# Patient Record
Sex: MALE | Race: WHITE | NOT HISPANIC OR LATINO | Employment: PART TIME | URBAN - METROPOLITAN AREA
[De-identification: names, ages, dates, MRNs, and addresses within clinical notes are randomized per-mention and may not be internally consistent; named-entity substitution may affect disease eponyms.]

---

## 2017-05-05 ENCOUNTER — TRANSCRIBE ORDERS (OUTPATIENT)
Dept: ADMINISTRATIVE | Age: 67
End: 2017-05-05

## 2017-05-05 ENCOUNTER — LAB (OUTPATIENT)
Dept: LAB | Age: 67
End: 2017-05-05
Payer: COMMERCIAL

## 2017-05-05 DIAGNOSIS — E11.319 TYPE 2 DIABETES MELLITUS WITH RETINOPATHY, MACULAR EDEMA PRESENCE UNSPECIFIED, UNSPECIFIED LATERALITY, UNSPECIFIED LONG TERM INSULIN USE STATUS, UNSPECIFIED RETINOPATHY SEVERITY: ICD-10-CM

## 2017-05-05 DIAGNOSIS — E78.5 HYPERLIPIDEMIA, UNSPECIFIED HYPERLIPIDEMIA TYPE: ICD-10-CM

## 2017-05-05 DIAGNOSIS — E11.319 TYPE 2 DIABETES MELLITUS WITH RETINOPATHY, MACULAR EDEMA PRESENCE UNSPECIFIED, UNSPECIFIED LATERALITY, UNSPECIFIED LONG TERM INSULIN USE STATUS, UNSPECIFIED RETINOPATHY SEVERITY: Primary | ICD-10-CM

## 2017-05-05 DIAGNOSIS — IMO0001 UNCONTROLLED DIABETES MELLITUS TYPE 2 WITHOUT COMPLICATIONS, UNSPECIFIED LONG TERM INSULIN USE STATUS: Primary | ICD-10-CM

## 2017-05-05 DIAGNOSIS — IMO0001 UNCONTROLLED DIABETES MELLITUS TYPE 2 WITHOUT COMPLICATIONS, UNSPECIFIED LONG TERM INSULIN USE STATUS: ICD-10-CM

## 2017-05-05 LAB
25(OH)D3 SERPL-MCNC: 33.8 NG/ML (ref 30–100)
ALBUMIN SERPL BCP-MCNC: 3.8 G/DL (ref 3.5–5)
ALP SERPL-CCNC: 83 U/L (ref 46–116)
ALT SERPL W P-5'-P-CCNC: 21 U/L (ref 12–78)
ANION GAP SERPL CALCULATED.3IONS-SCNC: 9 MMOL/L (ref 4–13)
AST SERPL W P-5'-P-CCNC: 11 U/L (ref 5–45)
BASOPHILS # BLD AUTO: 0.03 THOUSANDS/ΜL (ref 0–0.1)
BASOPHILS NFR BLD AUTO: 0 % (ref 0–1)
BILIRUB SERPL-MCNC: 0.44 MG/DL (ref 0.2–1)
BUN SERPL-MCNC: 19 MG/DL (ref 5–25)
CALCIUM SERPL-MCNC: 9.4 MG/DL (ref 8.3–10.1)
CHLORIDE SERPL-SCNC: 111 MMOL/L (ref 100–108)
CHOLEST SERPL-MCNC: 120 MG/DL (ref 50–200)
CO2 SERPL-SCNC: 24 MMOL/L (ref 21–32)
CREAT SERPL-MCNC: 1.13 MG/DL (ref 0.6–1.3)
EOSINOPHIL # BLD AUTO: 0.2 THOUSAND/ΜL (ref 0–0.61)
EOSINOPHIL NFR BLD AUTO: 3 % (ref 0–6)
ERYTHROCYTE [DISTWIDTH] IN BLOOD BY AUTOMATED COUNT: 13.2 % (ref 11.6–15.1)
EST. AVERAGE GLUCOSE BLD GHB EST-MCNC: 197 MG/DL
GFR SERPL CREATININE-BSD FRML MDRD: >60 ML/MIN/1.73SQ M
GLUCOSE P FAST SERPL-MCNC: 145 MG/DL (ref 65–99)
HBA1C MFR BLD: 8.5 % (ref 4.2–6.3)
HCT VFR BLD AUTO: 45.4 % (ref 36.5–49.3)
HDLC SERPL-MCNC: 38 MG/DL (ref 40–60)
HGB BLD-MCNC: 15.4 G/DL (ref 12–17)
LDLC SERPL CALC-MCNC: 54 MG/DL (ref 0–100)
LDLC SERPL DIRECT ASSAY-MCNC: 65 MG/DL (ref 0–100)
LYMPHOCYTES # BLD AUTO: 2.43 THOUSANDS/ΜL (ref 0.6–4.47)
LYMPHOCYTES NFR BLD AUTO: 32 % (ref 14–44)
MCH RBC QN AUTO: 30.6 PG (ref 26.8–34.3)
MCHC RBC AUTO-ENTMCNC: 33.9 G/DL (ref 31.4–37.4)
MCV RBC AUTO: 90 FL (ref 82–98)
MONOCYTES # BLD AUTO: 0.65 THOUSAND/ΜL (ref 0.17–1.22)
MONOCYTES NFR BLD AUTO: 9 % (ref 4–12)
NEUTROPHILS # BLD AUTO: 4.26 THOUSANDS/ΜL (ref 1.85–7.62)
NEUTS SEG NFR BLD AUTO: 56 % (ref 43–75)
NRBC BLD AUTO-RTO: 0 /100 WBCS
PLATELET # BLD AUTO: 214 THOUSANDS/UL (ref 149–390)
PMV BLD AUTO: 12.1 FL (ref 8.9–12.7)
POTASSIUM SERPL-SCNC: 3.8 MMOL/L (ref 3.5–5.3)
PROT SERPL-MCNC: 7.6 G/DL (ref 6.4–8.2)
PSA SERPL-MCNC: 0.9 NG/ML (ref 0–4)
RBC # BLD AUTO: 5.03 MILLION/UL (ref 3.88–5.62)
SODIUM SERPL-SCNC: 144 MMOL/L (ref 136–145)
TRIGL SERPL-MCNC: 141 MG/DL
TSH SERPL DL<=0.05 MIU/L-ACNC: 0.97 UIU/ML (ref 0.36–3.74)
WBC # BLD AUTO: 7.58 THOUSAND/UL (ref 4.31–10.16)

## 2017-05-05 PROCEDURE — 80053 COMPREHEN METABOLIC PANEL: CPT

## 2017-05-05 PROCEDURE — G0103 PSA SCREENING: HCPCS

## 2017-05-05 PROCEDURE — 36415 COLL VENOUS BLD VENIPUNCTURE: CPT

## 2017-05-05 PROCEDURE — 80061 LIPID PANEL: CPT

## 2017-05-05 PROCEDURE — 82306 VITAMIN D 25 HYDROXY: CPT

## 2017-05-05 PROCEDURE — 84443 ASSAY THYROID STIM HORMONE: CPT

## 2017-05-05 PROCEDURE — 83721 ASSAY OF BLOOD LIPOPROTEIN: CPT

## 2017-05-05 PROCEDURE — 85025 COMPLETE CBC W/AUTO DIFF WBC: CPT

## 2017-05-05 PROCEDURE — 83036 HEMOGLOBIN GLYCOSYLATED A1C: CPT

## 2018-04-13 ENCOUNTER — TRANSCRIBE ORDERS (OUTPATIENT)
Dept: ADMINISTRATIVE | Age: 68
End: 2018-04-13

## 2018-04-13 ENCOUNTER — APPOINTMENT (OUTPATIENT)
Dept: LAB | Age: 68
End: 2018-04-13
Payer: COMMERCIAL

## 2018-04-13 DIAGNOSIS — IMO0002 UNCONTROLLED TYPE 2 DIABETES MELLITUS WITH OTHER SPECIFIED COMPLICATION, WITHOUT LONG-TERM CURRENT USE OF INSULIN: ICD-10-CM

## 2018-04-13 DIAGNOSIS — I10 ESSENTIAL HYPERTENSION, MALIGNANT: ICD-10-CM

## 2018-04-13 DIAGNOSIS — IMO0002 UNCONTROLLED TYPE 2 DIABETES MELLITUS WITH OTHER SPECIFIED COMPLICATION, WITHOUT LONG-TERM CURRENT USE OF INSULIN: Primary | ICD-10-CM

## 2018-04-13 LAB
CREAT UR-MCNC: 95.7 MG/DL
EST. AVERAGE GLUCOSE BLD GHB EST-MCNC: 212 MG/DL
HBA1C MFR BLD: 9 % (ref 4.2–6.3)
MICROALBUMIN UR-MCNC: 59.9 MG/L (ref 0–20)
MICROALBUMIN/CREAT 24H UR: 63 MG/G CREATININE (ref 0–30)

## 2018-04-13 PROCEDURE — 82570 ASSAY OF URINE CREATININE: CPT | Performed by: INTERNAL MEDICINE

## 2018-04-13 PROCEDURE — 82043 UR ALBUMIN QUANTITATIVE: CPT | Performed by: INTERNAL MEDICINE

## 2018-04-13 PROCEDURE — 83036 HEMOGLOBIN GLYCOSYLATED A1C: CPT

## 2018-04-13 PROCEDURE — 36415 COLL VENOUS BLD VENIPUNCTURE: CPT

## 2018-12-30 ENCOUNTER — APPOINTMENT (OUTPATIENT)
Dept: LAB | Age: 68
End: 2018-12-30
Payer: COMMERCIAL

## 2018-12-30 ENCOUNTER — TRANSCRIBE ORDERS (OUTPATIENT)
Dept: ADMINISTRATIVE | Age: 68
End: 2018-12-30

## 2018-12-30 DIAGNOSIS — I51.9 MYXEDEMA HEART DISEASE: ICD-10-CM

## 2018-12-30 DIAGNOSIS — E03.9 MYXEDEMA HEART DISEASE: ICD-10-CM

## 2018-12-30 DIAGNOSIS — E55.9 VITAMIN D DEFICIENCY DISEASE: ICD-10-CM

## 2018-12-30 DIAGNOSIS — E11.649 UNCONTROLLED TYPE 2 DIABETES MELLITUS WITH HYPOGLYCEMIA, UNSPECIFIED HYPOGLYCEMIA COMA STATUS (HCC): Primary | ICD-10-CM

## 2018-12-30 DIAGNOSIS — E78.5 HYPERLIPIDEMIA, UNSPECIFIED HYPERLIPIDEMIA TYPE: ICD-10-CM

## 2018-12-30 DIAGNOSIS — E11.649 UNCONTROLLED TYPE 2 DIABETES MELLITUS WITH HYPOGLYCEMIA, UNSPECIFIED HYPOGLYCEMIA COMA STATUS (HCC): ICD-10-CM

## 2018-12-30 LAB
25(OH)D3 SERPL-MCNC: 40.8 NG/ML (ref 30–100)
ALBUMIN SERPL BCP-MCNC: 3.5 G/DL (ref 3.5–5)
ALP SERPL-CCNC: 83 U/L (ref 46–116)
ALT SERPL W P-5'-P-CCNC: 18 U/L (ref 12–78)
ANION GAP SERPL CALCULATED.3IONS-SCNC: 6 MMOL/L (ref 4–13)
AST SERPL W P-5'-P-CCNC: 10 U/L (ref 5–45)
BASOPHILS # BLD AUTO: 0.04 THOUSANDS/ΜL (ref 0–0.1)
BASOPHILS NFR BLD AUTO: 1 % (ref 0–1)
BILIRUB SERPL-MCNC: 0.56 MG/DL (ref 0.2–1)
BUN SERPL-MCNC: 17 MG/DL (ref 5–25)
CALCIUM SERPL-MCNC: 8.7 MG/DL (ref 8.3–10.1)
CHLORIDE SERPL-SCNC: 108 MMOL/L (ref 100–108)
CHOLEST SERPL-MCNC: 108 MG/DL (ref 50–200)
CO2 SERPL-SCNC: 26 MMOL/L (ref 21–32)
CREAT SERPL-MCNC: 1.12 MG/DL (ref 0.6–1.3)
EOSINOPHIL # BLD AUTO: 0.18 THOUSAND/ΜL (ref 0–0.61)
EOSINOPHIL NFR BLD AUTO: 3 % (ref 0–6)
ERYTHROCYTE [DISTWIDTH] IN BLOOD BY AUTOMATED COUNT: 12.2 % (ref 11.6–15.1)
EST. AVERAGE GLUCOSE BLD GHB EST-MCNC: 223 MG/DL
GFR SERPL CREATININE-BSD FRML MDRD: 68 ML/MIN/1.73SQ M
GLUCOSE P FAST SERPL-MCNC: 234 MG/DL (ref 65–99)
HBA1C MFR BLD: 9.4 % (ref 4.2–6.3)
HCT VFR BLD AUTO: 44.8 % (ref 36.5–49.3)
HDLC SERPL-MCNC: 37 MG/DL (ref 40–60)
HGB BLD-MCNC: 14.8 G/DL (ref 12–17)
IMM GRANULOCYTES # BLD AUTO: 0.01 THOUSAND/UL (ref 0–0.2)
IMM GRANULOCYTES NFR BLD AUTO: 0 % (ref 0–2)
LDLC SERPL CALC-MCNC: 51 MG/DL (ref 0–100)
LDLC SERPL DIRECT ASSAY-MCNC: 68 MG/DL (ref 0–100)
LYMPHOCYTES # BLD AUTO: 1.91 THOUSANDS/ΜL (ref 0.6–4.47)
LYMPHOCYTES NFR BLD AUTO: 28 % (ref 14–44)
MCH RBC QN AUTO: 30.5 PG (ref 26.8–34.3)
MCHC RBC AUTO-ENTMCNC: 33 G/DL (ref 31.4–37.4)
MCV RBC AUTO: 92 FL (ref 82–98)
MONOCYTES # BLD AUTO: 0.5 THOUSAND/ΜL (ref 0.17–1.22)
MONOCYTES NFR BLD AUTO: 7 % (ref 4–12)
NEUTROPHILS # BLD AUTO: 4.2 THOUSANDS/ΜL (ref 1.85–7.62)
NEUTS SEG NFR BLD AUTO: 61 % (ref 43–75)
NONHDLC SERPL-MCNC: 71 MG/DL
NRBC BLD AUTO-RTO: 0 /100 WBCS
PLATELET # BLD AUTO: 192 THOUSANDS/UL (ref 149–390)
PMV BLD AUTO: 11.9 FL (ref 8.9–12.7)
POTASSIUM SERPL-SCNC: 4.3 MMOL/L (ref 3.5–5.3)
PROT SERPL-MCNC: 7.3 G/DL (ref 6.4–8.2)
PSA SERPL-MCNC: 0.8 NG/ML (ref 0–4)
RBC # BLD AUTO: 4.86 MILLION/UL (ref 3.88–5.62)
SODIUM SERPL-SCNC: 140 MMOL/L (ref 136–145)
TRIGL SERPL-MCNC: 101 MG/DL
TSH SERPL DL<=0.05 MIU/L-ACNC: 1.01 UIU/ML (ref 0.36–3.74)
WBC # BLD AUTO: 6.84 THOUSAND/UL (ref 4.31–10.16)

## 2018-12-30 PROCEDURE — 36415 COLL VENOUS BLD VENIPUNCTURE: CPT

## 2018-12-30 PROCEDURE — 80053 COMPREHEN METABOLIC PANEL: CPT

## 2018-12-30 PROCEDURE — 82306 VITAMIN D 25 HYDROXY: CPT

## 2018-12-30 PROCEDURE — 84443 ASSAY THYROID STIM HORMONE: CPT

## 2018-12-30 PROCEDURE — 83036 HEMOGLOBIN GLYCOSYLATED A1C: CPT

## 2018-12-30 PROCEDURE — 83721 ASSAY OF BLOOD LIPOPROTEIN: CPT

## 2018-12-30 PROCEDURE — 80061 LIPID PANEL: CPT

## 2018-12-30 PROCEDURE — 85025 COMPLETE CBC W/AUTO DIFF WBC: CPT

## 2018-12-30 PROCEDURE — G0103 PSA SCREENING: HCPCS

## 2019-01-08 ENCOUNTER — APPOINTMENT (OUTPATIENT)
Dept: LAB | Age: 69
End: 2019-01-08
Payer: COMMERCIAL

## 2019-01-08 ENCOUNTER — TRANSCRIBE ORDERS (OUTPATIENT)
Dept: ADMINISTRATIVE | Age: 69
End: 2019-01-08

## 2019-01-08 DIAGNOSIS — E78.5 HYPERLIPIDEMIA, UNSPECIFIED HYPERLIPIDEMIA TYPE: Primary | ICD-10-CM

## 2019-01-08 DIAGNOSIS — E78.5 HYPERLIPIDEMIA, UNSPECIFIED HYPERLIPIDEMIA TYPE: ICD-10-CM

## 2019-01-08 LAB
BACTERIA UR QL AUTO: NORMAL /HPF
BILIRUB UR QL STRIP: NEGATIVE
CLARITY UR: CLEAR
COLOR UR: YELLOW
GLUCOSE UR STRIP-MCNC: ABNORMAL MG/DL
HGB UR QL STRIP.AUTO: NEGATIVE
HYALINE CASTS #/AREA URNS LPF: NORMAL /LPF
KETONES UR STRIP-MCNC: NEGATIVE MG/DL
LEUKOCYTE ESTERASE UR QL STRIP: ABNORMAL
NITRITE UR QL STRIP: NEGATIVE
NON-SQ EPI CELLS URNS QL MICRO: NORMAL /HPF
PH UR STRIP.AUTO: 5.5 [PH] (ref 4.5–8)
PROT UR STRIP-MCNC: NEGATIVE MG/DL
RBC #/AREA URNS AUTO: NORMAL /HPF
SP GR UR STRIP.AUTO: 1.04 (ref 1–1.03)
UROBILINOGEN UR QL STRIP.AUTO: 0.2 E.U./DL
WBC #/AREA URNS AUTO: NORMAL /HPF

## 2019-01-08 PROCEDURE — 81001 URINALYSIS AUTO W/SCOPE: CPT | Performed by: INTERNAL MEDICINE

## 2019-07-21 ENCOUNTER — APPOINTMENT (OUTPATIENT)
Dept: LAB | Age: 69
End: 2019-07-21
Payer: COMMERCIAL

## 2019-07-21 ENCOUNTER — TRANSCRIBE ORDERS (OUTPATIENT)
Dept: ADMINISTRATIVE | Age: 69
End: 2019-07-21

## 2019-07-21 DIAGNOSIS — E11.65 UNCONTROLLED TYPE 2 DIABETES MELLITUS WITH HYPERGLYCEMIA (HCC): ICD-10-CM

## 2019-07-21 DIAGNOSIS — E78.5 HYPERLIPIDEMIA, UNSPECIFIED HYPERLIPIDEMIA TYPE: Primary | ICD-10-CM

## 2019-07-21 DIAGNOSIS — E78.5 HYPERLIPIDEMIA, UNSPECIFIED HYPERLIPIDEMIA TYPE: ICD-10-CM

## 2019-07-21 LAB
ALBUMIN SERPL BCP-MCNC: 3.7 G/DL (ref 3.5–5)
ALP SERPL-CCNC: 82 U/L (ref 46–116)
ALT SERPL W P-5'-P-CCNC: 22 U/L (ref 12–78)
ANION GAP SERPL CALCULATED.3IONS-SCNC: 4 MMOL/L (ref 4–13)
AST SERPL W P-5'-P-CCNC: 12 U/L (ref 5–45)
BASOPHILS # BLD AUTO: 0.06 THOUSANDS/ΜL (ref 0–0.1)
BASOPHILS NFR BLD AUTO: 1 % (ref 0–1)
BILIRUB SERPL-MCNC: 0.45 MG/DL (ref 0.2–1)
BUN SERPL-MCNC: 16 MG/DL (ref 5–25)
CALCIUM SERPL-MCNC: 9.5 MG/DL (ref 8.3–10.1)
CHLORIDE SERPL-SCNC: 106 MMOL/L (ref 100–108)
CHOLEST SERPL-MCNC: 132 MG/DL (ref 50–200)
CO2 SERPL-SCNC: 27 MMOL/L (ref 21–32)
CREAT SERPL-MCNC: 1.12 MG/DL (ref 0.6–1.3)
CREAT UR-MCNC: 64 MG/DL
EOSINOPHIL # BLD AUTO: 0.24 THOUSAND/ΜL (ref 0–0.61)
EOSINOPHIL NFR BLD AUTO: 3 % (ref 0–6)
ERYTHROCYTE [DISTWIDTH] IN BLOOD BY AUTOMATED COUNT: 12.3 % (ref 11.6–15.1)
GFR SERPL CREATININE-BSD FRML MDRD: 67 ML/MIN/1.73SQ M
GLUCOSE P FAST SERPL-MCNC: 239 MG/DL (ref 65–99)
HCT VFR BLD AUTO: 45.1 % (ref 36.5–49.3)
HDLC SERPL-MCNC: 42 MG/DL (ref 40–60)
HGB BLD-MCNC: 14.9 G/DL (ref 12–17)
IMM GRANULOCYTES # BLD AUTO: 0.02 THOUSAND/UL (ref 0–0.2)
IMM GRANULOCYTES NFR BLD AUTO: 0 % (ref 0–2)
LDLC SERPL CALC-MCNC: 68 MG/DL (ref 0–100)
LYMPHOCYTES # BLD AUTO: 2.13 THOUSANDS/ΜL (ref 0.6–4.47)
LYMPHOCYTES NFR BLD AUTO: 29 % (ref 14–44)
MCH RBC QN AUTO: 30.1 PG (ref 26.8–34.3)
MCHC RBC AUTO-ENTMCNC: 33 G/DL (ref 31.4–37.4)
MCV RBC AUTO: 91 FL (ref 82–98)
MICROALBUMIN UR-MCNC: 47.3 MG/L (ref 0–20)
MICROALBUMIN/CREAT 24H UR: 74 MG/G CREATININE (ref 0–30)
MONOCYTES # BLD AUTO: 0.7 THOUSAND/ΜL (ref 0.17–1.22)
MONOCYTES NFR BLD AUTO: 10 % (ref 4–12)
NEUTROPHILS # BLD AUTO: 4.24 THOUSANDS/ΜL (ref 1.85–7.62)
NEUTS SEG NFR BLD AUTO: 57 % (ref 43–75)
NONHDLC SERPL-MCNC: 90 MG/DL
NRBC BLD AUTO-RTO: 0 /100 WBCS
PLATELET # BLD AUTO: 209 THOUSANDS/UL (ref 149–390)
PMV BLD AUTO: 11.8 FL (ref 8.9–12.7)
POTASSIUM SERPL-SCNC: 5 MMOL/L (ref 3.5–5.3)
PROT SERPL-MCNC: 7.2 G/DL (ref 6.4–8.2)
RBC # BLD AUTO: 4.95 MILLION/UL (ref 3.88–5.62)
SODIUM SERPL-SCNC: 137 MMOL/L (ref 136–145)
TRIGL SERPL-MCNC: 112 MG/DL
WBC # BLD AUTO: 7.39 THOUSAND/UL (ref 4.31–10.16)

## 2019-07-21 PROCEDURE — 85025 COMPLETE CBC W/AUTO DIFF WBC: CPT

## 2019-07-21 PROCEDURE — 80061 LIPID PANEL: CPT

## 2019-07-21 PROCEDURE — 82570 ASSAY OF URINE CREATININE: CPT | Performed by: INTERNAL MEDICINE

## 2019-07-21 PROCEDURE — 36415 COLL VENOUS BLD VENIPUNCTURE: CPT

## 2019-07-21 PROCEDURE — 82043 UR ALBUMIN QUANTITATIVE: CPT | Performed by: INTERNAL MEDICINE

## 2019-07-21 PROCEDURE — 80053 COMPREHEN METABOLIC PANEL: CPT

## 2019-07-26 ENCOUNTER — TRANSCRIBE ORDERS (OUTPATIENT)
Dept: ADMINISTRATIVE | Age: 69
End: 2019-07-26

## 2019-07-26 ENCOUNTER — APPOINTMENT (OUTPATIENT)
Dept: LAB | Age: 69
End: 2019-07-26
Payer: COMMERCIAL

## 2019-07-26 DIAGNOSIS — E11.649 UNCONTROLLED TYPE 2 DIABETES MELLITUS WITH HYPOGLYCEMIA WITHOUT COMA (HCC): Primary | ICD-10-CM

## 2019-07-26 DIAGNOSIS — E11.649 UNCONTROLLED TYPE 2 DIABETES MELLITUS WITH HYPOGLYCEMIA WITHOUT COMA (HCC): ICD-10-CM

## 2019-07-26 LAB
EST. AVERAGE GLUCOSE BLD GHB EST-MCNC: 258 MG/DL
HBA1C MFR BLD: 10.6 % (ref 4.2–6.3)

## 2019-07-26 PROCEDURE — 36415 COLL VENOUS BLD VENIPUNCTURE: CPT

## 2019-07-26 PROCEDURE — 83036 HEMOGLOBIN GLYCOSYLATED A1C: CPT

## 2019-11-03 ENCOUNTER — APPOINTMENT (OUTPATIENT)
Dept: LAB | Age: 69
End: 2019-11-03
Payer: COMMERCIAL

## 2019-11-03 ENCOUNTER — TRANSCRIBE ORDERS (OUTPATIENT)
Dept: ADMINISTRATIVE | Age: 69
End: 2019-11-03

## 2019-11-03 DIAGNOSIS — E11.65 TYPE 2 DIABETES MELLITUS WITH HYPERGLYCEMIA, UNSPECIFIED WHETHER LONG TERM INSULIN USE (HCC): ICD-10-CM

## 2019-11-03 DIAGNOSIS — E78.5 HYPERLIPIDEMIA, UNSPECIFIED HYPERLIPIDEMIA TYPE: ICD-10-CM

## 2019-11-03 DIAGNOSIS — E78.5 HYPERLIPIDEMIA, UNSPECIFIED HYPERLIPIDEMIA TYPE: Primary | ICD-10-CM

## 2019-11-03 LAB
ALBUMIN SERPL BCP-MCNC: 3.7 G/DL (ref 3.5–5)
ALP SERPL-CCNC: 72 U/L (ref 46–116)
ALT SERPL W P-5'-P-CCNC: 20 U/L (ref 12–78)
ANION GAP SERPL CALCULATED.3IONS-SCNC: 6 MMOL/L (ref 4–13)
AST SERPL W P-5'-P-CCNC: 9 U/L (ref 5–45)
BILIRUB SERPL-MCNC: 0.56 MG/DL (ref 0.2–1)
BUN SERPL-MCNC: 21 MG/DL (ref 5–25)
CALCIUM SERPL-MCNC: 9.3 MG/DL (ref 8.3–10.1)
CHLORIDE SERPL-SCNC: 108 MMOL/L (ref 100–108)
CO2 SERPL-SCNC: 26 MMOL/L (ref 21–32)
CREAT SERPL-MCNC: 1.23 MG/DL (ref 0.6–1.3)
EST. AVERAGE GLUCOSE BLD GHB EST-MCNC: 177 MG/DL
GFR SERPL CREATININE-BSD FRML MDRD: 60 ML/MIN/1.73SQ M
GLUCOSE SERPL-MCNC: 263 MG/DL (ref 65–140)
HBA1C MFR BLD: 7.8 % (ref 4.2–6.3)
POTASSIUM SERPL-SCNC: 4.7 MMOL/L (ref 3.5–5.3)
PROT SERPL-MCNC: 7 G/DL (ref 6.4–8.2)
SODIUM SERPL-SCNC: 140 MMOL/L (ref 136–145)

## 2019-11-03 PROCEDURE — 36415 COLL VENOUS BLD VENIPUNCTURE: CPT

## 2019-11-03 PROCEDURE — 80053 COMPREHEN METABOLIC PANEL: CPT

## 2019-11-03 PROCEDURE — 83036 HEMOGLOBIN GLYCOSYLATED A1C: CPT

## 2020-09-08 ENCOUNTER — OFFICE VISIT (OUTPATIENT)
Dept: INTERNAL MEDICINE CLINIC | Facility: CLINIC | Age: 70
End: 2020-09-08
Payer: MEDICARE

## 2020-09-08 VITALS
WEIGHT: 142 LBS | BODY MASS INDEX: 22.82 KG/M2 | HEIGHT: 66 IN | SYSTOLIC BLOOD PRESSURE: 139 MMHG | HEART RATE: 79 BPM | DIASTOLIC BLOOD PRESSURE: 82 MMHG | TEMPERATURE: 97.6 F

## 2020-09-08 DIAGNOSIS — I10 ESSENTIAL HYPERTENSION: ICD-10-CM

## 2020-09-08 DIAGNOSIS — Z79.4 TYPE 2 DIABETES MELLITUS WITH HYPERGLYCEMIA, WITH LONG-TERM CURRENT USE OF INSULIN (HCC): Primary | ICD-10-CM

## 2020-09-08 DIAGNOSIS — N52.1 ERECTILE DISORDER DUE TO MEDICAL CONDITION IN MALE: ICD-10-CM

## 2020-09-08 DIAGNOSIS — Z23 ENCOUNTER FOR IMMUNIZATION: ICD-10-CM

## 2020-09-08 DIAGNOSIS — E11.65 TYPE 2 DIABETES MELLITUS WITH HYPERGLYCEMIA, WITH LONG-TERM CURRENT USE OF INSULIN (HCC): Primary | ICD-10-CM

## 2020-09-08 DIAGNOSIS — E78.2 MIXED HYPERLIPIDEMIA: ICD-10-CM

## 2020-09-08 PROCEDURE — 99214 OFFICE O/P EST MOD 30 MIN: CPT | Performed by: INTERNAL MEDICINE

## 2020-09-08 PROCEDURE — G0008 ADMIN INFLUENZA VIRUS VAC: HCPCS | Performed by: INTERNAL MEDICINE

## 2020-09-08 PROCEDURE — 90653 IIV ADJUVANT VACCINE IM: CPT | Performed by: INTERNAL MEDICINE

## 2020-09-08 RX ORDER — ATORVASTATIN CALCIUM 40 MG/1
40 TABLET, FILM COATED ORAL
COMMUNITY
Start: 2020-07-16 | End: 2020-11-05

## 2020-09-08 RX ORDER — DAPAGLIFLOZIN 10 MG/1
10 TABLET, FILM COATED ORAL DAILY
COMMUNITY
Start: 2020-07-16 | End: 2020-10-12

## 2020-09-08 RX ORDER — SILDENAFIL 100 MG/1
100 TABLET, FILM COATED ORAL DAILY PRN
COMMUNITY
End: 2021-06-19 | Stop reason: SDUPTHER

## 2020-09-08 RX ORDER — METFORMIN HYDROCHLORIDE 500 MG/1
1000 TABLET, EXTENDED RELEASE ORAL 2 TIMES DAILY
COMMUNITY
Start: 2020-06-22 | End: 2020-11-05 | Stop reason: SDUPTHER

## 2020-09-08 RX ORDER — AMLODIPINE BESYLATE 10 MG/1
10 TABLET ORAL DAILY
COMMUNITY
Start: 2020-07-04 | End: 2020-09-28

## 2020-09-08 RX ORDER — VALSARTAN 320 MG/1
320 TABLET ORAL DAILY
COMMUNITY
Start: 2020-07-26 | End: 2020-10-19

## 2020-09-08 RX ORDER — GLIPIZIDE 10 MG/1
10 TABLET, FILM COATED, EXTENDED RELEASE ORAL 2 TIMES DAILY
COMMUNITY
Start: 2020-07-26 | End: 2020-10-22

## 2020-09-08 NOTE — PROGRESS NOTES
Assessment/Plan:           1  Type 2 diabetes mellitus with hyperglycemia, with long-term current use of insulin ([address] )    2  Mixed hyperlipidemia    3  Erectile disorder due to medical condition in male    4  Essential hypertension           1  Type 2 diabetes mellitus with hyperglycemia, with long-term current use of insulin ([facility])    - Hemoglobin A1C; Future  - Microalbumin / creatinine urine ratio    2  Mixed hyperlipidemia      3  Erectile disorder due to medical condition in male      4  Essential hypertension    - CBC and differential; Future  - Comprehensive metabolic panel; Future           Subjective:      Patient ID: Chad Nieto is a 72 y o  male  HPI    The following portions of the patient's history were reviewed and updated as appropriate: He  has no past medical history on file  He There are no active problems to display for this patient  He  has no past surgical history on file  His family history is not on file  He  reports that he has quit smoking  He has never used smokeless tobacco  He reports previous alcohol use  No history on file for drug  Current Outpatient Medications   Medication Sig Dispense Refill    amLODIPine (NORVASC) 10 mg tablet Take 10 mg by mouth daily Use as directed      atorvastatin (LIPITOR) 40 mg tablet Take 40 mg by mouth daily at bedtime      Farxiga 10 MG TABS Take 10 mg by mouth daily      glipiZIDE (GLUCOTROL XL) 10 mg 24 hr tablet Take 10 mg by mouth 2 (two) times a day      linaGLIPtin 5 MG TABS Take 5 mg by mouth daily      metFORMIN (GLUCOPHAGE-XR) 500 mg 24 hr tablet Take 1,000 mg by mouth 2 (two) times a day      sildenafil (VIAGRA) 100 mg tablet Take 100 mg by mouth daily as needed for erectile dysfunction      valsartan (DIOVAN) 320 MG tablet Take 320 mg by mouth daily       No current facility-administered medications for this visit        Current Outpatient Medications on File Prior to Visit   Medication Sig    amLODIPine (NORVASC) 10 mg tablet Take 10 mg by mouth daily Use as directed    atorvastatin (LIPITOR) 40 mg tablet Take 40 mg by mouth daily at bedtime    Farxiga 10 MG TABS Take 10 mg by mouth daily    glipiZIDE (GLUCOTROL XL) 10 mg 24 hr tablet Take 10 mg by mouth 2 (two) times a day    linaGLIPtin 5 MG TABS Take 5 mg by mouth daily    metFORMIN (GLUCOPHAGE-XR) 500 mg 24 hr tablet Take 1,000 mg by mouth 2 (two) times a day    sildenafil (VIAGRA) 100 mg tablet Take 100 mg by mouth daily as needed for erectile dysfunction    valsartan (DIOVAN) 320 MG tablet Take 320 mg by mouth daily     No current facility-administered medications on file prior to visit  He has No Known Allergies       Review of Systems   Constitutional: Negative for activity change  HENT: Negative for congestion  Respiratory: Negative for apnea  Cardiovascular: Negative for chest pain  Gastrointestinal: Negative for abdominal distention  Genitourinary: Negative for dysuria  Musculoskeletal: Positive for arthralgias  Neurological: Negative for headaches  Psychiatric/Behavioral: Negative for behavioral problems  Objective:      /82 (BP Location: Left arm, Patient Position: Sitting, Cuff Size: Adult)   Pulse 79   Temp 97 6 °F (36 4 °C) (Temporal)   Ht 5' 6" (1 676 m)   Wt 64 4 kg (142 lb)   BMI 22 92 kg/m²     No results found for this or any previous visit (from the past 1344 hour(s))  Physical Exam  HENT:      Head: Normocephalic and atraumatic  Nose: Nose normal       Mouth/Throat:      Mouth: Mucous membranes are moist       Pharynx: Oropharynx is clear  Eyes:      Extraocular Movements: Extraocular movements intact  Pupils: Pupils are equal, round, and reactive to light  Cardiovascular:      Rate and Rhythm: Normal rate and regular rhythm  Pulses: Normal pulses  Heart sounds: Normal heart sounds  Pulmonary:      Effort: Pulmonary effort is normal    Musculoskeletal: Normal range of motion  Neurological:      Mental Status: He is alert

## 2020-09-26 DIAGNOSIS — I10 ESSENTIAL HYPERTENSION: Primary | ICD-10-CM

## 2020-09-28 RX ORDER — AMLODIPINE BESYLATE 10 MG/1
TABLET ORAL
Qty: 90 TABLET | Refills: 0 | Status: SHIPPED | OUTPATIENT
Start: 2020-09-28 | End: 2020-11-05

## 2020-10-11 DIAGNOSIS — E11.65 TYPE 2 DIABETES MELLITUS WITH HYPERGLYCEMIA, WITH LONG-TERM CURRENT USE OF INSULIN (HCC): Primary | ICD-10-CM

## 2020-10-11 DIAGNOSIS — Z79.4 TYPE 2 DIABETES MELLITUS WITH HYPERGLYCEMIA, WITH LONG-TERM CURRENT USE OF INSULIN (HCC): Primary | ICD-10-CM

## 2020-10-12 RX ORDER — DAPAGLIFLOZIN 10 MG/1
TABLET, FILM COATED ORAL
Qty: 90 TABLET | Refills: 0 | Status: SHIPPED | OUTPATIENT
Start: 2020-10-12 | End: 2020-11-05

## 2020-10-18 DIAGNOSIS — I10 ESSENTIAL HYPERTENSION: Primary | ICD-10-CM

## 2020-10-19 RX ORDER — VALSARTAN 320 MG/1
TABLET ORAL
Qty: 90 TABLET | Refills: 0 | Status: SHIPPED | OUTPATIENT
Start: 2020-10-19 | End: 2021-03-28 | Stop reason: SDUPTHER

## 2020-10-22 DIAGNOSIS — Z79.4 TYPE 2 DIABETES MELLITUS WITH HYPERGLYCEMIA, WITH LONG-TERM CURRENT USE OF INSULIN (HCC): Primary | ICD-10-CM

## 2020-10-22 DIAGNOSIS — E11.65 TYPE 2 DIABETES MELLITUS WITH HYPERGLYCEMIA, WITH LONG-TERM CURRENT USE OF INSULIN (HCC): Primary | ICD-10-CM

## 2020-10-22 RX ORDER — GLIPIZIDE 10 MG/1
TABLET, FILM COATED, EXTENDED RELEASE ORAL
Qty: 180 TABLET | Refills: 0 | Status: SHIPPED | OUTPATIENT
Start: 2020-10-22 | End: 2020-12-15 | Stop reason: SDUPTHER

## 2020-11-05 DIAGNOSIS — Z79.4 TYPE 2 DIABETES MELLITUS WITH HYPERGLYCEMIA, WITH LONG-TERM CURRENT USE OF INSULIN (HCC): ICD-10-CM

## 2020-11-05 DIAGNOSIS — E11.65 TYPE 2 DIABETES MELLITUS WITH HYPERGLYCEMIA, WITH LONG-TERM CURRENT USE OF INSULIN (HCC): ICD-10-CM

## 2020-11-05 DIAGNOSIS — I10 ESSENTIAL HYPERTENSION: ICD-10-CM

## 2020-11-05 RX ORDER — AMLODIPINE BESYLATE 10 MG/1
TABLET ORAL
Qty: 90 TABLET | Refills: 0 | Status: SHIPPED | OUTPATIENT
Start: 2020-11-05 | End: 2020-12-15 | Stop reason: SDUPTHER

## 2020-11-05 RX ORDER — DAPAGLIFLOZIN 10 MG/1
TABLET, FILM COATED ORAL
Qty: 90 TABLET | Refills: 0 | Status: SHIPPED | OUTPATIENT
Start: 2020-11-05 | End: 2020-12-08 | Stop reason: ALTCHOICE

## 2020-11-05 RX ORDER — ATORVASTATIN CALCIUM 40 MG/1
TABLET, FILM COATED ORAL
Qty: 90 TABLET | Refills: 1 | Status: SHIPPED | OUTPATIENT
Start: 2020-11-05 | End: 2021-06-19 | Stop reason: SDUPTHER

## 2020-11-05 RX ORDER — METFORMIN HYDROCHLORIDE 500 MG/1
1000 TABLET, EXTENDED RELEASE ORAL 2 TIMES DAILY
Qty: 360 TABLET | Refills: 0 | Status: SHIPPED | OUTPATIENT
Start: 2020-11-05 | End: 2020-12-15 | Stop reason: SDUPTHER

## 2020-11-05 RX ORDER — DAPAGLIFLOZIN 10 MG/1
TABLET, FILM COATED ORAL
Qty: 90 TABLET | Refills: 0 | Status: CANCELLED | OUTPATIENT
Start: 2020-11-05

## 2020-11-05 RX ORDER — ATORVASTATIN CALCIUM 40 MG/1
TABLET, FILM COATED ORAL
Qty: 90 TABLET | Status: CANCELLED | OUTPATIENT
Start: 2020-11-05

## 2020-12-05 ENCOUNTER — APPOINTMENT (OUTPATIENT)
Dept: LAB | Age: 70
End: 2020-12-05
Payer: MEDICARE

## 2020-12-05 DIAGNOSIS — Z79.4 TYPE 2 DIABETES MELLITUS WITH HYPERGLYCEMIA, WITH LONG-TERM CURRENT USE OF INSULIN (HCC): ICD-10-CM

## 2020-12-05 DIAGNOSIS — E11.65 TYPE 2 DIABETES MELLITUS WITH HYPERGLYCEMIA, WITH LONG-TERM CURRENT USE OF INSULIN (HCC): ICD-10-CM

## 2020-12-05 DIAGNOSIS — I10 ESSENTIAL HYPERTENSION: ICD-10-CM

## 2020-12-05 LAB
ALBUMIN SERPL BCP-MCNC: 3.7 G/DL (ref 3.5–5)
ALP SERPL-CCNC: 72 U/L (ref 46–116)
ALT SERPL W P-5'-P-CCNC: 22 U/L (ref 12–78)
ANION GAP SERPL CALCULATED.3IONS-SCNC: 4 MMOL/L (ref 4–13)
AST SERPL W P-5'-P-CCNC: 13 U/L (ref 5–45)
BASOPHILS # BLD AUTO: 0.05 THOUSANDS/ΜL (ref 0–0.1)
BASOPHILS NFR BLD AUTO: 1 % (ref 0–1)
BILIRUB SERPL-MCNC: 0.6 MG/DL (ref 0.2–1)
BUN SERPL-MCNC: 22 MG/DL (ref 5–25)
CALCIUM SERPL-MCNC: 10.1 MG/DL (ref 8.3–10.1)
CHLORIDE SERPL-SCNC: 111 MMOL/L (ref 100–108)
CO2 SERPL-SCNC: 28 MMOL/L (ref 21–32)
CREAT SERPL-MCNC: 1.12 MG/DL (ref 0.6–1.3)
CREAT UR-MCNC: 86.8 MG/DL
EOSINOPHIL # BLD AUTO: 0.3 THOUSAND/ΜL (ref 0–0.61)
EOSINOPHIL NFR BLD AUTO: 5 % (ref 0–6)
ERYTHROCYTE [DISTWIDTH] IN BLOOD BY AUTOMATED COUNT: 12.4 % (ref 11.6–15.1)
EST. AVERAGE GLUCOSE BLD GHB EST-MCNC: 260 MG/DL
GFR SERPL CREATININE-BSD FRML MDRD: 67 ML/MIN/1.73SQ M
GLUCOSE P FAST SERPL-MCNC: 159 MG/DL (ref 65–99)
HBA1C MFR BLD: 10.7 %
HCT VFR BLD AUTO: 44 % (ref 36.5–49.3)
HGB BLD-MCNC: 14.5 G/DL (ref 12–17)
IMM GRANULOCYTES # BLD AUTO: 0.01 THOUSAND/UL (ref 0–0.2)
IMM GRANULOCYTES NFR BLD AUTO: 0 % (ref 0–2)
LYMPHOCYTES # BLD AUTO: 2.32 THOUSANDS/ΜL (ref 0.6–4.47)
LYMPHOCYTES NFR BLD AUTO: 36 % (ref 14–44)
MCH RBC QN AUTO: 30.3 PG (ref 26.8–34.3)
MCHC RBC AUTO-ENTMCNC: 33 G/DL (ref 31.4–37.4)
MCV RBC AUTO: 92 FL (ref 82–98)
MICROALBUMIN UR-MCNC: 80 MG/L (ref 0–20)
MICROALBUMIN/CREAT 24H UR: 92 MG/G CREATININE (ref 0–30)
MONOCYTES # BLD AUTO: 0.51 THOUSAND/ΜL (ref 0.17–1.22)
MONOCYTES NFR BLD AUTO: 8 % (ref 4–12)
NEUTROPHILS # BLD AUTO: 3.22 THOUSANDS/ΜL (ref 1.85–7.62)
NEUTS SEG NFR BLD AUTO: 50 % (ref 43–75)
NRBC BLD AUTO-RTO: 0 /100 WBCS
PLATELET # BLD AUTO: 204 THOUSANDS/UL (ref 149–390)
PMV BLD AUTO: 11.6 FL (ref 8.9–12.7)
POTASSIUM SERPL-SCNC: 6 MMOL/L (ref 3.5–5.3)
PROT SERPL-MCNC: 6.9 G/DL (ref 6.4–8.2)
RBC # BLD AUTO: 4.79 MILLION/UL (ref 3.88–5.62)
SODIUM SERPL-SCNC: 143 MMOL/L (ref 136–145)
WBC # BLD AUTO: 6.41 THOUSAND/UL (ref 4.31–10.16)

## 2020-12-05 PROCEDURE — 83036 HEMOGLOBIN GLYCOSYLATED A1C: CPT

## 2020-12-05 PROCEDURE — 85025 COMPLETE CBC W/AUTO DIFF WBC: CPT

## 2020-12-05 PROCEDURE — 36415 COLL VENOUS BLD VENIPUNCTURE: CPT

## 2020-12-05 PROCEDURE — 82570 ASSAY OF URINE CREATININE: CPT | Performed by: INTERNAL MEDICINE

## 2020-12-05 PROCEDURE — 80053 COMPREHEN METABOLIC PANEL: CPT

## 2020-12-05 PROCEDURE — 82043 UR ALBUMIN QUANTITATIVE: CPT | Performed by: INTERNAL MEDICINE

## 2020-12-08 ENCOUNTER — OFFICE VISIT (OUTPATIENT)
Dept: INTERNAL MEDICINE CLINIC | Facility: CLINIC | Age: 70
End: 2020-12-08
Payer: MEDICARE

## 2020-12-08 VITALS
DIASTOLIC BLOOD PRESSURE: 75 MMHG | BODY MASS INDEX: 23.3 KG/M2 | TEMPERATURE: 97.8 F | HEIGHT: 66 IN | SYSTOLIC BLOOD PRESSURE: 122 MMHG | OXYGEN SATURATION: 97 % | WEIGHT: 145 LBS | HEART RATE: 87 BPM

## 2020-12-08 DIAGNOSIS — Z00.01 ENCOUNTER FOR GENERAL ADULT MEDICAL EXAMINATION WITH ABNORMAL FINDINGS: Primary | ICD-10-CM

## 2020-12-08 DIAGNOSIS — Z11.59 NEED FOR HEPATITIS C SCREENING TEST: ICD-10-CM

## 2020-12-08 DIAGNOSIS — E87.5 HYPERKALEMIA: ICD-10-CM

## 2020-12-08 DIAGNOSIS — E78.2 MIXED HYPERLIPIDEMIA: ICD-10-CM

## 2020-12-08 DIAGNOSIS — I10 ESSENTIAL HYPERTENSION: ICD-10-CM

## 2020-12-08 DIAGNOSIS — E11.65 TYPE 2 DIABETES MELLITUS WITH HYPERGLYCEMIA, WITH LONG-TERM CURRENT USE OF INSULIN (HCC): ICD-10-CM

## 2020-12-08 DIAGNOSIS — Z79.4 TYPE 2 DIABETES MELLITUS WITH HYPERGLYCEMIA, WITH LONG-TERM CURRENT USE OF INSULIN (HCC): ICD-10-CM

## 2020-12-08 PROCEDURE — 99214 OFFICE O/P EST MOD 30 MIN: CPT | Performed by: INTERNAL MEDICINE

## 2020-12-08 PROCEDURE — G0402 INITIAL PREVENTIVE EXAM: HCPCS | Performed by: INTERNAL MEDICINE

## 2020-12-08 RX ORDER — EMPAGLIFLOZIN 25 MG/1
25 TABLET, FILM COATED ORAL EVERY MORNING
Qty: 30 TABLET | Refills: 1 | Status: SHIPPED | OUTPATIENT
Start: 2020-12-08 | End: 2021-06-19 | Stop reason: SDUPTHER

## 2020-12-10 ENCOUNTER — ANESTHESIA EVENT (OUTPATIENT)
Dept: GASTROENTEROLOGY | Facility: HOSPITAL | Age: 70
End: 2020-12-10

## 2020-12-11 ENCOUNTER — HOSPITAL ENCOUNTER (OUTPATIENT)
Dept: GASTROENTEROLOGY | Facility: HOSPITAL | Age: 70
Setting detail: OUTPATIENT SURGERY
Discharge: HOME | End: 2020-12-11
Attending: COLON & RECTAL SURGERY | Admitting: COLON & RECTAL SURGERY
Payer: MEDICARE

## 2020-12-11 ENCOUNTER — ANESTHESIA (OUTPATIENT)
Dept: GASTROENTEROLOGY | Facility: HOSPITAL | Age: 70
End: 2020-12-11

## 2020-12-11 VITALS — HEART RATE: 69 BPM

## 2020-12-11 VITALS
OXYGEN SATURATION: 96 % | SYSTOLIC BLOOD PRESSURE: 114 MMHG | DIASTOLIC BLOOD PRESSURE: 67 MMHG | BODY MASS INDEX: 24.11 KG/M2 | HEART RATE: 78 BPM | TEMPERATURE: 97.3 F | RESPIRATION RATE: 18 BRPM | WEIGHT: 150 LBS | HEIGHT: 66 IN

## 2020-12-11 DIAGNOSIS — Z12.11 COLON CANCER SCREENING: ICD-10-CM

## 2020-12-11 LAB — GLUCOSE SERPL-MCNC: 185 MG/DL (ref 65–140)

## 2020-12-11 PROCEDURE — 45385 COLONOSCOPY W/LESION REMOVAL: CPT | Performed by: COLON & RECTAL SURGERY

## 2020-12-11 PROCEDURE — 88305 TISSUE EXAM BY PATHOLOGIST: CPT | Performed by: PATHOLOGY

## 2020-12-11 PROCEDURE — 99203 OFFICE O/P NEW LOW 30 MIN: CPT | Performed by: COLON & RECTAL SURGERY

## 2020-12-11 PROCEDURE — 82948 REAGENT STRIP/BLOOD GLUCOSE: CPT

## 2020-12-11 RX ORDER — PROPOFOL 10 MG/ML
INJECTION, EMULSION INTRAVENOUS AS NEEDED
Status: DISCONTINUED | OUTPATIENT
Start: 2020-12-11 | End: 2020-12-11

## 2020-12-11 RX ORDER — LIDOCAINE HYDROCHLORIDE 10 MG/ML
INJECTION, SOLUTION EPIDURAL; INFILTRATION; INTRACAUDAL; PERINEURAL AS NEEDED
Status: DISCONTINUED | OUTPATIENT
Start: 2020-12-11 | End: 2020-12-11

## 2020-12-11 RX ORDER — PROPOFOL 10 MG/ML
INJECTION, EMULSION INTRAVENOUS CONTINUOUS PRN
Status: DISCONTINUED | OUTPATIENT
Start: 2020-12-11 | End: 2020-12-11

## 2020-12-11 RX ORDER — SODIUM CHLORIDE 9 MG/ML
INJECTION, SOLUTION INTRAVENOUS CONTINUOUS PRN
Status: DISCONTINUED | OUTPATIENT
Start: 2020-12-11 | End: 2020-12-11

## 2020-12-11 RX ADMIN — SODIUM CHLORIDE: 0.9 INJECTION, SOLUTION INTRAVENOUS at 08:07

## 2020-12-11 RX ADMIN — PROPOFOL 100 MCG/KG/MIN: 10 INJECTION, EMULSION INTRAVENOUS at 09:08

## 2020-12-11 RX ADMIN — LIDOCAINE HYDROCHLORIDE 50 MG: 10 INJECTION, SOLUTION EPIDURAL; INFILTRATION; INTRACAUDAL; PERINEURAL at 09:08

## 2020-12-11 RX ADMIN — PROPOFOL 100 MG: 10 INJECTION, EMULSION INTRAVENOUS at 09:08

## 2020-12-15 DIAGNOSIS — I10 ESSENTIAL HYPERTENSION: ICD-10-CM

## 2020-12-15 DIAGNOSIS — E11.65 TYPE 2 DIABETES MELLITUS WITH HYPERGLYCEMIA, WITH LONG-TERM CURRENT USE OF INSULIN (HCC): ICD-10-CM

## 2020-12-15 DIAGNOSIS — Z79.4 TYPE 2 DIABETES MELLITUS WITH HYPERGLYCEMIA, WITH LONG-TERM CURRENT USE OF INSULIN (HCC): ICD-10-CM

## 2020-12-16 RX ORDER — GLIPIZIDE 10 MG/1
10 TABLET, FILM COATED, EXTENDED RELEASE ORAL 2 TIMES DAILY
Qty: 180 TABLET | Refills: 1 | Status: SHIPPED | OUTPATIENT
Start: 2020-12-16 | End: 2021-04-27 | Stop reason: HOSPADM

## 2020-12-16 RX ORDER — AMLODIPINE BESYLATE 10 MG/1
10 TABLET ORAL DAILY
Qty: 90 TABLET | Refills: 1 | Status: SHIPPED | OUTPATIENT
Start: 2020-12-16 | End: 2021-06-19 | Stop reason: SDUPTHER

## 2020-12-16 RX ORDER — DAPAGLIFLOZIN 10 MG/1
10 TABLET, FILM COATED ORAL DAILY
Qty: 90 TABLET | Refills: 0 | Status: SHIPPED | OUTPATIENT
Start: 2020-12-16 | End: 2021-04-06

## 2020-12-16 RX ORDER — METFORMIN HYDROCHLORIDE 500 MG/1
1000 TABLET, EXTENDED RELEASE ORAL 2 TIMES DAILY
Qty: 360 TABLET | Refills: 0 | Status: SHIPPED | OUTPATIENT
Start: 2020-12-16 | End: 2021-03-28 | Stop reason: SDUPTHER

## 2021-03-12 ENCOUNTER — IMMUNIZATIONS (OUTPATIENT)
Dept: FAMILY MEDICINE CLINIC | Facility: HOSPITAL | Age: 71
End: 2021-03-12

## 2021-03-12 DIAGNOSIS — Z23 ENCOUNTER FOR IMMUNIZATION: Primary | ICD-10-CM

## 2021-03-12 PROCEDURE — 0001A SARS-COV-2 / COVID-19 MRNA VACCINE (PFIZER-BIONTECH) 30 MCG: CPT

## 2021-03-12 PROCEDURE — 91300 SARS-COV-2 / COVID-19 MRNA VACCINE (PFIZER-BIONTECH) 30 MCG: CPT

## 2021-03-28 DIAGNOSIS — E11.65 TYPE 2 DIABETES MELLITUS WITH HYPERGLYCEMIA, WITH LONG-TERM CURRENT USE OF INSULIN (HCC): ICD-10-CM

## 2021-03-28 DIAGNOSIS — I10 ESSENTIAL HYPERTENSION: ICD-10-CM

## 2021-03-28 DIAGNOSIS — Z79.4 TYPE 2 DIABETES MELLITUS WITH HYPERGLYCEMIA, WITH LONG-TERM CURRENT USE OF INSULIN (HCC): ICD-10-CM

## 2021-03-29 RX ORDER — METFORMIN HYDROCHLORIDE 500 MG/1
1000 TABLET, EXTENDED RELEASE ORAL 2 TIMES DAILY
Qty: 360 TABLET | Refills: 0 | Status: SHIPPED | OUTPATIENT
Start: 2021-03-29 | End: 2021-06-19 | Stop reason: SDUPTHER

## 2021-03-29 RX ORDER — VALSARTAN 320 MG/1
320 TABLET ORAL DAILY
Qty: 90 TABLET | Refills: 0 | Status: SHIPPED | OUTPATIENT
Start: 2021-03-29 | End: 2021-06-19 | Stop reason: SDUPTHER

## 2021-04-02 ENCOUNTER — APPOINTMENT (OUTPATIENT)
Dept: LAB | Age: 71
End: 2021-04-02
Payer: COMMERCIAL

## 2021-04-02 DIAGNOSIS — E87.5 HYPERKALEMIA: ICD-10-CM

## 2021-04-02 DIAGNOSIS — Z11.59 NEED FOR HEPATITIS C SCREENING TEST: ICD-10-CM

## 2021-04-02 LAB
ANION GAP SERPL CALCULATED.3IONS-SCNC: 5 MMOL/L (ref 4–13)
BUN SERPL-MCNC: 21 MG/DL (ref 5–25)
CALCIUM SERPL-MCNC: 9.6 MG/DL (ref 8.3–10.1)
CHLORIDE SERPL-SCNC: 109 MMOL/L (ref 100–108)
CO2 SERPL-SCNC: 27 MMOL/L (ref 21–32)
CREAT SERPL-MCNC: 1.14 MG/DL (ref 0.6–1.3)
GFR SERPL CREATININE-BSD FRML MDRD: 65 ML/MIN/1.73SQ M
GLUCOSE P FAST SERPL-MCNC: 208 MG/DL (ref 65–99)
HCV AB SER QL: NORMAL
POTASSIUM SERPL-SCNC: 4.6 MMOL/L (ref 3.5–5.3)
SODIUM SERPL-SCNC: 141 MMOL/L (ref 136–145)

## 2021-04-02 PROCEDURE — 86803 HEPATITIS C AB TEST: CPT

## 2021-04-02 PROCEDURE — 80048 BASIC METABOLIC PNL TOTAL CA: CPT

## 2021-04-02 PROCEDURE — 36415 COLL VENOUS BLD VENIPUNCTURE: CPT

## 2021-04-05 ENCOUNTER — IMMUNIZATIONS (OUTPATIENT)
Dept: FAMILY MEDICINE CLINIC | Facility: HOSPITAL | Age: 71
End: 2021-04-05

## 2021-04-05 DIAGNOSIS — Z23 ENCOUNTER FOR IMMUNIZATION: Primary | ICD-10-CM

## 2021-04-05 PROCEDURE — 91300 SARS-COV-2 / COVID-19 MRNA VACCINE (PFIZER-BIONTECH) 30 MCG: CPT

## 2021-04-05 PROCEDURE — 0002A SARS-COV-2 / COVID-19 MRNA VACCINE (PFIZER-BIONTECH) 30 MCG: CPT

## 2021-04-06 ENCOUNTER — OFFICE VISIT (OUTPATIENT)
Dept: INTERNAL MEDICINE CLINIC | Facility: CLINIC | Age: 71
End: 2021-04-06
Payer: COMMERCIAL

## 2021-04-06 VITALS
BODY MASS INDEX: 22.66 KG/M2 | TEMPERATURE: 97.9 F | HEIGHT: 66 IN | DIASTOLIC BLOOD PRESSURE: 97 MMHG | OXYGEN SATURATION: 96 % | HEART RATE: 94 BPM | SYSTOLIC BLOOD PRESSURE: 157 MMHG | WEIGHT: 141 LBS

## 2021-04-06 DIAGNOSIS — Z99.89 OBSTRUCTIVE SLEEP APNEA ON CPAP: ICD-10-CM

## 2021-04-06 DIAGNOSIS — Z79.4 TYPE 2 DIABETES MELLITUS WITH HYPERGLYCEMIA, WITH LONG-TERM CURRENT USE OF INSULIN (HCC): Primary | ICD-10-CM

## 2021-04-06 DIAGNOSIS — E11.9 ENCOUNTER FOR DIABETIC FOOT EXAM (HCC): ICD-10-CM

## 2021-04-06 DIAGNOSIS — E11.65 TYPE 2 DIABETES MELLITUS WITH HYPERGLYCEMIA, WITH LONG-TERM CURRENT USE OF INSULIN (HCC): Primary | ICD-10-CM

## 2021-04-06 DIAGNOSIS — G47.33 OBSTRUCTIVE SLEEP APNEA ON CPAP: ICD-10-CM

## 2021-04-06 DIAGNOSIS — E78.2 MIXED HYPERLIPIDEMIA: ICD-10-CM

## 2021-04-06 DIAGNOSIS — Z12.5 SCREENING PSA (PROSTATE SPECIFIC ANTIGEN): ICD-10-CM

## 2021-04-06 DIAGNOSIS — E87.5 HYPERKALEMIA: ICD-10-CM

## 2021-04-06 DIAGNOSIS — I10 ESSENTIAL HYPERTENSION: ICD-10-CM

## 2021-04-06 PROCEDURE — 99214 OFFICE O/P EST MOD 30 MIN: CPT | Performed by: INTERNAL MEDICINE

## 2021-04-06 NOTE — PROGRESS NOTES
Assessment/Plan: This is a 52-year-old gentleman with history of uncontrolled diabetes mellitus hypertension obstructive sleep apnea hyperlipidemia hyperkalemia and noncompliance  He states that he does not take his insulin regularly and he will try to be more regular  He does follow with an endocrinologist but has not seen his endocrinologist in a while  Also his endocrinologist might be moving to start spurred and he might have to find an alternative  I stressed the need to take his insulin on a regular basis  He states that he will try to remember to take it every day  His hyperkalemia is better and it may be related to not taking his insulin regularly  1  Type 2 diabetes mellitus with hyperglycemia, with long-term current use of insulin ([facility] [address] )    2  Encounter for diabetic foot exam ([facility] [address] )    3  Essential hypertension    4  Mixed hyperlipidemia    5  Hyperkalemia    6  Obstructive sleep apnea on CPAP           1  Type 2 diabetes mellitus with hyperglycemia, with long-term current use of insulin ([facility] [address] )      2  Encounter for diabetic foot exam [facility])    - Ambulatory referral to Ophthalmology; Future    3  Essential hypertension      4  Mixed hyperlipidemia      5  Hyperkalemia      6  Obstructive sleep apnea on CPAP             Subjective:      Patient ID: Charles Sharp is a 72 y o  male  This is a 52-year-old gentleman with history of uncontrolled diabetes mellitus hypertension obstructive sleep apnea hyperlipidemia hyperkalemia and noncompliance  He states that he does not take his insulin regularly and he will try to be more regular  He does follow with an endocrinologist but has not seen his endocrinologist in a while  Also his endocrinologist might be moving to start spurred and he might have to find an alternative  This is a 52-year-old gentleman with history of uncontrolled diabetes mellitus hypertension obstructive sleep apnea hyperlipidemia       The following portions of the patient's history were reviewed and updated as appropriate: He  has a past medical history of Diabetes mellitus ([facility] [address] ), Dyslipidemia, Hypertension, and Obstructive sleep apnea on CPAP  He   Patient Active Problem List    Diagnosis Date Noted    Obstructive sleep apnea on CPAP     Type 2 diabetes mellitus with hyperglycemia, with long-term current use of insulin ([facility] ) 12/08/2020    Essential hypertension 12/08/2020    Hyperkalemia 12/08/2020    Mixed hyperlipidemia 12/08/2020     He  has a past surgical history that includes Colonoscopy (2008)  His family history includes Hypertension in his mother  He  reports that he has quit smoking  He has never used smokeless tobacco  He reports previous alcohol use  No history on file for drug  Current Outpatient Medications   Medication Sig Dispense Refill    amLODIPine (NORVASC) 10 mg tablet Take 1 tablet (10 mg total) by mouth daily Use as directed 90 tablet 1    atorvastatin (LIPITOR) 40 mg tablet TAKE 1 TABLET BY MOUTH EVERYDAY AT BEDTIME 90 tablet 1    Empagliflozin (Jardiance) 25 MG TABS Take 1 tablet (25 mg total) by mouth every morning 30 tablet 1    glipiZIDE (GLUCOTROL XL) 10 mg 24 hr tablet Take 1 tablet (10 mg total) by mouth 2 (two) times a day 180 tablet 1    linaGLIPtin 5 MG TABS Take 5 mg by mouth daily      metFORMIN (GLUCOPHAGE-XR) 500 mg 24 hr tablet Take 2 tablets (1,000 mg total) by mouth 2 (two) times a day 360 tablet 0    sildenafil (VIAGRA) 100 mg tablet Take 100 mg by mouth daily as needed for erectile dysfunction      valsartan (DIOVAN) 320 MG tablet Take 1 tablet (320 mg total) by mouth daily 90 tablet 0     No current facility-administered medications for this visit        Current Outpatient Medications on File Prior to Visit   Medication Sig    amLODIPine (NORVASC) 10 mg tablet Take 1 tablet (10 mg total) by mouth daily Use as directed    atorvastatin (LIPITOR) 40 mg tablet TAKE 1 TABLET BY MOUTH EVERYDAY AT BEDTIME    Empagliflozin (Jardiance) 25 MG TABS Take 1 tablet (25 mg total) by mouth every morning    glipiZIDE (GLUCOTROL XL) 10 mg 24 hr tablet Take 1 tablet (10 mg total) by mouth 2 (two) times a day    linaGLIPtin 5 MG TABS Take 5 mg by mouth daily    metFORMIN (GLUCOPHAGE-XR) 500 mg 24 hr tablet Take 2 tablets (1,000 mg total) by mouth 2 (two) times a day    sildenafil (VIAGRA) 100 mg tablet Take 100 mg by mouth daily as needed for erectile dysfunction    valsartan (DIOVAN) 320 MG tablet Take 1 tablet (320 mg total) by mouth daily    [DISCONTINUED] Dapagliflozin Propanediol (Farxiga) 10 MG TABS Take 1 tablet (10 mg total) by mouth daily     No current facility-administered medications on file prior to visit  He has No Known Allergies       Review of Systems   Constitutional: Negative for appetite change, chills, fatigue and fever  HENT: Negative for sore throat and trouble swallowing  Eyes: Negative for redness  Respiratory: Negative for shortness of breath  Cardiovascular: Negative for chest pain and palpitations  Gastrointestinal: Negative for abdominal pain, constipation and diarrhea  Genitourinary: Negative for dysuria and hematuria  Musculoskeletal: Negative for back pain and neck pain  Skin: Negative for rash  Neurological: Negative for seizures, weakness and headaches  Hematological: Negative for adenopathy  Psychiatric/Behavioral: Negative for confusion  The patient is not nervous/anxious            Objective:      /97 (BP Location: Left arm, Patient Position: Sitting, Cuff Size: Standard)   Pulse 94   Temp 97 9 °F (36 6 °C) (Temporal)   Ht 5' 6" (1 676 m)   Wt 64 kg (141 lb)   SpO2 96%   BMI 22 76 kg/m²     Recent Results (from the past 1344 hour(s))   Basic metabolic panel    Collection Time: 04/02/21  8:52 AM   Result Value Ref Range    Sodium 141 136 - 145 mmol/L    Potassium 4 6 3 5 - 5 3 mmol/L    Chloride 109 (H) 100 - 108 mmol/L    CO2 27 21 - 32 mmol/L    ANION GAP 5 4 - 13 mmol/L    BUN 21 5 - 25 mg/dL    Creatinine 1 14 0 60 - 1 30 mg/dL    Glucose, Fasting 208 (H) 65 - 99 mg/dL    Calcium 9 6 8 3 - 10 1 mg/dL    eGFR 65 ml/min/1 73sq m   Hepatitis C antibody    Collection Time: 04/02/21  8:52 AM   Result Value Ref Range    Hepatitis C Ab Non-reactive Non-reactive        Physical Exam  Constitutional:       General: He is not in acute distress  Appearance: Normal appearance  HENT:      Head: Normocephalic and atraumatic  Nose: Nose normal       Mouth/Throat:      Mouth: Mucous membranes are moist    Eyes:      Extraocular Movements: Extraocular movements intact  Pupils: Pupils are equal, round, and reactive to light  Neck:      Musculoskeletal: Normal range of motion  Cardiovascular:      Rate and Rhythm: Normal rate and regular rhythm  Pulses: Normal pulses  Heart sounds: Normal heart sounds  No murmur  No friction rub  Pulmonary:      Effort: Pulmonary effort is normal  No respiratory distress  Breath sounds: Normal breath sounds  No wheezing  Abdominal:      General: Abdomen is flat  Bowel sounds are normal  There is no distension  Palpations: Abdomen is soft  There is no mass  Tenderness: There is no abdominal tenderness  There is no guarding  Musculoskeletal: Normal range of motion  Neurological:      General: No focal deficit present  Mental Status: He is alert and oriented to person, place, and time  Mental status is at baseline  Cranial Nerves: No cranial nerve deficit  Psychiatric:         Mood and Affect: Mood normal          Behavior: Behavior normal        Falls Plan of Care: Balance, strength, and gait training instructions were provided

## 2021-04-26 ENCOUNTER — HOSPITAL ENCOUNTER (OUTPATIENT)
Facility: HOSPITAL | Age: 71
Setting detail: OBSERVATION
Discharge: HOME | End: 2021-04-27
Attending: EMERGENCY MEDICINE | Admitting: INTERNAL MEDICINE
Payer: COMMERCIAL

## 2021-04-26 ENCOUNTER — APPOINTMENT (EMERGENCY)
Dept: RADIOLOGY | Facility: HOSPITAL | Age: 71
End: 2021-04-26
Payer: COMMERCIAL

## 2021-04-26 ENCOUNTER — APPOINTMENT (OUTPATIENT)
Dept: NON INVASIVE DIAGNOSTICS | Facility: HOSPITAL | Age: 71
End: 2021-04-26
Payer: COMMERCIAL

## 2021-04-26 ENCOUNTER — APPOINTMENT (OUTPATIENT)
Dept: RADIOLOGY | Facility: HOSPITAL | Age: 71
End: 2021-04-26
Payer: COMMERCIAL

## 2021-04-26 DIAGNOSIS — E78.2 MIXED HYPERLIPIDEMIA: ICD-10-CM

## 2021-04-26 DIAGNOSIS — I65.03 VERTEBRAL ARTERY STENOSIS, BILATERAL: ICD-10-CM

## 2021-04-26 DIAGNOSIS — R42 DIZZINESS: Primary | ICD-10-CM

## 2021-04-26 DIAGNOSIS — Z79.4 TYPE 2 DIABETES MELLITUS WITH HYPERGLYCEMIA, WITH LONG-TERM CURRENT USE OF INSULIN (HCC): ICD-10-CM

## 2021-04-26 DIAGNOSIS — E11.65 TYPE 2 DIABETES MELLITUS WITH HYPERGLYCEMIA, WITH LONG-TERM CURRENT USE OF INSULIN (HCC): ICD-10-CM

## 2021-04-26 DIAGNOSIS — R27.0 ATAXIA: ICD-10-CM

## 2021-04-26 PROBLEM — R79.89 ELEVATED SERUM CREATININE: Status: ACTIVE | Noted: 2021-04-26

## 2021-04-26 PROBLEM — N17.9 AKI (ACUTE KIDNEY INJURY) (HCC): Status: ACTIVE | Noted: 2021-04-26

## 2021-04-26 LAB
ALBUMIN SERPL BCP-MCNC: 3.8 G/DL (ref 3.5–5)
ALP SERPL-CCNC: 82 U/L (ref 46–116)
ALT SERPL W P-5'-P-CCNC: 23 U/L (ref 12–78)
ANION GAP SERPL CALCULATED.3IONS-SCNC: 4 MMOL/L (ref 4–13)
AST SERPL W P-5'-P-CCNC: 31 U/L (ref 5–45)
ATRIAL RATE: 67 BPM
BASOPHILS # BLD AUTO: 0.05 THOUSANDS/ΜL (ref 0–0.1)
BASOPHILS NFR BLD AUTO: 1 % (ref 0–1)
BILIRUB SERPL-MCNC: 0.81 MG/DL (ref 0.2–1)
BUN SERPL-MCNC: 24 MG/DL (ref 5–25)
CALCIUM SERPL-MCNC: 9.5 MG/DL (ref 8.3–10.1)
CHLORIDE SERPL-SCNC: 107 MMOL/L (ref 100–108)
CO2 SERPL-SCNC: 28 MMOL/L (ref 21–32)
CREAT SERPL-MCNC: 1.32 MG/DL (ref 0.6–1.3)
EOSINOPHIL # BLD AUTO: 0.24 THOUSAND/ΜL (ref 0–0.61)
EOSINOPHIL NFR BLD AUTO: 3 % (ref 0–6)
ERYTHROCYTE [DISTWIDTH] IN BLOOD BY AUTOMATED COUNT: 12.1 % (ref 11.6–15.1)
GFR SERPL CREATININE-BSD FRML MDRD: 55 ML/MIN/1.73SQ M
GLUCOSE SERPL-MCNC: 169 MG/DL (ref 65–140)
GLUCOSE SERPL-MCNC: 243 MG/DL (ref 65–140)
GLUCOSE SERPL-MCNC: 257 MG/DL (ref 65–140)
HCT VFR BLD AUTO: 49.1 % (ref 36.5–49.3)
HGB BLD-MCNC: 16.8 G/DL (ref 12–17)
IMM GRANULOCYTES # BLD AUTO: 0.03 THOUSAND/UL (ref 0–0.2)
IMM GRANULOCYTES NFR BLD AUTO: 0 % (ref 0–2)
LYMPHOCYTES # BLD AUTO: 2.75 THOUSANDS/ΜL (ref 0.6–4.47)
LYMPHOCYTES NFR BLD AUTO: 31 % (ref 14–44)
MCH RBC QN AUTO: 30.4 PG (ref 26.8–34.3)
MCHC RBC AUTO-ENTMCNC: 34.2 G/DL (ref 31.4–37.4)
MCV RBC AUTO: 89 FL (ref 82–98)
MONOCYTES # BLD AUTO: 0.76 THOUSAND/ΜL (ref 0.17–1.22)
MONOCYTES NFR BLD AUTO: 9 % (ref 4–12)
NEUTROPHILS # BLD AUTO: 5.12 THOUSANDS/ΜL (ref 1.85–7.62)
NEUTS SEG NFR BLD AUTO: 56 % (ref 43–75)
NRBC BLD AUTO-RTO: 0 /100 WBCS
P AXIS: 38 DEGREES
PLATELET # BLD AUTO: 230 THOUSANDS/UL (ref 149–390)
PMV BLD AUTO: 11.5 FL (ref 8.9–12.7)
POTASSIUM SERPL-SCNC: 4.8 MMOL/L (ref 3.5–5.3)
PR INTERVAL: 178 MS
PROT SERPL-MCNC: 7.8 G/DL (ref 6.4–8.2)
QRS AXIS: -29 DEGREES
QRSD INTERVAL: 88 MS
QT INTERVAL: 390 MS
QTC INTERVAL: 412 MS
RBC # BLD AUTO: 5.52 MILLION/UL (ref 3.88–5.62)
SODIUM SERPL-SCNC: 139 MMOL/L (ref 136–145)
T WAVE AXIS: 33 DEGREES
TROPONIN I SERPL-MCNC: <0.02 NG/ML
VENTRICULAR RATE: 67 BPM
WBC # BLD AUTO: 8.95 THOUSAND/UL (ref 4.31–10.16)

## 2021-04-26 PROCEDURE — NC001 PR NO CHARGE: Performed by: PSYCHIATRY & NEUROLOGY

## 2021-04-26 PROCEDURE — 93005 ELECTROCARDIOGRAM TRACING: CPT

## 2021-04-26 PROCEDURE — 93306 TTE W/DOPPLER COMPLETE: CPT | Performed by: INTERNAL MEDICINE

## 2021-04-26 PROCEDURE — G1004 CDSM NDSC: HCPCS

## 2021-04-26 PROCEDURE — 93306 TTE W/DOPPLER COMPLETE: CPT

## 2021-04-26 PROCEDURE — 99205 OFFICE O/P NEW HI 60 MIN: CPT | Performed by: PSYCHIATRY & NEUROLOGY

## 2021-04-26 PROCEDURE — 70498 CT ANGIOGRAPHY NECK: CPT

## 2021-04-26 PROCEDURE — 96360 HYDRATION IV INFUSION INIT: CPT

## 2021-04-26 PROCEDURE — 82948 REAGENT STRIP/BLOOD GLUCOSE: CPT

## 2021-04-26 PROCEDURE — 85025 COMPLETE CBC W/AUTO DIFF WBC: CPT | Performed by: EMERGENCY MEDICINE

## 2021-04-26 PROCEDURE — 36415 COLL VENOUS BLD VENIPUNCTURE: CPT

## 2021-04-26 PROCEDURE — 99285 EMERGENCY DEPT VISIT HI MDM: CPT

## 2021-04-26 PROCEDURE — 70496 CT ANGIOGRAPHY HEAD: CPT

## 2021-04-26 PROCEDURE — 92610 EVALUATE SWALLOWING FUNCTION: CPT

## 2021-04-26 PROCEDURE — 99285 EMERGENCY DEPT VISIT HI MDM: CPT | Performed by: EMERGENCY MEDICINE

## 2021-04-26 PROCEDURE — 84484 ASSAY OF TROPONIN QUANT: CPT | Performed by: EMERGENCY MEDICINE

## 2021-04-26 PROCEDURE — 71045 X-RAY EXAM CHEST 1 VIEW: CPT

## 2021-04-26 PROCEDURE — 70551 MRI BRAIN STEM W/O DYE: CPT

## 2021-04-26 PROCEDURE — 80053 COMPREHEN METABOLIC PANEL: CPT | Performed by: EMERGENCY MEDICINE

## 2021-04-26 PROCEDURE — 93010 ELECTROCARDIOGRAM REPORT: CPT | Performed by: INTERNAL MEDICINE

## 2021-04-26 RX ORDER — ASPIRIN 81 MG/1
81 TABLET, CHEWABLE ORAL DAILY
Status: DISCONTINUED | OUTPATIENT
Start: 2021-04-26 | End: 2021-04-27 | Stop reason: HOSPADM

## 2021-04-26 RX ORDER — DAPAGLIFLOZIN 5 MG/1
5 TABLET, FILM COATED ORAL DAILY
COMMUNITY
End: 2021-04-27 | Stop reason: HOSPADM

## 2021-04-26 RX ORDER — AMLODIPINE BESYLATE 10 MG/1
10 TABLET ORAL DAILY
Status: DISCONTINUED | OUTPATIENT
Start: 2021-04-26 | End: 2021-04-27 | Stop reason: HOSPADM

## 2021-04-26 RX ORDER — INSULIN GLARGINE 100 [IU]/ML
52 INJECTION, SOLUTION SUBCUTANEOUS
COMMUNITY
End: 2021-04-27 | Stop reason: HOSPADM

## 2021-04-26 RX ORDER — ONDANSETRON 2 MG/ML
4 INJECTION INTRAMUSCULAR; INTRAVENOUS EVERY 6 HOURS PRN
Status: DISCONTINUED | OUTPATIENT
Start: 2021-04-26 | End: 2021-04-27 | Stop reason: HOSPADM

## 2021-04-26 RX ORDER — ATORVASTATIN CALCIUM 40 MG/1
40 TABLET, FILM COATED ORAL
Status: DISCONTINUED | OUTPATIENT
Start: 2021-04-26 | End: 2021-04-27 | Stop reason: HOSPADM

## 2021-04-26 RX ORDER — HEPARIN SODIUM 5000 [USP'U]/ML
5000 INJECTION, SOLUTION INTRAVENOUS; SUBCUTANEOUS EVERY 8 HOURS SCHEDULED
Status: DISCONTINUED | OUTPATIENT
Start: 2021-04-26 | End: 2021-04-27 | Stop reason: HOSPADM

## 2021-04-26 RX ORDER — LABETALOL 20 MG/4 ML (5 MG/ML) INTRAVENOUS SYRINGE
10 EVERY 6 HOURS PRN
Status: DISCONTINUED | OUTPATIENT
Start: 2021-04-26 | End: 2021-04-27 | Stop reason: HOSPADM

## 2021-04-26 RX ORDER — ACETAMINOPHEN 325 MG/1
650 TABLET ORAL EVERY 4 HOURS PRN
Status: DISCONTINUED | OUTPATIENT
Start: 2021-04-26 | End: 2021-04-27 | Stop reason: HOSPADM

## 2021-04-26 RX ORDER — INSULIN GLARGINE 100 [IU]/ML
20 INJECTION, SOLUTION SUBCUTANEOUS
Status: DISCONTINUED | OUTPATIENT
Start: 2021-04-26 | End: 2021-04-27

## 2021-04-26 RX ADMIN — SODIUM CHLORIDE 1000 ML: 0.9 INJECTION, SOLUTION INTRAVENOUS at 11:02

## 2021-04-26 RX ADMIN — INSULIN GLARGINE 20 UNITS: 100 INJECTION, SOLUTION SUBCUTANEOUS at 21:43

## 2021-04-26 RX ADMIN — INSULIN LISPRO 2 UNITS: 100 INJECTION, SOLUTION INTRAVENOUS; SUBCUTANEOUS at 18:16

## 2021-04-26 RX ADMIN — ATORVASTATIN CALCIUM 40 MG: 40 TABLET, FILM COATED ORAL at 21:42

## 2021-04-26 RX ADMIN — HEPARIN SODIUM 5000 UNITS: 5000 INJECTION INTRAVENOUS; SUBCUTANEOUS at 21:42

## 2021-04-26 RX ADMIN — IOHEXOL 85 ML: 350 INJECTION, SOLUTION INTRAVENOUS at 10:16

## 2021-04-26 RX ADMIN — ASPIRIN 81 MG 81 MG: 81 TABLET ORAL at 16:54

## 2021-04-26 RX ADMIN — AMLODIPINE BESYLATE 10 MG: 10 TABLET ORAL at 16:54

## 2021-04-26 NOTE — CONSULTS
Consultation - Neurology   Roxy Rangel 72 y o  male MRN: [id number]  Unit/Bed#: ED 24 Encounter: [id number]     Assessment/Plan   * Dizziness  Assessment & Plan  60-year-old male with past medical history of hypertension, hyperlipidemia, diabetes, and sleep apnea  The patient presents with dizziness and vomiting for the past few days and intermittent dizziness over the past few weeks  It is also noted to be having more difficulty walking  CT angiogram did show stenosis of both V4 segments of the vertebral arteries just above the foramen magnum, moderate on the right and mild on the left  Neurological examination as below  NIH of zero  DDX - posterior circulation stroke vs inner ear pathology, does not appear to be vestibular basilar insufficiency, rule out orthostatisis  - Stroke pathway   MRI brain with out contrast   Echo   Lipid Panel   Hemoglobin A1c   Aspirin 81 mg, will further adjust depending on MRI findings   Atorvastatin 40 mg   Permissive HTN   Continue telemetry   PT/OT/ST   Frequent neuro checks  Continue to monitor and notify neurology with any changes  - Medical management and supportive care per primary team  Correction of any metabolic or infectious disturbances  Vertebral artery stenosis, bilateral  Assessment & Plan  CT angiogram did show stenosis of both V4 segments of the vertebral arteries just above the foramen magnum, moderate on the right and mild on the left  -  Plan as above    Roxy Rangel will need follow up in in 4 weeks with neurovascular attending or advance practitioner  He will not require outpatient neurological testing  History of Present Illness     Reason for Consult / Principal Problem: Dizziness and ataxia  HPI: Roxy Rangel is a 72 y o   male with past medical history of hypertension, hyperlipidemia, diabetes mellitus, and sleep apnea (complaint with cpap)    Per record review, the patient presented ED on 04/26/2021 with concerns of dizziness and vomiting for the past 3 days, he also has been intermittently dizzy over the past few weeks  It is reported that is worse in the morning when he tried to get out of bed, cannot keep food down for last few days  There is also reports that he has to catch himself when he is walking which is abnormal for him  During patient interview,  the patient reports that for the last 1-2 weeks he has been having intermittent dizziness - described more as vertigo, but could not elaborate to well  He reports these episodes will last 15 minutes than resolve, but he would not feel well, weak for a few hours afterwards  No focal stroke like symptoms associated with these  He reports he may have had 5-6 of these episodes  He reports on Friday, these symptoms got worse, his dizziness was worse with vomiting an nausea over the past few days, this was constant and was unable to eat  He also reported generalized weakness and a feeling of being off balance with this, no ataxia reported, just generally weak - falling side to side, no focal weakness with walking reported  He reports since coming to the hospital this improved, but he has not walked  His blood pressures were elevated on arrival, HR's were in the 70s to 80s, she had no fever  He was noted to have a slightly elevated creatinine of 1 32 with baseline a 1 1, chest x ray was unremarkable for cardiopulmonary disease  CT of the head and neck - all CT brain showed periventricular white matter changes suggestive of chronic microangiopathic, no mass, hemorrhage or acute ischemia  CT Angiography did show stenosis of both V4 segments of the vertebral artery just above the foramen magnum, moderate on the right and mild on the left  The patient was admitted on the stroke pathway       Inpatient consult to Neurology  Consult performed by: Kellie Grant PA-C  Consult ordered by: Mis Floyd DO        Review of Systems   Constitutional: Positive for activity change, appetite change and fatigue  HENT: Positive for trouble swallowing  Respiratory: Negative  Cardiovascular: Negative  Gastrointestinal: Negative  Musculoskeletal: Positive for gait problem  Neurological: Positive for dizziness and weakness  Negative for seizures, syncope, facial asymmetry, speech difficulty, light-headedness, numbness and headaches  General weakness   Hematological: Negative  Psychiatric/Behavioral: Negative        Historical Information   Past Medical History:   Diagnosis Date    Diabetes mellitus ([facility] [address] )     Type 2    Dyslipidemia     Hypertension     Obstructive sleep apnea on CPAP      Past Surgical History:   Procedure Laterality Date    COLONOSCOPY  2008     Social History   Social History     Substance and Sexual Activity   Alcohol Use Not Currently     Social History     Substance and Sexual Activity   Drug Use Not on file     E-Cigarette/Vaping     E-Cigarette/Vaping Substances     Social History     Tobacco Use   Smoking Status Former Smoker   Smokeless Tobacco Never Used     Family History:   Family History   Problem Relation Age of Onset    Hypertension Mother      Meds/Allergies   all current active meds have been reviewed, current meds:   Current Facility-Administered Medications   Medication Dose Route Frequency    acetaminophen (TYLENOL) tablet 650 mg  650 mg Oral Q4H PRN    amLODIPine (NORVASC) tablet 10 mg  10 mg Oral Daily    aspirin chewable tablet 81 mg  81 mg Oral Daily    atorvastatin (LIPITOR) tablet 40 mg  40 mg Oral HS    heparin (porcine) subcutaneous injection 5,000 Units  5,000 Units Subcutaneous Q8H [address]    insulin glargine (LANTUS) subcutaneous injection 20 Units 0 2 mL  20 Units Subcutaneous HS    insulin lispro (HumaLOG) 100 units/mL subcutaneous injection 1-5 Units  1-5 Units Subcutaneous 4x Daily (AC & HS)    Labetalol HCl (NORMODYNE) injection 10 mg  10 mg Intravenous Q6H PRN    ondansetron (ZOFRAN) injection 4 mg  4 mg Intravenous Q6H PRN    and PTA meds:   Prior to Admission Medications   Prescriptions Last Dose Informant Patient Reported? Taking? Dapagliflozin Propanediol (Farxiga) 5 MG TABS   Yes Yes   Sig: Take 5 mg by mouth daily   Empagliflozin (Jardiance) 25 MG TABS Not Taking at Unknown time  No No   Sig: Take 1 tablet (25 mg total) by mouth every morning   Patient not taking: Reported on 4/26/2021   amLODIPine (NORVASC) 10 mg tablet Past Week at Unknown time  No Yes   Sig: Take 1 tablet (10 mg total) by mouth daily Use as directed   atorvastatin (LIPITOR) 40 mg tablet Past Week at Unknown time Self No Yes   Sig: TAKE 1 TABLET BY MOUTH EVERYDAY AT BEDTIME   glipiZIDE (GLUCOTROL XL) 10 mg 24 hr tablet Past Week at Unknown time  No Yes   Sig: Take 1 tablet (10 mg total) by mouth 2 (two) times a day   insulin glargine (Basaglar KwikPen) 100 units/mL injection pen  Self Yes Yes   Sig: Inject 52 Units under the skin daily at bedtime   linaGLIPtin 5 MG TABS Not Taking at Unknown time Self Yes No   Sig: Take 5 mg by mouth daily   metFORMIN (GLUCOPHAGE-XR) 500 mg 24 hr tablet Past Week at Unknown time  No Yes   Sig: Take 2 tablets (1,000 mg total) by mouth 2 (two) times a day   sildenafil (VIAGRA) 100 mg tablet Not Taking at Unknown time Self Yes No   Sig: Take 100 mg by mouth daily as needed for erectile dysfunction   valsartan (DIOVAN) 320 MG tablet Past Week at Unknown time  No Yes   Sig: Take 1 tablet (320 mg total) by mouth daily      Facility-Administered Medications: None     No Known Allergies    Objective   Vitals:Blood pressure (!) 176/86, pulse 74, temperature 98 °F (36 7 °C), temperature source Oral, resp  rate 18, SpO2 97 %  ,There is no height or weight on file to calculate BMI  No intake or output data in the 24 hours ending 04/26/21 1430    Invasive Devices:    Invasive Devices     Peripheral Intravenous Line            Peripheral IV 04/26/21 Left Antecubital less than 1 day                Physical Exam  Vitals signs reviewed  Constitutional:       General: He is not in acute distress  Appearance: Normal appearance  He is not ill-appearing, toxic-appearing or diaphoretic  HENT:      Head: Normocephalic and atraumatic  Mouth/Throat:      Mouth: Mucous membranes are moist    Eyes:      Extraocular Movements: Extraocular movements intact and EOM normal       Pupils: Pupils are equal, round, and reactive to light  Neck:      Musculoskeletal: Normal range of motion  Cardiovascular:      Rate and Rhythm: Normal rate  Pulmonary:      Effort: No respiratory distress  Abdominal:      General: There is no distension  Musculoskeletal:         General: No swelling or tenderness  Right lower leg: No edema  Left lower leg: No edema  Skin:     General: Skin is warm and dry  Neurological:      Mental Status: He is alert and oriented to person, place, and time  Coordination: Finger-Nose-Finger Test and Heel to [facility] Test normal       Deep Tendon Reflexes: Strength normal       Reflex Scores:       Tricep reflexes are 1+ on the right side and 1+ on the left side  Bicep reflexes are 1+ on the right side and 1+ on the left side  Brachioradialis reflexes are 1+ on the right side and 1+ on the left side  Patellar reflexes are 1+ on the right side and 1+ on the left side  Achilles reflexes are 0 on the right side and 0 on the left side  Psychiatric:         Speech: Speech normal        Neurologic Exam     Mental Status   Oriented to person, place, and time  Follows 2 step commands  Attention: normal  Concentration: normal    Speech: speech is normal   Level of consciousness: alert  Able to name object  Able to read  Able to repeat  Able to write  Cranial Nerves     CN II   Visual fields full to confrontation  CN III, IV, VI   Pupils are equal, round, and reactive to light    Extraocular motions are normal    Nystagmus: none   Diplopia: none  Ophthalmoparesis: none    CN V   Facial sensation intact  CN VII   Facial expression full, symmetric  CN VIII   CN VIII normal      CN IX, X   CN IX normal    CN X normal      CN XI   CN XI normal      CN XII   CN XII normal      Motor Exam   Muscle bulk: normal  Right arm tone: normal  Left arm tone: normal  Right arm pronator drift: absent  Left arm pronator drift: absent    Strength   Strength 5/5 throughout  Sensory Exam   Light touch normal    Pinprick normal    No focal sensory findings noted  Gait, Coordination, and Reflexes     Coordination   Finger to nose coordination: normal  Heel to shin coordination: normal    Tremor   Resting tremor: absent  Intention tremor: absent  Action tremor: absent    Reflexes   Right brachioradialis: 1+  Left brachioradialis: 1+  Right biceps: 1+  Left biceps: 1+  Right triceps: 1+  Left triceps: 1+  Right patellar: 1+  Left patellar: 1+  Right achilles: 0  Left achilles: 0  Right plantar: normal  Left plantar: normalNo seizure activity seen        Lab Results:     Recent Results (from the past 24 hour(s))   CBC and differential    Collection Time: 04/26/21  9:22 AM   Result Value Ref Range    WBC 8 95 4 31 - 10 16 Thousand/uL    RBC 5 52 3 88 - 5 62 Million/uL    Hemoglobin 16 8 12 0 - 17 0 g/dL    Hematocrit 49 1 36 5 - 49 3 %    MCV 89 82 - 98 fL    MCH 30 4 26 8 - 34 3 pg    MCHC 34 2 31 4 - 37 4 g/dL    RDW 12 1 11 6 - 15 1 %    MPV 11 5 8 9 - 12 7 fL    Platelets 007 154 - 494 Thousands/uL    nRBC 0 /100 WBCs    Neutrophils Relative 56 43 - 75 %    Immat GRANS % 0 0 - 2 %    Lymphocytes Relative 31 14 - 44 %    Monocytes Relative 9 4 - 12 %    Eosinophils Relative 3 0 - 6 %    Basophils Relative 1 0 - 1 %    Neutrophils Absolute 5 12 1 85 - 7 62 Thousands/µL    Immature Grans Absolute 0 03 0 00 - 0 20 Thousand/uL    Lymphocytes Absolute 2 75 0 60 - 4 47 Thousands/µL    Monocytes Absolute 0 76 0 17 - 1 22 Thousand/µL    Eosinophils Absolute 0 24 0 00 - 0 61 Thousand/µL    Basophils Absolute 0 05 0 00 - 0 10 Thousands/µL   Comprehensive metabolic panel    Collection Time: 04/26/21  9:22 AM   Result Value Ref Range    Sodium 139 136 - 145 mmol/L    Potassium 4 8 3 5 - 5 3 mmol/L    Chloride 107 100 - 108 mmol/L    CO2 28 21 - 32 mmol/L    ANION GAP 4 4 - 13 mmol/L    BUN 24 5 - 25 mg/dL    Creatinine 1 32 (H) 0 60 - 1 30 mg/dL    Glucose 169 (H) 65 - 140 mg/dL    Calcium 9 5 8 3 - 10 1 mg/dL    AST 31 5 - 45 U/L    ALT 23 12 - 78 U/L    Alkaline Phosphatase 82 46 - 116 U/L    Total Protein 7 8 6 4 - 8 2 g/dL    Albumin 3 8 3 5 - 5 0 g/dL    Total Bilirubin 0 81 0 20 - 1 00 mg/dL    eGFR 55 ml/min/1 73sq m   Troponin I    Collection Time: 04/26/21  9:22 AM   Result Value Ref Range    Troponin I <0 02 ng/mL     Per radiology interpertation -     "  Procedure: Cta Head And Neck With And Without Contrast    Result Date: 4/26/2021  Narrative: CTA NECK AND BRAIN WITH AND WITHOUT CONTRAST INDICATION: Ataxia, stroke suspected COMPARISON:   None  TECHNIQUE:  Routine CT imaging of the Brain without contrast   Post contrast imaging was performed after administration of iodinated contrast through the neck and brain  Post contrast axial 0 625 mm images timed to opacify the arterial system  3D rendering was performed on an independent workstation  MIP reconstructions performed  Coronal reconstructions were performed of the noncontrast portion of the brain  Radiation dose length product (DLP) for this visit:  1423 43 mGy-cm   This examination, like all CT scans performed in the [facility], was performed utilizing techniques to minimize radiation dose exposure, including the use of iterative reconstruction and automated exposure control     IV Contrast:  85 mL of iohexol (OMNIPAQUE)  IMAGE QUALITY:   Diagnostic FINDINGS: NONCONTRAST BRAIN PARENCHYMA: Decreased attenuation is noted in periventricular and subcortical white matter demonstrating an appearance that is statistically most likely to represent moderate microangiopathic change  No CT signs of acute infarction  No intracranial mass, mass effect or midline shift  No acute parenchymal hemorrhage  Vascular calcification of the distal vertebral arteries  VENTRICLES AND EXTRA-AXIAL SPACES:  Ventricles and extra-axial CSF spaces are prominent commensurate with the degree of volume loss  No hydrocephalus  No acute extra-axial hemorrhage  VISUALIZED ORBITS AND PARANASAL SINUSES:  Unremarkable  CERVICAL VASCULATURE AORTIC ARCH AND GREAT VESSELS:  Normal aortic arch and great vessel origins  Normal visualized subclavian vessels  RIGHT VERTEBRAL ARTERY CERVICAL SEGMENT:  Normal origin  The vessel is normal in caliber throughout the neck  LEFT VERTEBRAL ARTERY CERVICAL SEGMENT:  Normal origin  The vessel is normal in caliber throughout the neck  RIGHT EXTRACRANIAL CAROTID SEGMENT:  Normal caliber common carotid artery  Normal bifurcation and cervical internal carotid artery  No stenosis or dissection  LEFT EXTRACRANIAL CAROTID SEGMENT:  Normal caliber common carotid artery  Normal bifurcation and cervical internal carotid artery  No stenosis or dissection  NASCET criteria was used to determine the degree of internal carotid artery diameter stenosis  INTRACRANIAL VASCULATURE INTERNAL CAROTID ARTERIES:  Normal enhancement of the intracranial portions of the internal carotid arteries  Normal ophthalmic artery origins  Normal ICA terminus  ANTERIOR CIRCULATION:  Symmetric A1 segments and anterior cerebral arteries with normal enhancement  Normal anterior communicating artery  MIDDLE CEREBRAL ARTERY CIRCULATION:  M1 segment and middle cerebral artery branches demonstrate normal enhancement bilaterally  DISTAL VERTEBRAL ARTERIES:  Mild calcification of both V4 segments  There is approximately 50% stenosis of the right vertebral artery and mild, less than 50% narrowing on the left  BASILAR ARTERY:  Basilar artery is normal in caliber    Normal superior cerebellar arteries  POSTERIOR CEREBRAL ARTERIES: Both posterior cerebral arteries arises from the basilar tip  Both arteries demonstrate normal enhancement  Normal posterior communicating arteries  DURAL VENOUS SINUSES:  Normal  NON VASCULAR ANATOMY BONY STRUCTURES:  Mild cervical spondylitic degenerative change  No acute osseous abnormality  SOFT TISSUES OF THE NECK:  Normal  THORACIC INLET:  Unremarkable  Impression: CT brain: Periventricular white matter changes suggestive of chronic microangiopathy  No mass, hemorrhage or acute ischemia  CT angiography: Stenosis of both V4 segments of the vertebral arteries just above the foramen magnum, moderate on the right and mild on the left  Workstation performed: [id number]     Procedure: Xr Chest 1 View Portable    Result Date: 4/26/2021  Narrative: CHEST INDICATION:   dizzy  COMPARISON:  None EXAM PERFORMED/VIEWS:  XR CHEST PORTABLE FINDINGS: Cardiomediastinal silhouette appears unremarkable  The lungs are clear  No pneumothorax or pleural effusion  Osseous structures appear within normal limits for patient age  Impression: No acute cardiopulmonary disease  Workstation performed: [id number]   "  Imaging Studies: I have personally reviewed pertinent reports  EKG, Pathology, and Other Studies: I have personally reviewed pertinent reports        Code Status: Level 1 - Full Code

## 2021-04-26 NOTE — ASSESSMENT & PLAN NOTE
Patient with dizziness, nausea, and vomiting over the past 3 days  He has experiences intermittently before, but acutely worse over the past couple of days  Patient has not been able to keep any food or liquids down  Patient with a remote history of vertigo versus motion sickness  CT angiogram revealed bilateral vertebral artery stenosis, right greater than left  Right vertebral artery with about 50% stenosis  Patient improved with IV fluid resuscitation in the ED  MRI:  Chronic lacunar strokes without any acute findings  Microangiopathic changes  Echo- EF 03%, grade 1 diastolic dysfunction  Plan:  -Neurology consult, appreciate recommendations  - source of his symptoms likely peripheral vertigo  - Will communicate with PT OT regarding vestibular rehab  - Atorvastatin 40 mg daily continue  - Aspirin 81 mg daily started  - plan for discharge after following up on PT OT recommendations

## 2021-04-26 NOTE — ASSESSMENT & PLAN NOTE
Patient with bilateral vertebral artery stenosis on CT angiogram   Right is worse than left  Approximately 50% stenosed      Plan:  -Atorvastatin 40 mg daily  -Labetalol 10 mg every 6 hours as needed for systolic blood pressures greater than 200  -Neurology consult, appreciate recommendations

## 2021-04-26 NOTE — SPEECH THERAPY NOTE
Bedside Swallow Evaluation:    Summary:  Pt presents w/ adequate oral and pharyngeal stages of swallowing with regular solids and thin liquids  Patient is admitted with dizziness and vomiting  Does not report any esophageal or swallowing concerns  Informal assessment of speech, language and cognitive skills appear adequate  Recommendations:  Diet: Regular  Liquid: Thin   Meds: Whole with liquids   Supervision: Independent     Eval only, No f/u tx indicated  From H&P:   The patient is a 72 y o  M with a PMHx of HTN, T2DM (Last A1c 10 7% in Dec 2020), HLD that presented to the ED on 4/26/21 with concerns of worsening dizziness with vomiting for the past 3 days  Patient states that he has been dizzy intermittently over the past few weeks  It is worse in the morning when he tries to get up out of bed  It is worsened to the point where the patient can't keep anything down for the past few days  He states it is worse with food  Denies any feeling of food getting stuck in his throat  Patient states that he is compliant with his medications, and no longer takes sildenafil  Patient does not take his blood pressure at home  Patient states that sometimes he will have to [address] when walking, but normally does not have any problems  He states that years ago, he was told he might have vertigo  He states some remote history of motion sickness  Patient denied any headaches, double vision, blurry vision, slurred speech, or numbness    In the ED, patient was hypertensive with systolic blood pressures in the 160s to 180s, heart rates in the 70s to 80s, afebrile, and saturating well on room air  Patient did not have any electrolyte abnormalities on preliminary blood work, and troponin was negative  Hemoglobin was robust at 16 8  Creatinine elevated slightly at 1 32, from his baseline of 1 1  Chest x-ray was unremarkable for any cardiopulmonary disease    CT angiogram of the head and neck revealed stenosis of both V4 segments of the vertebral arteries just above the foramen magnum, moderate on the right and mild on the left  Neurological exam was unremarkable  Houston-Hallpike maneuver was negative  Patient seem to ambulate appropriately without assistance  Patient states that he feels better since coming into the emergency department and being resuscitated with fluids  Patient was admitted for observation and neurological consult for vertebral artery stenosis  Consider consult w/:  None     Reason for consult:  Stroke protocol    Precautions:  None     Current diet:  Regular with thin liquids   Premorbid diet[de-identified]  Regular with thin liquids   Previous VBS:  None   O2 requirement:  None   Voice/Speech:  WFL  Social:  Lives with family   Follows commands:  WFL                        Cognitive Status:  WFL  Oral [facility] exam:  Dentition: wfl-natural   Labial strength and ROM: wfl  Lingual strength and ROM: wfl  Mandibular strength and ROM: wfl  Velum: not visualized     Items administered:  soft solid, hard solid, thin liquids  Liquids were taken by straw       Oral stage:  Lip closure: wfl  Mastication: wfl  Bolus formation: wfl  Bolus control: wfl  Transfer: wfl  Oral residue: no  Pocketing: no    Pharyngeal stage:  Swallow promptness: appears adequate  Laryngeal rise: not palpated  Wet voice: no  Throat clear: no  Cough: no  Secondary swallows: no  Audible swallows: no  No overt s/s aspiration    Esophageal stage:  No s/s reported

## 2021-04-26 NOTE — H&P
INTERNAL MEDICINE RESIDENCY ADMISSION H&P     Name: Charles Sharp   Age & Sex: 72 y o  male   MRN: [id number]  Unit/Bed#: ED 24   Encounter: [id number]  Primary Care Provider: Fermin Donaldson MD    Code Status: Level 1 - Full Code  Admission Status: OBSERVATION  Disposition: Patient requires Med/Surg with Telemetry    Admit to team: SOD Team B     ASSESSMENT/PLAN     Principal Problem:    Dizziness  Active Problems:    Type 2 diabetes mellitus with hyperglycemia, with long-term current use of insulin (HCC)    Essential hypertension    Mixed hyperlipidemia    Vertebral artery stenosis, bilateral    Elevated serum creatinine      * Dizziness  Assessment & Plan  Patient with dizziness, nausea, and vomiting over the past 3 days  He has experiences intermittently before, but acutely worse over the past couple of days  Patient has not been able to keep any food or liquids down  Patient with a remote history of vertigo versus motion sickness  CT angiogram revealed bilateral vertebral artery stenosis, right greater than left  Right vertebral artery with about 50% stenosis  Patient improved with IV fluid resuscitation in the ED  Plan:  -MRI of the brain ordered  -Neurology consult, appreciate recommendations  -Atorvastatin 40 mg daily continue  -Aspirin 81 mg daily started  -A1c level, lipid panel ordered  -Orthostatics ordered  -Echocardiogram ordered  -PT/OT  -Admit for observation    Elevated serum creatinine  Assessment & Plan  Patient recently with a baseline creatinine of 1 1, EGFR 65  Patient's creatinine on arrival was 1 32  Likely secondary to decreased p o  Intake for the past few days  Received 1 L bolus of normal saline in the emergency department      Plan:  -Bedside nursing dysphagia assessment  -After cleared, encourage PO Intake  -Follow with daily BMP    Vertebral artery stenosis, bilateral  Assessment & Plan  Patient with bilateral vertebral artery stenosis on CT angiogram   Right is worse than left  Approximately 50% stenosed  Plan:  -Atorvastatin 40 mg daily  -Lipid panel ordered  -Labetalol 10 mg every 6 hours as needed for systolic blood pressures greater than 200  -Neurology consult, appreciate recommendations    Mixed hyperlipidemia  Assessment & Plan  Patient's most recent lipid panel in 2019 revealed total cholesterol 132, triglycerides 112, HDL 42, LDL 68  Patient is compliant with atorvastatin 40 mg daily at home  Plan:  -New lipid panel ordered  -Continue atorvastatin 40 mg daily    Essential hypertension  Assessment & Plan  Patient's home regimen includes amlodipine 10 mg daily, valsartan 320 mg daily  Patient does not take his blood pressure at home  States that he is compliant with all his medications  He states that he follows up regularly with his PCP  Plan:  -Continue home amlodipine 10 mg daily  -Hold valsartan in setting of elevated creatinine  -Permissive hypertension per Neurology  -Labetalol 10 mg every 6 hours for systolic blood pressure greater than 200     Type 2 diabetes mellitus with hyperglycemia, with long-term current use of insulin ([facility])  Assessment & Plan  Lab Results   Component Value Date    HGBA1C 10 7 (H) 12/05/2020       No results for input(s): POCGLU in the last 72 hours  Blood Sugar Average: Last 72 hrs:    Patient's last hemoglobin A1c was 10 7% in December of 2020  Patient's home regimen includes dapagliflozin 5 mg daily, glipizide 10 mg twice daily, linagliptin 5 mg daily, metformin 1000 mg twice daily, and insulin glargine 52 units at bedtime  Patient states that his wife administers most of his medications      Plan:  -Patient has not had adequate PO intake recently, so will initiate insulin glargine 20 units at bedtime, titrate up as appropriate    -Sliding scale insulin algorithm 2  -Hemoglobin A1c ordered      VTE Pharmacologic Prophylaxis: Heparin  VTE Mechanical Prophylaxis: sequential compression device    CHIEF COMPLAINT     Chief Complaint   Patient presents with    Dizziness     Dizzy for two weeks with mulktiple episodes of vomiting over thispast weekend  HISTORY OF PRESENT ILLNESS   The patient is a 72 y o  M with a PMHx of HTN, T2DM (Last A1c 10 7% in Dec 2020), HLD that presented to the ED on 4/26/21 with concerns of worsening dizziness with vomiting for the past 3 days  Patient states that he has been dizzy intermittently over the past few weeks  It is worse in the morning when he tries to get up out of bed  It is worsened to the point where the patient can't keep anything down for the past few days  He states it is worse with food  Denies any feeling of food getting stuck in his throat  Patient states that he is compliant with his medications, and no longer takes sildenafil  Patient does not take his blood pressure at home  Patient states that sometimes he will have to [address] when walking, but normally does not have any problems  He states that years ago, he was told he might have vertigo  He states some remote history of motion sickness  Patient denied any headaches, double vision, blurry vision, slurred speech, or numbness  In the ED, patient was hypertensive with systolic blood pressures in the 160s to 180s, heart rates in the 70s to 80s, afebrile, and saturating well on room air  Patient did not have any electrolyte abnormalities on preliminary blood work, and troponin was negative  Hemoglobin was robust at 16 8  Creatinine elevated slightly at 1 32, from his baseline of 1 1  Chest x-ray was unremarkable for any cardiopulmonary disease  CT angiogram of the head and neck revealed stenosis of both V4 segments of the vertebral arteries just above the foramen magnum, moderate on the right and mild on the left  Neurological exam was unremarkable  [place]-Hallpike maneuver was negative  Patient seem to ambulate appropriately without assistance    Patient states that he feels better since coming into the emergency department and being resuscitated with fluids  Patient was admitted for observation and neurological consult for vertebral artery stenosis  REVIEW OF SYSTEMS     Review of Systems   Constitutional: Negative for chills and fatigue  HENT: Negative for congestion, postnasal drip, rhinorrhea, sinus pain, sore throat and tinnitus  Eyes: Negative for discharge and itching  Respiratory: Negative for cough, chest tightness and shortness of breath  Cardiovascular: Negative for chest pain  Gastrointestinal: Positive for nausea and vomiting  Negative for abdominal distention and abdominal pain  Genitourinary: Negative for difficulty urinating and dysuria  Musculoskeletal: Positive for gait problem  Negative for arthralgias and myalgias  Skin: Negative for wound  Neurological: Positive for dizziness  Negative for seizures, speech difficulty, weakness and numbness  Psychiatric/Behavioral: Negative for agitation and confusion  OBJECTIVE     Vitals:    21 1100 21 1130 21 1230 21 1330   BP: (!) 175/92 (!) 177/92 (!) 185/95 (!) 176/86   Pulse: 74 72 77 74   Resp: 13 12 16 18   Temp:       TempSrc:       SpO2: 96% 96% 96% 97%      Temperature:   Temp (24hrs), Av °F (36 7 °C), Min:98 °F (36 7 °C), Max:98 °F (36 7 °C)    Temperature: 98 °F (36 7 °C)  Intake & Output:  I/O     None        Weights: There is no height or weight on file to calculate BMI  Weight (last 2 days)     None        Physical Exam  Vitals signs reviewed  Constitutional:       Appearance: Normal appearance  HENT:      Head: Normocephalic and atraumatic  Right Ear: External ear normal       Left Ear: External ear normal       Nose: Nose normal       Mouth/Throat:      Mouth: Mucous membranes are moist       Pharynx: Oropharynx is clear  Eyes:      Extraocular Movements: Extraocular movements intact  Pupils: Pupils are equal, round, and reactive to light     Neck: Musculoskeletal: Normal range of motion  Cardiovascular:      Rate and Rhythm: Normal rate and regular rhythm  Heart sounds: No murmur  Pulmonary:      Effort: Pulmonary effort is normal       Breath sounds: Normal breath sounds  No wheezing  Abdominal:      General: Abdomen is flat  Bowel sounds are normal  There is no distension  Palpations: Abdomen is soft  Tenderness: There is no abdominal tenderness  Musculoskeletal:      Right lower leg: No edema  Left lower leg: No edema  Skin:     General: Skin is warm and dry  Capillary Refill: Capillary refill takes less than 2 seconds  Neurological:      General: No focal deficit present  Mental Status: He is alert and oriented to person, place, and time  Cranial Nerves: No cranial nerve deficit  Comments: [place]-Hallpike negative B/L  Strength 5/5 in all extremities  Normal finger-to-nose and heel-to-shin   Psychiatric:         Mood and Affect: Mood normal          Behavior: Behavior normal        PAST MEDICAL HISTORY     Past Medical History:   Diagnosis Date    Diabetes mellitus ([facility] [address] )     Type 2    Dyslipidemia     Hypertension     Obstructive sleep apnea on CPAP      PAST SURGICAL HISTORY     Past Surgical History:   Procedure Laterality Date    COLONOSCOPY  2008     SOCIAL & FAMILY HISTORY     Social History     Substance and Sexual Activity   Alcohol Use Not Currently     Substance and Sexual Activity   Alcohol Use Not Currently        Substance and Sexual Activity   Drug Use Not on file     Social History     Tobacco Use   Smoking Status Former Smoker   Smokeless Tobacco Never Used     Family History   Problem Relation Age of Onset    Hypertension Mother      LABORATORY DATA     Labs: I have personally reviewed pertinent reports      Results from last 7 days   Lab Units 04/26/21  0922   WBC Thousand/uL 8 95   HEMOGLOBIN g/dL 16 8   HEMATOCRIT % 49 1   PLATELETS Thousands/uL 230   NEUTROS PCT % 56   MONOS PCT % 9 Results from last 7 days   Lab Units 04/26/21  0922   POTASSIUM mmol/L 4 8   CHLORIDE mmol/L 107   CO2 mmol/L 28   BUN mg/dL 24   CREATININE mg/dL 1 32*   CALCIUM mg/dL 9 5   ALK PHOS U/L 82   ALT U/L 23   AST U/L 31                      Results from last 7 days   Lab Units 04/26/21  0922   TROPONIN I ng/mL <0 02     Micro:  No results found for: [place], WOUNDCULT, SPUTUMCULTUR  IMAGING & DIAGNOSTIC TESTS     Imaging: I have personally reviewed pertinent reports  Cta Head And Neck With And Without Contrast    Result Date: 4/26/2021  Impression: CT brain: Periventricular white matter changes suggestive of chronic microangiopathy  No mass, hemorrhage or acute ischemia  CT angiography: Stenosis of both V4 segments of the vertebral arteries just above the foramen magnum, moderate on the right and mild on the left  Workstation performed: [id number]     Xr Chest 1 View Portable    Result Date: 4/26/2021  Impression: No acute cardiopulmonary disease  Workstation performed: [id number]     EKG, Pathology, and Other Studies: I have personally reviewed pertinent reports  ALLERGIES   No Known Allergies  MEDICATIONS PRIOR TO ARRIVAL     Prior to Admission medications    Medication Sig Start Date End Date Taking?  Authorizing Provider   amLODIPine (NORVASC) 10 mg tablet Take 1 tablet (10 mg total) by mouth daily Use as directed 12/16/20   Chante Botello MD   atorvastatin (LIPITOR) 40 mg tablet TAKE 1 TABLET BY MOUTH EVERYDAY AT BEDTIME 11/5/20   Chante Botello MD   Empagliflozin (Jardiance) 25 MG TABS Take 1 tablet (25 mg total) by mouth every morning 12/8/20   Chante Botello MD   glipiZIDE (GLUCOTROL XL) 10 mg 24 hr tablet Take 1 tablet (10 mg total) by mouth 2 (two) times a day 12/16/20   Chante Botello MD   linaGLIPtin 5 MG TABS Take 5 mg by mouth daily    Historical Provider, MD   metFORMIN (GLUCOPHAGE-XR) 500 mg 24 hr tablet Take 2 tablets (1,000 mg total) by mouth 2 (two) times a day 3/29/21 6/27/21  Merline Roller, MD   sildenafil (VIAGRA) 100 mg tablet Take 100 mg by mouth daily as needed for erectile dysfunction    Historical Provider, MD   valsartan (DIOVAN) 320 MG tablet Take 1 tablet (320 mg total) by mouth daily 3/29/21   Merline Roller, MD     MEDICATIONS ADMINISTERED IN LAST 24 HOURS     Medication Administration - last 24 hours from 04/25/2021 1358 to 04/26/2021 1358       Date/Time Order Dose Route Action Action by     04/26/2021 1016 iohexol (OMNIPAQUE) 350 MG/ML injection (SINGLE-DOSE) 85 mL 85 mL Intravenous Given Giuseppe Seay     04/26/2021 1330 sodium chloride 0 9 % bolus 1,000 mL 0 mL Intravenous Stopped Sary Boucher RN     04/26/2021 1102 sodium chloride 0 9 % bolus 1,000 mL 1,000 mL Intravenous New Bag Lynann Apley, RN        CURRENT MEDICATIONS              Admission Time  I spent 45 minutes admitting the patient  This involved direct patient contact where I performed a full history and physical, reviewing previous records, and reviewing laboratory and other diagnostic studies  Portions of the record may have been created with voice recognition software  Occasional wrong word or "sound a like" substitutions may have occurred due to the inherent limitations of voice recognition software    Read the chart carefully and recognize, using context, where substitutions have occurred     ==  Cameron Martinez, [facility]  Internal Medicine Residency PGY-1

## 2021-04-26 NOTE — ASSESSMENT & PLAN NOTE
Patient's most recent lipid panel in 2019 revealed total cholesterol 132, triglycerides 112, HDL 42, LDL 68  Patient is compliant with atorvastatin 40 mg daily at home      Plan:  -Continue atorvastatin 40 mg daily

## 2021-04-26 NOTE — PROGRESS NOTES
INTERNAL MEDICINE RESIDENCY SENIOR ADMISSION NOTE     Name: Margarito Nova   Age & Sex: 72 y o  male   MRN: [id number]  Unit/Bed#: ED 24   Encounter: [id number]  Primary Care Provider: Tiffanie Johnston MD    Admit to team: SOD Team B     Patient seen and examined  Reviewed H&P per Dr Valorie Rangel   Agree with the assessment and plan with any exception/addition as noted below:    Principal Problem:    Dizziness  Active Problems:    Type 2 diabetes mellitus with hyperglycemia, with long-term current use of insulin (HCC)    Essential hypertension    Mixed hyperlipidemia    Vertebral artery stenosis, bilateral    Elevated serum creatinine    79-year-old male with past medical history of hypertension, hyperlipidemia, type 2 diabetes who presented to the ED today with concerns for dizziness and vomiting for the past 3 days, as well as generalized weakness and feeling off balance  CT a showed stenosis of both V4 segments of vertebral artery just above the foramen magnum, moderate on the right and mild on the left  Patient was admitted on stroke pathway  Stroke pathway/vertebral artery stenosis, moderate on the right and mild on the left  · Neurology following, appreciate recommendations  Follow-up on MRI, echocardiogram, lipid panel, hemoglobin A1c  · Frequent neuro checks  · Initiated on aspirin 81 mg and atorvastatin 40 mg q h s  · Allow for permissive hypertension  · P r n   Labetalol for SBP greater than 200  · Monitor on telemetry    Further management of chronic conditions as highlighted in H&P      Code Status: Level 1 - Full Code  Admission Status: INPATIENT   Disposition: Patient requires Med/Surg with Telemetry  Expected Length of Stay: >2 days     Edvin Colin DO  Internal Medicine- PGY2

## 2021-04-26 NOTE — ED ATTENDING ATTESTATION
Final Diagnosis:  1  Dizziness    2  Ataxia    3  Type 2 diabetes mellitus with hyperglycemia, with long-term current use of insulin ([facility] [address] )    4  Mixed hyperlipidemia    5  Vertebral artery stenosis, bilateral           IRm MD, saw and evaluated the patient  All available labs and X-rays were ordered by me or the resident and have been reviewed by myself  I discussed the patient with the resident / non-physician and agree with the resident's / non-physician practitioner's findings and plan as documented in the resident's / non-physician practicitioner's note, except where noted  At this point, I agree with the current assessment done in the ED  I was present during key portions of all procedures performed unless otherwise stated  Chief Complaint   Patient presents with    Dizziness     Dizzy for two weeks with mulktiple episodes of vomiting over thispast weekend  This is a 72 y o  male presenting for evaluation of dizziness  For 3 weeks, worsening in 3 days, has had dizziness w/ nausea/vomiting  It was happening maybe 1x/week initially  Over the weekend, couldn't keep anything down  He is vomiting even toast, can't keep anything donw  No changes in medications  Otherwise, healthy  Saturday: walking around was fine, but when he got home is when he got sick  Hx of motion sickness  PMH:  DM  HTN   has a past medical history of Diabetes mellitus ([facility] [address] ), Dyslipidemia, Hypertension, and Obstructive sleep apnea on CPAP  PSH:   has a past surgical history that includes Colonoscopy (2008)      Social:  Social History     Substance and Sexual Activity   Alcohol Use Not Currently     Social History     Tobacco Use   Smoking Status Former Smoker   Smokeless Tobacco Never Used     Social History     Substance and Sexual Activity   Drug Use Not on file     PE:  Vitals:    04/27/21 1115 04/27/21 1147 04/27/21 1148 04/27/21 1503   BP: (!) 171/103 163/100 162/100 163/99   Pulse: 74 Resp: 18      Temp: 97 9 °F (36 6 °C)   97 8 °F (36 6 °C)   TempSrc:       SpO2: 94%      General: VSS, NAD, awake, alert  Well-nourished, well-developed  Appears stated age  Head: Normocephalic, atraumatic, nontender  Eyes: PERRL, EOM-I  No diplopia  No hyphema  No subconjunctival hemorrhages  Symmetrical lids  ENTAtraumatic external nose and ears  MMM  No stridor  Normal phonation  No drooling  Base of mouth is soft  No mastoid tenderness  Neck: Symmetric, trachea midline  No JVD  CV: Peripheral pulses +2 throughout  No chest wall tenderness  Lungs:   Unlabored   No retractions  No crepitus  No tachypnea  No paradoxical motion  Abd: +BS, soft, NT/ND    MSK:   FROM   No lower extremity edema  Back:   No CVAT  Skin: Dry, intact  Neuro: AAOx3, GCS 15, CN II-XII grossly intact  Motor grossly intact  Sensory grossly intact  EHL/FHL/PF/DF/KF/KE/HF/HE 5/5  No saddle anesthesia  M/U/R/A nerve sensation bilateral intact and equal    strength 5/5  Good capillary refill  2+ radial pulses, bilaterally equal    BICEPS/TRICEPS 5/5  Shoulder abduction/adduction 5/5  No pronator drift  No aphasia/dysarthria  CN 2-12 intact  Normal finger to nose  No nystagmus  No facial droop  Ambulating with very small steps  If with bigger steps, he becomes ataxic and almost falls  Psychiatric/Behavioral: Appropriate mood and affect   Exam: deferred  A:  - Dizziness  - Ataxia  - Vomiting  P:  - Concern for central cause  - CTA H/N  - Labs  - Admission for further workup    - 13 point ROS was performed and all are normal unless stated in the history above  - Nursing note reviewed  Vitals reviewed  - Orders placed by myself and/or advanced practitioner / resident     - Previous chart was reviewed  - No language barrier    - History obtained from patient  - There are no limitations to the history obtained  - Critical care time: Not applicable for this patient     - Patient does not need initiation of IV thrombolytics: Last Known well was unknown    Code Status: Prior  Advance Directive and Living Will:      Power of :    POLST:      Medications   iohexol (OMNIPAQUE) 350 MG/ML injection (SINGLE-DOSE) 85 mL (85 mL Intravenous Given 4/26/21 1016)   sodium chloride 0 9 % bolus 1,000 mL (0 mL Intravenous Stopped 4/26/21 1330)   potassium chloride (K-DUR,KLOR-CON) CR tablet 40 mEq (40 mEq Oral Given 4/27/21 0918)     MRI brain wo contrast   Final Result      White matter changes suggestive of chronic microangiopathy  No acute intracranial pathology  Chronic lacunar infarcts are seen in the left centrum semiovale  Workstation performed: [id number]         CTA head and neck with and without contrast   ED Interpretation   CT ordered by my resident and the report from radiologist has been reviewed  Final Result      CT brain: Periventricular white matter changes suggestive of chronic microangiopathy  No mass, hemorrhage or acute ischemia  CT angiography: Stenosis of both V4 segments of the vertebral arteries just above the foramen magnum, moderate on the right and mild on the left  Workstation performed: [id number]         XR chest 1 view portable   ED Interpretation   No acute cardiopulmonary disease        Final Result      No acute cardiopulmonary disease                    Workstation performed: [id number]           Orders Placed This Encounter   Procedures    XR chest 1 view portable    CTA head and neck with and without contrast    MRI brain wo contrast    CBC and differential    Comprehensive metabolic panel    Troponin I    Lipid Panel with Direct LDL reflex    Hemoglobin A1c w/EAG Estimation    Comprehensive metabolic panel    CBC (With Platelets)    Ambulatory referral to Physical Therapy    Ambulatory referral to Occupational Therapy    Ambulatory referral to Neurosurgery    Continuous cardiac monitoring    Continuous pulse oximetry    Orthostatic blood pressure    Activity as tolerated    Inpatient consult to Neurology    Inpatient consult to Neurology    OT eval and treat    PT eval and treat    SPEECH Language eval and treatment    EKG RESULTS    ECG 12 lead    ECG 12 lead    Echo complete with contrast if indicated    Place in Observation    Discharge patient     Labs Reviewed   COMPREHENSIVE METABOLIC PANEL - Abnormal       Result Value Ref Range Status    Sodium 139  136 - 145 mmol/L Final    Potassium 4 8  3 5 - 5 3 mmol/L Final    Comment: Slightly Hemolyzed; Results May be Affected    Chloride 107  100 - 108 mmol/L Final    CO2 28  21 - 32 mmol/L Final    ANION GAP 4  4 - 13 mmol/L Final    BUN 24  5 - 25 mg/dL Final    Creatinine 1 32 (*) 0 60 - 1 30 mg/dL Final    Comment: Standardized to IDMS reference method    Glucose 169 (*) 65 - 140 mg/dL Final    Comment: If the patient is fasting, the ADA then defines impaired fasting glucose as > 100 mg/dL and diabetes as > or equal to 123 mg/dL  Specimen collection should occur prior to Sulfasalazine administration due to the potential for falsely depressed results  Specimen collection should occur prior to Sulfapyridine administration due to the potential for falsely elevated results  Calcium 9 5  8 3 - 10 1 mg/dL Final    AST 31  5 - 45 U/L Final    Comment: Slightly Hemolyzed; Results May be Affected  Specimen collection should occur prior to Sulfasalazine administration due to the potential for falsely depressed results  ALT 23  12 - 78 U/L Final    Comment: Specimen collection should occur prior to Sulfasalazine administration due to the potential for falsely depressed results       Alkaline Phosphatase 82  46 - 116 U/L Final    Total Protein 7 8  6 4 - 8 2 g/dL Final    Albumin 3 8  3 5 - 5 0 g/dL Final    Total Bilirubin 0 81  0 20 - 1 00 mg/dL Final    Comment: Use of this assay is not recommended for patients undergoing treatment with eltrombopag due to the potential for falsely elevated results      eGFR 55  ml/min/1 73sq m Final    Narrative:     National Kidney Disease Foundation guidelines for Chronic Kidney Disease (CKD):     Stage 1 with normal or high GFR (GFR > 90 mL/min/1 73 square meters)    Stage 2 Mild CKD (GFR = 60-89 mL/min/1 73 square meters)    Stage 3A Moderate CKD (GFR = 45-59 mL/min/1 73 square meters)    Stage 3B Moderate CKD (GFR = 30-44 mL/min/1 73 square meters)    Stage 4 Severe CKD (GFR = 15-29 mL/min/1 73 square meters)    Stage 5 End Stage CKD (GFR <15 mL/min/1 73 square meters)  Note: GFR calculation is accurate only with a steady state creatinine   POCT GLUCOSE - Abnormal    POC Glucose 243 (*) 65 - 140 mg/dl Final   CBC AND DIFFERENTIAL    WBC 8 95  4 31 - 10 16 Thousand/uL Final    RBC 5 52  3 88 - 5 62 Million/uL Final    Hemoglobin 16 8  12 0 - 17 0 g/dL Final    Hematocrit 49 1  36 5 - 49 3 % Final    MCV 89  82 - 98 fL Final    MCH 30 4  26 8 - 34 3 pg Final    MCHC 34 2  31 4 - 37 4 g/dL Final    RDW 12 1  11 6 - 15 1 % Final    MPV 11 5  8 9 - 12 7 fL Final    Platelets 939  591 - 390 Thousands/uL Final    nRBC 0  /100 WBCs Final    Neutrophils Relative 56  43 - 75 % Final    Immat GRANS % 0  0 - 2 % Final    Lymphocytes Relative 31  14 - 44 % Final    Monocytes Relative 9  4 - 12 % Final    Eosinophils Relative 3  0 - 6 % Final    Basophils Relative 1  0 - 1 % Final    Neutrophils Absolute 5 12  1 85 - 7 62 Thousands/µL Final    Immature Grans Absolute 0 03  0 00 - 0 20 Thousand/uL Final    Lymphocytes Absolute 2 75  0 60 - 4 47 Thousands/µL Final    Monocytes Absolute 0 76  0 17 - 1 22 Thousand/µL Final    Eosinophils Absolute 0 24  0 00 - 0 61 Thousand/µL Final    Basophils Absolute 0 05  0 00 - 0 10 Thousands/µL Final   TROPONIN I    Troponin I <0 02  ng/mL Final    Comment: Siemens Chemistry analyzer 99% cutoff is > 0 04 ng/mL in network labs     o cTnI 99% cutoff is useful only when applied to patients in the clinical setting of myocardial ischemia   o cTnI 99% cutoff should be interpreted in the context of clinical history, ECG findings and possibly cardiac imaging to establish correct diagnosis  o cTnI 99% cutoff may be suggestive but clearly not indicative of a coronary event without the clinical setting of myocardial ischemia  LIGHT BLUE TOP     Time reflects when diagnosis was documented in both MDM as applicable and the Disposition within this note     Time User Action Codes Description Comment    4/26/2021 12:06 PM Jules Ryan Add [R42] Dizziness     4/26/2021 12:06 PM Jules Ryan Add [R27 0] Ataxia     4/26/2021  1:14 PM Yetta Halley Add [E11 65,  Z79 4] Type 2 diabetes mellitus with hyperglycemia, with long-term current use of insulin ([facility] [address] )     4/26/2021  1:14 PM Yetta Halley Add [E78 2] Mixed hyperlipidemia     4/26/2021  1:32 PM Yetta Halley Add [I65 03] Vertebral artery stenosis, bilateral       ED Disposition     ED Disposition Condition Date/Time Comment    Admit Stable Mon Apr 26, 2021 12:06 PM Case was discussed with Dr Greg Tidwell and the patient's admission status was agreed to be Admission Status: observation status to the service of Dr Sara Arnett           Follow-up Information     Follow up With Specialties Details Why Contact Info Additional Information    Rosa Mederos MD Internal Medicine Follow up in 1 week(s)  [address] [id number] Neurosurgery Follow up in 1 week(s)  [address]  [facility] [address]  [facility] [address], [place], [id number] [phone]        Discharge Medication List as of 4/27/2021  3:01 PM      START taking these medications    Details   aspirin 81 mg chewable tablet Chew 1 tablet (81 mg total) daily, Starting Wed 4/28/2021, Normal      insulin glargine (LANTUS) 100 units/mL subcutaneous injection Inject 25 Units under the skin daily at bedtime, Starting Tue 4/27/2021, Normal         CONTINUE these medications which have NOT CHANGED    Details   amLODIPine (NORVASC) 10 mg tablet Take 1 tablet (10 mg total) by mouth daily Use as directed, Starting Wed 12/16/2020, Normal      atorvastatin (LIPITOR) 40 mg tablet TAKE 1 TABLET BY MOUTH EVERYDAY AT BEDTIME, Normal      metFORMIN (GLUCOPHAGE-XR) 500 mg 24 hr tablet Take 2 tablets (1,000 mg total) by mouth 2 (two) times a day, Starting Mon 3/29/2021, Until Sun 6/27/2021, Normal      valsartan (DIOVAN) 320 MG tablet Take 1 tablet (320 mg total) by mouth daily, Starting Mon 3/29/2021, Normal      Empagliflozin (Jardiance) 25 MG TABS Take 1 tablet (25 mg total) by mouth every morning, Starting Tue 12/8/2020, Print      linaGLIPtin 5 MG TABS Take 5 mg by mouth daily, Historical Med      sildenafil (VIAGRA) 100 mg tablet Take 100 mg by mouth daily as needed for erectile dysfunction, Historical Med         STOP taking these medications       Dapagliflozin Propanediol (Farxiga) 5 MG TABS Comments:   Reason for Stopping:         glipiZIDE (GLUCOTROL XL) 10 mg 24 hr tablet Comments:   Reason for Stopping:         insulin glargine (Basaglar KwikPen) 100 units/mL injection pen Comments:   Reason for Stopping:             Outpatient Discharge Orders   Ambulatory referral to Physical Therapy   Standing Status: Future Standing Exp  Date: 04/27/22      Ambulatory referral to Occupational Therapy   Standing Status: Future Standing Exp  Date: 04/27/22      Ambulatory referral to Neurosurgery   Standing Status: Future Standing Exp  Date: 04/27/22      Activity as tolerated     Prior to Admission Medications   Prescriptions Last Dose Informant Patient Reported? Taking?    Dapagliflozin Propanediol (Farxiga) 5 MG TABS   Yes Yes   Sig: Take 5 mg by mouth daily   Empagliflozin (Jardiance) 25 MG TABS Not Taking at Unknown time  No No   Sig: Take 1 tablet (25 mg total) by mouth every morning   Patient not taking: Reported on 4/26/2021   amLODIPine (NORVASC) 10 mg tablet Past Week at Unknown time  No Yes   Sig: Take 1 tablet (10 mg total) by mouth daily Use as directed   atorvastatin (LIPITOR) 40 mg tablet Past Week at Unknown time Self No Yes   Sig: TAKE 1 TABLET BY MOUTH EVERYDAY AT BEDTIME   glipiZIDE (GLUCOTROL XL) 10 mg 24 hr tablet Past Week at Unknown time  No Yes   Sig: Take 1 tablet (10 mg total) by mouth 2 (two) times a day   insulin glargine (Basaglar KwikPen) 100 units/mL injection pen  Self Yes Yes   Sig: Inject 52 Units under the skin daily at bedtime   linaGLIPtin 5 MG TABS Not Taking at Unknown time Self Yes No   Sig: Take 5 mg by mouth daily   metFORMIN (GLUCOPHAGE-XR) 500 mg 24 hr tablet Past Week at Unknown time  No Yes   Sig: Take 2 tablets (1,000 mg total) by mouth 2 (two) times a day   sildenafil (VIAGRA) 100 mg tablet Not Taking at Unknown time Self Yes No   Sig: Take 100 mg by mouth daily as needed for erectile dysfunction   valsartan (DIOVAN) 320 MG tablet Past Week at Unknown time  No Yes   Sig: Take 1 tablet (320 mg total) by mouth daily      Facility-Administered Medications: None       Portions of the record may have been created with voice recognition software  Occasional wrong word or "sound a like" substitutions may have occurred due to the inherent limitations of voice recognition software  Read the chart carefully and recognize, using context, where substitutions have occurred      Electronically signed by:  Magi Sands

## 2021-04-26 NOTE — ASSESSMENT & PLAN NOTE
Patient's home regimen includes amlodipine 10 mg daily, valsartan 320 mg daily  Patient does not take his blood pressure at home  States that he is compliant with all his medications  He states that he follows up regularly with his PCP  Plan:  -Continue home amlodipine 10 mg daily  - no signs of stroke on MRI  - will start on losartan 100 mg daily as a substitute for home valsartan  - resume valsartan on discharge and stop losartan

## 2021-04-26 NOTE — ED PROVIDER NOTES
History  Chief Complaint   Patient presents with    Dizziness     Dizzy for two weeks with mulktiple episodes of vomiting over thispast weekend  29-year-old male past medical history significant for hypertension, hyperlipidemia, diabetes that presents ED today for dizziness and weakness  Patient has had intermittent dizziness where he feels unsteady on his feet for last 2 weeks  However over the weekend he has had worsening of this dizziness as well as nausea and vomiting and not able to tolerate p o  Intake  They have tried even eating bland foods and he still does have vomiting  No blood in the vomit  Patient also feels unsteady on his feet  Denies any chest pain, shortness breath, abdominal pain  He says he has a remote history of motion sickness/vertigo  Prior to Admission Medications   Prescriptions Last Dose Informant Patient Reported? Taking?    Dapagliflozin Propanediol (Farxiga) 5 MG TABS   Yes Yes   Sig: Take 5 mg by mouth daily   Empagliflozin (Jardiance) 25 MG TABS Not Taking at Unknown time  No No   Sig: Take 1 tablet (25 mg total) by mouth every morning   Patient not taking: Reported on 4/26/2021   amLODIPine (NORVASC) 10 mg tablet Past Week at Unknown time  No Yes   Sig: Take 1 tablet (10 mg total) by mouth daily Use as directed   atorvastatin (LIPITOR) 40 mg tablet Past Week at Unknown time Self No Yes   Sig: TAKE 1 TABLET BY MOUTH EVERYDAY AT BEDTIME   glipiZIDE (GLUCOTROL XL) 10 mg 24 hr tablet Past Week at Unknown time  No Yes   Sig: Take 1 tablet (10 mg total) by mouth 2 (two) times a day   insulin glargine (Basaglar KwikPen) 100 units/mL injection pen  Self Yes Yes   Sig: Inject 52 Units under the skin daily at bedtime   linaGLIPtin 5 MG TABS Not Taking at Unknown time Self Yes No   Sig: Take 5 mg by mouth daily   metFORMIN (GLUCOPHAGE-XR) 500 mg 24 hr tablet Past Week at Unknown time  No Yes   Sig: Take 2 tablets (1,000 mg total) by mouth 2 (two) times a day   sildenafil (VIAGRA) 100 mg tablet Not Taking at Unknown time Self Yes No   Sig: Take 100 mg by mouth daily as needed for erectile dysfunction   valsartan (DIOVAN) 320 MG tablet Past Week at Unknown time  No Yes   Sig: Take 1 tablet (320 mg total) by mouth daily      Facility-Administered Medications: None       Past Medical History:   Diagnosis Date    Diabetes mellitus ([facility] [address] )     Type 2    Dyslipidemia     Hypertension     Obstructive sleep apnea on CPAP        Past Surgical History:   Procedure Laterality Date    COLONOSCOPY  2008       Family History   Problem Relation Age of Onset    Hypertension Mother      I have reviewed and agree with the history as documented  E-Cigarette/Vaping     E-Cigarette/Vaping Substances     Social History     Tobacco Use    Smoking status: Former Smoker    Smokeless tobacco: Never Used   Substance Use Topics    Alcohol use: Not Currently    Drug use: Not on file        Review of Systems   Constitutional: Negative for chills and fever  HENT: Negative for hearing loss  Eyes: Negative for visual disturbance  Respiratory: Negative for shortness of breath  Cardiovascular: Negative for chest pain  Gastrointestinal: Negative for abdominal pain, constipation, diarrhea, nausea and vomiting  Genitourinary: Negative for difficulty urinating  Musculoskeletal: Negative for myalgias  Skin: Negative for rash  Neurological: Positive for dizziness  Negative for headaches  Psychiatric/Behavioral: Negative for agitation  All other systems reviewed and are negative        Physical Exam  ED Triage Vitals   Temperature Pulse Respirations Blood Pressure SpO2   04/26/21 0918 04/26/21 0918 04/26/21 0918 04/26/21 0918 04/26/21 0918   98 °F (36 7 °C) 69 16 166/93 98 %      Temp Source Heart Rate Source Patient Position - Orthostatic VS BP Location FiO2 (%)   04/26/21 0918 04/26/21 1230 -- -- --   Oral Monitor         Pain Score       04/26/21 0918       No Pain             Orthostatic Vital Signs  Vitals:    04/26/21 1230 04/26/21 1330 04/26/21 1600 04/26/21 1700   BP: (!) 185/95 (!) 176/86 153/88 155/93   Pulse: 77 74 78 76       Physical Exam  Vitals signs and nursing note reviewed  Constitutional:       General: He is not in acute distress  Appearance: Normal appearance  He is not ill-appearing  HENT:      Head: Normocephalic and atraumatic  Right Ear: External ear normal       Left Ear: External ear normal       Nose: Nose normal       Mouth/Throat:      Mouth: Mucous membranes are moist       Pharynx: Oropharynx is clear  No oropharyngeal exudate  Eyes:      Extraocular Movements: Extraocular movements intact  Conjunctiva/sclera: Conjunctivae normal       Pupils: Pupils are equal, round, and reactive to light  Neck:      Musculoskeletal: Normal range of motion  No neck rigidity  Cardiovascular:      Rate and Rhythm: Normal rate and regular rhythm  Pulses: Normal pulses  Heart sounds: Normal heart sounds  Pulmonary:      Effort: Pulmonary effort is normal       Breath sounds: Normal breath sounds  Abdominal:      General: Abdomen is flat  Bowel sounds are normal  There is no distension  Palpations: Abdomen is soft  Tenderness: There is no abdominal tenderness  Musculoskeletal: Normal range of motion  General: No swelling  Skin:     General: Skin is warm and dry  Capillary Refill: Capillary refill takes less than 2 seconds  Neurological:      General: No focal deficit present  Mental Status: He is alert and oriented to person, place, and time  Mental status is at baseline  Cranial Nerves: No cranial nerve deficit  Motor: No weakness  Gait: Gait abnormal       Comments: Patient ataxic and tips over to the left when he walks     Psychiatric:         Mood and Affect: Mood normal          Behavior: Behavior normal          ED Medications  Medications   amLODIPine (NORVASC) tablet 10 mg (10 mg Oral Given 4/26/21 1654)   atorvastatin (LIPITOR) tablet 40 mg (has no administration in time range)   heparin (porcine) subcutaneous injection 5,000 Units (5,000 Units Subcutaneous Not Given 4/26/21 1654)   aspirin chewable tablet 81 mg (81 mg Oral Given 4/26/21 1654)   ondansetron (ZOFRAN) injection 4 mg (has no administration in time range)   acetaminophen (TYLENOL) tablet 650 mg (has no administration in time range)   insulin glargine (LANTUS) subcutaneous injection 20 Units 0 2 mL (has no administration in time range)   insulin lispro (HumaLOG) 100 units/mL subcutaneous injection 1-5 Units (2 Units Subcutaneous Given 4/26/21 1816)   Labetalol HCl (NORMODYNE) injection 10 mg (has no administration in time range)   iohexol (OMNIPAQUE) 350 MG/ML injection (SINGLE-DOSE) 85 mL (85 mL Intravenous Given 4/26/21 1016)   sodium chloride 0 9 % bolus 1,000 mL (0 mL Intravenous Stopped 4/26/21 1330)       Diagnostic Studies  Results Reviewed     Procedure Component Value Units Date/Time    Fingerstick Glucose (POCT) [[id number]]  (Abnormal) Collected: 04/26/21 1802    Lab Status: Final result Updated: 04/26/21 1810     POC Glucose 243 mg/dl     Platelet count [[id number]]     Lab Status: No result Specimen: Blood     Troponin I [[id number]] Collected: 04/26/21 0922    Lab Status: Final result Specimen: Blood from Arm, Left Updated: 04/26/21 0957     Troponin I <0 02 ng/mL     Comprehensive metabolic panel [[id number]]  (Abnormal) Collected: 04/26/21 0922    Lab Status: Final result Specimen: Blood from Arm, Left Updated: 04/26/21 0953     Sodium 139 mmol/L      Potassium 4 8 mmol/L      Chloride 107 mmol/L      CO2 28 mmol/L      ANION GAP 4 mmol/L      BUN 24 mg/dL      Creatinine 1 32 mg/dL      Glucose 169 mg/dL      Calcium 9 5 mg/dL      AST 31 U/L      ALT 23 U/L      Alkaline Phosphatase 82 U/L      Total Protein 7 8 g/dL      Albumin 3 8 g/dL      Total Bilirubin 0 81 mg/dL      eGFR 55 ml/min/1 73sq m     Narrative:      National Kidney Disease Foundation guidelines for Chronic Kidney Disease (CKD):     Stage 1 with normal or high GFR (GFR > 90 mL/min/1 73 square meters)    Stage 2 Mild CKD (GFR = 60-89 mL/min/1 73 square meters)    Stage 3A Moderate CKD (GFR = 45-59 mL/min/1 73 square meters)    Stage 3B Moderate CKD (GFR = 30-44 mL/min/1 73 square meters)    Stage 4 Severe CKD (GFR = 15-29 mL/min/1 73 square meters)    Stage 5 End Stage CKD (GFR <15 mL/min/1 73 square meters)  Note: GFR calculation is accurate only with a steady state creatinine    CBC and differential [[id number]] Collected: 04/26/21 0922    Lab Status: Final result Specimen: Blood from Arm, Left Updated: 04/26/21 0936     WBC 8 95 Thousand/uL      RBC 5 52 Million/uL      Hemoglobin 16 8 g/dL      Hematocrit 49 1 %      MCV 89 fL      MCH 30 4 pg      MCHC 34 2 g/dL      RDW 12 1 %      MPV 11 5 fL      Platelets 299 Thousands/uL      nRBC 0 /100 WBCs      Neutrophils Relative 56 %      Immat GRANS % 0 %      Lymphocytes Relative 31 %      Monocytes Relative 9 %      Eosinophils Relative 3 %      Basophils Relative 1 %      Neutrophils Absolute 5 12 Thousands/µL      Immature Grans Absolute 0 03 Thousand/uL      Lymphocytes Absolute 2 75 Thousands/µL      Monocytes Absolute 0 76 Thousand/µL      Eosinophils Absolute 0 24 Thousand/µL      Basophils Absolute 0 05 Thousands/µL                  CTA head and neck with and without contrast   ED Interpretation by Natacha Donaldson MD (04/26 1059)   CT ordered by my resident and the report from radiologist has been reviewed  Final Result by Smita Meléndez DO (04/26 1041)      CT brain: Periventricular white matter changes suggestive of chronic microangiopathy  No mass, hemorrhage or acute ischemia  CT angiography: Stenosis of both V4 segments of the vertebral arteries just above the foramen magnum, moderate on the right and mild on the left                    Workstation performed: [id number]         XR chest 1 view portable   ED Interpretation by Radu Jo MD (04/26 1006)   No acute cardiopulmonary disease        Final Result by Shanice Berrios MD (04/26 1055)      No acute cardiopulmonary disease  Workstation performed: [id number]         MRI brain wo contrast    (Results Pending)         Procedures  Procedures      ED Course  ED Course as of Apr 26 1954 Mon Apr 26, 2021   1126 SOD was paged for admission  Want us to reach out to Neuro Admitting for possible admission      1140 Neuro Admitting Paged via Tiger Text for admission      1201 OBS, med surg with tele  Oris Omid                                          [facility]  Number of Diagnoses or Management Options  Ataxia:   Dizziness:   Diagnosis management comments: 32-year-old male presenting to ED today for dizziness with nausea vomiting  At this time will evaluate with a CBC, CMP, troponin, 12 lead EKG, CT head and neck with and without contrast   If initial workup negative patient should be admitted to the medicine service for stroke workup with MRI  Patient was ultimately admitted to the SOD service for further management and workup        Disposition  Final diagnoses:   Dizziness   Ataxia     Time reflects when diagnosis was documented in both MDM as applicable and the Disposition within this note     Time User Action Codes Description Comment    4/26/2021 12:06 PM Beverlie Eng Add [R42] Dizziness     4/26/2021 12:06 PM Beverlie Eng Add [R27 0] Ataxia     4/26/2021  1:14 PM Michaeline Cons Add [E11 65,  Z79 4] Type 2 diabetes mellitus with hyperglycemia, with long-term current use of insulin ([facility] [address] )     4/26/2021  1:14 PM Michaeline Cons Add [E78 2] Mixed hyperlipidemia     4/26/2021  1:32 PM Michaeline Cons Add [I65 03] Vertebral artery stenosis, bilateral       ED Disposition     ED Disposition Condition Date/Time Comment    Admit Stable Mon Apr 26, 2021 12:06 PM Case was discussed with Dr Juan Tyler and the patient's admission status was agreed to be Admission Status: observation status to the service of Dr Florencio Deleon   Follow-up Information    None         Current Discharge Medication List      CONTINUE these medications which have NOT CHANGED    Details   amLODIPine (NORVASC) 10 mg tablet Take 1 tablet (10 mg total) by mouth daily Use as directed  Qty: 90 tablet, Refills: 1    Associated Diagnoses: Essential hypertension      atorvastatin (LIPITOR) 40 mg tablet TAKE 1 TABLET BY MOUTH EVERYDAY AT BEDTIME  Qty: 90 tablet, Refills: 1    Associated Diagnoses: Type 2 diabetes mellitus with hyperglycemia, with long-term current use of insulin ([facility])      Dapagliflozin Propanediol (Farxiga) 5 MG TABS Take 5 mg by mouth daily      glipiZIDE (GLUCOTROL XL) 10 mg 24 hr tablet Take 1 tablet (10 mg total) by mouth 2 (two) times a day  Qty: 180 tablet, Refills: 1    Associated Diagnoses: Type 2 diabetes mellitus with hyperglycemia, with long-term current use of insulin ([facility])      insulin glargine (Basaglar KwikPen) 100 units/mL injection pen Inject 52 Units under the skin daily at bedtime      metFORMIN (GLUCOPHAGE-XR) 500 mg 24 hr tablet Take 2 tablets (1,000 mg total) by mouth 2 (two) times a day  Qty: 360 tablet, Refills: 0    Associated Diagnoses: Type 2 diabetes mellitus with hyperglycemia, with long-term current use of insulin ([facility])      valsartan (DIOVAN) 320 MG tablet Take 1 tablet (320 mg total) by mouth daily  Qty: 90 tablet, Refills: 0    Associated Diagnoses: Essential hypertension      Empagliflozin (Jardiance) 25 MG TABS Take 1 tablet (25 mg total) by mouth every morning  Qty: 30 tablet, Refills: 1    Associated Diagnoses: Type 2 diabetes mellitus with hyperglycemia, with long-term current use of insulin ([facility])      linaGLIPtin 5 MG TABS Take 5 mg by mouth daily      sildenafil (VIAGRA) 100 mg tablet Take 100 mg by mouth daily as needed for erectile dysfunction           No discharge procedures on file      PDMP Review     None           ED Provider  Attending physically available and evaluated Anisa Tidwell  BALDEV managed the patient along with the ED Attending      Electronically Signed by         Zully Curry MD  04/26/21 9255

## 2021-04-26 NOTE — ASSESSMENT & PLAN NOTE
Lab Results   Component Value Date    HGBA1C 10 7 (H) 12/05/2020       No results for input(s): POCGLU in the last 72 hours  Blood Sugar Average: Last 72 hrs:    Patient's last hemoglobin A1c was 10 7% in December of 2020  Patient's home regimen includes dapagliflozin 5 mg daily, glipizide 10 mg twice daily, linagliptin 5 mg daily, metformin 1000 mg twice daily, and insulin glargine 52 units at bedtime  Patient states that his wife administers most of his medications  Plan:  - currently on Lantus 25 units daily with sliding scale  Will up titrate based on response   - plan is to resume metformin, linagliptin and Jardiance on discharge  - Will hold on to home glipizide and dapagliflozin on discharge

## 2021-04-26 NOTE — ASSESSMENT & PLAN NOTE
CT angiogram did show stenosis of both V4 segments of the vertebral arteries just above the foramen magnum, moderate on the right and mild on the left      -  Plan as above

## 2021-04-26 NOTE — ASSESSMENT & PLAN NOTE
Patient recently with a baseline creatinine of 1 1, EGFR 65  Patient's creatinine on arrival was 1 32  Likely secondary to decreased p o  Intake for the past few days  Received 1 L bolus of normal saline in the emergency department  Plan:  - resolved

## 2021-04-26 NOTE — ASSESSMENT & PLAN NOTE
15-year-old male with past medical history of hypertension, hyperlipidemia, diabetes, and sleep apnea  The patient presents with dizziness and vomiting for the past few days and intermittent dizziness over the past few weeks  It is also noted to be having more difficulty walking  CT angiogram did show stenosis of both V4 segments of the vertebral arteries just above the foramen magnum, moderate on the right and mild on the left  Neurological examination as below  NIH of zero  Symptoms have now resolved  DDX - Suspect inner ear pathology, cannot exclude orthostasis, does not appear to be vestibular basilar insufficiency with VA stenosis noted on CTA imaging, MRI with out evidence for stroke, symptoms lasted for 3 days with out stroke seen on MRI less likely TIA  With his severity of symptoms and lasting for few days, this appears less likely to be an MRI negative stroke  - Stroke pathway   MRI brain with out contrast completed as below - no evidence of stroke   Echo - EF was 70% there was no regional wall abnormalities,  Bilateral atrium were normal in size   Lipid Panel - LDL 75   Hemoglobin A1c - 10 2 H   Continue ASA, with stenosis seen on CTA imaging   Atorvastatin 40 mg   Continue telemetry   PT/OT/ST - consider vestibular therapy as out patient   Frequent neuro checks  Continue to monitor and notify neurology with any changes  - Medical management and supportive care per primary team  Correction of any metabolic or infectious disturbances

## 2021-04-27 VITALS
RESPIRATION RATE: 18 BRPM | DIASTOLIC BLOOD PRESSURE: 99 MMHG | SYSTOLIC BLOOD PRESSURE: 163 MMHG | OXYGEN SATURATION: 94 % | TEMPERATURE: 97.8 F | HEART RATE: 74 BPM

## 2021-04-27 PROBLEM — R42 DIZZINESS: Status: RESOLVED | Noted: 2021-04-26 | Resolved: 2021-04-27

## 2021-04-27 PROBLEM — R79.89 ELEVATED SERUM CREATININE: Status: RESOLVED | Noted: 2021-04-26 | Resolved: 2021-04-27

## 2021-04-27 LAB
ALBUMIN SERPL BCP-MCNC: 3.3 G/DL (ref 3.5–5)
ALP SERPL-CCNC: 72 U/L (ref 46–116)
ALT SERPL W P-5'-P-CCNC: 22 U/L (ref 12–78)
ANION GAP SERPL CALCULATED.3IONS-SCNC: 8 MMOL/L (ref 4–13)
AST SERPL W P-5'-P-CCNC: 18 U/L (ref 5–45)
BILIRUB SERPL-MCNC: 0.69 MG/DL (ref 0.2–1)
BUN SERPL-MCNC: 19 MG/DL (ref 5–25)
CALCIUM ALBUM COR SERPL-MCNC: 10 MG/DL (ref 8.3–10.1)
CALCIUM SERPL-MCNC: 9.4 MG/DL (ref 8.3–10.1)
CHLORIDE SERPL-SCNC: 109 MMOL/L (ref 100–108)
CHOLEST SERPL-MCNC: 142 MG/DL (ref 50–200)
CO2 SERPL-SCNC: 23 MMOL/L (ref 21–32)
CREAT SERPL-MCNC: 0.98 MG/DL (ref 0.6–1.3)
ERYTHROCYTE [DISTWIDTH] IN BLOOD BY AUTOMATED COUNT: 12.2 % (ref 11.6–15.1)
EST. AVERAGE GLUCOSE BLD GHB EST-MCNC: 246 MG/DL
GFR SERPL CREATININE-BSD FRML MDRD: 78 ML/MIN/1.73SQ M
GLUCOSE SERPL-MCNC: 100 MG/DL (ref 65–140)
GLUCOSE SERPL-MCNC: 114 MG/DL (ref 65–140)
GLUCOSE SERPL-MCNC: 173 MG/DL (ref 65–140)
HBA1C MFR BLD: 10.2 %
HCT VFR BLD AUTO: 42.7 % (ref 36.5–49.3)
HDLC SERPL-MCNC: 47 MG/DL
HGB BLD-MCNC: 14.7 G/DL (ref 12–17)
LDLC SERPL CALC-MCNC: 75 MG/DL (ref 0–100)
MCH RBC QN AUTO: 30.4 PG (ref 26.8–34.3)
MCHC RBC AUTO-ENTMCNC: 34.4 G/DL (ref 31.4–37.4)
MCV RBC AUTO: 88 FL (ref 82–98)
PLATELET # BLD AUTO: 217 THOUSANDS/UL (ref 149–390)
PMV BLD AUTO: 11.2 FL (ref 8.9–12.7)
POTASSIUM SERPL-SCNC: 3.3 MMOL/L (ref 3.5–5.3)
PROT SERPL-MCNC: 6.5 G/DL (ref 6.4–8.2)
RBC # BLD AUTO: 4.83 MILLION/UL (ref 3.88–5.62)
SODIUM SERPL-SCNC: 140 MMOL/L (ref 136–145)
TRIGL SERPL-MCNC: 99 MG/DL
WBC # BLD AUTO: 6.99 THOUSAND/UL (ref 4.31–10.16)

## 2021-04-27 PROCEDURE — 83036 HEMOGLOBIN GLYCOSYLATED A1C: CPT | Performed by: STUDENT IN AN ORGANIZED HEALTH CARE EDUCATION/TRAINING PROGRAM

## 2021-04-27 PROCEDURE — 85027 COMPLETE CBC AUTOMATED: CPT | Performed by: STUDENT IN AN ORGANIZED HEALTH CARE EDUCATION/TRAINING PROGRAM

## 2021-04-27 PROCEDURE — 82948 REAGENT STRIP/BLOOD GLUCOSE: CPT

## 2021-04-27 PROCEDURE — 99214 OFFICE O/P EST MOD 30 MIN: CPT | Performed by: PSYCHIATRY & NEUROLOGY

## 2021-04-27 PROCEDURE — 80053 COMPREHEN METABOLIC PANEL: CPT | Performed by: STUDENT IN AN ORGANIZED HEALTH CARE EDUCATION/TRAINING PROGRAM

## 2021-04-27 PROCEDURE — 97166 OT EVAL MOD COMPLEX 45 MIN: CPT

## 2021-04-27 PROCEDURE — 80061 LIPID PANEL: CPT | Performed by: STUDENT IN AN ORGANIZED HEALTH CARE EDUCATION/TRAINING PROGRAM

## 2021-04-27 PROCEDURE — 97162 PT EVAL MOD COMPLEX 30 MIN: CPT

## 2021-04-27 RX ORDER — POTASSIUM CHLORIDE 20 MEQ/1
40 TABLET, EXTENDED RELEASE ORAL ONCE
Status: COMPLETED | OUTPATIENT
Start: 2021-04-27 | End: 2021-04-27

## 2021-04-27 RX ORDER — INSULIN GLARGINE 100 [IU]/ML
25 INJECTION, SOLUTION SUBCUTANEOUS
Qty: 10 ML | Refills: 0 | Status: SHIPPED | OUTPATIENT
Start: 2021-04-27 | End: 2021-05-21

## 2021-04-27 RX ORDER — INSULIN GLARGINE 100 [IU]/ML
25 INJECTION, SOLUTION SUBCUTANEOUS
Status: DISCONTINUED | OUTPATIENT
Start: 2021-04-27 | End: 2021-04-27 | Stop reason: HOSPADM

## 2021-04-27 RX ORDER — LOSARTAN POTASSIUM 50 MG/1
100 TABLET ORAL DAILY
Status: DISCONTINUED | OUTPATIENT
Start: 2021-04-27 | End: 2021-04-27 | Stop reason: HOSPADM

## 2021-04-27 RX ORDER — ASPIRIN 81 MG/1
81 TABLET, CHEWABLE ORAL DAILY
Qty: 90 TABLET | Refills: 0 | Status: SHIPPED | OUTPATIENT
Start: 2021-04-28 | End: 2021-06-19 | Stop reason: SDUPTHER

## 2021-04-27 RX ORDER — INSULIN GLARGINE 100 [IU]/ML
30 INJECTION, SOLUTION SUBCUTANEOUS
Status: DISCONTINUED | OUTPATIENT
Start: 2021-04-27 | End: 2021-04-27

## 2021-04-27 RX ADMIN — POTASSIUM CHLORIDE 40 MEQ: 1500 TABLET, EXTENDED RELEASE ORAL at 09:18

## 2021-04-27 RX ADMIN — LOSARTAN POTASSIUM 100 MG: 50 TABLET, FILM COATED ORAL at 12:59

## 2021-04-27 RX ADMIN — HEPARIN SODIUM 5000 UNITS: 5000 INJECTION INTRAVENOUS; SUBCUTANEOUS at 13:00

## 2021-04-27 RX ADMIN — ASPIRIN 81 MG 81 MG: 81 TABLET ORAL at 09:18

## 2021-04-27 RX ADMIN — AMLODIPINE BESYLATE 10 MG: 10 TABLET ORAL at 09:19

## 2021-04-27 RX ADMIN — INSULIN LISPRO 1 UNITS: 100 INJECTION, SOLUTION INTRAVENOUS; SUBCUTANEOUS at 13:00

## 2021-04-27 NOTE — CASE MANAGEMENT
Pt is medically stable for discharge today  CM s/w therapy, no recommendations indicated  Pt has no further needs  Wife Tolu to transport home

## 2021-04-27 NOTE — PHYSICAL THERAPY NOTE
Physical Therapy Evaluation note     Patient Name: Luis Alberto Camilo    NPPTT'H Date: 4/27/2021     Problem List  Active Problems:    Type 2 diabetes mellitus with hyperglycemia, with long-term current use of insulin ([facility] [address] )    Essential hypertension    Mixed hyperlipidemia    Vertebral artery stenosis, bilateral       Past Medical History  Past Medical History:   Diagnosis Date    Diabetes mellitus ([address] )     Type 2    Dyslipidemia     Hypertension     Obstructive sleep apnea on CPAP         Past Surgical History  Past Surgical History:   Procedure Laterality Date    COLONOSCOPY  2008 04/27/21 1130   PT Last Visit   PT Visit Date 04/27/21   Note Type   Note type Evaluation   Pain Assessment   Pain Assessment Tool Pain Assessment not indicated - pt denies pain   Pain Score No Pain   Home Living   Type of Home House   Home Layout Two level   Additional Comments Pt lives in a [facility] with 12 steps to the 2nd floor  Pt had 2 THEODORE  PT was I for ADL's and mobility prior  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  Pt's wife works from home  PT does not use any DME      Prior Function   Level of [place] Independent with ADLs and functional mobility   Lives With Spouse   Receives Help From Family   ADL Assistance Independent   IADLs Independent   Falls in the last 6 months 0   Vocational   (semi retired)   Restrictions/Precautions   [facility] Bearing Precautions Per Order No   Other Precautions Fall Risk   General   Family/Caregiver Present No   Cognition   Overall Cognitive Status WFL   Arousal/Participation Alert   Attention Within functional limits   Orientation Level Oriented X4   Memory Within functional limits   Following Commands Follows all commands and directions without difficulty   RLE Assessment   RLE Assessment WFL   LLE Assessment   LLE Assessment WFL   Bed Mobility   Supine to Sit 6  Modified independent   Additional Comments sitting EOB at a I level Transfers   Sit to Stand 5  Supervision   Stand to Sit 5  Supervision   Ambulation/Elevation   Gait pattern Excessively slow; Shuffling   Gait Assistance 5  Supervision   Assistive Device None   Distance 60ftx2   Stair Management Assistance 5  Supervision   Stair Management Technique One rail R   Number of Stairs 7   Balance   Static Sitting Good   Dynamic Sitting Fair +   Static Standing Fair +   Dynamic Standing Fair +   Ambulatory Fair +   Endurance Deficit   Endurance Deficit No   Activity Tolerance   Activity Tolerance Patient tolerated treatment well   Medical Staff Made Aware OT   Nurse Made Aware nurse approved therapy session   Assessment   Prognosis Good   Problem List Decreased mobility   Assessment Pt is a 71 yo male admitted to [place] [address] on 4/26/2021 s/p dizziness  DX: vertebral artery stenosis B, hyperlipidemia, hypertension, DM  Two patient identifiers were used to confirm  Pt lives in a [facility] with 2 [address] with his wife  Pt was I for ADL's and mobility prior  Pt did not use any DME  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  Pt's wife can assist and works from home  Pt's impairments include reduced mobility, gait abnormalitites  Recommend discharge to home  At the end of the session the patient was left in seated position with call bell and phone within reach  The patient's AM-PAC Basic Mobility Inpatient Short Form Raw Score is 22, Standardized Score is 47 4  A standardized score greater than 42 9 suggests the patient may benefit from discharge to home  Please also refer to the recommendation of the Physical Therapist for safe discharge planning  Pt will be D/C from PT at this time due to being near his baseline for mobility      Recommendation   PT Discharge Recommendation No rehabilitation needs   PT - OK to Discharge Yes   Additional Comments when medically stable   AM-PAC Basic Mobility Inpatient   Turning in Bed Without Bedrails 4   Lying on Back to Sitting on Edge of Flat Bed 4 Moving Bed to Chair 4   Standing Up From Chair 4   Walk in Room 3   Climb 3-5 Stairs 3   Basic Mobility Inpatient Raw Score 22   Basic Mobility Standardized Score 47 4   Alison Cochran, Pt, DPT

## 2021-04-27 NOTE — PROGRESS NOTES
Progress Note - Neurology   Margarito Nova 72 y o  male MRN: [id number]  Unit/Bed#: CW2 213-02 Encounter: [id number]    Assessment/Plan   * Dizziness  Assessment & Plan  67-year-old male with past medical history of hypertension, hyperlipidemia, diabetes, and sleep apnea  The patient presents with dizziness and vomiting for the past few days and intermittent dizziness over the past few weeks  It is also noted to be having more difficulty walking  CT angiogram did show stenosis of both V4 segments of the vertebral arteries just above the foramen magnum, moderate on the right and mild on the left  Neurological examination as below  NIH of zero  Symptoms have now resolved  DDX - Suspect inner ear pathology, cannot exclude orthostasis, does not appear to be vestibular basilar insufficiency with VA stenosis noted on CTA imaging, MRI with out evidence for stroke, symptoms lasted for 3 days with out stroke seen on MRI less likely TIA  With his severity of symptoms and lasting for few days, this appears less likely to be an MRI negative stroke  - Stroke pathway   MRI brain with out contrast completed as below - no evidence of stroke   Echo - EF was 70% there was no regional wall abnormalities,  Bilateral atrium were normal in size   Lipid Panel - LDL 75   Hemoglobin A1c - 10 2 H   Continue ASA, with stenosis seen on CTA imaging   Atorvastatin 40 mg   Continue telemetry   PT/OT/ST - consider vestibular therapy as out patient   Frequent neuro checks  Continue to monitor and notify neurology with any changes  - Medical management and supportive care per primary team  Correction of any metabolic or infectious disturbances  Vertebral artery stenosis, bilateral  Assessment & Plan  CT angiogram did show stenosis of both V4 segments of the vertebral arteries just above the foramen magnum, moderate on the right and mild on the left      -  Plan as above    Margarito Nova will need follow up in in 4 weeks with neurovascular attending or advance practitioner  He will not require outpatient neurological testing  Subjective:   No events overnight  He denies any further dizziness or nausea or vomiting  He denies any headache problems with swallowing, problems with speech, problems with vision, one-sided weakness, one-sided numbness, ataxia, or any other neurological complaints  He reports he was able to ambulate to the bathroom without any difficulty and he appears back to his baseline  I did notice a little facial asymmetry this morning on the left side,  When investigating this with the patient the patient reports that he did have teeth removed on that side and that is a chronic finding  ROS:  12 point ROS are negative, as per HPI  Vitals: Blood pressure 167/99, pulse 69, temperature 97 9 °F (36 6 °C), temperature source Oral, resp  rate 18, SpO2 95 %  ,There is no height or weight on file to calculate BMI  Current Facility-Administered Medications   Medication Dose Route Frequency Provider Last Rate    acetaminophen  650 mg Oral Q4H PRN Jimmey Pun, DO      amLODIPine  10 mg Oral Daily Jimmey Pun, DO      aspirin  81 mg Oral Daily Jimmey Pun, DO      atorvastatin  40 mg Oral HS Jimmey Pun, DO      heparin (porcine)  5,000 Units Subcutaneous Q8H [facility], DO      insulin glargine  30 Units Subcutaneous HS Clotilda Pollack, DO      insulin lispro  1-5 Units Subcutaneous 4x Daily (AC & HS) Jimmey Pun, DO      Labetalol HCl  10 mg Intravenous Q6H PRN Jimmey Pun, DO      ondansetron  4 mg Intravenous Q6H PRN Jimmey Pun, DO      potassium chloride  40 mEq Oral Once Clotilda Pollack, DO       Physical Exam:   Vital signs reviewed     Constitutional - in NAD  HEENT - NC/AT  Lungs -  No respiratory distress noted,  No dyspnea to conversation  Abdomen -  Nondistended  Extremities - No edema noted  Skin - No rashes noted,  no ecchymosis,  or matthew    Neurological    Mental status - the patient is awake alert oriented x 3, with no evidence of aphasia or dysarthria, patient is able to follow simple commands, is able to follow complex commands  No paraphasic errors noted  Cranial nerves 2 through 12 are intact - there was a slight decrease in left nasolabial fold reported to be chronic  Motor - 5/5 upper extremities and lower extremities, without drift, normal tone and bulk    No tremor or fixed deficit noted    Rapid movements are equal bilaterally    Sensation - nonfocal to touch, no neglect noted  Coordination -  no ataxia noted on finger-to-nose or heel-to-shin    Toes are downgoing bilaterally    No evidence of  myoclonus or tremor  No evidence of seizure activity, observed  Gait - nl    Lab, Imaging and other studies:   I have personally reviewed pertinent reports    , CBC:   Results from last 7 days   Lab Units 04/27/21  0512 04/26/21  0922   WBC Thousand/uL 6 99 8 95   RBC Million/uL 4 83 5 52   HEMOGLOBIN g/dL 14 7 16 8   HEMATOCRIT % 42 7 49 1   MCV fL 88 89   PLATELETS Thousands/uL 217 230   , BMP/CMP:   Results from last 7 days   Lab Units 04/27/21  0512 04/26/21  0922   SODIUM mmol/L 140 139   POTASSIUM mmol/L 3 3* 4 8   CHLORIDE mmol/L 109* 107   CO2 mmol/L 23 28   BUN mg/dL 19 24   CREATININE mg/dL 0 98 1 32*   CALCIUM mg/dL 9 4 9 5   AST U/L 18 31   ALT U/L 22 23   ALK PHOS U/L 72 82   EGFR ml/min/1 73sq m 78 55   , Vitamin B12:   , HgBA1C:   Results from last 7 days   Lab Units 04/27/21  0512   HEMOGLOBIN A1C % 10 2*   , TSH:   , Coagulation:   , Lipid Profile:   Results from last 7 days   Lab Units 04/27/21  0512   HDL mg/dL 47   LDL CALC mg/dL 75   TRIGLYCERIDES mg/dL 99   , Ammonia:   , Urinalysis:       Invalid input(s): URIBILINOGEN, Drug Screen:   , Medication Drug Levels:       Invalid input(s): CARBAMAZEPINE,  PHENOBARB, LACOSAMIDE, OXCARBAZEPINE     Per radiology interpretation -    "  Procedure: Cta Head And Neck With And Without Contrast    Result Date: 4/26/2021  Narrative: CTA NECK AND BRAIN WITH AND WITHOUT CONTRAST INDICATION: Ataxia, stroke suspected COMPARISON:   None  TECHNIQUE:  Routine CT imaging of the Brain without contrast   Post contrast imaging was performed after administration of iodinated contrast through the neck and brain  Post contrast axial 0 625 mm images timed to opacify the arterial system  3D rendering was performed on an independent workstation  MIP reconstructions performed  Coronal reconstructions were performed of the noncontrast portion of the brain  Radiation dose length product (DLP) for this visit:  1423 43 mGy-cm   This examination, like all CT scans performed in the [facility], was performed utilizing techniques to minimize radiation dose exposure, including the use of iterative reconstruction and automated exposure control  IV Contrast:  85 mL of iohexol (OMNIPAQUE)  IMAGE QUALITY:   Diagnostic FINDINGS: NONCONTRAST BRAIN PARENCHYMA: Decreased attenuation is noted in periventricular and subcortical white matter demonstrating an appearance that is statistically most likely to represent moderate microangiopathic change  No CT signs of acute infarction  No intracranial mass, mass effect or midline shift  No acute parenchymal hemorrhage  Vascular calcification of the distal vertebral arteries  VENTRICLES AND EXTRA-AXIAL SPACES:  Ventricles and extra-axial CSF spaces are prominent commensurate with the degree of volume loss  No hydrocephalus  No acute extra-axial hemorrhage  VISUALIZED ORBITS AND PARANASAL SINUSES:  Unremarkable  CERVICAL VASCULATURE AORTIC ARCH AND GREAT VESSELS:  Normal aortic arch and great vessel origins  Normal visualized subclavian vessels  RIGHT VERTEBRAL ARTERY CERVICAL SEGMENT:  Normal origin  The vessel is normal in caliber throughout the neck  LEFT VERTEBRAL ARTERY CERVICAL SEGMENT:  Normal origin   The vessel is normal in caliber throughout the neck  RIGHT EXTRACRANIAL CAROTID SEGMENT:  Normal caliber common carotid artery  Normal bifurcation and cervical internal carotid artery  No stenosis or dissection  LEFT EXTRACRANIAL CAROTID SEGMENT:  Normal caliber common carotid artery  Normal bifurcation and cervical internal carotid artery  No stenosis or dissection  NASCET criteria was used to determine the degree of internal carotid artery diameter stenosis  INTRACRANIAL VASCULATURE INTERNAL CAROTID ARTERIES:  Normal enhancement of the intracranial portions of the internal carotid arteries  Normal ophthalmic artery origins  Normal ICA terminus  ANTERIOR CIRCULATION:  Symmetric A1 segments and anterior cerebral arteries with normal enhancement  Normal anterior communicating artery  MIDDLE CEREBRAL ARTERY CIRCULATION:  M1 segment and middle cerebral artery branches demonstrate normal enhancement bilaterally  DISTAL VERTEBRAL ARTERIES:  Mild calcification of both V4 segments  There is approximately 50% stenosis of the right vertebral artery and mild, less than 50% narrowing on the left  BASILAR ARTERY:  Basilar artery is normal in caliber  Normal superior cerebellar arteries  POSTERIOR CEREBRAL ARTERIES: Both posterior cerebral arteries arises from the basilar tip  Both arteries demonstrate normal enhancement  Normal posterior communicating arteries  DURAL VENOUS SINUSES:  Normal  NON VASCULAR ANATOMY BONY STRUCTURES:  Mild cervical spondylitic degenerative change  No acute osseous abnormality  SOFT TISSUES OF THE NECK:  Normal  THORACIC INLET:  Unremarkable  Impression: CT brain: Periventricular white matter changes suggestive of chronic microangiopathy  No mass, hemorrhage or acute ischemia  CT angiography: Stenosis of both V4 segments of the vertebral arteries just above the foramen magnum, moderate on the right and mild on the left   Workstation performed: [id number]     Procedure: Xr Chest 1 View Portable    Result Date: 4/26/2021  Narrative: CHEST INDICATION:   dizzy  COMPARISON:  None EXAM PERFORMED/VIEWS:  XR CHEST PORTABLE FINDINGS: Cardiomediastinal silhouette appears unremarkable  The lungs are clear  No pneumothorax or pleural effusion  Osseous structures appear within normal limits for patient age  Impression: No acute cardiopulmonary disease   Workstation performed: [id number]   "

## 2021-04-27 NOTE — DISCHARGE SUMMARY
INTERNAL MEDICINE RESIDENCY DISCHARGE SUMMARY     Faith Matos   72 y o  male  MRN: [id number]  Room/Bed: [facility] 213/[facility] 213-02     [facility] U[facility]9   Encounter: [id number]    Active Problems:    Type 2 diabetes mellitus with hyperglycemia, with long-term current use of insulin (HCC)    Essential hypertension    Mixed hyperlipidemia    Vertebral artery stenosis, bilateral      * Dizziness-resolved as of 4/27/2021  Assessment & Plan  Patient with dizziness, nausea, and vomiting over the past 3 days  He has experiences intermittently before, but acutely worse over the past couple of days  Patient has not been able to keep any food or liquids down  Patient with a remote history of vertigo versus motion sickness  CT angiogram revealed bilateral vertebral artery stenosis, right greater than left  Right vertebral artery with about 50% stenosis  Patient improved with IV fluid resuscitation in the ED  MRI:  Chronic lacunar strokes without any acute findings  Microangiopathic changes  Echo- EF 04%, grade 1 diastolic dysfunction  Plan:  -Neurology consult, appreciate recommendations  - source of his symptoms likely peripheral vertigo  - Will communicate with PT OT regarding vestibular rehab  - Atorvastatin 40 mg daily continue  - Aspirin 81 mg daily started  - patient does not require any PTOT outpatient  Plan to discharge on 04/27 with planned follow-up with Neurosurgery outpatient  Vertebral artery stenosis, bilateral  Assessment & Plan  Patient with bilateral vertebral artery stenosis on CT angiogram   Right is worse than left  Approximately 50% stenosed      Plan:  -Atorvastatin 40 mg daily  -Labetalol 10 mg every 6 hours as needed for systolic blood pressures greater than 200  -Neurology consult, appreciate recommendations    Mixed hyperlipidemia  Assessment & Plan  Patient's most recent lipid panel in 2019 revealed total cholesterol 132, triglycerides 112, HDL 42, LDL 68  Patient is compliant with atorvastatin 40 mg daily at home  Plan:  -Continue atorvastatin 40 mg daily    Essential hypertension  Assessment & Plan  Patient's home regimen includes amlodipine 10 mg daily, valsartan 320 mg daily  Patient does not take his blood pressure at home  States that he is compliant with all his medications  He states that he follows up regularly with his PCP  Plan:  -Continue home amlodipine 10 mg daily  - no signs of stroke on MRI  - will start on losartan 100 mg daily as a substitute for home valsartan  - resume valsartan on discharge and stop losartan  Type 2 diabetes mellitus with hyperglycemia, with long-term current use of insulin ([facility])  Assessment & Plan  Lab Results   Component Value Date    HGBA1C 10 7 (H) 12/05/2020       No results for input(s): POCGLU in the last 72 hours  Blood Sugar Average: Last 72 hrs:    Patient's last hemoglobin A1c was 10 7% in December of 2020  Patient's home regimen includes dapagliflozin 5 mg daily, glipizide 10 mg twice daily, linagliptin 5 mg daily, metformin 1000 mg twice daily, and insulin glargine 52 units at bedtime  Patient states that his wife administers most of his medications  Plan:  - currently on Lantus 25 units daily with sliding scale  Will up titrate based on response   - plan is to resume metformin, linagliptin and Jardiance on discharge  - Will hold on to home glipizide and dapagliflozin on discharge  Elevated serum creatinine-resolved as of 4/27/2021  Assessment & Plan  Patient recently with a baseline creatinine of 1 1, EGFR 65  Patient's creatinine on arrival was 1 32  Likely secondary to decreased p o  Intake for the past few days  Received 1 L bolus of normal saline in the emergency department  Plan:  - resolved  [address]     As per Dr Danielito Ospina note:  The patient is a 72 y o  M with a PMHx of HTN, T2DM (Last A1c 10 7% in Dec 2020), HLD that presented to the ED on 4/26/21 with concerns of worsening dizziness with vomiting for the past 3 days  Patient states that he has been dizzy intermittently over the past few weeks  It is worse in the morning when he tries to get up out of bed  It is worsened to the point where the patient can't keep anything down for the past few days  He states it is worse with food  Denies any feeling of food getting stuck in his throat  Patient states that he is compliant with his medications, and no longer takes sildenafil  Patient does not take his blood pressure at home  Patient states that sometimes he will have to [address] when walking, but normally does not have any problems  He states that years ago, he was told he might have vertigo  He states some remote history of motion sickness  Patient denied any headaches, double vision, blurry vision, slurred speech, or numbness      In the ED, patient was hypertensive with systolic blood pressures in the 160s to 180s, heart rates in the 70s to 80s, afebrile, and saturating well on room air  Patient did not have any electrolyte abnormalities on preliminary blood work, and troponin was negative  Hemoglobin was robust at 16 8  Creatinine elevated slightly at 1 32, from his baseline of 1 1  Chest x-ray was unremarkable for any cardiopulmonary disease  CT angiogram of the head and neck revealed stenosis of both V4 segments of the vertebral arteries just above the foramen magnum, moderate on the right and mild on the left  Neurological exam was unremarkable  [place]-Hallpike maneuver was negative  Patient seem to ambulate appropriately without assistance  Patient states that he feels better since coming into the emergency department and being resuscitated with fluids  Patient was admitted for observation and neurological consult for vertebral artery stenosis "    Patient had uncomplicated hospital course  Neurology saw the patient    Patient also had MRI which showed chronic lacunar stroke without any acute findings  His cardiac echocardiogram was unremarkable  Patient also had [place]-Hallpike maneuver which was negative  CTA head and neck showed moderate right and mild left vertebral stenosis  Aspirin was added to his regimen  The etiology of his symptoms was thought to be likely peripheral vertigo and less likely to be vertebral stenosis causing his symptoms  For patient's diabetes, his insulin was decreased to Lantus 25 units daily along with continuation of metformin, Jardiance and linagliptin  His glipizide and dapagliflozin where hold on discharge  Patient was evaluated by PT OT and did not require any rehab  Patient was discharged on 04/27 with plan to follow-up with neurosurgery given his bilateral vertebral stenosis on CTA head and neck  Patient was symptoms free on the day of discharge  Patient was also asked to follow up with PCP within 1-2 weeks  DISCHARGE INFORMATION     PCP at Discharge: Dr Jasmin Gomez    Admitting Provider: Salina Pritchard MD  Admission Date: 4/26/2021    Discharge Provider: Salina Pritchard MD  Discharge Date: 4/27/21    Discharge Disposition: Final discharge disposition not confirmed  Discharge Condition: good  Discharge with Lines: no    Discharge Diet: regular diet  Activity Restrictions: none  Test Results Pending at Discharge: none    Discharge Diagnoses:  Principal Problem (Resolved):    Dizziness  Active Problems:    Type 2 diabetes mellitus with hyperglycemia, with long-term current use of insulin (HCC)    Essential hypertension    Mixed hyperlipidemia    Vertebral artery stenosis, bilateral  Resolved Problems:    Elevated serum creatinine      Consulting Providers: neurology       Diagnostic & Therapeutic Procedures Performed:  Cta Head And Neck With And Without Contrast    Result Date: 4/26/2021  Impression: CT brain: Periventricular white matter changes suggestive of chronic microangiopathy  No mass, hemorrhage or acute ischemia   CT angiography: Stenosis of both V4 segments of the vertebral arteries just above the foramen magnum, moderate on the right and mild on the left  Workstation performed: [id number]     Xr Chest 1 View Portable    Result Date: 4/26/2021  Impression: No acute cardiopulmonary disease  Workstation performed: [id number]     Mri Brain Wo Contrast    Result Date: 4/27/2021  Impression: White matter changes suggestive of chronic microangiopathy  No acute intracranial pathology  Chronic lacunar infarcts are seen in the left centrum semiovale   Workstation performed: [id number]       Code Status: Level 1 - Full Code  Advance Directive & Living Will: <no information>  Power of :    POLST:      Medications:  Current Discharge Medication List      STOP taking these medications       Dapagliflozin Propanediol (Farxiga) 5 MG TABS Comments:   Reason for Stopping:         glipiZIDE (GLUCOTROL XL) 10 mg 24 hr tablet Comments:   Reason for Stopping:         insulin glargine (Basaglar KwikPen) 100 units/mL injection pen Comments:   Reason for Stopping:             Current Discharge Medication List      START taking these medications    Details   aspirin 81 mg chewable tablet Chew 1 tablet (81 mg total) daily  Qty: 90 tablet, Refills: 0    Associated Diagnoses: Vertebral artery stenosis, bilateral      insulin glargine (LANTUS) 100 units/mL subcutaneous injection Inject 25 Units under the skin daily at bedtime  Qty: 10 mL, Refills: 0    Associated Diagnoses: Type 2 diabetes mellitus with hyperglycemia, with long-term current use of insulin ([facility]           Current Discharge Medication List      CONTINUE these medications which have NOT CHANGED    Details   amLODIPine (NORVASC) 10 mg tablet Take 1 tablet (10 mg total) by mouth daily Use as directed  Qty: 90 tablet, Refills: 1    Associated Diagnoses: Essential hypertension      atorvastatin (LIPITOR) 40 mg tablet TAKE 1 TABLET BY MOUTH EVERYDAY AT BEDTIME  Qty: 90 tablet, Refills: 1    Associated Diagnoses: Type 2 diabetes mellitus with hyperglycemia, with long-term current use of insulin ([facility])      metFORMIN (GLUCOPHAGE-XR) 500 mg 24 hr tablet Take 2 tablets (1,000 mg total) by mouth 2 (two) times a day  Qty: 360 tablet, Refills: 0    Associated Diagnoses: Type 2 diabetes mellitus with hyperglycemia, with long-term current use of insulin ([facility])      valsartan (DIOVAN) 320 MG tablet Take 1 tablet (320 mg total) by mouth daily  Qty: 90 tablet, Refills: 0    Associated Diagnoses: Essential hypertension      Empagliflozin (Jardiance) 25 MG TABS Take 1 tablet (25 mg total) by mouth every morning  Qty: 30 tablet, Refills: 1    Associated Diagnoses: Type 2 diabetes mellitus with hyperglycemia, with long-term current use of insulin ([facility])      linaGLIPtin 5 MG TABS Take 5 mg by mouth daily      sildenafil (VIAGRA) 100 mg tablet Take 100 mg by mouth daily as needed for erectile dysfunction             Allergies:  No Known Allergies    FOLLOW-UP     Name Relationship Specialty Phone Fax Address Order              [address] [phone] [address] [place] [phone]     Next Steps: Follow up in 1 week(s)      Christiana Cuevas MD   Internal Medicine [facility] [address] [place] [address]     Next Steps: Follow up in 1 week(s)               Discharge Statement:   I spent 1 hour minutes discharging the patient  This time was spent on the day of discharge  I had direct contact with the patient on the day of discharge  Additional documentation is required if more than 30 minutes were spent on discharge  Portions of the record may have been created with voice recognition software  Occasional wrong word or "sound a like" substitutions may have occurred due to the inherent limitations of voice recognition software    Read the chart carefully and recognize, using context, where substitutions have occurred     ==  Zoraida Delgado, [address]  Internal Medicine Resident PGY-2

## 2021-04-27 NOTE — OCCUPATIONAL THERAPY NOTE
Occupational Therapy Evaluation     Patient Name: Reyes Barney  EQFBB'[id number] Date: 4/27/2021  Problem List  Active Problems:    Type 2 diabetes mellitus with hyperglycemia, with long-term current use of insulin ([facility] [address] )    Essential hypertension    Mixed hyperlipidemia    Vertebral artery stenosis, bilateral    Past Medical History  Past Medical History:   Diagnosis Date    Diabetes mellitus ([facility] [address] )     Type 2    Dyslipidemia     Hypertension     Obstructive sleep apnea on CPAP      Past Surgical History  Past Surgical History:   Procedure Laterality Date    COLONOSCOPY  2008 04/27/21 1135   OT Last Visit   OT Visit Date 04/27/21   Note Type   Note type Evaluation   Restrictions/Precautions   Weight Bearing Precautions Per Order No   Other Precautions Fall Risk;Pain   Pain Assessment   Pain Assessment Tool Pain Assessment not indicated - pt denies pain   Pain Score No Pain   Home Living   Type of [address] [place] [address] Two level;1/2 bath on main level;Bed/bath upstairs; Performs ADLs on one level  (2 THEODORE)   Bathroom Shower/Tub Walk-in shower   Bathroom Toilet Standard   Bathroom Equipment Other (Comment)  (denies any)   Home Equipment Other (Comment)  (denies any)   Additional Comments Pt reports living in 2 SH, 2 THEODORE, bed/bath 2nd floor w/ 12 steps up   Prior Function   Level of [place] Independent with ADLs and functional mobility   Lives With Spouse   Receives Help From Family   ADL Assistance Independent   IADLs Independent   Falls in the last 6 months 0   Vocational Other (Comment)  (semi retired)   Comments Pt reports being I w/ ADLS/IADLS, transfers and functional mobility PTA   Lifestyle   Autonomy I ADLS/IADLS, transfers and functional mobility PTA   Reciprocal Relationships Pt lives w/ his spouse who works from home   Service to Others Pt is semi retired; 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   Intrinsic Gratification Enjoys relaxing   Psychosocial   Psychosocial (WDL) WDL   ADL Eating Assistance 7  Independent   Grooming Assistance 7  Independent   UB Bathing Assistance 5  Supervision/Setup   LB Bathing Assistance 5  Supervision/Setup   UB Dressing Assistance 5  Supervision/Setup   LB Dressing Assistance 5  Supervision/Setup   Toileting Assistance  5  Supervision/Setup   Functional Assistance 5  Supervision/Setup   Bed Mobility   Supine to Sit 6  Modified independent   Sit to Supine Unable to assess   Additional Comments Sat EOB w/ G balance/trunk control   Transfers   Sit to Stand 5  Supervision   Additional items Verbal cues   Stand to Sit 5  Supervision   Additional items Verbal cues   Additional Comments Pt performed STS from EOB w/ S, no DME Used   Functional Mobility   Functional Mobility 5  Supervision   Additional Comments pt ambulated short houshold distance w/ S, no DME Used   Balance   Static Sitting Good   Dynamic Sitting Fair +   Static Standing Fair   Dynamic Standing Fair   Ambulatory Fair   Activity Tolerance   Activity Tolerance Patient tolerated treatment well   Medical Staff Made Aware PT   Nurse Made Aware yes   RUE Assessment   RUE Assessment WFL   LUE Assessment   LUE Assessment WFL   Hand Function   Gross Motor Coordination Functional   Fine Motor Coordination Functional   Sensation   Light Touch No apparent deficits   Vision-Basic Assessment   Current Vision No visual deficits   Vision - Complex Assessment   Ocular Range of Motion WFL   Perception   Inattention/Neglect Appears intact   Cognition   Overall Cognitive Status WFL   Arousal/Participation Responsive; Cooperative   Attention Within functional limits   Orientation Level Oriented X4   Memory Within functional limits   Following Commands Follows all commands and directions without difficulty   Comments Pt is pleasant and cooperative   Assessment   Limitation Decreased high-level ADLs; Decreased endurance   Prognosis Fair   Assessment Pt is a 71 y/o male seen for OT eval s/p adm to [facility] w/ worsening dizziness and vomiting  Pt is dx'd w/ B/L vertebral artery stenosis  Pt  has a past medical history of Diabetes mellitus ([address] ), Dyslipidemia, Hypertension, and Obstructive sleep apnea on CPAP  Pt with active OT orders and up with assistance  orders  Pt lives with his spouse in 2 SH, 2 THEODORE, 12 steps to 2nd floor  Pt was I w/  ADLS and IADLS, drove, & required no use of DME PTA  Pt is not currently demonstrating any significant deficits on occupational performance @ this time  Overall, is functioning at a S level for all functional tasks  Will have spouse at home to assist as needed; requires no use of DME  Reports no concerns about D/C home w/ family support once medically stable  Pt scored overall 90/100 on the Barthel Index  Based on the aforementioned OT evaluation, functional performance deficits, and assessments, pt has been identified as a moderate complexity evaluation  Recommend home with family support upon D/C  The patient's raw score on the AM-PAC Daily Activity inpatient short form is 24, standardized score is 57 54, greater than 39 4  Patients at this level are likely to benefit from discharge to home  Please refer to the recommendation of the Occupational Therapist for safe discharge planning  Pt will no longer benefit from immediate acute skilled OT services  Pt w/ no further questions or concerns regarding OT services  Will D/C OT at this time  Please re-consult if pt's medical status changes     Goals   Patient Goals to go home   Recommendation   OT Discharge Recommendation No rehabilitation needs   OT - OK to Discharge Yes  (when medically stable)   AM-PAC Daily Activity Inpatient   Lower Body Dressing 4   Bathing 4   Toileting 4   Upper Body Dressing 4   Grooming 4   Eating 4   Daily Activity Raw Score 24   Daily Activity Standardized Score (Calc for Raw Score >=11) 57 54   AM-PAC Applied Cognition Inpatient   Following a Speech/Presentation 4   Understanding Ordinary Conversation 4   Taking Medications 4 Remembering Where Things Are Placed or Put Away 4   Remembering List of 4-5 Errands 4   Taking Care of Complicated Tasks 4   Applied Cognition Raw Score 24   Applied Cognition Standardized Score 62 21   Barthel Index   Feeding 10   Bathing 5   Grooming Score 5   Dressing Score 10   Bladder Score 10   Bowels Score 10   Toilet Use Score 10   Transfers (Bed/Chair) Score 15   Mobility (Level Surface) Score 10   Stairs Score 5   Barthel Index Score 90     Emili Foreman MS, OTR/L

## 2021-04-27 NOTE — PLAN OF CARE
Problem: Potential for Falls  Goal: Patient will remain free of falls  Description: INTERVENTIONS:  - Assess patient frequently for physical needs  -  Identify cognitive and physical deficits and behaviors that affect risk of falls  -  [place] fall precautions as indicated by assessment   - Educate patient/family on patient safety including physical limitations  - Instruct patient to call for assistance with activity based on assessment  - Modify environment to reduce risk of injury  - Consider OT/PT consult to assist with strengthening/mobility  4/26/2021 2020 by Dipti Jenkins RN  Outcome: Progressing  4/26/2021 2020 by Dipti Jenkins RN  Outcome: Progressing     Problem: Neurological Deficit  Goal: Neurological status is stable or improving  Description: Interventions:  - Monitor and assess patient's level of consciousness, motor function, sensory function, and level of assistance needed for ADLs  - Monitor and report changes from baseline  Collaborate with interdisciplinary team to initiate plan and implement interventions as ordered  - Provide and maintain a safe environment  - Consider seizure precautions  - Consider fall precautions  - Consider aspiration precautions  - Consider bleeding precautions  4/26/2021 2020 by Dipti Jenkins RN  Outcome: Progressing  4/26/2021 2020 by Dipti Jenkins RN  Outcome: Progressing     Problem: Activity Intolerance/Impaired Mobility  Goal: Mobility/activity is maintained at optimum level for patient  Description: Interventions:  - Assess and monitor patient  barriers to mobility and need for assistive/adaptive devices  - Assess patient's emotional response to limitations  - Collaborate with interdisciplinary team and initiate plans and interventions as ordered  - Encourage independent activity per ability   - Maintain proper body alignment  - Perform active/passive rom as tolerated/ordered    - Plan activities to conserve energy   - Turn patient as appropriate  4/26/2021 2020 by Steve Evans RN  Outcome: Progressing  4/26/2021 2020 by Steve Evans RN  Outcome: Progressing     Problem: Communication Impairment  Goal: Ability to express needs and understand communication  Description: Assess patient's communication skills and ability to understand information  Patient will demonstrate use of effective communication techniques, alternative methods of communication and understanding even if not able to speak  - Encourage communication and provide alternate methods of communication as needed  - Collaborate with case management/ for discharge needs  - Include patient/family/caregiver in decisions related to communication  4/26/2021 2020 by Steve Evans RN  Outcome: Progressing  4/26/2021 2020 by Steve Evans RN  Outcome: Progressing     Problem: Potential for Aspiration  Goal: Non-ventilated patient's risk of aspiration is minimized  Description: Assess and monitor vital signs, respiratory status, and labs (WBC)  Monitor for signs of aspiration (tachypnea, cough, rales, wheezing, cyanosis, fever)  - Assess and monitor patient's ability to swallow  - Place patient up in chair to eat if possible  - HOB up at 90 degrees to eat if unable to get patient up into chair   - Supervise patient during oral intake  - Instruct patient/ family to take small bites  - Instruct patient/ family to take small single sips when taking liquids  - Follow patient-specific strategies generated by speech pathologist   4/26/2021 2020 by Steve Evans RN  Outcome: Progressing  4/26/2021 2020 by Steve Evans RN  Outcome: Progressing  Goal: Ventilated patient's risk of aspiration is minimized  Description: Assess and monitor vital signs, respiratory status, airway cuff pressure, and labs (WBC)  Monitor for signs of aspiration (tachypnea, cough, rales, wheezing, cyanosis, fever)      - Elevate head of bed 30 degrees if patient has tube feeding   - Monitor tube feeding  4/26/2021 2020 by Monalisa Tijerina RN  Outcome: Progressing  4/26/2021 2020 by Monalisa Tijerina RN  Outcome: Progressing     Problem: Nutrition  Goal: Nutrition/Hydration status is improving  Description: Monitor and assess patient's nutrition/hydration status for malnutrition (ex- brittle hair, bruises, dry skin, pale skin and conjunctiva, muscle wasting, smooth red tongue, and disorientation)  Collaborate with interdisciplinary team and initiate plan and interventions as ordered  Monitor patient's weight and dietary intake as ordered or per policy  Utilize nutrition screening tool and intervene per policy  Determine patient's food preferences and provide high-protein, high-caloric foods as appropriate  - Assist patient with eating   - Allow adequate time for meals   - Encourage patient to take dietary supplement as ordered  - Collaborate with clinical nutritionist   - Include patient/family/caregiver in decisions related to nutrition    4/26/2021 2020 by Monalisa Tijerina RN  Outcome: Progressing  4/26/2021 2020 by Monalisa Tijerina RN  Outcome: Progressing     Problem: NEUROSENSORY - ADULT  Goal: Achieves stable or improved neurological status  Description: INTERVENTIONS  - Monitor and report changes in neurological status  - Monitor vital signs such as temperature, blood pressure, glucose, and any other labs ordered   - Initiate measures to prevent increased intracranial pressure  - Monitor for seizure activity and implement precautions if appropriate      4/26/2021 2020 by Monalisa Tijerina RN  Outcome: Progressing  4/26/2021 2020 by Monalisa Tijerina RN  Outcome: Progressing  Goal: Remains free of injury related to seizures activity  Description: INTERVENTIONS  - Maintain airway, patient safety  and administer oxygen as ordered  - Monitor patient for seizure activity, document and report duration and description of seizure to physician/advanced practitioner  - If seizure occurs,  ensure patient safety during seizure  - Reorient patient post seizure  - Seizure pads on all 4 side rails  - Instruct patient/family to notify RN of any seizure activity including if an aura is experienced  - Instruct patient/family to call for assistance with activity based on nursing assessment  - Administer anti-seizure medications if ordered    4/26/2021 2020 by Corinne Clark RN  Outcome: Progressing  4/26/2021 2020 by Corinne Clark RN  Outcome: Progressing  Goal: Achieves maximal functionality and self care  Description: INTERVENTIONS  - Monitor swallowing and airway patency with patient fatigue and changes in neurological status  - Encourage and assist patient to increase activity and self care     - Encourage visually impaired, hearing impaired and aphasic patients to use assistive/communication devices  4/26/2021 2020 by Corinne Clark RN  Outcome: Progressing  4/26/2021 2020 by Corinne Clark RN  Outcome: Progressing     Problem: PAIN - ADULT  Goal: Verbalizes/displays adequate comfort level or baseline comfort level  Description: Interventions:  - Encourage patient to monitor pain and request assistance  - Assess pain using appropriate pain scale  - Administer analgesics based on type and severity of pain and evaluate response  - Implement non-pharmacological measures as appropriate and evaluate response  - Consider cultural and social influences on pain and pain management  - Notify physician/advanced practitioner if interventions unsuccessful or patient reports new pain  4/26/2021 2020 by Corinne Clark RN  Outcome: Progressing  4/26/2021 2020 by Corinne Clark RN  Outcome: Progressing     Problem: INFECTION - ADULT  Goal: Absence or prevention of progression during hospitalization  Description: INTERVENTIONS:  - Assess and monitor for signs and symptoms of infection  - Monitor lab/diagnostic results  - Monitor all insertion sites, i e  indwelling lines, tubes, and drains  - Monitor endotracheal if appropriate and nasal secretions for changes in amount and color  - [place] appropriate cooling/warming therapies per order  - Administer medications as ordered  - Instruct and encourage patient and family to use good hand hygiene technique  - Identify and instruct in appropriate isolation precautions for identified infection/condition  4/26/2021 2020 by Tamar Ruggiero RN  Outcome: Progressing  4/26/2021 2020 by Tamar Ruggiero RN  Outcome: Progressing     Problem: SAFETY ADULT  Goal: Maintain or return to baseline ADL function  Description: INTERVENTIONS:  -  Assess patient's ability to carry out ADLs; assess patient's baseline for ADL function and identify physical deficits which impact ability to perform ADLs (bathing, care of mouth/teeth, toileting, grooming, dressing, etc )  - Assess/evaluate cause of self-care deficits   - Assess range of motion  - Assess patient's mobility; develop plan if impaired  - Assess patient's need for assistive devices and provide as appropriate  - Encourage maximum independence but intervene and supervise when necessary  - Involve family in performance of ADLs  - Assess for home care needs following discharge   - Consider OT consult to assist with ADL evaluation and planning for discharge  - Provide patient education as appropriate  4/26/2021 2020 by Tamar Ruggiero RN  Outcome: Progressing  4/26/2021 2020 by Tamar Ruggiero RN  Outcome: Progressing  Goal: Maintain or return mobility status to optimal level  Description: INTERVENTIONS:  - Assess patient's baseline mobility status (ambulation, transfers, stairs, etc )    - Identify cognitive and physical deficits and behaviors that affect mobility  - Identify mobility aids required to assist with transfers and/or ambulation (gait belt, sit-to-stand, lift, walker, cane, etc )  - [place] fall precautions as indicated by assessment  - Record patient progress and toleration of activity level on Mobility SBAR; progress patient to next Phase/Stage  - Instruct patient to call for assistance with activity based on assessment  - Consider rehabilitation consult to assist with strengthening/weightbearing, etc   4/26/2021 2020 by Tracie Johnson RN  Outcome: Progressing  4/26/2021 2020 by Tracie Johnson RN  Outcome: Progressing     Problem: DISCHARGE PLANNING  Goal: Discharge to home or other facility with appropriate resources  Description: INTERVENTIONS:  - Identify barriers to discharge w/patient and caregiver  - Arrange for needed discharge resources and transportation as appropriate  - Identify discharge learning needs (meds, wound care, etc )  - Arrange for interpretive services to assist at discharge as needed  - Refer to Case Management Department for coordinating discharge planning if the patient needs post-hospital services based on physician/advanced practitioner order or complex needs related to functional status, cognitive ability, or social support system  4/26/2021 2020 by Tracie Johnson RN  Outcome: Progressing  4/26/2021 2020 by Tracie Johnson RN  Outcome: Progressing     Problem: Knowledge Deficit  Goal: Patient/family/caregiver demonstrates understanding of disease process, treatment plan, medications, and discharge instructions  Description: Complete learning assessment and assess knowledge base    Interventions:  - Provide teaching at level of understanding  - Provide teaching via preferred learning methods  4/26/2021 2020 by Tracie Johnson RN  Outcome: Progressing  4/26/2021 2020 by Tracie Johnson RN  Outcome: Progressing

## 2021-04-27 NOTE — PROGRESS NOTES
INTERNAL MEDICINE RESIDENCY PROGRESS NOTE     Name: Anisa Tidwell   Age & Sex: 72 y o  male   MRN: [id number]  Unit/Bed#: CW2 213-02   Encounter: [id number]  Team: SOD Team B     PATIENT INFORMATION     Name: Anisa Tidwell   Age & Sex: 72 y o  male   MRN: [id number]  Hospital Stay Days: 0    ASSESSMENT/PLAN     Principal Problem:    Dizziness  Active Problems:    Type 2 diabetes mellitus with hyperglycemia, with long-term current use of insulin (HCC)    Essential hypertension    Mixed hyperlipidemia    Vertebral artery stenosis, bilateral      * Dizziness  Assessment & Plan  Patient with dizziness, nausea, and vomiting over the past 3 days  He has experiences intermittently before, but acutely worse over the past couple of days  Patient has not been able to keep any food or liquids down  Patient with a remote history of vertigo versus motion sickness  CT angiogram revealed bilateral vertebral artery stenosis, right greater than left  Right vertebral artery with about 50% stenosis  Patient improved with IV fluid resuscitation in the ED  MRI:  Chronic lacunar strokes without any acute findings  Microangiopathic changes  Echo- EF 10%, grade 1 diastolic dysfunction  Plan:  -Neurology consult, appreciate recommendations  - source of his symptoms likely peripheral vertigo  - Will communicate with PT OT regarding vestibular rehab  - Atorvastatin 40 mg daily continue  - Aspirin 81 mg daily started  - plan for discharge after following up on PT OT recommendations  Vertebral artery stenosis, bilateral  Assessment & Plan  Patient with bilateral vertebral artery stenosis on CT angiogram   Right is worse than left  Approximately 50% stenosed      Plan:  -Atorvastatin 40 mg daily  -Labetalol 10 mg every 6 hours as needed for systolic blood pressures greater than 200  -Neurology consult, appreciate recommendations    Mixed hyperlipidemia  Assessment & Plan  Patient's most recent lipid panel in 2019 revealed total cholesterol 132, triglycerides 112, HDL 42, LDL 68  Patient is compliant with atorvastatin 40 mg daily at home  Plan:  -Continue atorvastatin 40 mg daily    Essential hypertension  Assessment & Plan  Patient's home regimen includes amlodipine 10 mg daily, valsartan 320 mg daily  Patient does not take his blood pressure at home  States that he is compliant with all his medications  He states that he follows up regularly with his PCP  Plan:  -Continue home amlodipine 10 mg daily  - no signs of stroke on MRI  - will start on losartan 100 mg daily as a substitute for home valsartan  - resume valsartan on discharge and stop losartan  Type 2 diabetes mellitus with hyperglycemia, with long-term current use of insulin (HCC)  Assessment & Plan  Lab Results   Component Value Date    HGBA1C 10 7 (H) 12/05/2020       No results for input(s): POCGLU in the last 72 hours  Blood Sugar Average: Last 72 hrs:    Patient's last hemoglobin A1c was 10 7% in December of 2020  Patient's home regimen includes dapagliflozin 5 mg daily, glipizide 10 mg twice daily, linagliptin 5 mg daily, metformin 1000 mg twice daily, and insulin glargine 52 units at bedtime  Patient states that his wife administers most of his medications  Plan:  - currently on Lantus 25 units daily with sliding scale  Will up titrate based on response   - plan is to resume metformin, linagliptin and Jardiance on discharge  - Will hold on to home glipizide and dapagliflozin on discharge  Elevated serum creatinine-resolved as of 4/27/2021  Assessment & Plan  Patient recently with a baseline creatinine of 1 1, EGFR 65  Patient's creatinine on arrival was 1 32  Likely secondary to decreased p o  Intake for the past few days  Received 1 L bolus of normal saline in the emergency department  Plan:  - resolved  Disposition:  Likely discharge today or tomorrow     SUBJECTIVE     Patient seen and examined  No acute events overnight  Alert and oriented x3    OBJECTIVE     Vitals:    21 0600 21 0655 21 0700 21 1115   BP: 145/91 167/99 167/99 (!) 171/103   Pulse:    74   Resp:  18  18   Temp:    97 9 °F (36 6 °C)   TempSrc:       SpO2:    94%      Temperature:   Temp (24hrs), Av 9 °F (36 6 °C), Min:97 9 °F (36 6 °C), Max:97 9 °F (36 6 °C)    Temperature: 97 9 °F (36 6 °C)  Intake & Output:  I/O        07 -  07 07 -  0700    P  O    240    Total Intake   240    Net   +240               Weights: There is no height or weight on file to calculate BMI  Weight (last 2 days)     None        Physical Exam  Constitutional:       General: He is not in acute distress  Appearance: He is well-developed  He is not diaphoretic  HENT:      Head: Normocephalic and atraumatic  Nose: Nose normal       Mouth/Throat:      Pharynx: No oropharyngeal exudate  Eyes:      General: No scleral icterus  Right eye: No discharge  Left eye: No discharge  Neck:      Musculoskeletal: Normal range of motion and neck supple  Cardiovascular:      Rate and Rhythm: Normal rate and regular rhythm  Heart sounds: Normal heart sounds  No murmur  No friction rub  No gallop  Pulmonary:      Effort: Pulmonary effort is normal  No respiratory distress  Breath sounds: No stridor  No wheezing or rales  Abdominal:      General: Bowel sounds are normal  There is no distension  Palpations: Abdomen is soft  Tenderness: There is no abdominal tenderness  There is no guarding  Musculoskeletal: Normal range of motion  Skin:     General: Skin is warm  Findings: No erythema  Neurological:      Mental Status: He is alert and oriented to person, place, and time  LABORATORY DATA     Labs: I have personally reviewed pertinent reports    Results from last 7 days   Lab Units 21  0512 21  0922   WBC Thousand/uL 6 99 8 95   HEMOGLOBIN g/dL 14 7 16  8   HEMATOCRIT % 42 7 49 1   PLATELETS Thousands/uL 217 230   NEUTROS PCT %  --  56   MONOS PCT %  --  9      Results from last 7 days   Lab Units 04/27/21  0512 04/26/21  0922   POTASSIUM mmol/L 3 3* 4 8   CHLORIDE mmol/L 109* 107   CO2 mmol/L 23 28   BUN mg/dL 19 24   CREATININE mg/dL 0 98 1 32*   CALCIUM mg/dL 9 4 9 5   ALK PHOS U/L 72 82   ALT U/L 22 23   AST U/L 18 31                      Results from last 7 days   Lab Units 04/26/21  0922   TROPONIN I ng/mL <0 02       IMAGING & DIAGNOSTIC TESTING     Radiology Results: I have personally reviewed pertinent reports  Cta Head And Neck With And Without Contrast    Result Date: 4/26/2021  Impression: CT brain: Periventricular white matter changes suggestive of chronic microangiopathy  No mass, hemorrhage or acute ischemia  CT angiography: Stenosis of both V4 segments of the vertebral arteries just above the foramen magnum, moderate on the right and mild on the left  Workstation performed: [id number]     Xr Chest 1 View Portable    Result Date: 4/26/2021  Impression: No acute cardiopulmonary disease  Workstation performed: [id number]     Mri Brain Wo Contrast    Result Date: 4/27/2021  Impression: White matter changes suggestive of chronic microangiopathy  No acute intracranial pathology  Chronic lacunar infarcts are seen in the left centrum semiovale  Workstation performed: [id number]     Other Diagnostic Testing: I have personally reviewed pertinent reports      ACTIVE MEDICATIONS     Current Facility-Administered Medications   Medication Dose Route Frequency    acetaminophen (TYLENOL) tablet 650 mg  650 mg Oral Q4H PRN    amLODIPine (NORVASC) tablet 10 mg  10 mg Oral Daily    aspirin chewable tablet 81 mg  81 mg Oral Daily    atorvastatin (LIPITOR) tablet 40 mg  40 mg Oral HS    heparin (porcine) subcutaneous injection 5,000 Units  5,000 Units Subcutaneous Q8H [facility]    insulin glargine (LANTUS) subcutaneous injection 25 Units 0 25 mL  25 Units Subcutaneous HS    insulin lispro (HumaLOG) 100 units/mL subcutaneous injection 1-5 Units  1-5 Units Subcutaneous 4x Daily (AC & HS)    Labetalol HCl (NORMODYNE) injection 10 mg  10 mg Intravenous Q6H PRN    losartan (COZAAR) tablet 100 mg  100 mg Oral Daily    ondansetron (ZOFRAN) injection 4 mg  4 mg Intravenous Q6H PRN       VTE Pharmacologic Prophylaxis: Heparin  VTE Mechanical Prophylaxis: sequential compression device    Portions of the record may have been created with voice recognition software  Occasional wrong word or "sound a like" substitutions may have occurred due to the inherent limitations of voice recognition software    Read the chart carefully and recognize, using context, where substitutions have occurred   ==  Merleen Jeans, [facility]  Internal Medicine Residency PGY-2

## 2021-04-27 NOTE — CASE MANAGEMENT
LOS: Day 1  Bundle: pt is not a bundle  Readmission risk: pt is not a 30 day readmit    CM reviewed role with pt at bedside  Pt reported his wife Kendal Rubi as his emergency contact at [phone]  Pt reported that he lives with his wife in a 2 level town house with [address] between floors  Pt reported that he was IPTA with ADLs  Pt reported that he drives  No reported hx of VNA, STR or OPPT  PCP is Dr Ave Schilder; pharmacy of choice is [facility] on [facility]  No reported hx of MH or D&A  Pt reported his wife Kendal Rubi will p/u once medically stable  CM reviewed d/c planning process including the following: identifying help at home, patient preference for d/c planning needs, Discharge Lounge, [facility] Meds to Bed program, availability of treatment team to discuss questions or concerns patient and/or family may have regarding understanding medications and recognizing signs and symptoms once discharged  CM also encouraged patient to follow up with all recommended appointments after discharge  Patient advised of importance for patient and family to participate in managing patients medical well being  Patient/caregiver received discharge checklist  Content reviewed  Patient/caregiver encouraged to participate in discharge plan of care prior to discharge home

## 2021-04-28 ENCOUNTER — TRANSITIONAL CARE MANAGEMENT (OUTPATIENT)
Dept: INTERNAL MEDICINE CLINIC | Facility: CLINIC | Age: 71
End: 2021-04-28

## 2021-05-03 ENCOUNTER — OFFICE VISIT (OUTPATIENT)
Dept: INTERNAL MEDICINE CLINIC | Facility: CLINIC | Age: 71
End: 2021-05-03
Payer: COMMERCIAL

## 2021-05-03 VITALS
BODY MASS INDEX: 22.18 KG/M2 | HEART RATE: 73 BPM | DIASTOLIC BLOOD PRESSURE: 85 MMHG | TEMPERATURE: 98.6 F | HEIGHT: 66 IN | SYSTOLIC BLOOD PRESSURE: 138 MMHG | WEIGHT: 138 LBS

## 2021-05-03 DIAGNOSIS — I10 ESSENTIAL HYPERTENSION: ICD-10-CM

## 2021-05-03 DIAGNOSIS — E11.65 TYPE 2 DIABETES MELLITUS WITH HYPERGLYCEMIA, WITH LONG-TERM CURRENT USE OF INSULIN (HCC): ICD-10-CM

## 2021-05-03 DIAGNOSIS — E78.2 MIXED HYPERLIPIDEMIA: ICD-10-CM

## 2021-05-03 DIAGNOSIS — R11.2 INTRACTABLE VOMITING WITH NAUSEA, UNSPECIFIED VOMITING TYPE: Primary | ICD-10-CM

## 2021-05-03 DIAGNOSIS — Z79.4 TYPE 2 DIABETES MELLITUS WITH HYPERGLYCEMIA, WITH LONG-TERM CURRENT USE OF INSULIN (HCC): ICD-10-CM

## 2021-05-03 PROCEDURE — 1111F DSCHRG MED/CURRENT MED MERGE: CPT | Performed by: INTERNAL MEDICINE

## 2021-05-03 PROCEDURE — 99496 TRANSJ CARE MGMT HIGH F2F 7D: CPT | Performed by: INTERNAL MEDICINE

## 2021-05-06 PROBLEM — R11.10 INTRACTABLE VOMITING: Status: ACTIVE | Noted: 2021-05-06

## 2021-05-06 NOTE — ASSESSMENT & PLAN NOTE
The cause of sudden intractable vomiting is not clear  Possibly it is related to the vertebral artery stenosis  This may be affecting the posterior circulation of the brain and causing vomiting

## 2021-05-06 NOTE — ASSESSMENT & PLAN NOTE
Lab Results   Component Value Date    HGBA1C 10 2 (H) 04/27/2021   Does have uncontrolled diabetes mellitus and has not been very compliant with his insulin  Diabetic gastroparesis will have to be considered

## 2021-05-06 NOTE — PROGRESS NOTES
Assessment/Plan:     Intractable vomiting  The cause of sudden intractable vomiting is not clear  Possibly it is related to the vertebral artery stenosis  This may be affecting the posterior circulation of the brain and causing vomiting  Type 2 diabetes mellitus with hyperglycemia, with long-term current use of insulin ([facility])    Lab Results   Component Value Date    HGBA1C 10 2 (H) 04/27/2021   Does have uncontrolled diabetes mellitus and has not been very compliant with his insulin  Diabetic gastroparesis will have to be considered  Diagnoses and all orders for this visit:    Intractable vomiting with nausea, unspecified vomiting type  -     Ambulatory referral to Gastroenterology; Future    Type 2 diabetes mellitus with hyperglycemia, with long-term current use of insulin ([facility] )  -     Ambulatory referral to Ophthalmology; Future    Essential hypertension    Mixed hyperlipidemia         Subjective:     Patient ID: Pao Chand is a 72 y o  male  This is a 81-year-old gentleman who was admitted to the hospital with intractable nausea and vomiting  He describes these episodes as happening infrequently but he gets the urge to vomit and throws up during these episodes  He has no abdominal pain or any diarrhea or constipation  MRI of the brain did show microangiopathic changes and old lacunar infarct  CT angiogram showed vertebral artery stenosis  Review of Systems   Constitutional: Negative for appetite change, chills, fatigue and fever  HENT: Negative for sore throat and trouble swallowing  Eyes: Negative for redness  Respiratory: Negative for shortness of breath  Cardiovascular: Negative for chest pain and palpitations  Gastrointestinal: Negative for abdominal pain, constipation and diarrhea  Patient is not nauseous at the time of the visit  Genitourinary: Negative for dysuria and hematuria  Musculoskeletal: Negative for back pain and neck pain  Skin: Negative for rash  Neurological: Negative for seizures, weakness and headaches  Hematological: Negative for adenopathy  Psychiatric/Behavioral: Negative for confusion  The patient is not nervous/anxious  Objective:     Physical Exam  Constitutional:       General: He is not in acute distress  Appearance: Normal appearance  HENT:      Head: Normocephalic and atraumatic  Right Ear: Tympanic membrane normal       Left Ear: Tympanic membrane normal       Nose: Nose normal       Mouth/Throat:      Mouth: Mucous membranes are moist    Eyes:      Extraocular Movements: Extraocular movements intact  Pupils: Pupils are equal, round, and reactive to light  Neck:      Musculoskeletal: Normal range of motion and neck supple  Cardiovascular:      Rate and Rhythm: Normal rate and regular rhythm  Pulses: Normal pulses  Heart sounds: Normal heart sounds  No murmur  No friction rub  Pulmonary:      Effort: Pulmonary effort is normal  No respiratory distress  Breath sounds: Normal breath sounds  No wheezing  Abdominal:      General: Abdomen is flat  Bowel sounds are normal  There is no distension  Palpations: Abdomen is soft  There is no mass  Tenderness: There is no abdominal tenderness  There is no guarding  Musculoskeletal: Normal range of motion  Skin:     General: Skin is warm and dry  Neurological:      General: No focal deficit present  Mental Status: He is alert and oriented to person, place, and time  Mental status is at baseline  Cranial Nerves: No cranial nerve deficit  Comments: Patient is not nauseous with head movement     Psychiatric:         Mood and Affect: Mood normal          Behavior: Behavior normal            Vitals:    05/03/21 1716 05/03/21 1730 05/03/21 1748   BP: 169/97 150/90 138/85   BP Location: Left arm  Right arm   Patient Position: Sitting     Cuff Size: Standard     Pulse: 73     Temp: 98 6 °F (37 °C)     Weight: 62 6 kg (138 lb) Height: 5' 6" (1 676 m)         Transitional Care Management Review:  Bhargavi Hernandez is a 72 y o  male here for TCM follow up  During the TCM phone call patient stated:    TCM Call (since 4/5/2021)     Date and time call was made  4/28/2021  8:29 AM    Hospital care reviewed  Records reviewed    Patient [facility] hospitial[facility] at  [facility]        Date of Admission  04/26/21    Date of discharge  04/27/21    Diagnosis  dizziness    Disposition  Home    Current Symptoms  None      TCM Call (since 4/5/2021)     Post hospital issues  None    Scheduled for follow up?   Yes    Did you obtain your prescribed medications  No    Why were you unable to obtain your medications  sent to [facility] and picking up today 4/28    Do you need help managing your prescriptions or medications  Yes    Why type of assitance do you need  wife manages    Is transportation to your appointment needed  Yes    Specify why  wife drives    I have advised the patient to call PCP with any new or worsening symptoms  Stephanie Pradhan MD

## 2021-05-11 ENCOUNTER — TELEPHONE (OUTPATIENT)
Dept: NEUROLOGY | Facility: CLINIC | Age: 71
End: 2021-05-11

## 2021-05-11 NOTE — TELEPHONE ENCOUNTER
Spoke with patient and he is scheduled with Dr Leslie Buckley on 07/02 in the [place] office  Mailed patient paperwork  SLB/Stroke/[employer]    Notes from Chart:  Radha Shaffer will need follow up in in 4 weeks with neurovascular attending or advance practitioner  He will not require outpatient neurological testing

## 2021-05-13 NOTE — ASSESSMENT & PLAN NOTE
Presents as referral from pcp for bilateral V4 segment vertebral artery stenosis  · Presented to hospital 4/26/21 with 3 days of dizziness, nausea and vomiting and noted with the above findings  · Evaluated by neurology in hospital and started on ASA 81 mg, already on atorvastatin  · Since discharge has had 2 additional episodes including this morning  · Exam is non-focal  Negative Rhomberg  Imaging:  · CTA head and neck w/wo 4/26/21: CT brain: Periventricular white matter changes suggestive of chronic microangiopathy  No mass, hemorrhage or acute ischemia  CT angiography: Stenosis of both V4 segments of the vertebral arteries just above the foramen magnum, moderate on the right and mild on the left  · MRI brain wo 4/26/21: White matter changes suggestive of chronic microangiopathy  No acute intracranial pathology  Chronic lacunar infarcts are seen in the left centrum semiovale  Plan:  · Reviewed imaging extensively with patient and wife Marylee Redden  · Continue ASA 81 mg daily and statin  · Follow up with Dr Valorie Rangel as scheduled in July  · Discussed that symptoms are more consistent with vertigo than with VBI since they are associated with sudden posture changes and episodic  No syncopal events  · No further neurosurgical follow-up or intervention needed  · Follow up as needed

## 2021-05-14 ENCOUNTER — OFFICE VISIT (OUTPATIENT)
Dept: NEUROSURGERY | Facility: CLINIC | Age: 71
End: 2021-05-14
Payer: COMMERCIAL

## 2021-05-14 VITALS
DIASTOLIC BLOOD PRESSURE: 84 MMHG | SYSTOLIC BLOOD PRESSURE: 142 MMHG | WEIGHT: 138 LBS | HEART RATE: 74 BPM | RESPIRATION RATE: 16 BRPM | BODY MASS INDEX: 22.18 KG/M2 | HEIGHT: 66 IN | TEMPERATURE: 96.9 F

## 2021-05-14 DIAGNOSIS — I65.03 VERTEBRAL ARTERY STENOSIS, BILATERAL: Primary | ICD-10-CM

## 2021-05-14 PROCEDURE — 3077F SYST BP >= 140 MM HG: CPT | Performed by: NURSE PRACTITIONER

## 2021-05-14 PROCEDURE — 3008F BODY MASS INDEX DOCD: CPT | Performed by: NURSE PRACTITIONER

## 2021-05-14 PROCEDURE — 1036F TOBACCO NON-USER: CPT | Performed by: NURSE PRACTITIONER

## 2021-05-14 PROCEDURE — 1160F RVW MEDS BY RX/DR IN RCRD: CPT | Performed by: NURSE PRACTITIONER

## 2021-05-14 PROCEDURE — 99204 OFFICE O/P NEW MOD 45 MIN: CPT | Performed by: NURSE PRACTITIONER

## 2021-05-14 PROCEDURE — 3079F DIAST BP 80-89 MM HG: CPT | Performed by: NURSE PRACTITIONER

## 2021-05-14 NOTE — PROGRESS NOTES
Neurosurgery Office Note  Basilia Fernando 72 y o  male MRN: [id number]      Assessment/Plan     Vertebral artery stenosis, bilateral  Presents as referral from pcp for bilateral V4 segment vertebral artery stenosis  · Presented to hospital 4/26/21 with 3 days of dizziness, nausea and vomiting and noted with the above findings  · Evaluated by neurology in hospital and started on ASA 81 mg, already on atorvastatin  · Since discharge has had 2 additional episodes including this morning  · Exam is non-focal  Negative Rhomberg  Imaging:  · CTA head and neck w/wo 4/26/21: CT brain: Periventricular white matter changes suggestive of chronic microangiopathy  No mass, hemorrhage or acute ischemia  CT angiography: Stenosis of both V4 segments of the vertebral arteries just above the foramen magnum, moderate on the right and mild on the left  · MRI brain wo 4/26/21: White matter changes suggestive of chronic microangiopathy  No acute intracranial pathology  Chronic lacunar infarcts are seen in the left centrum semiovale  Plan:  · Reviewed imaging extensively with patient and wife Tao Ortiz  · Continue ASA 81 mg daily and statin  · Follow up with Dr Danielito Ospina as scheduled in July  · Discussed that symptoms are more consistent with vertigo than with VBI since they are associated with sudden posture changes and episodic  No syncopal events  · No further neurosurgical follow-up or intervention needed  · Follow up as needed  Diagnoses and all orders for this visit:    Vertebral artery stenosis, bilateral            CHIEF COMPLAINT    Chief Complaint   Patient presents with    Follow-up       HISTORY    History of Present Illness     72y o  year old male with past medical history of DM, HTN, HLD, DEWEY, who presents as referral from neurology for bilateral V4 segment vertebral artery stenosis  He states that 2 weeks ago he was experiencing almost constant dizziness and episodes of nausea and vomiting   Since he was discharged he had one additional episode one week ago and one this morning  He states that these most often occur in the morning when he is getting up and ready and with sudden movements  He denies sensation of room spinning but feels as though he is sea sick  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  HPI    See Discussion    REVIEW OF SYSTEMS    Review of Systems   Constitutional: Negative  HENT: Negative  Eyes: Negative  Respiratory: Positive for apnea (CPAP)  Gastrointestinal: Positive for vomiting (this am due to dizziness)  Endocrine: Negative  Genitourinary: Negative  Musculoskeletal: Positive for gait problem  Skin: Negative  Allergic/Immunologic: Negative  Neurological: Positive for dizziness (when waking up in the am, recently past month with sudden movement)  Hematological: Bruises/bleeds easily (asa 81)  ROS reviewed and edited as needed  Meds/Allergies     Current Outpatient Medications   Medication Sig Dispense Refill    amLODIPine (NORVASC) 10 mg tablet Take 1 tablet (10 mg total) by mouth daily Use as directed 90 tablet 1    aspirin 81 mg chewable tablet Chew 1 tablet (81 mg total) daily 90 tablet 0    atorvastatin (LIPITOR) 40 mg tablet TAKE 1 TABLET BY MOUTH EVERYDAY AT BEDTIME 90 tablet 1    Empagliflozin (Jardiance) 25 MG TABS Take 1 tablet (25 mg total) by mouth every morning 30 tablet 1    insulin glargine (LANTUS) 100 units/mL subcutaneous injection Inject 25 Units under the skin daily at bedtime 10 mL 0    metFORMIN (GLUCOPHAGE-XR) 500 mg 24 hr tablet Take 2 tablets (1,000 mg total) by mouth 2 (two) times a day 360 tablet 0    sildenafil (VIAGRA) 100 mg tablet Take 100 mg by mouth daily as needed for erectile dysfunction      valsartan (DIOVAN) 320 MG tablet Take 1 tablet (320 mg total) by mouth daily 90 tablet 0     No current facility-administered medications for this visit          No Known Allergies    PAST HISTORY    Past Medical History:   Diagnosis Date    Diabetes mellitus ([facility] [address] )     Type 2    Dyslipidemia     Hypertension     Obstructive sleep apnea on CPAP        Past Surgical History:   Procedure Laterality Date    COLONOSCOPY  2008       Social History     Tobacco Use    Smoking status: Former Smoker    Smokeless tobacco: Never Used   Substance Use Topics    Alcohol use: Not Currently    Drug use: Not on file       Family History   Problem Relation Age of Onset    Hypertension Mother          Above history personally reviewed  EXAM    Vitals:Blood pressure 142/84, pulse 74, temperature (!) 96 9 °F (36 1 °C), temperature source Tympanic, resp  rate 16, height 5' 6" (1 676 m), weight 62 6 kg (138 lb)  ,Body mass index is 22 27 kg/m²  Physical Exam  Constitutional:       General: He is not in acute distress  Appearance: He is well-developed  He is not diaphoretic  Eyes:      General:         Right eye: No discharge  Left eye: No discharge  Extraocular Movements: EOM normal       Conjunctiva/sclera: Conjunctivae normal       Pupils: Pupils are equal, round, and reactive to light  Neck:      Musculoskeletal: Normal range of motion and neck supple  Pulmonary:      Effort: Pulmonary effort is normal  No respiratory distress  Abdominal:      General: Bowel sounds are normal  There is no distension  Palpations: Abdomen is soft  Tenderness: There is no abdominal tenderness  Musculoskeletal: Normal range of motion  Skin:     General: Skin is warm and dry  Neurological:      Mental Status: He is alert and oriented to person, place, and time  Cranial Nerves: No cranial nerve deficit  Sensory: No sensory deficit  Motor: No weakness        Coordination: Coordination normal  Finger-Nose-Finger Test normal       Gait: Gait normal       Deep Tendon Reflexes: Reflexes normal    Psychiatric:         Speech: Speech normal          Behavior: Behavior normal          Thought Content: Thought content normal          Judgment: Judgment normal          Neurologic Exam     Mental Status   Oriented to person, place, and time  Oriented to person  Oriented to place  Oriented to time  Oriented to year, month and date  Registration: recalls 3 of 3 objects  Follows 2 step commands  Attention: normal  Concentration: normal    Speech: speech is normal   Level of consciousness: alert  Knowledge: good and consistent with education  Able to perform simple calculations  Able to name object  Cranial Nerves   Cranial nerves II through XII intact  CN III, IV, VI   Pupils are equal, round, and reactive to light  Extraocular motions are normal    Right pupil: Size: 3 mm  Shape: regular  Reactivity: brisk  Consensual response: intact  Accommodation: intact  Left pupil: Size: 3 mm  Shape: regular  Reactivity: brisk  Consensual response: intact  Accommodation: intact  Nystagmus: none   Diplopia: none  Conjugate gaze: present    CN V   Right facial sensation deficit: none  Left facial sensation deficit: none    CN VII   Facial expression full, symmetric       CN VIII   Hearing: intact    CN IX, X   Palate: symmetric    CN XI   Right sternocleidomastoid strength: normal  Left sternocleidomastoid strength: normal  Right trapezius strength: normal  Left trapezius strength: normal    CN XII   Tongue: not atrophic  Fasciculations: absent  Tongue deviation: none    Motor Exam   Muscle bulk: normal  Overall muscle tone: normal  Right arm pronator drift: absent  Left arm pronator drift: absent    Strength   Right deltoid: 5/5  Left deltoid: 5/5  Right biceps: 5/5  Left biceps: 5/5  Right triceps: 5/5  Left triceps: 5/5  Right quadriceps: 5/5  Left quadriceps: 5/5  Right hamstrin/5  Left hamstrin/5  Right anterior tibial: 5/5  Left anterior tibial: 5/5  Right posterior tibial: 5/5  Left posterior tibial: 5/5  Right peroneal: 5/5  Left peroneal: 5/5  Right gastroc: 5/5  Left gastroc: 5/5    Sensory Exam   Light touch normal    Proprioception normal      Gait, Coordination, and Reflexes     Coordination   Finger to nose coordination: normal    Tremor   Resting tremor: absent  Intention tremor: absent  Action tremor: absent        MEDICAL DECISION MAKING    Imaging Studies:     Cta Head And Neck With And Without Contrast    Result Date: 4/26/2021  Narrative: CTA NECK AND BRAIN WITH AND WITHOUT CONTRAST INDICATION: Ataxia, stroke suspected COMPARISON:   None  TECHNIQUE:  Routine CT imaging of the Brain without contrast   Post contrast imaging was performed after administration of iodinated contrast through the neck and brain  Post contrast axial 0 625 mm images timed to opacify the arterial system  3D rendering was performed on an independent workstation  MIP reconstructions performed  Coronal reconstructions were performed of the noncontrast portion of the brain  Radiation dose length product (DLP) for this visit:  1423 43 mGy-cm   This examination, like all CT scans performed in the [facility], was performed utilizing techniques to minimize radiation dose exposure, including the use of iterative reconstruction and automated exposure control  IV Contrast:  85 mL of iohexol (OMNIPAQUE)  IMAGE QUALITY:   Diagnostic FINDINGS: NONCONTRAST BRAIN PARENCHYMA: Decreased attenuation is noted in periventricular and subcortical white matter demonstrating an appearance that is statistically most likely to represent moderate microangiopathic change  No CT signs of acute infarction  No intracranial mass, mass effect or midline shift  No acute parenchymal hemorrhage  Vascular calcification of the distal vertebral arteries  VENTRICLES AND EXTRA-AXIAL SPACES:  Ventricles and extra-axial CSF spaces are prominent commensurate with the degree of volume loss  No hydrocephalus  No acute extra-axial hemorrhage   VISUALIZED ORBITS AND PARANASAL SINUSES: Unremarkable  CERVICAL VASCULATURE AORTIC ARCH AND GREAT VESSELS:  Normal aortic arch and great vessel origins  Normal visualized subclavian vessels  RIGHT VERTEBRAL ARTERY CERVICAL SEGMENT:  Normal origin  The vessel is normal in caliber throughout the neck  LEFT VERTEBRAL ARTERY CERVICAL SEGMENT:  Normal origin  The vessel is normal in caliber throughout the neck  RIGHT EXTRACRANIAL CAROTID SEGMENT:  Normal caliber common carotid artery  Normal bifurcation and cervical internal carotid artery  No stenosis or dissection  LEFT EXTRACRANIAL CAROTID SEGMENT:  Normal caliber common carotid artery  Normal bifurcation and cervical internal carotid artery  No stenosis or dissection  NASCET criteria was used to determine the degree of internal carotid artery diameter stenosis  INTRACRANIAL VASCULATURE INTERNAL CAROTID ARTERIES:  Normal enhancement of the intracranial portions of the internal carotid arteries  Normal ophthalmic artery origins  Normal ICA terminus  ANTERIOR CIRCULATION:  Symmetric A1 segments and anterior cerebral arteries with normal enhancement  Normal anterior communicating artery  MIDDLE CEREBRAL ARTERY CIRCULATION:  M1 segment and middle cerebral artery branches demonstrate normal enhancement bilaterally  DISTAL VERTEBRAL ARTERIES:  Mild calcification of both V4 segments  There is approximately 50% stenosis of the right vertebral artery and mild, less than 50% narrowing on the left  BASILAR ARTERY:  Basilar artery is normal in caliber  Normal superior cerebellar arteries  POSTERIOR CEREBRAL ARTERIES: Both posterior cerebral arteries arises from the basilar tip  Both arteries demonstrate normal enhancement  Normal posterior communicating arteries  DURAL VENOUS SINUSES:  Normal  NON VASCULAR ANATOMY BONY STRUCTURES:  Mild cervical spondylitic degenerative change  No acute osseous abnormality  SOFT TISSUES OF THE NECK:  Normal  THORACIC INLET:  Unremarkable       Impression: CT brain: Periventricular white matter changes suggestive of chronic microangiopathy  No mass, hemorrhage or acute ischemia  CT angiography: Stenosis of both V4 segments of the vertebral arteries just above the foramen magnum, moderate on the right and mild on the left  Workstation performed: [id number]     Xr Chest 1 View Portable    Result Date: 4/26/2021  Narrative: CHEST INDICATION:   dizzy  COMPARISON:  None EXAM PERFORMED/VIEWS:  XR CHEST PORTABLE FINDINGS: Cardiomediastinal silhouette appears unremarkable  The lungs are clear  No pneumothorax or pleural effusion  Osseous structures appear within normal limits for patient age  Impression: No acute cardiopulmonary disease  Workstation performed: [id number]     Mri Brain Wo Contrast    Result Date: 4/27/2021  Narrative: MRI BRAIN WITHOUT CONTRAST INDICATION: Dizziness, ataxia  COMPARISON:   None  TECHNIQUE:  Sagittal T1, axial T2, axial FLAIR, axial T1, axial [place] and axial diffusion imaging  IMAGE QUALITY:  Diagnostic  FINDINGS: BRAIN PARENCHYMA:  There is no discrete mass, mass effect or midline shift  There is no intracranial hemorrhage  There is no evidence of acute infarction and diffusion imaging is unremarkable  Small scattered hyperintensities on T2/FLAIR imaging are noted in the periventricular and subcortical white matter demonstrating an appearance that is statistically most likely to represent mild microangiopathic change  Chronic lacunar infarcts are seen in the left centrum semiovale  VENTRICLES:  Normal for the patient's age  SELLA AND PITUITARY GLAND:  Normal  ORBITS:  Normal  PARANASAL SINUSES:  Normal  VASCULATURE:  Evaluation of the major intracranial vasculature demonstrates appropriate flow voids  CALVARIUM AND SKULL BASE:  Normal  EXTRACRANIAL SOFT TISSUES:  Normal      Impression: White matter changes suggestive of chronic microangiopathy  No acute intracranial pathology    Chronic lacunar infarcts are seen in the left centrum semiovale  Workstation performed: [id number]       I have personally reviewed pertinent reports     and I have personally reviewed pertinent films in PACS

## 2021-05-21 DIAGNOSIS — Z79.4 TYPE 2 DIABETES MELLITUS WITH HYPERGLYCEMIA, WITH LONG-TERM CURRENT USE OF INSULIN (HCC): ICD-10-CM

## 2021-05-21 DIAGNOSIS — E11.65 TYPE 2 DIABETES MELLITUS WITH HYPERGLYCEMIA, WITH LONG-TERM CURRENT USE OF INSULIN (HCC): ICD-10-CM

## 2021-05-21 RX ORDER — INSULIN GLARGINE 100 [IU]/ML
INJECTION, SOLUTION SUBCUTANEOUS
Qty: 10 ML | Refills: 0 | Status: SHIPPED | OUTPATIENT
Start: 2021-05-21 | End: 2021-06-19 | Stop reason: SDUPTHER

## 2021-06-09 ENCOUNTER — TELEPHONE (OUTPATIENT)
Dept: GASTROENTEROLOGY | Facility: CLINIC | Age: 71
End: 2021-06-09

## 2021-06-19 DIAGNOSIS — E11.65 TYPE 2 DIABETES MELLITUS WITH HYPERGLYCEMIA, WITH LONG-TERM CURRENT USE OF INSULIN (HCC): ICD-10-CM

## 2021-06-19 DIAGNOSIS — I10 ESSENTIAL HYPERTENSION: ICD-10-CM

## 2021-06-19 DIAGNOSIS — Z79.4 TYPE 2 DIABETES MELLITUS WITH HYPERGLYCEMIA, WITH LONG-TERM CURRENT USE OF INSULIN (HCC): ICD-10-CM

## 2021-06-19 DIAGNOSIS — I65.03 VERTEBRAL ARTERY STENOSIS, BILATERAL: ICD-10-CM

## 2021-06-19 DIAGNOSIS — N52.1 ERECTILE DYSFUNCTION DUE TO DISEASES CLASSIFIED ELSEWHERE: Primary | ICD-10-CM

## 2021-06-19 NOTE — TELEPHONE ENCOUNTER
Patient's wife called to request for Rx to be sent she stated that she called in 6/16 and nothing has been sent to the pharmacy

## 2021-06-20 PROCEDURE — 4010F ACE/ARB THERAPY RXD/TAKEN: CPT | Performed by: NURSE PRACTITIONER

## 2021-06-20 RX ORDER — ASPIRIN 81 MG/1
81 TABLET, CHEWABLE ORAL DAILY
Qty: 90 TABLET | Refills: 0 | Status: SHIPPED | OUTPATIENT
Start: 2021-06-20

## 2021-06-20 RX ORDER — VALSARTAN 320 MG/1
320 TABLET ORAL DAILY
Qty: 90 TABLET | Refills: 0 | Status: SHIPPED | OUTPATIENT
Start: 2021-06-20 | End: 2021-07-22 | Stop reason: SDUPTHER

## 2021-06-20 RX ORDER — ATORVASTATIN CALCIUM 40 MG/1
40 TABLET, FILM COATED ORAL
Qty: 90 TABLET | Refills: 2 | Status: SHIPPED | OUTPATIENT
Start: 2021-06-20 | End: 2021-08-18 | Stop reason: SDUPTHER

## 2021-06-20 RX ORDER — METFORMIN HYDROCHLORIDE 500 MG/1
1000 TABLET, EXTENDED RELEASE ORAL 2 TIMES DAILY
Qty: 360 TABLET | Refills: 0 | Status: SHIPPED | OUTPATIENT
Start: 2021-06-20 | End: 2021-08-18 | Stop reason: SDUPTHER

## 2021-06-20 RX ORDER — EMPAGLIFLOZIN 25 MG/1
25 TABLET, FILM COATED ORAL EVERY MORNING
Qty: 30 TABLET | Refills: 0 | Status: SHIPPED | OUTPATIENT
Start: 2021-06-20 | End: 2021-06-23

## 2021-06-20 RX ORDER — INSULIN GLARGINE 100 [IU]/ML
25 INJECTION, SOLUTION SUBCUTANEOUS
Qty: 10 ML | Refills: 0 | Status: SHIPPED | OUTPATIENT
Start: 2021-06-20 | End: 2021-07-16 | Stop reason: SDUPTHER

## 2021-06-20 RX ORDER — AMLODIPINE BESYLATE 10 MG/1
10 TABLET ORAL DAILY
Qty: 90 TABLET | Refills: 0 | Status: SHIPPED | OUTPATIENT
Start: 2021-06-20 | End: 2021-08-18 | Stop reason: SDUPTHER

## 2021-06-20 RX ORDER — SILDENAFIL 100 MG/1
100 TABLET, FILM COATED ORAL DAILY PRN
Qty: 10 TABLET | Refills: 0 | Status: SHIPPED | OUTPATIENT
Start: 2021-06-20

## 2021-06-22 DIAGNOSIS — Z79.4 TYPE 2 DIABETES MELLITUS WITH HYPERGLYCEMIA, WITH LONG-TERM CURRENT USE OF INSULIN (HCC): ICD-10-CM

## 2021-06-22 DIAGNOSIS — E11.65 TYPE 2 DIABETES MELLITUS WITH HYPERGLYCEMIA, WITH LONG-TERM CURRENT USE OF INSULIN (HCC): ICD-10-CM

## 2021-06-23 RX ORDER — EMPAGLIFLOZIN 25 MG/1
TABLET, FILM COATED ORAL
Qty: 30 TABLET | Refills: 1 | Status: SHIPPED | OUTPATIENT
Start: 2021-06-23 | End: 2021-07-16 | Stop reason: SDUPTHER

## 2021-07-02 ENCOUNTER — OFFICE VISIT (OUTPATIENT)
Dept: NEUROLOGY | Facility: CLINIC | Age: 71
End: 2021-07-02
Payer: COMMERCIAL

## 2021-07-02 VITALS
HEIGHT: 66 IN | HEART RATE: 70 BPM | BODY MASS INDEX: 21.53 KG/M2 | SYSTOLIC BLOOD PRESSURE: 104 MMHG | DIASTOLIC BLOOD PRESSURE: 70 MMHG | WEIGHT: 134 LBS

## 2021-07-02 DIAGNOSIS — I65.03 VERTEBRAL ARTERY STENOSIS, BILATERAL: ICD-10-CM

## 2021-07-02 DIAGNOSIS — Z87.898 HISTORY OF VERTIGO: Primary | ICD-10-CM

## 2021-07-02 PROCEDURE — 3078F DIAST BP <80 MM HG: CPT | Performed by: PSYCHIATRY & NEUROLOGY

## 2021-07-02 PROCEDURE — 3008F BODY MASS INDEX DOCD: CPT | Performed by: PSYCHIATRY & NEUROLOGY

## 2021-07-02 PROCEDURE — 1160F RVW MEDS BY RX/DR IN RCRD: CPT | Performed by: PSYCHIATRY & NEUROLOGY

## 2021-07-02 PROCEDURE — 3074F SYST BP LT 130 MM HG: CPT | Performed by: PSYCHIATRY & NEUROLOGY

## 2021-07-02 PROCEDURE — 1036F TOBACCO NON-USER: CPT | Performed by: PSYCHIATRY & NEUROLOGY

## 2021-07-02 PROCEDURE — 99213 OFFICE O/P EST LOW 20 MIN: CPT | Performed by: PSYCHIATRY & NEUROLOGY

## 2021-07-02 NOTE — PROGRESS NOTES
Patient ID: Ruddy Proctor is a 80 y o  male  Assessment/Plan:    Vertebral artery stenosis, bilateral  Episode of vertigo lasting 4 days in April; no repeat dizziness since discharge  Hospital workup showing no acute infarct, instead chronic lacunar infarct  EKG/Echo unremarkable at that time  Vertigo thought to be peripheral in nature  Bilateral v4 stenosis; more on right  Found on imaging during hospitalization in April  No symptoms of concern on this visit  Recommend controlling vascular risk factors especially Diabetes as his A1c was last 10  Recommend continuing daily 81mg aspirin  Follow up on a PRN basis if dizziness continues/worsens or any new neurological symptoms of concern  Diagnoses and all orders for this visit:    History of vertigo    Vertebral artery stenosis, bilateral         Subjective:    HPI    Patient presents to the office today following recent hospitalization  At that time he presented with 4 days of vertigo symptoms- describes severe dizziness with nausea and vomiting but without hearing loss or ear pain  He denies any known trigger- no fever or sickness to his knowledge   He states he always has been someone who easily becomes dizzy during certain activities such as being in plane/boat etc  He completed testing in the hospital which was negative for acute stroke  His diabetes was uncontrolled when checked at that time; he states this is cyclical for him (diabetes will be well controlled in summer but poorly controlled in winter time)  He understands the need for regular use of medication and healthy lifestyle to keep A1c numbers consistently below 7  Since discharge, he denies experiencing any new or worsening stroke-like symptoms  He denies repeat dizziness episodes  Patient denies the presence of any residual symptoms following hospitalization and claims he has returned to baseline functioning   He did see neurosurgery since hospitalization, who suggested dizziness likely related to vertigo and less likely to be vertebrobasilar insufficiency  It was recommended he continue daily aspirin and statin  Appointments / Medication Review:  Patient successfully followed up with PCP and neurosurgery  I reviewed medications with him  Patient reports having no difficulties obtaining medications  Reports he is taking all as prescribed with no missed doses, medication side effects, or signs of bleeding  Risk Factors / Education:  We reviewed stroke type, symptoms, personal risk factors and management, medications, and resources  Patient verbalizes understanding of teaching  Offered stroke education binder to patient  he has been managing personal stroke risk factors and reports the following: he is non smoker, he reports following a not healthy diet  I reviewed info with him regarding low salt / low cholesterol / diabetic friendly diet  Reports he is more determined to get his diet under control  I addressed all his questions  At the conclusion of the conversation, patient denies having any further questions or concerns  Objective:    Blood pressure 104/70, pulse 70, height 5' 6" (1 676 m), weight 60 8 kg (134 lb)  Physical Exam  Vitals reviewed  Constitutional:       General: He is not in acute distress  Appearance: Normal appearance  HENT:      Head: Normocephalic and atraumatic  Mouth/Throat:      Mouth: Mucous membranes are moist       Pharynx: Oropharynx is clear  Eyes:      Extraocular Movements: No nystagmus  Cardiovascular:      Rate and Rhythm: Normal rate  Pulses: Normal pulses  Pulmonary:      Effort: Pulmonary effort is normal    Abdominal:      General: There is no distension  Musculoskeletal:         General: Normal range of motion  Cervical back: Normal range of motion  Skin:     General: Skin is warm and dry  Capillary Refill: Capillary refill takes less than 2 seconds     Neurological:      General: No focal deficit present  Mental Status: He is alert  Coordination: Romberg sign negative  Deep Tendon Reflexes: Strength normal       Reflex Scores:       Bicep reflexes are 2+ on the right side and 2+ on the left side  Brachioradialis reflexes are 2+ on the right side and 2+ on the left side  Patellar reflexes are 2+ on the right side and 2+ on the left side  Achilles reflexes are 2+ on the right side and 2+ on the left side  Psychiatric:         Mood and Affect: Mood normal          Speech: Speech normal          Behavior: Behavior normal          Neurological Exam  Mental Status  Alert  Oriented to person, place, time and situation  Speech is normal  Fund of knowledge is appropriate for level of education  Cranial Nerves  CN II: Right visual acuity: counts fingers  Left visual acuity: counts fingers  CN III, IV, VI: No nystagmus  Right pupil: 2 mm  Round  Reactive to light  Reactive to accommodation  Left pupil: 2 mm  Round  Reactive to light  Reactive to accommodation  CN V: Facial sensation is normal   CN VII: Full and symmetric facial movement  CN VIII: Hearing is normal   CN IX, X: Palate elevates symmetrically  CN XI: Shoulder shrug strength is normal   CN XII: Tongue midline without atrophy or fasciculations  Motor  Normal muscle bulk throughout  Normal muscle tone  No abnormal involuntary movements  Strength is 5/5 throughout all four extremities  Sensory  Light touch is normal in upper and lower extremities  Reflexes                                           Right                      Left  Brachioradialis                    2+                         2+  Biceps                                 2+                         2+  Patellar                                2+                         2+  Achilles                                2+                         2+    Gait  Casual gait is normal including stance, stride, and arm swing  Normal toe walking  Normal tandem gait  Romberg is absent  Able to rise from chair without using arms  ROS:    Review of Systems   Constitutional: Negative  Negative for appetite change and fever  HENT: Negative  Negative for hearing loss, tinnitus, trouble swallowing and voice change  Eyes: Negative  Negative for photophobia and pain  Respiratory: Negative  Negative for shortness of breath  Cardiovascular: Negative  Negative for palpitations  Gastrointestinal: Negative  Negative for nausea and vomiting  Endocrine: Negative  Negative for cold intolerance  Genitourinary: Negative  Negative for dysuria, frequency and urgency  Musculoskeletal: Negative  Negative for myalgias and neck pain  Skin: Negative  Negative for rash  Neurological: Negative  Negative for dizziness, tremors, seizures, syncope, facial asymmetry, speech difficulty, weakness, light-headedness, numbness and headaches  Hematological: Negative  Does not bruise/bleed easily  Psychiatric/Behavioral: Negative  Negative for confusion, hallucinations and sleep disturbance

## 2021-07-02 NOTE — ASSESSMENT & PLAN NOTE
Episode of vertigo lasting 4 days in April; no repeat dizziness since discharge  Hospital workup showing no acute infarct, instead chronic lacunar infarct  EKG/Echo unremarkable at that time  Vertigo thought to be peripheral in nature  Bilateral v4 stenosis; more on right  Found on imaging during hospitalization in April  No symptoms of concern on this visit  Recommend controlling vascular risk factors especially Diabetes as his A1c was last 10  Recommend continuing daily 81mg aspirin  Follow up on a PRN basis if dizziness continues/worsens or any new neurological symptoms of concern

## 2021-07-07 ENCOUNTER — RA CDI HCC (OUTPATIENT)
Dept: OTHER | Facility: HOSPITAL | Age: 71
End: 2021-07-07

## 2021-07-07 NOTE — PROGRESS NOTES
[facility]  coding opportunities          Chart reviewed, no opportunity found: CHART REVIEWED, NO OPPORTUNITY FOUND                     Patients insurance company: [employer] (Medicare Advantage and Commercial)

## 2021-07-16 DIAGNOSIS — E11.65 TYPE 2 DIABETES MELLITUS WITH HYPERGLYCEMIA, WITH LONG-TERM CURRENT USE OF INSULIN (HCC): ICD-10-CM

## 2021-07-16 DIAGNOSIS — Z79.4 TYPE 2 DIABETES MELLITUS WITH HYPERGLYCEMIA, WITH LONG-TERM CURRENT USE OF INSULIN (HCC): ICD-10-CM

## 2021-07-16 RX ORDER — INSULIN GLARGINE 100 [IU]/ML
25 INJECTION, SOLUTION SUBCUTANEOUS
Qty: 22.5 ML | Refills: 1 | Status: SHIPPED | OUTPATIENT
Start: 2021-07-16 | End: 2021-08-18 | Stop reason: SDUPTHER

## 2021-07-16 RX ORDER — EMPAGLIFLOZIN 25 MG/1
25 TABLET, FILM COATED ORAL EVERY MORNING
Qty: 90 TABLET | Refills: 1 | Status: SHIPPED | OUTPATIENT
Start: 2021-07-16 | End: 2021-08-18 | Stop reason: SDUPTHER

## 2021-07-22 DIAGNOSIS — I10 ESSENTIAL HYPERTENSION: ICD-10-CM

## 2021-07-23 PROCEDURE — 4010F ACE/ARB THERAPY RXD/TAKEN: CPT | Performed by: PSYCHIATRY & NEUROLOGY

## 2021-07-23 RX ORDER — VALSARTAN 320 MG/1
320 TABLET ORAL DAILY
Qty: 90 TABLET | Refills: 1 | Status: SHIPPED | OUTPATIENT
Start: 2021-07-23 | End: 2021-08-18 | Stop reason: SDUPTHER

## 2021-08-06 DIAGNOSIS — Z79.4 TYPE 2 DIABETES MELLITUS WITH HYPERGLYCEMIA, WITH LONG-TERM CURRENT USE OF INSULIN (HCC): Primary | ICD-10-CM

## 2021-08-06 DIAGNOSIS — E11.65 TYPE 2 DIABETES MELLITUS WITH HYPERGLYCEMIA, WITH LONG-TERM CURRENT USE OF INSULIN (HCC): Primary | ICD-10-CM

## 2021-08-06 NOTE — TELEPHONE ENCOUNTER
Last seen 5/3/21, next appt is 8/18/21   Refill needed for his insulin syringes, he only has enough for 1 more day, needs sent to pharmacy today

## 2021-08-11 ENCOUNTER — RA CDI HCC (OUTPATIENT)
Dept: OTHER | Facility: HOSPITAL | Age: 71
End: 2021-08-11

## 2021-08-15 ENCOUNTER — APPOINTMENT (OUTPATIENT)
Dept: LAB | Age: 71
End: 2021-08-15
Payer: COMMERCIAL

## 2021-08-15 DIAGNOSIS — Z12.5 SCREENING PSA (PROSTATE SPECIFIC ANTIGEN): ICD-10-CM

## 2021-08-15 DIAGNOSIS — Z79.4 TYPE 2 DIABETES MELLITUS WITH HYPERGLYCEMIA, WITH LONG-TERM CURRENT USE OF INSULIN (HCC): ICD-10-CM

## 2021-08-15 DIAGNOSIS — I10 ESSENTIAL HYPERTENSION: ICD-10-CM

## 2021-08-15 DIAGNOSIS — E11.65 TYPE 2 DIABETES MELLITUS WITH HYPERGLYCEMIA, WITH LONG-TERM CURRENT USE OF INSULIN (HCC): ICD-10-CM

## 2021-08-15 LAB
BACTERIA UR QL AUTO: ABNORMAL /HPF
BASOPHILS # BLD AUTO: 0.05 THOUSANDS/ΜL (ref 0–0.1)
BASOPHILS NFR BLD AUTO: 1 % (ref 0–1)
BILIRUB UR QL STRIP: NEGATIVE
CLARITY UR: CLEAR
COLOR UR: YELLOW
CREAT UR-MCNC: 44.3 MG/DL
EOSINOPHIL # BLD AUTO: 0.31 THOUSAND/ΜL (ref 0–0.61)
EOSINOPHIL NFR BLD AUTO: 4 % (ref 0–6)
ERYTHROCYTE [DISTWIDTH] IN BLOOD BY AUTOMATED COUNT: 12.2 % (ref 11.6–15.1)
EST. AVERAGE GLUCOSE BLD GHB EST-MCNC: 298 MG/DL
GLUCOSE UR STRIP-MCNC: ABNORMAL MG/DL
HBA1C MFR BLD: 12 %
HCT VFR BLD AUTO: 42.8 % (ref 36.5–49.3)
HGB BLD-MCNC: 14.5 G/DL (ref 12–17)
HGB UR QL STRIP.AUTO: NEGATIVE
HYALINE CASTS #/AREA URNS LPF: ABNORMAL /LPF
IMM GRANULOCYTES # BLD AUTO: 0.02 THOUSAND/UL (ref 0–0.2)
IMM GRANULOCYTES NFR BLD AUTO: 0 % (ref 0–2)
KETONES UR STRIP-MCNC: NEGATIVE MG/DL
LEUKOCYTE ESTERASE UR QL STRIP: ABNORMAL
LYMPHOCYTES # BLD AUTO: 2.04 THOUSANDS/ΜL (ref 0.6–4.47)
LYMPHOCYTES NFR BLD AUTO: 26 % (ref 14–44)
MCH RBC QN AUTO: 30.9 PG (ref 26.8–34.3)
MCHC RBC AUTO-ENTMCNC: 33.9 G/DL (ref 31.4–37.4)
MCV RBC AUTO: 91 FL (ref 82–98)
MICROALBUMIN UR-MCNC: 94.2 MG/L (ref 0–20)
MICROALBUMIN/CREAT 24H UR: 213 MG/G CREATININE (ref 0–30)
MONOCYTES # BLD AUTO: 0.6 THOUSAND/ΜL (ref 0.17–1.22)
MONOCYTES NFR BLD AUTO: 8 % (ref 4–12)
NEUTROPHILS # BLD AUTO: 4.72 THOUSANDS/ΜL (ref 1.85–7.62)
NEUTS SEG NFR BLD AUTO: 61 % (ref 43–75)
NITRITE UR QL STRIP: NEGATIVE
NON-SQ EPI CELLS URNS QL MICRO: ABNORMAL /HPF
NRBC BLD AUTO-RTO: 0 /100 WBCS
PH UR STRIP.AUTO: 6 [PH]
PLATELET # BLD AUTO: 229 THOUSANDS/UL (ref 149–390)
PMV BLD AUTO: 11.9 FL (ref 8.9–12.7)
PROT UR STRIP-MCNC: ABNORMAL MG/DL
PSA SERPL-MCNC: 0.7 NG/ML (ref 0–4)
RBC # BLD AUTO: 4.69 MILLION/UL (ref 3.88–5.62)
RBC #/AREA URNS AUTO: ABNORMAL /HPF
SP GR UR STRIP.AUTO: 1.04 (ref 1–1.03)
UROBILINOGEN UR QL STRIP.AUTO: 0.2 E.U./DL
WBC # BLD AUTO: 7.74 THOUSAND/UL (ref 4.31–10.16)
WBC #/AREA URNS AUTO: ABNORMAL /HPF

## 2021-08-15 PROCEDURE — 83036 HEMOGLOBIN GLYCOSYLATED A1C: CPT

## 2021-08-15 PROCEDURE — 3060F POS MICROALBUMINURIA REV: CPT | Performed by: INTERNAL MEDICINE

## 2021-08-15 PROCEDURE — 85025 COMPLETE CBC W/AUTO DIFF WBC: CPT

## 2021-08-15 PROCEDURE — 81001 URINALYSIS AUTO W/SCOPE: CPT | Performed by: INTERNAL MEDICINE

## 2021-08-15 PROCEDURE — 82043 UR ALBUMIN QUANTITATIVE: CPT | Performed by: INTERNAL MEDICINE

## 2021-08-15 PROCEDURE — G0103 PSA SCREENING: HCPCS

## 2021-08-15 PROCEDURE — 82570 ASSAY OF URINE CREATININE: CPT | Performed by: INTERNAL MEDICINE

## 2021-08-15 PROCEDURE — 36415 COLL VENOUS BLD VENIPUNCTURE: CPT

## 2021-08-15 PROCEDURE — 3046F HEMOGLOBIN A1C LEVEL >9.0%: CPT | Performed by: INTERNAL MEDICINE

## 2021-08-18 ENCOUNTER — OFFICE VISIT (OUTPATIENT)
Dept: INTERNAL MEDICINE CLINIC | Facility: CLINIC | Age: 71
End: 2021-08-18
Payer: COMMERCIAL

## 2021-08-18 VITALS
OXYGEN SATURATION: 98 % | HEART RATE: 78 BPM | SYSTOLIC BLOOD PRESSURE: 143 MMHG | BODY MASS INDEX: 21.69 KG/M2 | HEIGHT: 66 IN | WEIGHT: 135 LBS | TEMPERATURE: 98 F | DIASTOLIC BLOOD PRESSURE: 85 MMHG

## 2021-08-18 DIAGNOSIS — E11.65 TYPE 2 DIABETES MELLITUS WITH HYPERGLYCEMIA, WITH LONG-TERM CURRENT USE OF INSULIN (HCC): Primary | ICD-10-CM

## 2021-08-18 DIAGNOSIS — I10 ESSENTIAL HYPERTENSION: ICD-10-CM

## 2021-08-18 DIAGNOSIS — Z79.4 TYPE 2 DIABETES MELLITUS WITH HYPERGLYCEMIA, WITH LONG-TERM CURRENT USE OF INSULIN (HCC): Primary | ICD-10-CM

## 2021-08-18 DIAGNOSIS — E78.2 MIXED HYPERLIPIDEMIA: ICD-10-CM

## 2021-08-18 PROCEDURE — 3077F SYST BP >= 140 MM HG: CPT | Performed by: INTERNAL MEDICINE

## 2021-08-18 PROCEDURE — 3079F DIAST BP 80-89 MM HG: CPT | Performed by: INTERNAL MEDICINE

## 2021-08-18 PROCEDURE — 99214 OFFICE O/P EST MOD 30 MIN: CPT | Performed by: INTERNAL MEDICINE

## 2021-08-18 PROCEDURE — 4010F ACE/ARB THERAPY RXD/TAKEN: CPT | Performed by: INTERNAL MEDICINE

## 2021-08-18 PROCEDURE — 3008F BODY MASS INDEX DOCD: CPT | Performed by: INTERNAL MEDICINE

## 2021-08-18 PROCEDURE — 3725F SCREEN DEPRESSION PERFORMED: CPT | Performed by: INTERNAL MEDICINE

## 2021-08-18 PROCEDURE — 1160F RVW MEDS BY RX/DR IN RCRD: CPT | Performed by: INTERNAL MEDICINE

## 2021-08-18 RX ORDER — AMLODIPINE BESYLATE 10 MG/1
10 TABLET ORAL DAILY
Qty: 90 TABLET | Refills: 3 | Status: SHIPPED | OUTPATIENT
Start: 2021-08-18

## 2021-08-18 RX ORDER — ATORVASTATIN CALCIUM 40 MG/1
40 TABLET, FILM COATED ORAL
Qty: 90 TABLET | Refills: 2 | Status: SHIPPED | OUTPATIENT
Start: 2021-08-18

## 2021-08-18 RX ORDER — VALSARTAN 320 MG/1
320 TABLET ORAL DAILY
Qty: 90 TABLET | Refills: 1 | Status: SHIPPED | OUTPATIENT
Start: 2021-08-18 | End: 2021-11-01 | Stop reason: SDUPTHER

## 2021-08-18 RX ORDER — EMPAGLIFLOZIN 25 MG/1
25 TABLET, FILM COATED ORAL EVERY MORNING
Qty: 90 TABLET | Refills: 1 | Status: SHIPPED | OUTPATIENT
Start: 2021-08-18 | End: 2022-07-05 | Stop reason: SDUPTHER

## 2021-08-18 RX ORDER — INSULIN GLARGINE 100 [IU]/ML
25 INJECTION, SOLUTION SUBCUTANEOUS
Qty: 30 ML | Refills: 1 | Status: SHIPPED | OUTPATIENT
Start: 2021-08-18 | End: 2021-12-28

## 2021-08-18 RX ORDER — METFORMIN HYDROCHLORIDE 500 MG/1
1000 TABLET, EXTENDED RELEASE ORAL 2 TIMES DAILY
Qty: 360 TABLET | Refills: 3 | Status: SHIPPED | OUTPATIENT
Start: 2021-08-18 | End: 2022-07-20

## 2021-08-18 NOTE — PROGRESS NOTES
Assessment/Plan: This is a 67-year-old gentleman with a history of diabetes mellitus  He states that he has not been very compliant with his insulin earlier in the year but for the last 3 months has been taking his insulin more regularly  Because of his high A1c Dexcom 24 hour blood sugar monitoring was advised as well as follow-up with endocrinology  1  Type 2 diabetes mellitus with hyperglycemia, with long-term current use of insulin (HCC)  -     Continous glucose monitoring dexcom placement; Future  -     Continous glucose monitoring dexcom intrepretation; Future  -     Ambulatory referral to Endocrinology; Future  -     metFORMIN (GLUCOPHAGE-XR) 500 mg 24 hr tablet; Take 2 tablets (1,000 mg total) by mouth 2 (two) times a day  -     insulin glargine (Lantus) 100 units/mL subcutaneous injection; Inject 25 Units under the skin daily at bedtime  -     Empagliflozin (Jardiance) 25 MG TABS; Take 1 tablet (25 mg total) by mouth every morning  -     Insulin Syringe-Needle U-100 30G X 1/2" 0 3 ML MISC; Use daily Use daily box of 100 BD ultrafine needles 30g  -     atorvastatin (LIPITOR) 40 mg tablet; Take 1 tablet (40 mg total) by mouth daily at bedtime    2  Essential hypertension  -     amLODIPine (NORVASC) 10 mg tablet; Take 1 tablet (10 mg total) by mouth daily Use as directed  -     valsartan (DIOVAN) 320 MG tablet; Take 1 tablet (320 mg total) by mouth daily    3  Mixed hyperlipidemia           1  Type 2 diabetes mellitus with hyperglycemia, with long-term current use of insulin ([address] )      2  Essential hypertension             Subjective:      Patient ID: Andreea Becker is a 80 y o  male  This is a 67-year-old gentleman with a history of diabetes mellitus  He states that he has not been very compliant with his insulin earlier in the year but for the last 3 months has been taking his insulin more regularly    Because of his high A1c Dexcom 24 hour blood sugar monitoring was advised as well as follow-up with endocrinology  The following portions of the patient's history were reviewed and updated as appropriate: He  has a past medical history of Diabetes mellitus ([facility] ), Dyslipidemia, Hypertension, and Obstructive sleep apnea on CPAP  He   Patient Active Problem List    Diagnosis Date Noted    Intractable vomiting 05/06/2021    Vertebral artery stenosis, bilateral 04/26/2021    Obstructive sleep apnea on CPAP     Type 2 diabetes mellitus with hyperglycemia, with long-term current use of insulin ([facility] ) 12/08/2020    Essential hypertension 12/08/2020    Hyperkalemia 12/08/2020    Mixed hyperlipidemia 12/08/2020     He  has a past surgical history that includes Colonoscopy (2008)  His family history includes Hypertension in his mother  He  reports that he has quit smoking  He has never used smokeless tobacco  He reports previous alcohol use  He reports that he does not use drugs    Current Outpatient Medications   Medication Sig Dispense Refill    amLODIPine (NORVASC) 10 mg tablet Take 1 tablet (10 mg total) by mouth daily Use as directed 90 tablet 3    aspirin 81 mg chewable tablet Chew 1 tablet (81 mg total) daily 90 tablet 0    atorvastatin (LIPITOR) 40 mg tablet Take 1 tablet (40 mg total) by mouth daily at bedtime 90 tablet 2    Empagliflozin (Jardiance) 25 MG TABS Take 1 tablet (25 mg total) by mouth every morning 90 tablet 1    insulin glargine (Lantus) 100 units/mL subcutaneous injection Inject 25 Units under the skin daily at bedtime 30 mL 1    Insulin Syringe-Needle U-100 30G X 1/2" 0 3 ML MISC Use daily Use daily box of 100 BD ultrafine needles 30g 100 each 2    metFORMIN (GLUCOPHAGE-XR) 500 mg 24 hr tablet Take 2 tablets (1,000 mg total) by mouth 2 (two) times a day 360 tablet 3    sildenafil (VIAGRA) 100 mg tablet Take 1 tablet (100 mg total) by mouth daily as needed for erectile dysfunction 10 tablet 0    valsartan (DIOVAN) 320 MG tablet Take 1 tablet (320 mg total) by mouth daily 90 tablet 1     No current facility-administered medications for this visit  Current Outpatient Medications on File Prior to Visit   Medication Sig    aspirin 81 mg chewable tablet Chew 1 tablet (81 mg total) daily    sildenafil (VIAGRA) 100 mg tablet Take 1 tablet (100 mg total) by mouth daily as needed for erectile dysfunction    [DISCONTINUED] amLODIPine (NORVASC) 10 mg tablet Take 1 tablet (10 mg total) by mouth daily Use as directed    [DISCONTINUED] atorvastatin (LIPITOR) 40 mg tablet Take 1 tablet (40 mg total) by mouth daily at bedtime    [DISCONTINUED] Empagliflozin (Jardiance) 25 MG TABS Take 1 tablet (25 mg total) by mouth every morning    [DISCONTINUED] insulin glargine (Lantus) 100 units/mL subcutaneous injection Inject 25 Units under the skin daily at bedtime    [DISCONTINUED] Insulin Syringe-Needle U-100 30G X 1/2" 0 3 ML MISC Use daily Use daily box of 100 BD ultrafine needles 30g    [DISCONTINUED] metFORMIN (GLUCOPHAGE-XR) 500 mg 24 hr tablet Take 2 tablets (1,000 mg total) by mouth 2 (two) times a day    [DISCONTINUED] valsartan (DIOVAN) 320 MG tablet Take 1 tablet (320 mg total) by mouth daily     No current facility-administered medications on file prior to visit  He has No Known Allergies       Review of Systems   Constitutional: Negative for appetite change, chills, fatigue and fever  HENT: Negative for sore throat and trouble swallowing  Eyes: Negative for redness  Respiratory: Negative for shortness of breath  Cardiovascular: Negative for chest pain and palpitations  Gastrointestinal: Negative for abdominal pain, constipation and diarrhea  Genitourinary: Negative for dysuria and hematuria  Musculoskeletal: Negative for back pain and neck pain  Skin: Negative for rash  Neurological: Negative for seizures, weakness and headaches  Hematological: Negative for adenopathy  Psychiatric/Behavioral: Negative for confusion  The patient is not nervous/anxious  Objective:      /85 (BP Location: Left arm, Patient Position: Sitting, Cuff Size: Standard)   Pulse 78   Temp 98 °F (36 7 °C) (Temporal)   Ht 5' 6" (1 676 m)   Wt 61 2 kg (135 lb)   SpO2 98%   BMI 21 79 kg/m²     Recent Results (from the past 1344 hour(s))   CBC and differential    Collection Time: 08/15/21 11:43 AM   Result Value Ref Range    WBC 7 74 4 31 - 10 16 Thousand/uL    RBC 4 69 3 88 - 5 62 Million/uL    Hemoglobin 14 5 12 0 - 17 0 g/dL    Hematocrit 42 8 36 5 - 49 3 %    MCV 91 82 - 98 fL    MCH 30 9 26 8 - 34 3 pg    MCHC 33 9 31 4 - 37 4 g/dL    RDW 12 2 11 6 - 15 1 %    MPV 11 9 8 9 - 12 7 fL    Platelets 364 051 - 781 Thousands/uL    nRBC 0 /100 WBCs    Neutrophils Relative 61 43 - 75 %    Immat GRANS % 0 0 - 2 %    Lymphocytes Relative 26 14 - 44 %    Monocytes Relative 8 4 - 12 %    Eosinophils Relative 4 0 - 6 %    Basophils Relative 1 0 - 1 %    Neutrophils Absolute 4 72 1 85 - 7 62 Thousands/µL    Immature Grans Absolute 0 02 0 00 - 0 20 Thousand/uL    Lymphocytes Absolute 2 04 0 60 - 4 47 Thousands/µL    Monocytes Absolute 0 60 0 17 - 1 22 Thousand/µL    Eosinophils Absolute 0 31 0 00 - 0 61 Thousand/µL    Basophils Absolute 0 05 0 00 - 0 10 Thousands/µL   Hemoglobin A1C    Collection Time: 08/15/21 11:43 AM   Result Value Ref Range    Hemoglobin A1C 12 0 (H) Normal 3 8-5 6%; PreDiabetic 5 7-6 4%; Diabetic >=6 5%; Glycemic control for adults with diabetes <7 0% %     mg/dl   PSA, Total Screen    Collection Time: 08/15/21 11:43 AM   Result Value Ref Range    PSA 0 7 0 0 - 4 0 ng/mL   Microalbumin / creatinine urine ratio    Collection Time: 08/15/21 11:52 AM   Result Value Ref Range    Creatinine, Ur 44 3 mg/dL    Microalbum  ,U,Random 94 2 (H) 0 0 - 20 0 mg/L    Microalb Creat Ratio 213 (H) 0 - 30 mg/g creatinine   Urinalysis with microscopic    Collection Time: 08/15/21 11:52 AM   Result Value Ref Range    Clarity, UA Clear     Color, UA Yellow     Specific Gravity, UA 1 036 (H) 1 003 - 1 030    pH, UA 6 0 4 5, 5 0, 5 5, 6 0, 6 5, 7 0, 7 5, 8 0    Glucose, UA >=1000 (1%) (A) Negative mg/dl    Ketones, UA Negative Negative mg/dl    Blood, UA Negative Negative    Protein, UA Trace (A) Negative mg/dl    Nitrite, UA Negative Negative    Bilirubin, UA Negative Negative    Urobilinogen, UA 0 2 0 2, 1 0 E U /dl E U /dl    Leukocytes, UA (A) Negative     Elevated glucose may cause decreased leukocyte values  See urine microscopic for [facility] result/    WBC, UA None Seen None Seen, 2-4, 5-60 /hpf    RBC, UA None Seen None Seen, 2-4 /hpf    Hyaline Casts, UA None Seen None Seen /lpf    Bacteria, UA None Seen None Seen, Occasional /hpf    Epithelial Cells None Seen None Seen, Occasional /hpf        Physical Exam  Constitutional:       General: He is not in acute distress  Appearance: Normal appearance  HENT:      Head: Normocephalic and atraumatic  Nose: Nose normal       Mouth/Throat:      Mouth: Mucous membranes are moist    Eyes:      Extraocular Movements: Extraocular movements intact  Pupils: Pupils are equal, round, and reactive to light  Cardiovascular:      Rate and Rhythm: Normal rate and regular rhythm  Pulses: Normal pulses  Heart sounds: Normal heart sounds  No murmur heard  No friction rub  Pulmonary:      Effort: Pulmonary effort is normal  No respiratory distress  Breath sounds: Normal breath sounds  No wheezing  Abdominal:      General: Abdomen is flat  Bowel sounds are normal  There is no distension  Palpations: Abdomen is soft  There is no mass  Tenderness: There is no abdominal tenderness  There is no guarding  Musculoskeletal:         General: Normal range of motion  Cervical back: Normal range of motion and neck supple  Neurological:      General: No focal deficit present  Mental Status: He is alert and oriented to person, place, and time  Mental status is at baseline        Cranial Nerves: No cranial nerve deficit     Psychiatric:         Mood and Affect: Mood normal          Behavior: Behavior normal

## 2021-09-03 ENCOUNTER — VBI (OUTPATIENT)
Dept: ADMINISTRATIVE | Facility: OTHER | Age: 71
End: 2021-09-03

## 2021-10-15 ENCOUNTER — VBI (OUTPATIENT)
Dept: ADMINISTRATIVE | Facility: OTHER | Age: 71
End: 2021-10-15

## 2021-10-22 ENCOUNTER — CLINICAL SUPPORT (OUTPATIENT)
Dept: INTERNAL MEDICINE CLINIC | Facility: CLINIC | Age: 71
End: 2021-10-22
Payer: COMMERCIAL

## 2021-10-22 ENCOUNTER — TELEPHONE (OUTPATIENT)
Dept: INTERNAL MEDICINE CLINIC | Facility: CLINIC | Age: 71
End: 2021-10-22

## 2021-10-22 DIAGNOSIS — E11.65 TYPE 2 DIABETES MELLITUS WITH HYPERGLYCEMIA, WITH LONG-TERM CURRENT USE OF INSULIN (HCC): Primary | ICD-10-CM

## 2021-10-22 DIAGNOSIS — Z79.4 TYPE 2 DIABETES MELLITUS WITH HYPERGLYCEMIA, WITH LONG-TERM CURRENT USE OF INSULIN (HCC): Primary | ICD-10-CM

## 2021-10-22 DIAGNOSIS — Z23 NEEDS FLU SHOT: Primary | ICD-10-CM

## 2021-10-22 PROCEDURE — 90662 IIV NO PRSV INCREASED AG IM: CPT

## 2021-10-22 PROCEDURE — G0008 ADMIN INFLUENZA VIRUS VAC: HCPCS

## 2021-11-01 DIAGNOSIS — I10 ESSENTIAL HYPERTENSION: ICD-10-CM

## 2021-11-01 PROCEDURE — 4010F ACE/ARB THERAPY RXD/TAKEN: CPT | Performed by: INTERNAL MEDICINE

## 2021-11-01 RX ORDER — VALSARTAN 320 MG/1
320 TABLET ORAL DAILY
Qty: 90 TABLET | Refills: 1 | Status: SHIPPED | OUTPATIENT
Start: 2021-11-01

## 2021-11-05 ENCOUNTER — CONSULT (OUTPATIENT)
Dept: ENDOCRINOLOGY | Facility: CLINIC | Age: 71
End: 2021-11-05
Payer: COMMERCIAL

## 2021-11-05 VITALS — WEIGHT: 147 LBS | BODY MASS INDEX: 23.63 KG/M2 | HEIGHT: 66 IN | HEART RATE: 73 BPM

## 2021-11-05 DIAGNOSIS — E11.65 TYPE 2 DIABETES MELLITUS WITH HYPERGLYCEMIA, WITH LONG-TERM CURRENT USE OF INSULIN (HCC): Primary | ICD-10-CM

## 2021-11-05 DIAGNOSIS — E55.9 VITAMIN D DEFICIENCY: ICD-10-CM

## 2021-11-05 DIAGNOSIS — R53.1 WEAKNESS: ICD-10-CM

## 2021-11-05 DIAGNOSIS — Z79.4 TYPE 2 DIABETES MELLITUS WITH HYPERGLYCEMIA, WITH LONG-TERM CURRENT USE OF INSULIN (HCC): Primary | ICD-10-CM

## 2021-11-05 DIAGNOSIS — E78.2 MIXED HYPERLIPIDEMIA: ICD-10-CM

## 2021-11-05 PROCEDURE — 99204 OFFICE O/P NEW MOD 45 MIN: CPT | Performed by: INTERNAL MEDICINE

## 2021-11-05 PROCEDURE — 3008F BODY MASS INDEX DOCD: CPT | Performed by: INTERNAL MEDICINE

## 2021-11-05 PROCEDURE — 1036F TOBACCO NON-USER: CPT | Performed by: INTERNAL MEDICINE

## 2021-11-08 ENCOUNTER — TELEPHONE (OUTPATIENT)
Dept: ENDOCRINOLOGY | Facility: CLINIC | Age: 71
End: 2021-11-08

## 2021-11-08 ENCOUNTER — DOCUMENTATION (OUTPATIENT)
Dept: ENDOCRINOLOGY | Facility: CLINIC | Age: 71
End: 2021-11-08

## 2021-11-08 DIAGNOSIS — E11.65 TYPE 2 DIABETES MELLITUS WITH HYPERGLYCEMIA, WITH LONG-TERM CURRENT USE OF INSULIN (HCC): Primary | ICD-10-CM

## 2021-11-08 DIAGNOSIS — Z79.4 TYPE 2 DIABETES MELLITUS WITH HYPERGLYCEMIA, WITH LONG-TERM CURRENT USE OF INSULIN (HCC): Primary | ICD-10-CM

## 2021-11-09 DIAGNOSIS — E11.65 TYPE 2 DIABETES MELLITUS WITH HYPERGLYCEMIA, WITH LONG-TERM CURRENT USE OF INSULIN (HCC): Primary | ICD-10-CM

## 2021-11-09 DIAGNOSIS — Z79.4 TYPE 2 DIABETES MELLITUS WITH HYPERGLYCEMIA, WITH LONG-TERM CURRENT USE OF INSULIN (HCC): Primary | ICD-10-CM

## 2021-11-09 RX ORDER — BLOOD SUGAR DIAGNOSTIC
1 STRIP MISCELLANEOUS 2 TIMES DAILY
Qty: 200 EACH | Refills: 1 | Status: SHIPPED | OUTPATIENT
Start: 2021-11-09 | End: 2022-05-02 | Stop reason: SDUPTHER

## 2021-11-12 DIAGNOSIS — E11.65 TYPE 2 DIABETES MELLITUS WITH HYPERGLYCEMIA, WITH LONG-TERM CURRENT USE OF INSULIN (HCC): ICD-10-CM

## 2021-11-12 DIAGNOSIS — Z79.4 TYPE 2 DIABETES MELLITUS WITH HYPERGLYCEMIA, WITH LONG-TERM CURRENT USE OF INSULIN (HCC): ICD-10-CM

## 2021-11-23 ENCOUNTER — VBI (OUTPATIENT)
Dept: ADMINISTRATIVE | Facility: OTHER | Age: 71
End: 2021-11-23

## 2021-12-06 ENCOUNTER — NEW PATIENT (OUTPATIENT)
Dept: URBAN - METROPOLITAN AREA CLINIC 6 | Facility: CLINIC | Age: 71
End: 2021-12-06

## 2021-12-06 DIAGNOSIS — Z79.4: ICD-10-CM

## 2021-12-06 DIAGNOSIS — E11.3293: ICD-10-CM

## 2021-12-06 LAB
LEFT EYE DIABETIC RETINOPATHY: NORMAL
RIGHT EYE DIABETIC RETINOPATHY: NORMAL
SEVERITY (EYE EXAM): NORMAL

## 2021-12-06 PROCEDURE — G8427 DOCREV CUR MEDS BY ELIG CLIN: HCPCS

## 2021-12-06 PROCEDURE — 2022F DILAT RTA XM EVC RTNOPTHY: CPT

## 2021-12-06 PROCEDURE — 92004 COMPRE OPH EXAM NEW PT 1/>: CPT

## 2021-12-06 PROCEDURE — 1036F TOBACCO NON-USER: CPT

## 2021-12-06 PROCEDURE — 92250 FUNDUS PHOTOGRAPHY W/I&R: CPT

## 2021-12-06 PROCEDURE — 5010F MACUL RESULT PHY/QHP MNG DM: CPT

## 2021-12-06 ASSESSMENT — TONOMETRY
OS_IOP_MMHG: 14
OD_IOP_MMHG: 13

## 2021-12-06 ASSESSMENT — VISUAL ACUITY
OS_SC: 20/30-1
OD_SC: 20/40-1
OU_CC: J1

## 2021-12-14 ENCOUNTER — LAB (OUTPATIENT)
Dept: LAB | Age: 71
End: 2021-12-14
Payer: COMMERCIAL

## 2021-12-14 DIAGNOSIS — E11.65 TYPE 2 DIABETES MELLITUS WITH HYPERGLYCEMIA, WITH LONG-TERM CURRENT USE OF INSULIN (HCC): ICD-10-CM

## 2021-12-14 DIAGNOSIS — R53.1 WEAKNESS: ICD-10-CM

## 2021-12-14 DIAGNOSIS — E55.9 VITAMIN D DEFICIENCY: ICD-10-CM

## 2021-12-14 DIAGNOSIS — Z79.4 TYPE 2 DIABETES MELLITUS WITH HYPERGLYCEMIA, WITH LONG-TERM CURRENT USE OF INSULIN (HCC): ICD-10-CM

## 2021-12-14 LAB
25(OH)D3 SERPL-MCNC: 49.6 NG/ML (ref 30–100)
ALBUMIN SERPL BCP-MCNC: 4.1 G/DL (ref 3.5–5)
ALP SERPL-CCNC: 100 U/L (ref 46–116)
ALT SERPL W P-5'-P-CCNC: 20 U/L (ref 12–78)
ANION GAP SERPL CALCULATED.3IONS-SCNC: 6 MMOL/L (ref 4–13)
AST SERPL W P-5'-P-CCNC: 18 U/L (ref 5–45)
BILIRUB SERPL-MCNC: 0.73 MG/DL (ref 0.2–1)
BUN SERPL-MCNC: 19 MG/DL (ref 5–25)
CALCIUM SERPL-MCNC: 10 MG/DL (ref 8.3–10.1)
CHLORIDE SERPL-SCNC: 108 MMOL/L (ref 100–108)
CO2 SERPL-SCNC: 26 MMOL/L (ref 21–32)
CREAT SERPL-MCNC: 1.28 MG/DL (ref 0.6–1.3)
EST. AVERAGE GLUCOSE BLD GHB EST-MCNC: 189 MG/DL
GFR SERPL CREATININE-BSD FRML MDRD: 56 ML/MIN/1.73SQ M
GLUCOSE P FAST SERPL-MCNC: 188 MG/DL (ref 65–99)
HBA1C MFR BLD: 8.2 %
POTASSIUM SERPL-SCNC: 4.2 MMOL/L (ref 3.5–5.3)
PROT SERPL-MCNC: 7.9 G/DL (ref 6.4–8.2)
SODIUM SERPL-SCNC: 140 MMOL/L (ref 136–145)
T4 FREE SERPL-MCNC: 1.1 NG/DL (ref 0.76–1.46)
TSH SERPL DL<=0.05 MIU/L-ACNC: 1.29 UIU/ML (ref 0.36–3.74)

## 2021-12-14 PROCEDURE — 83036 HEMOGLOBIN GLYCOSYLATED A1C: CPT

## 2021-12-14 PROCEDURE — 84439 ASSAY OF FREE THYROXINE: CPT

## 2021-12-14 PROCEDURE — 84681 ASSAY OF C-PEPTIDE: CPT

## 2021-12-14 PROCEDURE — 84443 ASSAY THYROID STIM HORMONE: CPT

## 2021-12-14 PROCEDURE — 83519 RIA NONANTIBODY: CPT

## 2021-12-14 PROCEDURE — 86341 ISLET CELL ANTIBODY: CPT

## 2021-12-14 PROCEDURE — 3052F HG A1C>EQUAL 8.0%<EQUAL 9.0%: CPT | Performed by: INTERNAL MEDICINE

## 2021-12-14 PROCEDURE — 82306 VITAMIN D 25 HYDROXY: CPT

## 2021-12-14 PROCEDURE — 36415 COLL VENOUS BLD VENIPUNCTURE: CPT

## 2021-12-14 PROCEDURE — 80053 COMPREHEN METABOLIC PANEL: CPT

## 2021-12-15 ENCOUNTER — TELEPHONE (OUTPATIENT)
Dept: ENDOCRINOLOGY | Facility: CLINIC | Age: 71
End: 2021-12-15

## 2021-12-16 LAB — C PEPTIDE SERPL-MCNC: 2.2 NG/ML (ref 1.1–4.4)

## 2021-12-20 ENCOUNTER — OFFICE VISIT (OUTPATIENT)
Dept: ENDOCRINOLOGY | Facility: CLINIC | Age: 71
End: 2021-12-20
Payer: COMMERCIAL

## 2021-12-20 VITALS
WEIGHT: 148.2 LBS | HEART RATE: 77 BPM | DIASTOLIC BLOOD PRESSURE: 90 MMHG | SYSTOLIC BLOOD PRESSURE: 170 MMHG | BODY MASS INDEX: 23.82 KG/M2 | HEIGHT: 66 IN

## 2021-12-20 DIAGNOSIS — E11.65 TYPE 2 DIABETES MELLITUS WITH HYPERGLYCEMIA, WITH LONG-TERM CURRENT USE OF INSULIN (HCC): Primary | ICD-10-CM

## 2021-12-20 DIAGNOSIS — Z79.4 TYPE 2 DIABETES MELLITUS WITH HYPERGLYCEMIA, WITH LONG-TERM CURRENT USE OF INSULIN (HCC): Primary | ICD-10-CM

## 2021-12-20 DIAGNOSIS — E78.2 MIXED HYPERLIPIDEMIA: ICD-10-CM

## 2021-12-20 DIAGNOSIS — E55.9 VITAMIN D DEFICIENCY: ICD-10-CM

## 2021-12-20 PROCEDURE — 1160F RVW MEDS BY RX/DR IN RCRD: CPT | Performed by: INTERNAL MEDICINE

## 2021-12-20 PROCEDURE — 1036F TOBACCO NON-USER: CPT | Performed by: INTERNAL MEDICINE

## 2021-12-20 PROCEDURE — 99214 OFFICE O/P EST MOD 30 MIN: CPT | Performed by: INTERNAL MEDICINE

## 2021-12-21 LAB — GAD65 AB SER-ACNC: <5 U/ML (ref 0–5)

## 2021-12-22 ENCOUNTER — OFFICE VISIT (OUTPATIENT)
Dept: INTERNAL MEDICINE CLINIC | Facility: CLINIC | Age: 71
End: 2021-12-22
Payer: COMMERCIAL

## 2021-12-22 ENCOUNTER — VBI (OUTPATIENT)
Dept: ADMINISTRATIVE | Facility: OTHER | Age: 71
End: 2021-12-22

## 2021-12-22 VITALS
WEIGHT: 143 LBS | DIASTOLIC BLOOD PRESSURE: 70 MMHG | HEIGHT: 66 IN | HEART RATE: 89 BPM | SYSTOLIC BLOOD PRESSURE: 120 MMHG | BODY MASS INDEX: 22.98 KG/M2 | OXYGEN SATURATION: 96 % | TEMPERATURE: 98.6 F

## 2021-12-22 DIAGNOSIS — I10 ESSENTIAL HYPERTENSION: ICD-10-CM

## 2021-12-22 DIAGNOSIS — Z00.00 MEDICARE ANNUAL WELLNESS VISIT, SUBSEQUENT: ICD-10-CM

## 2021-12-22 DIAGNOSIS — Z79.4 TYPE 2 DIABETES MELLITUS WITH HYPERGLYCEMIA, WITH LONG-TERM CURRENT USE OF INSULIN (HCC): Primary | ICD-10-CM

## 2021-12-22 DIAGNOSIS — E11.65 TYPE 2 DIABETES MELLITUS WITH HYPERGLYCEMIA, WITH LONG-TERM CURRENT USE OF INSULIN (HCC): Primary | ICD-10-CM

## 2021-12-22 DIAGNOSIS — E78.2 MIXED HYPERLIPIDEMIA: ICD-10-CM

## 2021-12-22 LAB — ISLET CELL512 AB SER-ACNC: <7.5 U/ML

## 2021-12-22 PROCEDURE — 3008F BODY MASS INDEX DOCD: CPT | Performed by: INTERNAL MEDICINE

## 2021-12-22 PROCEDURE — 99214 OFFICE O/P EST MOD 30 MIN: CPT | Performed by: INTERNAL MEDICINE

## 2021-12-22 PROCEDURE — G0439 PPPS, SUBSEQ VISIT: HCPCS | Performed by: INTERNAL MEDICINE

## 2021-12-22 NOTE — PROGRESS NOTES
Assessment and Plan:     Problem List Items Addressed This Visit        Endocrine    Type 2 diabetes mellitus with hyperglycemia, with long-term current use of insulin ([address] ) - Primary       Cardiovascular and Mediastinum    Essential hypertension      Other Visit Diagnoses     Medicare annual wellness visit, subsequent               Preventive health issues were discussed with patient, and age appropriate screening tests were ordered as noted in patient's After Visit Summary  Personalized health advice and appropriate referrals for health education or preventive services given if needed, as noted in patient's After Visit Summary       History of Present Illness:     Patient presents for Medicare Annual Wellness visit    Patient Care Team:  Bryan Quezada MD as PCP - General (Internal Medicine)  Andreea Méndez MD (Endocrinology)     Problem List:     Patient Active Problem List   Diagnosis    Type 2 diabetes mellitus with hyperglycemia, with long-term current use of insulin ([address] )    Essential hypertension    Hyperkalemia    Mixed hyperlipidemia    Obstructive sleep apnea on CPAP    Vertebral artery stenosis, bilateral    Intractable vomiting      Past Medical and Surgical History:     Past Medical History:   Diagnosis Date    Diabetes mellitus ([address] )     Type 2    Dyslipidemia     Hypertension     Obstructive sleep apnea on CPAP      Past Surgical History:   Procedure Laterality Date    COLONOSCOPY  2008      Family History:     Family History   Problem Relation Age of Onset    Hypertension Mother       Social History:     Social History     Socioeconomic History    Marital status: /Civil Union     Spouse name: None    Number of children: None    Years of education: None    Highest education level: None   Occupational History    None   Tobacco Use    Smoking status: Former Smoker    Smokeless tobacco: Never Used   Vaping Use    Vaping Use: Never used   Substance and Sexual Activity  Alcohol use: Not Currently    Drug use: Never    Sexual activity: None   Other Topics Concern    None   Social History Narrative    None     Social Determinants of Health     Financial Resource Strain: Not on file   Food Insecurity: Not on file   Transportation Needs: Not on file   Physical Activity: Not on file   Stress: Not on file   Social Connections: Not on file   Intimate Partner Violence: Not on file   Housing Stability: Not on file      Medications and Allergies:     Current Outpatient Medications   Medication Sig Dispense Refill    amLODIPine (NORVASC) 10 mg tablet Take 1 tablet (10 mg total) by mouth daily Use as directed 90 tablet 3    aspirin 81 mg chewable tablet Chew 1 tablet (81 mg total) daily 90 tablet 0    atorvastatin (LIPITOR) 40 mg tablet Take 1 tablet (40 mg total) by mouth daily at bedtime 90 tablet 2    Empagliflozin (Jardiance) 25 MG TABS Take 1 tablet (25 mg total) by mouth every morning 90 tablet 1    glucose blood (FreeStyle In[employer] Test) test strip Use 1 each 2 (two) times a day 200 each 1    Insulin Syringe-Needle U-100 30G X 1/2" 0 3 ML MISC Use daily Use daily box of 100 BD ultrafine needles 30g 100 each 2    sildenafil (VIAGRA) 100 mg tablet Take 1 tablet (100 mg total) by mouth daily as needed for erectile dysfunction 10 tablet 0    valsartan (DIOVAN) 320 MG tablet Take 1 tablet (320 mg total) by mouth daily 90 tablet 1    insulin glargine (Lantus) 100 units/mL subcutaneous injection Inject 25 Units under the skin daily at bedtime 30 mL 1    metFORMIN (GLUCOPHAGE-XR) 500 mg 24 hr tablet Take 2 tablets (1,000 mg total) by mouth 2 (two) times a day 360 tablet 3     No current facility-administered medications for this visit       No Known Allergies   Immunizations:     Immunization History   Administered Date(s) Administered    COVID-19 PFIZER VACCINE 0 3 ML IM 03/12/2021, 04/05/2021    INFLUENZA 09/08/2020    Influenza, high dose seasonal 0 7 mL 10/22/2021    Tdap 04/17/2017    influenza, trivalent, adjuvanted 09/08/2020      Health Maintenance:         Topic Date Due    Colorectal Cancer Screening  12/11/2025    Hepatitis C Screening  Completed         Topic Date Due    Pneumococcal Vaccine: 65+ Years (1 of 2 - PPSV23) Never done    COVID-19 Vaccine (3 - Booster for [place] Davi series) 10/05/2021      Medicare Health Risk Assessment:     /70   Pulse 89   Temp 98 6 °F (37 °C) (Temporal)   Ht 5' 6" (1 676 m)   Wt 64 9 kg (143 lb)   SpO2 96%   BMI 23 08 kg/m²      Willis Escalante is here for his Subsequent Wellness visit  Last Medicare Wellness visit information reviewed, patient interviewed and updates made to the record today  Health Risk Assessment:   Patient rates overall health as very good  Patient feels that their physical health rating is much better  Patient is very satisfied with their life  Eyesight was rated as same  Hearing was rated as same  Patient feels that their emotional and mental health rating is same  Patients states they are never, rarely angry  Patient states they are sometimes unusually tired/fatigued  Pain experienced in the last 7 days has been none  Patient states that he has experienced no weight loss or gain in last 6 months  Depression Screening:   PHQ-2 Score: 0      Fall Risk Screening: In the past year, patient has experienced: no history of falling in past year      Home Safety:  Patient does not have trouble with stairs inside or outside of their home  Patient has working smoke alarms and has working carbon monoxide detector  Home safety hazards include: none  Nutrition:   Current diet is Diabetic  Medications:   Patient is currently taking over-the-counter supplements  OTC medications include: see medication list  Patient is able to manage medications       Activities of Daily Living (ADLs)/Instrumental Activities of Daily Living (IADLs):   Walk and transfer into and out of bed and chair?: Yes  Dress and groom yourself?: Yes    Bathe or shower yourself?: Yes    Feed yourself? Yes  Do your laundry/housekeeping?: Yes  Manage your money, pay your bills and track your expenses?: Yes  Make your own meals?: Yes    Do your own shopping?: Yes    Previous Hospitalizations:   Any hospitalizations or ED visits within the last 12 months?: Yes    How many hospitalizations have you had in the last year?: 1-2    Advance Care Planning:   Living will: No    Durable POA for healthcare: No    Advanced directive: No      PREVENTIVE SCREENINGS      Cardiovascular Screening:    General: Screening Not Indicated and History Lipid Disorder      Diabetes Screening:     General: Screening Not Indicated and History Diabetes      Colorectal Cancer Screening:     General: Screening Current      Prostate Cancer Screening:    General: Screening Current      Abdominal Aortic Aneurysm (AAA) Screening:    Risk factors include: age between 73-67 yo and tobacco use        Lung Cancer Screening:     General: Screening Not Indicated      Hepatitis C Screening:    General: Screening Current    Screening, Brief Intervention, and Referral to Treatment (SBIRT)    Screening  Typical number of drinks in a day: 0  Typical number of drinks in a week: 0  Interpretation: Low risk drinking behavior  Single Item Drug Screening:  How often have you used an illegal drug (including marijuana) or a prescription medication for non-medical reasons in the past year? never    Single Item Drug Screen Score: 0  Interpretation: Negative screen for possible drug use disorder    Other Counseling Topics:   Car/seat belt/driving safety, skin self-exam, sunscreen and regular weightbearing exercise and calcium and vitamin D intake         Lord Nirmal MD

## 2021-12-22 NOTE — PROGRESS NOTES
Assessment/Plan:           1  Type 2 diabetes mellitus with hyperglycemia, with long-term current use of insulin ([facility])  Comments:  Sugar is elevated  Blood work was reviewed  Consistency with medications as advised    2  Essential hypertension  Comments:  Continue amlodipine valsartan continue home monitoring  3  Medicare annual wellness visit, subsequent    4  Mixed hyperlipidemia  Comments:  Continue atorvastatin 40 mg daily  1  Type 2 diabetes mellitus with hyperglycemia, with long-term current use of insulin ([address] )      2  Essential hypertension      3  Medicare annual wellness visit, subsequent         No problem-specific Assessment & Plan notes found for this encounter  Subjective:      Patient ID: Bettina Dubois is a 71 y o  male  HPI    The following portions of the patient's history were reviewed and updated as appropriate: He  has a past medical history of Diabetes mellitus ([address] ), Dyslipidemia, Hypertension, and Obstructive sleep apnea on CPAP  He   Patient Active Problem List    Diagnosis Date Noted    Intractable vomiting 05/06/2021    Vertebral artery stenosis, bilateral 04/26/2021    Obstructive sleep apnea on CPAP     Type 2 diabetes mellitus with hyperglycemia, with long-term current use of insulin ([facility] ) 12/08/2020    Essential hypertension 12/08/2020    Hyperkalemia 12/08/2020    Mixed hyperlipidemia 12/08/2020     He  has a past surgical history that includes Colonoscopy (2008)  His family history includes Hypertension in his mother  He  reports that he has quit smoking  He has never used smokeless tobacco  He reports previous alcohol use  He reports that he does not use drugs    Current Outpatient Medications   Medication Sig Dispense Refill    amLODIPine (NORVASC) 10 mg tablet Take 1 tablet (10 mg total) by mouth daily Use as directed 90 tablet 3    aspirin 81 mg chewable tablet Chew 1 tablet (81 mg total) daily 90 tablet 0    atorvastatin (LIPITOR) 40 mg tablet Take 1 tablet (40 mg total) by mouth daily at bedtime 90 tablet 2    Empagliflozin (Jardiance) 25 MG TABS Take 1 tablet (25 mg total) by mouth every morning 90 tablet 1    Insulin Syringe-Needle U-100 30G X 1/2" 0 3 ML MISC Use daily Use daily box of 100 BD ultrafine needles 30g 100 each 2    sildenafil (VIAGRA) 100 mg tablet Take 1 tablet (100 mg total) by mouth daily as needed for erectile dysfunction 10 tablet 0    valsartan (DIOVAN) 320 MG tablet Take 1 tablet (320 mg total) by mouth daily 90 tablet 1    glucose blood (FreeStyle Insu[employer]x Test) test strip Use 1 each 2 (two) times a day 200 each 1    Lantus 100 UNIT/ML subcutaneous injection INJECT 25 UNITS UNDER THE SKIN DAILY AT BEDTIME 20 mL 1    metFORMIN (GLUCOPHAGE-XR) 500 mg 24 hr tablet Take 2 tablets (1,000 mg total) by mouth 2 (two) times a day 360 tablet 3     No current facility-administered medications for this visit  Current Outpatient Medications on File Prior to Visit   Medication Sig    amLODIPine (NORVASC) 10 mg tablet Take 1 tablet (10 mg total) by mouth daily Use as directed    aspirin 81 mg chewable tablet Chew 1 tablet (81 mg total) daily    atorvastatin (LIPITOR) 40 mg tablet Take 1 tablet (40 mg total) by mouth daily at bedtime    Empagliflozin (Jardiance) 25 MG TABS Take 1 tablet (25 mg total) by mouth every morning    Insulin Syringe-Needle U-100 30G X 1/2" 0 3 ML MISC Use daily Use daily box of 100 BD ultrafine needles 30g    sildenafil (VIAGRA) 100 mg tablet Take 1 tablet (100 mg total) by mouth daily as needed for erectile dysfunction    valsartan (DIOVAN) 320 MG tablet Take 1 tablet (320 mg total) by mouth daily    metFORMIN (GLUCOPHAGE-XR) 500 mg 24 hr tablet Take 2 tablets (1,000 mg total) by mouth 2 (two) times a day     No current facility-administered medications on file prior to visit  He has No Known Allergies       Review of Systems   Constitutional: Negative for appetite change, chills, fatigue and fever    HENT: Negative for sore throat and trouble swallowing  Eyes: Negative for redness  Respiratory: Negative for shortness of breath  Cardiovascular: Negative for chest pain and palpitations  Gastrointestinal: Negative for abdominal pain, constipation and diarrhea  Genitourinary: Negative for dysuria and hematuria  Musculoskeletal: Negative for back pain and neck pain  Skin: Negative for rash  Neurological: Negative for seizures, weakness and headaches  Hematological: Negative for adenopathy  Psychiatric/Behavioral: Negative for confusion  The patient is not nervous/anxious  Objective:      /70   Pulse 89   Temp 98 6 °F (37 °C) (Temporal)   Ht 5' 6" (1 676 m)   Wt 64 9 kg (143 lb)   SpO2 96%   BMI 23 08 kg/m²     Results Reviewed     None          No results found for this or any previous visit (from the past 1344 hour(s))  Physical Exam  Constitutional:       General: He is not in acute distress  Appearance: Normal appearance  HENT:      Head: Normocephalic and atraumatic  Nose: Nose normal       Mouth/Throat:      Mouth: Mucous membranes are moist    Eyes:      Extraocular Movements: Extraocular movements intact  Pupils: Pupils are equal, round, and reactive to light  Cardiovascular:      Rate and Rhythm: Normal rate and regular rhythm  Pulses: Normal pulses  Heart sounds: Normal heart sounds  No murmur heard  No friction rub  Pulmonary:      Effort: Pulmonary effort is normal  No respiratory distress  Breath sounds: Normal breath sounds  No wheezing  Abdominal:      General: Abdomen is flat  Bowel sounds are normal  There is no distension  Palpations: Abdomen is soft  There is no mass  Tenderness: There is no abdominal tenderness  There is no guarding  Musculoskeletal:         General: Normal range of motion  Neurological:      General: No focal deficit present        Mental Status: He is alert and oriented to person, place, and time  Mental status is at baseline  Cranial Nerves: No cranial nerve deficit     Psychiatric:         Mood and Affect: Mood normal          Behavior: Behavior normal

## 2021-12-28 DIAGNOSIS — Z79.4 TYPE 2 DIABETES MELLITUS WITH HYPERGLYCEMIA, WITH LONG-TERM CURRENT USE OF INSULIN (HCC): ICD-10-CM

## 2021-12-28 DIAGNOSIS — E11.65 TYPE 2 DIABETES MELLITUS WITH HYPERGLYCEMIA, WITH LONG-TERM CURRENT USE OF INSULIN (HCC): ICD-10-CM

## 2021-12-28 RX ORDER — INSULIN GLARGINE 100 [IU]/ML
25 INJECTION, SOLUTION SUBCUTANEOUS
Qty: 20 ML | Refills: 1 | Status: SHIPPED | OUTPATIENT
Start: 2021-12-28 | End: 2022-04-18

## 2022-01-10 ENCOUNTER — VBI (OUTPATIENT)
Dept: ADMINISTRATIVE | Facility: OTHER | Age: 72
End: 2022-01-10

## 2022-02-05 ENCOUNTER — IMMUNIZATIONS (OUTPATIENT)
Dept: FAMILY MEDICINE CLINIC | Facility: HOSPITAL | Age: 72
End: 2022-02-05

## 2022-02-05 DIAGNOSIS — Z23 ENCOUNTER FOR IMMUNIZATION: Primary | ICD-10-CM

## 2022-02-05 PROCEDURE — 0001A COVID-19 PFIZER VACC 0.3 ML: CPT

## 2022-02-05 PROCEDURE — 91300 COVID-19 PFIZER VACC 0.3 ML: CPT

## 2022-04-17 DIAGNOSIS — Z79.4 TYPE 2 DIABETES MELLITUS WITH HYPERGLYCEMIA, WITH LONG-TERM CURRENT USE OF INSULIN (HCC): ICD-10-CM

## 2022-04-17 DIAGNOSIS — E11.65 TYPE 2 DIABETES MELLITUS WITH HYPERGLYCEMIA, WITH LONG-TERM CURRENT USE OF INSULIN (HCC): ICD-10-CM

## 2022-04-18 ENCOUNTER — TELEPHONE (OUTPATIENT)
Dept: ENDOCRINOLOGY | Facility: CLINIC | Age: 72
End: 2022-04-18

## 2022-04-18 ENCOUNTER — TELEPHONE (OUTPATIENT)
Dept: INTERNAL MEDICINE CLINIC | Facility: CLINIC | Age: 72
End: 2022-04-18

## 2022-04-18 DIAGNOSIS — E11.65 TYPE 2 DIABETES MELLITUS WITH HYPERGLYCEMIA, WITH LONG-TERM CURRENT USE OF INSULIN (HCC): Primary | ICD-10-CM

## 2022-04-18 DIAGNOSIS — Z79.4 TYPE 2 DIABETES MELLITUS WITH HYPERGLYCEMIA, WITH LONG-TERM CURRENT USE OF INSULIN (HCC): Primary | ICD-10-CM

## 2022-04-18 RX ORDER — INSULIN GLARGINE 100 [IU]/ML
25 INJECTION, SOLUTION SUBCUTANEOUS
Qty: 20 ML | Refills: 1 | Status: SHIPPED | OUTPATIENT
Start: 2022-04-18 | End: 2022-07-20

## 2022-04-19 ENCOUNTER — TELEPHONE (OUTPATIENT)
Dept: ENDOCRINOLOGY | Facility: CLINIC | Age: 72
End: 2022-04-19

## 2022-04-19 ENCOUNTER — APPOINTMENT (OUTPATIENT)
Dept: LAB | Age: 72
End: 2022-04-19
Payer: COMMERCIAL

## 2022-04-19 DIAGNOSIS — Z79.4 TYPE 2 DIABETES MELLITUS WITH HYPERGLYCEMIA, WITH LONG-TERM CURRENT USE OF INSULIN (HCC): ICD-10-CM

## 2022-04-19 DIAGNOSIS — E11.65 TYPE 2 DIABETES MELLITUS WITH HYPERGLYCEMIA, WITH LONG-TERM CURRENT USE OF INSULIN (HCC): ICD-10-CM

## 2022-04-19 LAB
ALBUMIN SERPL BCP-MCNC: 4 G/DL (ref 3.5–5)
ALP SERPL-CCNC: 80 U/L (ref 46–116)
ALT SERPL W P-5'-P-CCNC: 20 U/L (ref 12–78)
ANION GAP SERPL CALCULATED.3IONS-SCNC: 2 MMOL/L (ref 4–13)
AST SERPL W P-5'-P-CCNC: 15 U/L (ref 5–45)
BASOPHILS # BLD AUTO: 0.06 THOUSANDS/ΜL (ref 0–0.1)
BASOPHILS NFR BLD AUTO: 1 % (ref 0–1)
BILIRUB SERPL-MCNC: 0.68 MG/DL (ref 0.2–1)
BUN SERPL-MCNC: 20 MG/DL (ref 5–25)
CALCIUM SERPL-MCNC: 10.1 MG/DL (ref 8.3–10.1)
CHLORIDE SERPL-SCNC: 109 MMOL/L (ref 100–108)
CO2 SERPL-SCNC: 29 MMOL/L (ref 21–32)
CREAT SERPL-MCNC: 1.16 MG/DL (ref 0.6–1.3)
EOSINOPHIL # BLD AUTO: 0.4 THOUSAND/ΜL (ref 0–0.61)
EOSINOPHIL NFR BLD AUTO: 7 % (ref 0–6)
ERYTHROCYTE [DISTWIDTH] IN BLOOD BY AUTOMATED COUNT: 12.9 % (ref 11.6–15.1)
EST. AVERAGE GLUCOSE BLD GHB EST-MCNC: 260 MG/DL
GFR SERPL CREATININE-BSD FRML MDRD: 63 ML/MIN/1.73SQ M
GLUCOSE P FAST SERPL-MCNC: 168 MG/DL (ref 65–99)
HBA1C MFR BLD: 10.7 %
HCT VFR BLD AUTO: 46.8 % (ref 36.5–49.3)
HGB BLD-MCNC: 15.3 G/DL (ref 12–17)
IMM GRANULOCYTES # BLD AUTO: 0.02 THOUSAND/UL (ref 0–0.2)
IMM GRANULOCYTES NFR BLD AUTO: 0 % (ref 0–2)
LYMPHOCYTES # BLD AUTO: 1.99 THOUSANDS/ΜL (ref 0.6–4.47)
LYMPHOCYTES NFR BLD AUTO: 32 % (ref 14–44)
MCH RBC QN AUTO: 30.1 PG (ref 26.8–34.3)
MCHC RBC AUTO-ENTMCNC: 32.7 G/DL (ref 31.4–37.4)
MCV RBC AUTO: 92 FL (ref 82–98)
MONOCYTES # BLD AUTO: 0.47 THOUSAND/ΜL (ref 0.17–1.22)
MONOCYTES NFR BLD AUTO: 8 % (ref 4–12)
NEUTROPHILS # BLD AUTO: 3.26 THOUSANDS/ΜL (ref 1.85–7.62)
NEUTS SEG NFR BLD AUTO: 52 % (ref 43–75)
NRBC BLD AUTO-RTO: 0 /100 WBCS
PLATELET # BLD AUTO: 207 THOUSANDS/UL (ref 149–390)
PMV BLD AUTO: 11.6 FL (ref 8.9–12.7)
POTASSIUM SERPL-SCNC: 5.1 MMOL/L (ref 3.5–5.3)
PROT SERPL-MCNC: 7 G/DL (ref 6.4–8.2)
RBC # BLD AUTO: 5.09 MILLION/UL (ref 3.88–5.62)
SODIUM SERPL-SCNC: 140 MMOL/L (ref 136–145)
WBC # BLD AUTO: 6.2 THOUSAND/UL (ref 4.31–10.16)

## 2022-04-19 PROCEDURE — 36415 COLL VENOUS BLD VENIPUNCTURE: CPT

## 2022-04-19 PROCEDURE — 80053 COMPREHEN METABOLIC PANEL: CPT

## 2022-04-19 PROCEDURE — 3046F HEMOGLOBIN A1C LEVEL >9.0%: CPT | Performed by: INTERNAL MEDICINE

## 2022-04-19 PROCEDURE — 83036 HEMOGLOBIN GLYCOSYLATED A1C: CPT

## 2022-04-19 PROCEDURE — 85025 COMPLETE CBC W/AUTO DIFF WBC: CPT

## 2022-04-19 NOTE — TELEPHONE ENCOUNTER
----- Message from Madie Price MD sent at 4/19/2022  2:28 PM EDT -----  A1c high   Will discuss upcoming visit

## 2022-04-25 ENCOUNTER — OFFICE VISIT (OUTPATIENT)
Dept: ENDOCRINOLOGY | Facility: CLINIC | Age: 72
End: 2022-04-25
Payer: COMMERCIAL

## 2022-04-25 ENCOUNTER — RA CDI HCC (OUTPATIENT)
Dept: OTHER | Facility: HOSPITAL | Age: 72
End: 2022-04-25

## 2022-04-25 VITALS
SYSTOLIC BLOOD PRESSURE: 126 MMHG | DIASTOLIC BLOOD PRESSURE: 70 MMHG | WEIGHT: 145.4 LBS | HEART RATE: 71 BPM | BODY MASS INDEX: 23.37 KG/M2 | HEIGHT: 66 IN

## 2022-04-25 DIAGNOSIS — Z99.89 OBSTRUCTIVE SLEEP APNEA ON CPAP: ICD-10-CM

## 2022-04-25 DIAGNOSIS — Z79.4 TYPE 2 DIABETES MELLITUS WITH HYPERGLYCEMIA, WITH LONG-TERM CURRENT USE OF INSULIN (HCC): Primary | ICD-10-CM

## 2022-04-25 DIAGNOSIS — E11.65 TYPE 2 DIABETES MELLITUS WITH HYPERGLYCEMIA, WITH LONG-TERM CURRENT USE OF INSULIN (HCC): Primary | ICD-10-CM

## 2022-04-25 DIAGNOSIS — G47.33 OBSTRUCTIVE SLEEP APNEA ON CPAP: ICD-10-CM

## 2022-04-25 DIAGNOSIS — E78.2 MIXED HYPERLIPIDEMIA: ICD-10-CM

## 2022-04-25 PROCEDURE — 1160F RVW MEDS BY RX/DR IN RCRD: CPT | Performed by: INTERNAL MEDICINE

## 2022-04-25 PROCEDURE — 99214 OFFICE O/P EST MOD 30 MIN: CPT | Performed by: INTERNAL MEDICINE

## 2022-04-25 PROCEDURE — 1036F TOBACCO NON-USER: CPT | Performed by: INTERNAL MEDICINE

## 2022-04-25 NOTE — PROGRESS NOTES
[facility]  coding opportunities          Chart Reviewed number of suggestions sent to Provider 2  [phone], [phone]  [id number]    Patients Insurance     Medicare Insurance: [facility] [employer]
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
## 2022-04-27 ENCOUNTER — OFFICE VISIT (OUTPATIENT)
Dept: INTERNAL MEDICINE CLINIC | Facility: CLINIC | Age: 72
End: 2022-04-27
Payer: COMMERCIAL

## 2022-04-27 VITALS
OXYGEN SATURATION: 98 % | TEMPERATURE: 98 F | HEIGHT: 66 IN | WEIGHT: 137 LBS | BODY MASS INDEX: 22.02 KG/M2 | SYSTOLIC BLOOD PRESSURE: 160 MMHG | DIASTOLIC BLOOD PRESSURE: 92 MMHG | HEART RATE: 82 BPM

## 2022-04-27 DIAGNOSIS — E11.65 TYPE 2 DIABETES MELLITUS WITH HYPERGLYCEMIA, WITH LONG-TERM CURRENT USE OF INSULIN (HCC): Primary | ICD-10-CM

## 2022-04-27 DIAGNOSIS — Z99.89 OBSTRUCTIVE SLEEP APNEA ON CPAP: ICD-10-CM

## 2022-04-27 DIAGNOSIS — E78.2 MIXED HYPERLIPIDEMIA: ICD-10-CM

## 2022-04-27 DIAGNOSIS — I10 ESSENTIAL HYPERTENSION: ICD-10-CM

## 2022-04-27 DIAGNOSIS — G47.33 OBSTRUCTIVE SLEEP APNEA ON CPAP: ICD-10-CM

## 2022-04-27 DIAGNOSIS — Z79.4 TYPE 2 DIABETES MELLITUS WITH HYPERGLYCEMIA, WITH LONG-TERM CURRENT USE OF INSULIN (HCC): Primary | ICD-10-CM

## 2022-04-27 PROCEDURE — 99214 OFFICE O/P EST MOD 30 MIN: CPT | Performed by: INTERNAL MEDICINE

## 2022-04-27 PROCEDURE — 3008F BODY MASS INDEX DOCD: CPT | Performed by: INTERNAL MEDICINE

## 2022-04-27 NOTE — PROGRESS NOTES
Assessment/Plan:      Depression Screening and Follow-up Plan: Patient was screened for depression during today's encounter  They screened negative with a PHQ-2 score of 0           1  Type 2 diabetes mellitus with hyperglycemia, with long-term current use of insulin ([facility])  Comments:  He admits that he has not been taking his insulin because he was very busy  Or not of appliances in his house broke down  Orders:  -     Ambulatory referral to Diabetic Education; Future; Expected date: 04/27/2022    2  Essential hypertension  Comments:  [facility] continue amlodipine and valsartan  3  Mixed hyperlipidemia    4  Obstructive sleep apnea on CPAP  Comments:  Continue to use the CPAP machine  1  Type 2 diabetes mellitus with hyperglycemia, with long-term current use of insulin ([facility] )      2  Essential hypertension         No problem-specific Assessment & Plan notes found for this encounter  Subjective:      Patient ID: Marzena Gutierrez is a 80 y o  male  HPI    The following portions of the patient's history were reviewed and updated as appropriate: He  has a past medical history of Diabetes mellitus ([facility] ), Dyslipidemia, Hypertension, and Obstructive sleep apnea on CPAP  He   Patient Active Problem List    Diagnosis Date Noted    Intractable vomiting 05/06/2021    Vertebral artery stenosis, bilateral 04/26/2021    Obstructive sleep apnea on CPAP     Type 2 diabetes mellitus with hyperglycemia, with long-term current use of insulin ([facility] ) 12/08/2020    Essential hypertension 12/08/2020    Hyperkalemia 12/08/2020    Mixed hyperlipidemia 12/08/2020     He  has a past surgical history that includes Colonoscopy (2008)  His family history includes Hypertension in his mother  He  reports that he has quit smoking  He has never used smokeless tobacco  He reports previous alcohol use  He reports that he does not use drugs    Current Outpatient Medications   Medication Sig Dispense Refill    amLODIPine (NORVASC) 10 mg tablet Take 1 tablet (10 mg total) by mouth daily Use as directed 90 tablet 3    aspirin 81 mg chewable tablet Chew 1 tablet (81 mg total) daily 90 tablet 0    atorvastatin (LIPITOR) 40 mg tablet Take 1 tablet (40 mg total) by mouth daily at bedtime 90 tablet 2    Empagliflozin (Jardiance) 25 MG TABS Take 1 tablet (25 mg total) by mouth every morning 90 tablet 1    glucose blood (FreeStyle InsuLinx Test) test strip Use 1 each 2 (two) times a day 200 each 1    Insulin Syringe-Needle U-100 30G X 1/2" 0 3 ML MISC Use daily Use daily box of 100 BD ultrafine needles 30g 100 each 2    Lantus 100 UNIT/ML subcutaneous injection INJECT 25 UNITS UNDER THE SKIN DAILY AT BEDTIME 20 mL 1    metFORMIN (GLUCOPHAGE-XR) 500 mg 24 hr tablet Take 2 tablets (1,000 mg total) by mouth 2 (two) times a day 360 tablet 3    sildenafil (VIAGRA) 100 mg tablet Take 1 tablet (100 mg total) by mouth daily as needed for erectile dysfunction 10 tablet 0    valsartan (DIOVAN) 320 MG tablet Take 1 tablet (320 mg total) by mouth daily 90 tablet 1     No current facility-administered medications for this visit       Current Outpatient Medications on File Prior to Visit   Medication Sig    amLODIPine (NORVASC) 10 mg tablet Take 1 tablet (10 mg total) by mouth daily Use as directed    aspirin 81 mg chewable tablet Chew 1 tablet (81 mg total) daily    atorvastatin (LIPITOR) 40 mg tablet Take 1 tablet (40 mg total) by mouth daily at bedtime    Empagliflozin (Jardiance) 25 MG TABS Take 1 tablet (25 mg total) by mouth every morning    glucose blood (FreeStyle InsuLinx Test) test strip Use 1 each 2 (two) times a day    Insulin Syringe-Needle U-100 30G X 1/2" 0 3 ML MISC Use daily Use daily box of 100 BD ultrafine needles 30g    Lantus 100 UNIT/ML subcutaneous injection INJECT 25 UNITS UNDER THE SKIN DAILY AT BEDTIME    metFORMIN (GLUCOPHAGE-XR) 500 mg 24 hr tablet Take 2 tablets (1,000 mg total) by mouth 2 (two) times a day    sildenafil (VIAGRA) 100 mg tablet Take 1 tablet (100 mg total) by mouth daily as needed for erectile dysfunction    valsartan (DIOVAN) 320 MG tablet Take 1 tablet (320 mg total) by mouth daily     No current facility-administered medications on file prior to visit  He has No Known Allergies       Review of Systems   Constitutional: Negative for appetite change, chills, fatigue and fever  HENT: Negative for sore throat and trouble swallowing  Eyes: Negative for redness  Respiratory: Negative for shortness of breath  Cardiovascular: Negative for chest pain and palpitations  Gastrointestinal: Negative for abdominal pain, constipation and diarrhea  Genitourinary: Negative for dysuria and hematuria  Musculoskeletal: Negative for back pain and neck pain  Skin: Negative for rash  Neurological: Negative for seizures, weakness and headaches  Hematological: Negative for adenopathy  Psychiatric/Behavioral: Negative for confusion  The patient is not nervous/anxious  Objective:      /92 (BP Location: Left arm, Patient Position: Sitting, Cuff Size: Adult)   Pulse 82   Temp 98 °F (36 7 °C)   Ht 5' 6" (1 676 m)   Wt 62 1 kg (137 lb)   SpO2 98%   BMI 22 11 kg/m²     Results Reviewed     None          Recent Results (from the past 1344 hour(s))   Hemoglobin A1C    Collection Time: 04/19/22  9:49 AM   Result Value Ref Range    Hemoglobin A1C 10 7 (H) Normal 3 8-5 6%; PreDiabetic 5 7-6 4%; Diabetic >=6 5%; Glycemic control for adults with diabetes <7 0% %     mg/dl   CBC and differential    Collection Time: 04/19/22  9:49 AM   Result Value Ref Range    WBC 6 20 4  31 - 10 16 Thousand/uL    RBC 5 09 3 88 - 5 62 Million/uL    Hemoglobin 15 3 12 0 - 17 0 g/dL    Hematocrit 46 8 36 5 - 49 3 %    MCV 92 82 - 98 fL    MCH 30 1 26 8 - 34 3 pg    MCHC 32 7 31 4 - 37 4 g/dL    RDW 12 9 11 6 - 15 1 %    MPV 11 6 8 9 - 12 7 fL    Platelets 199 359 - 173 Thousands/uL    nRBC 0 /100 WBCs Neutrophils Relative 52 43 - 75 %    Immat GRANS % 0 0 - 2 %    Lymphocytes Relative 32 14 - 44 %    Monocytes Relative 8 4 - 12 %    Eosinophils Relative 7 (H) 0 - 6 %    Basophils Relative 1 0 - 1 %    Neutrophils Absolute 3 26 1 85 - 7 62 Thousands/µL    Immature Grans Absolute 0 02 0 00 - 0 20 Thousand/uL    Lymphocytes Absolute 1 99 0 60 - 4 47 Thousands/µL    Monocytes Absolute 0 47 0 17 - 1 22 Thousand/µL    Eosinophils Absolute 0 40 0 00 - 0 61 Thousand/µL    Basophils Absolute 0 06 0 00 - 0 10 Thousands/µL   Comprehensive metabolic panel    Collection Time: 04/19/22  9:49 AM   Result Value Ref Range    Sodium 140 136 - 145 mmol/L    Potassium 5 1 3 5 - 5 3 mmol/L    Chloride 109 (H) 100 - 108 mmol/L    CO2 29 21 - 32 mmol/L    ANION GAP 2 (L) 4 - 13 mmol/L    BUN 20 5 - 25 mg/dL    Creatinine 1 16 0 60 - 1 30 mg/dL    Glucose, Fasting 168 (H) 65 - 99 mg/dL    Calcium 10 1 8 3 - 10 1 mg/dL    AST 15 5 - 45 U/L    ALT 20 12 - 78 U/L    Alkaline Phosphatase 80 46 - 116 U/L    Total Protein 7 0 6 4 - 8 2 g/dL    Albumin 4 0 3 5 - 5 0 g/dL    Total Bilirubin 0 68 0 20 - 1 00 mg/dL    eGFR 63 ml/min/1 73sq m        Physical Exam  Constitutional:       General: He is not in acute distress  Appearance: Normal appearance  HENT:      Head: Normocephalic and atraumatic  Right Ear: Tympanic membrane normal       Left Ear: Tympanic membrane normal       Nose: Nose normal       Mouth/Throat:      Mouth: Mucous membranes are moist    Eyes:      Extraocular Movements: Extraocular movements intact  Pupils: Pupils are equal, round, and reactive to light  Cardiovascular:      Rate and Rhythm: Normal rate and regular rhythm  Pulses: Normal pulses  Heart sounds: Normal heart sounds  No murmur heard  No friction rub  Pulmonary:      Effort: Pulmonary effort is normal  No respiratory distress  Breath sounds: Normal breath sounds  No wheezing  Abdominal:      General: Abdomen is flat   Bowel sounds are normal  There is no distension  Palpations: Abdomen is soft  There is no mass  Tenderness: There is no abdominal tenderness  There is no guarding  Musculoskeletal:         General: Normal range of motion  Cervical back: Normal range of motion  Skin:     General: Skin is warm  Neurological:      General: No focal deficit present  Mental Status: He is alert and oriented to person, place, and time  Mental status is at baseline  Cranial Nerves: No cranial nerve deficit     Psychiatric:         Mood and Affect: Mood normal          Behavior: Behavior normal

## 2022-04-27 NOTE — PROGRESS NOTES
Diabetic Foot Exam    Patient's shoes and socks removed  Right Foot/Ankle   Right Foot Inspection  Skin Exam: skin normal, skin intact and dry skin  No warmth, no callus, no erythema, no maceration, no abnormal color, no pre-ulcer, no ulcer and no callus  Toe Exam: ROM and strength within normal limits  No swelling, no tenderness, erythema and  no right toe deformity    Sensory   Monofilament testing: intact    Vascular  Capillary refills: < 3 seconds  The right DP pulse is 1+  The right PT pulse is 1+  Left Foot/Ankle  Left Foot Inspection  Skin Exam: skin normal, skin intact and dry skin  No warmth, no erythema, no maceration, normal color, no pre-ulcer, no ulcer and no callus  Toe Exam: ROM and strength within normal limits  No swelling, no tenderness, no erythema and no left toe deformity  Sensory   Monofilament testing: intact    Vascular  Capillary refills: < 3 seconds  The left DP pulse is 1+  The left PT pulse is 1+       Assign Risk Category  No deformity present  No loss of protective sensation  No weak pulses  Risk: 0

## 2022-05-02 DIAGNOSIS — E11.65 TYPE 2 DIABETES MELLITUS WITH HYPERGLYCEMIA, WITH LONG-TERM CURRENT USE OF INSULIN (HCC): ICD-10-CM

## 2022-05-02 DIAGNOSIS — Z79.4 TYPE 2 DIABETES MELLITUS WITH HYPERGLYCEMIA, WITH LONG-TERM CURRENT USE OF INSULIN (HCC): ICD-10-CM

## 2022-05-02 RX ORDER — BLOOD SUGAR DIAGNOSTIC
1 STRIP MISCELLANEOUS 2 TIMES DAILY
Qty: 200 EACH | Refills: 1 | Status: SHIPPED | OUTPATIENT
Start: 2022-05-02

## 2022-05-03 ENCOUNTER — VBI (OUTPATIENT)
Dept: ADMINISTRATIVE | Facility: OTHER | Age: 72
End: 2022-05-03

## 2022-05-05 ENCOUNTER — TELEPHONE (OUTPATIENT)
Dept: ADMINISTRATIVE | Facility: OTHER | Age: 72
End: 2022-05-05

## 2022-05-05 NOTE — TELEPHONE ENCOUNTER
Upon review of the In Basket request we were able to locate, review, and update the patient chart as requested for Diabetic Eye Exam     Any additional questions or concerns should be emailed to the Practice Liaisons via [email or web address] email, please do not reply via In Basket      Thank you  Tawana Helms MA

## 2022-05-05 NOTE — TELEPHONE ENCOUNTER
----- Message from [facility] sent at 5/5/2022 11:04 AM EDT -----  05/05/22 11:05 AM    Hello, our patient Cliff Masterson has had Diabetic Eye Exam completed/performed  Please assist in updating the patient chart by pulling the document from the Media Tab  The date of service is 2021       Thank you,  [address] [place] [address]

## 2022-06-22 ENCOUNTER — VBI (OUTPATIENT)
Dept: ADMINISTRATIVE | Facility: OTHER | Age: 72
End: 2022-06-22

## 2022-07-05 DIAGNOSIS — E11.65 TYPE 2 DIABETES MELLITUS WITH HYPERGLYCEMIA, WITH LONG-TERM CURRENT USE OF INSULIN (HCC): ICD-10-CM

## 2022-07-05 DIAGNOSIS — Z79.4 TYPE 2 DIABETES MELLITUS WITH HYPERGLYCEMIA, WITH LONG-TERM CURRENT USE OF INSULIN (HCC): ICD-10-CM

## 2022-07-05 RX ORDER — EMPAGLIFLOZIN 25 MG/1
TABLET, FILM COATED ORAL
Qty: 90 TABLET | Refills: 1 | OUTPATIENT
Start: 2022-07-05

## 2022-07-12 ENCOUNTER — TELEMEDICINE (OUTPATIENT)
Dept: INTERNAL MEDICINE CLINIC | Facility: CLINIC | Age: 72
End: 2022-07-12
Payer: COMMERCIAL

## 2022-07-12 VITALS
HEIGHT: 66 IN | DIASTOLIC BLOOD PRESSURE: 84 MMHG | OXYGEN SATURATION: 90 % | SYSTOLIC BLOOD PRESSURE: 157 MMHG | WEIGHT: 137 LBS | HEART RATE: 72 BPM | BODY MASS INDEX: 22.02 KG/M2 | TEMPERATURE: 99.6 F

## 2022-07-12 DIAGNOSIS — Z79.4 TYPE 2 DIABETES MELLITUS WITH HYPERGLYCEMIA, WITH LONG-TERM CURRENT USE OF INSULIN (HCC): ICD-10-CM

## 2022-07-12 DIAGNOSIS — U07.1 COVID-19: Primary | ICD-10-CM

## 2022-07-12 DIAGNOSIS — E11.65 TYPE 2 DIABETES MELLITUS WITH HYPERGLYCEMIA, WITH LONG-TERM CURRENT USE OF INSULIN (HCC): ICD-10-CM

## 2022-07-12 PROCEDURE — 99214 OFFICE O/P EST MOD 30 MIN: CPT | Performed by: INTERNAL MEDICINE

## 2022-07-12 PROCEDURE — 1160F RVW MEDS BY RX/DR IN RCRD: CPT | Performed by: INTERNAL MEDICINE

## 2022-07-12 NOTE — PROGRESS NOTES
COVID-19 Outpatient Progress Note    Assessment/Plan:    Problem List Items Addressed This Visit        Endocrine    Type 2 diabetes mellitus with hyperglycemia, with long-term current use of insulin ([address] )      Other Visit Diagnoses     COVID-19    -  Primary    Relevant Medications    nirmatrelvir & ritonavir (Paxlovid) tablet therapy pack         Disposition:     Vitamin-C vitamin-D zinc plenty of fluids and Paxilovid  Patient meets criteria for PAXLOVID and they have been counseled appropriately according to EUA documentation released by the FDA  After discussion, patient agrees to treatment  Katelyn Ansari is an investigational medicine used to treat mild-to-moderate COVID-19 in adults and children (15years of age and older weighing at least 80 pounds (40 kg)) with positive results of direct SARS-CoV-2 viral testing, and who are at high risk for progression to severe COVID-19, including hospitalization or death  PAXLOVID is investigational because it is still being studied  There is limited information about the safety and effectiveness of using PAXLOVID to treat people with mild-to-moderate COVID-19  The FDA has authorized the emergency use of PAXLOVID for the treatment of mild-tomoderate COVID-19 in adults and children (15years of age and older weighing at least 80 pounds (40 kg)) with a positive test for the virus that causes COVID-19, and who are at high risk for progression to severe COVID-19, including hospitalization or death, under an EUA  What should I tell my healthcare provider before I take PAXLOVID? Tell your healthcare provider if you:  - Have any allergies  - Have liver or kidney disease  - Are pregnant or plan to become pregnant  - Are breastfeeding a child  - Have any serious illnesses    Tell your healthcare provider about all the medicines you take, including prescription and over-the-counter medicines, vitamins, and herbal supplements   Some medicines may interact with PAXLOVID and may cause serious side effects  Keep a list of your medicines to show your healthcare provider and pharmacist when you get a new medicine  You can ask your healthcare provider or pharmacist for a list of medicines that interact with PAXLOVID  Do not start taking a new medicine without telling your healthcare provider  Your healthcare provider can tell you if it is safe to take PAXLOVID with other medicines  Tell your healthcare provider if you are taking combined hormonal contraceptive  PAXLOVID may affect how your birth control pills work  Females who are able to become pregnant should use another effective alternative form of contraception or an additional barrier method of contraception  Talk to your healthcare provider if you have any questions about contraceptive methods that might be right for you  How do I take PAXLOVID? PAXLOVID consists of 2 medicines: nirmatrelvir and ritonavir  - Take 2 pink tablets of nirmatrelvir with 1 white tablet of ritonavir by mouth 2 times each day (in the morning and in the evening) for 5 days  For each dose, take all 3 tablets at the same time  - If you have kidney disease, talk to your healthcare provider  You may need a different dose  - Swallow the tablets whole  Do not chew, break, or crush the tablets  - Take PAXLOVID with or without food  - Do not stop taking PAXLOVID without talking to your healthcare provider, even if you feel better  - If you miss a dose of PAXLOVID within 8 hours of the time it is usually taken, take it as soon as you remember  If you miss a dose by more than 8 hours, skip the missed dose and take the next dose at your regular time  Do not take 2 doses of PAXLOVID at the same time  - If you take too much PAXLOVID, call your healthcare provider or go to the nearest hospital emergency room right away    - If you are taking a ritonavir- or cobicistat-containing medicine to treat hepatitis C or Human Immunodeficiency Virus (HIV), you should continue to take your medicine as prescribed by your healthcare provider   - Talk to your healthcare provider if you do not feel better or if you feel worse after 5 days  Who should generally not take PAXLOVID? Do not take PAXLOVID if:  You are allergic to nirmatrelvir, ritonavir, or any of the ingredients in PAXLOVID  You are taking any of the following medicines:  - Alfuzosin  - Pethidine, piroxicam, propoxyphene  - Ranolazine  - Amiodarone, dronedarone, flecainide, propafenone, quinidine  - Colchicine  - Lurasidone, pimozide, clozapine  - Dihydroergotamine, ergotamine, methylergonovine  - Lovastatin, simvastatin  - Sildenafil (Revatio®) for pulmonary arterial hypertension (PAH)  - Triazolam, oral midazolam  - Apalutamide  - Carbamazepine, phenobarbital, phenytoin  - Rifampin  - St  Jose Marias Wort (hypericum perforatum)    What are the important possible side effects of PAXLOVID? Possible side effects of PAXLOVID are:  - Liver Problems  Tell your healthcare provider right away if you have any of these signs and symptoms of liver problems: loss of appetite, yellowing of your skin and the whites of eyes (jaundice), dark-colored urine, pale colored stools and itchy skin, stomach area (abdominal) pain  - Resistance to HIV Medicines  If you have untreated HIV infection, PAXLOVID may lead to some HIV medicines not working as well in the future  - Other possible side effects include: altered sense of taste, diarrhea, high blood pressure, or muscle aches    These are not all the possible side effects of PAXLOVID  Not many people have taken PAXLOVID  Serious and unexpected side effects may happen  Thena Bras is still being studied, so it is possible that all of the risks are not known at this time  What other treatment choices are there? Like Gilbert Commander may allow for the emergency use of other medicines to treat people with COVID-19   Go to https://[employer]/ for information on the emergency use of other medicines that are authorized by FDA to treat people with COVID-19  Your healthcare provider may talk with you about clinical trials for which you may be eligible  It is your choice to be treated or not to be treated with PAXLOVID  Should you decide not to receive it or for your child not to receive it, it will not change your standard medical care  What if I am pregnant or breastfeeding? There is no experience treating pregnant women or breastfeeding mothers with PAXLOVID  For a mother and unborn baby, the benefit of taking PAXLOVID may be greater than the risk from the treatment  If you are pregnant, discuss your options and specific situation with your healthcare provider  It is recommended that you use effective barrier contraception or do not have sexual activity while taking PAXLOVID  If you are breastfeeding, discuss your options and specific situation with your healthcare provider  How do I report side effects with PAXLOVID? Contact your healthcare provider if you have any side effects that bother you or do not go away  Report side effects to FDA MedWatch at www fda gov/medwatch or call 5-448-QAO1169 or you can report side effects to [employer]  at the contact information provided below  Website Fax number Telephone number   [employer] [phone]     How should I store [address]? Store PAXLOVID tablets at room temperature between 68°F to 77°F (20°C to 25°C)  Full fact sheet for patients, parents, and caregivers can be found at: [employer]    I have spent 25 minutes directly with the patient   Greater than 50% of this time was spent in counseling/coordination of care regarding: prognosis, risks and benefits of treatment options and instructions for management  If short of breath or low oxygen level he will go to the emergency room  Encounter provider Moi Law MD    Provider located at [address] [address] [phone]    Recent Visits  No visits were found meeting these conditions  Showing recent visits within past 7 days and meeting all other requirements  Today's Visits  Date Type Provider Dept   07/12/22 Telemedicine Moi Law MD [facility]  [address] today's visits and meeting all other requirements  Future Appointments  No visits were found meeting these conditions  Showing future appointments within next 150 days and meeting all other requirements       Patient agrees to participate in a virtual check in via telephone or video visit instead of presenting to the office to address urgent/immediate medical needs  Patient is aware this is a billable service  After connecting through [facility], the patient was identified by name and date of birth  Reyes Barney was informed that this was a telemedicine visit and that the exam was being conducted confidentially over secure lines  My office door was closed  No one else was in the room  Reyes Barney acknowledged consent and understanding of privacy and security of the telemedicine visit  I informed the patient that I have reviewed his record in Epic and presented the opportunity for him to ask any questions regarding the visit today  The patient agreed to participate  Verification of patient location:  Patient is located in the following state in which I hold an active license: [place]    Subjective:   Reyes Barney is a 71 y o  male who is concerned about COVID-19  Patient's symptoms include nasal congestion, anosmia, loss of taste, cough, diarrhea and myalgias   Patient denies fever, chills, fatigue, sore throat, shortness of breath, abdominal pain, nausea, vomiting and headaches         - Date of symptom onset: 7/9/2022      COVID-19 vaccination status: Fully vaccinated with booster    Exposure:   Contact with a person who is under investigation (PUI) for or who is positive for COVID-19 within the last 14 days?: No    Hospitalized recently for fever and/or lower respiratory symptoms?: No      Currently a healthcare worker that is involved in direct patient care?: No      Works in a special setting where the risk of COVID-19 transmission may be high? (this may include long-term care, correctional and [facility]CI facilities; homeless shelters; assisted-living facilities and group homes ): No      Resident in a special setting where the risk of COVID-19 transmission may be high? (this may include long-term care, correctional and [facility]CI facilities; homeless shelters; assisted-living facilities and group homes ): No      No results found for: [address] [facility], 185 [facility], [address] [facility], [facility], 350 [facility], 700 [facility]  Past Medical History:   Diagnosis Date    Diabetes mellitus ([facility] [address] [address] )     Type 2    Dyslipidemia     Hypertension     Obstructive sleep apnea on CPAP      Past Surgical History:   Procedure Laterality Date    COLONOSCOPY  2008     Current Outpatient Medications   Medication Sig Dispense Refill    amLODIPine (NORVASC) 10 mg tablet Take 1 tablet (10 mg total) by mouth daily Use as directed 90 tablet 3    atorvastatin (LIPITOR) 40 mg tablet Take 1 tablet (40 mg total) by mouth daily at bedtime 90 tablet 2    Empagliflozin (Jardiance) 25 MG TABS Take 1 tablet (25 mg total) by mouth every morning 90 tablet 1    glucose blood (FreeStyle [employer] Test) test strip Use 1 each 2 (two) times a day 200 each 1    Insulin Syringe-Needle U-100 30G X 1/2" 0 3 ML MISC Use daily Use daily box of 100 BD ultrafine needles 30g 100 each 2    Lantus 100 UNIT/ML subcutaneous injection INJECT 25 UNITS UNDER THE SKIN DAILY AT BEDTIME 20 mL 1    metFORMIN (GLUCOPHAGE-XR) 500 mg 24 hr tablet Take 2 tablets (1,000 mg total) by mouth 2 (two) times a day 360 tablet 3    nirmatrelvir & ritonavir (Paxlovid) tablet therapy pack Take 3 tablets by mouth 2 (two) times a day for 5 days 30 tablet 0    valsartan (DIOVAN) 320 MG tablet Take 1 tablet (320 mg total) by mouth daily 90 tablet 1    aspirin 81 mg chewable tablet Chew 1 tablet (81 mg total) daily 90 tablet 0    sildenafil (VIAGRA) 100 mg tablet Take 1 tablet (100 mg total) by mouth daily as needed for erectile dysfunction 10 tablet 0     No current facility-administered medications for this visit  No Known Allergies    Review of Systems   Constitutional: Negative for appetite change, chills, fatigue and fever  HENT: Positive for congestion  Negative for sore throat and trouble swallowing  Eyes: Negative for redness  Respiratory: Positive for cough  Negative for shortness of breath  Cardiovascular: Negative for chest pain and palpitations  Gastrointestinal: Positive for diarrhea  Negative for abdominal pain, constipation, nausea and vomiting  Genitourinary: Negative for dysuria and hematuria  Musculoskeletal: Positive for myalgias  Negative for back pain and neck pain  Skin: Negative for rash  Neurological: Negative for seizures, weakness and headaches  Hematological: Negative for adenopathy  Psychiatric/Behavioral: Negative for confusion  The patient is not nervous/anxious  Objective:    Vitals:    07/12/22 1333   BP: 157/84   BP Location: Right arm   Patient Position: Sitting   Cuff Size: Standard   Pulse: 72   Temp: 99 6 °F (37 6 °C)   TempSrc: Tympanic   SpO2: 90%   Weight: 62 1 kg (137 lb)   Height: 5' 6" (1 676 m)       Physical Exam  Vitals and nursing note reviewed  Constitutional:       Appearance: He is well-developed  HENT:      Head: Normocephalic and atraumatic     Eyes:      Conjunctiva/sclera: Conjunctivae normal    Pulmonary:      Effort: Pulmonary effort is normal  No respiratory distress  Abdominal:      Palpations: Abdomen is soft  Tenderness: There is no abdominal tenderness  Musculoskeletal:      Cervical back: Neck supple  Neurological:      Mental Status: He is alert  VIRTUAL VISIT DISCLAIMER    Maura Madison verbally agrees to participate in [employer]  Pt is aware that [employer] could be limited without vital signs or the ability to perform a full hands-on physical Be Garcia understands he or the provider may request at any time to terminate the video visit and request the patient to seek care or treatment in person

## 2022-07-20 ENCOUNTER — VBI (OUTPATIENT)
Dept: ADMINISTRATIVE | Facility: OTHER | Age: 72
End: 2022-07-20

## 2022-07-20 ENCOUNTER — TELEMEDICINE (OUTPATIENT)
Dept: INTERNAL MEDICINE CLINIC | Facility: CLINIC | Age: 72
End: 2022-07-20
Payer: COMMERCIAL

## 2022-07-20 VITALS
OXYGEN SATURATION: 95 % | DIASTOLIC BLOOD PRESSURE: 81 MMHG | TEMPERATURE: 98.1 F | BODY MASS INDEX: 22.18 KG/M2 | WEIGHT: 138 LBS | HEIGHT: 66 IN | SYSTOLIC BLOOD PRESSURE: 146 MMHG | HEART RATE: 71 BPM

## 2022-07-20 DIAGNOSIS — R53.1 WEAKNESS: ICD-10-CM

## 2022-07-20 DIAGNOSIS — E11.65 TYPE 2 DIABETES MELLITUS WITH HYPERGLYCEMIA, WITH LONG-TERM CURRENT USE OF INSULIN (HCC): ICD-10-CM

## 2022-07-20 DIAGNOSIS — Z79.4 TYPE 2 DIABETES MELLITUS WITH HYPERGLYCEMIA, WITH LONG-TERM CURRENT USE OF INSULIN (HCC): ICD-10-CM

## 2022-07-20 DIAGNOSIS — R26.89 IMBALANCE: ICD-10-CM

## 2022-07-20 DIAGNOSIS — U07.1 COVID-19: Primary | ICD-10-CM

## 2022-07-20 PROCEDURE — 99214 OFFICE O/P EST MOD 30 MIN: CPT | Performed by: INTERNAL MEDICINE

## 2022-07-20 PROCEDURE — 3077F SYST BP >= 140 MM HG: CPT | Performed by: INTERNAL MEDICINE

## 2022-07-20 PROCEDURE — 1160F RVW MEDS BY RX/DR IN RCRD: CPT | Performed by: INTERNAL MEDICINE

## 2022-07-20 PROCEDURE — 3079F DIAST BP 80-89 MM HG: CPT | Performed by: INTERNAL MEDICINE

## 2022-07-20 NOTE — PROGRESS NOTES
Assessment/Plan:           1  COVID-19  Comments:  Improving slowly completed antiviral   A lot of weakness and deconditioning  Orders:  -     Ambulatory Referral to Physical Therapy; Future    2  Weakness  Comments:  Physical therapy advised  Orders:  -     Ambulatory Referral to Physical Therapy; Future    3  Imbalance  Comments:  Physical therapy advised  Diabetic neuropathy is also present  Orders:  -     Ambulatory Referral to Physical Therapy; Future    4  Type 2 diabetes mellitus with hyperglycemia, with long-term current use of insulin ([facility])  Comments:  Continue better blood sugar control  1  COVID-19      2  Weakness      3  Imbalance         No problem-specific Assessment & Plan notes found for this encounter  Subjective:      Patient ID: Jon Ren is a 71 y o  male  HPI    The following portions of the patient's history were reviewed and updated as appropriate: He  has a past medical history of COVID-19 (07/2022), Diabetes mellitus ([facility] ), Dyslipidemia, Hypertension, and Obstructive sleep apnea on CPAP  He   Patient Active Problem List    Diagnosis Date Noted    Intractable vomiting 05/06/2021    Vertebral artery stenosis, bilateral 04/26/2021    Obstructive sleep apnea on CPAP     Type 2 diabetes mellitus with hyperglycemia, with long-term current use of insulin ([facility] ) 12/08/2020    Essential hypertension 12/08/2020    Hyperkalemia 12/08/2020    Mixed hyperlipidemia 12/08/2020     He  has a past surgical history that includes Colonoscopy (2008)  His family history includes Hypertension in his mother  He  reports that he has quit smoking  He has never used smokeless tobacco  He reports previous alcohol use  He reports that he does not use drugs    Current Outpatient Medications   Medication Sig Dispense Refill    amLODIPine (NORVASC) 10 mg tablet Take 1 tablet (10 mg total) by mouth daily Use as directed 90 tablet 3    aspirin 81 mg chewable tablet Chew 1 tablet (81 mg total) daily 90 tablet 0    atorvastatin (LIPITOR) 40 mg tablet Take 1 tablet (40 mg total) by mouth daily at bedtime 90 tablet 2    Empagliflozin (Jardiance) 25 MG TABS Take 1 tablet (25 mg total) by mouth every morning 90 tablet 1    glucose blood (FreeStyle InsuLinx Test) test strip Use 1 each 2 (two) times a day 200 each 1    Insulin Syringe-Needle U-100 30G X 1/2" 0 3 ML MISC Use daily Use daily box of 100 BD ultrafine needles 30g 100 each 2    Lantus 100 UNIT/ML subcutaneous injection INJECT 25 UNITS UNDER THE SKIN DAILY AT BEDTIME 20 mL 1    metFORMIN (GLUCOPHAGE-XR) 500 mg 24 hr tablet Take 2 tablets (1,000 mg total) by mouth 2 (two) times a day 360 tablet 3    valsartan (DIOVAN) 320 MG tablet Take 1 tablet (320 mg total) by mouth daily 90 tablet 1    sildenafil (VIAGRA) 100 mg tablet Take 1 tablet (100 mg total) by mouth daily as needed for erectile dysfunction (Patient not taking: No sig reported) 10 tablet 0     No current facility-administered medications for this visit       Current Outpatient Medications on File Prior to Visit   Medication Sig    amLODIPine (NORVASC) 10 mg tablet Take 1 tablet (10 mg total) by mouth daily Use as directed    aspirin 81 mg chewable tablet Chew 1 tablet (81 mg total) daily    atorvastatin (LIPITOR) 40 mg tablet Take 1 tablet (40 mg total) by mouth daily at bedtime    Empagliflozin (Jardiance) 25 MG TABS Take 1 tablet (25 mg total) by mouth every morning    glucose blood (FreeStyle InsuLinx Test) test strip Use 1 each 2 (two) times a day    Insulin Syringe-Needle U-100 30G X 1/2" 0 3 ML MISC Use daily Use daily box of 100 BD ultrafine needles 30g    Lantus 100 UNIT/ML subcutaneous injection INJECT 25 UNITS UNDER THE SKIN DAILY AT BEDTIME    metFORMIN (GLUCOPHAGE-XR) 500 mg 24 hr tablet Take 2 tablets (1,000 mg total) by mouth 2 (two) times a day    valsartan (DIOVAN) 320 MG tablet Take 1 tablet (320 mg total) by mouth daily    sildenafil (VIAGRA) 100 mg tablet Take 1 tablet (100 mg total) by mouth daily as needed for erectile dysfunction (Patient not taking: No sig reported)     No current facility-administered medications on file prior to visit  He has No Known Allergies       Review of Systems   Constitutional: Negative for appetite change, chills, fatigue and fever  HENT: Negative for sore throat and trouble swallowing  Eyes: Negative for redness  Respiratory: Negative for shortness of breath  Cardiovascular: Negative for chest pain and palpitations  Gastrointestinal: Negative for abdominal pain, constipation and diarrhea  Genitourinary: Negative for dysuria and hematuria  Musculoskeletal: Positive for gait problem  Negative for back pain and neck pain  Skin: Negative for rash  Neurological: Positive for weakness  Negative for seizures and headaches  Hematological: Negative for adenopathy  Psychiatric/Behavioral: Negative for confusion  The patient is not nervous/anxious  Objective:      /81 (BP Location: Left arm, Patient Position: Sitting, Cuff Size: Standard)   Pulse 71   Temp 98 1 °F (36 7 °C) (Tympanic)   Ht 5' 6" (1 676 m)   Wt 62 6 kg (138 lb)   SpO2 95%   BMI 22 27 kg/m²     Results Reviewed     None          No results found for this or any previous visit (from the past 1344 hour(s))  Physical Exam  Constitutional:       Appearance: Normal appearance  He is normal weight  HENT:      Head: Normocephalic and atraumatic  Nose: Nose normal       Mouth/Throat:      Mouth: Mucous membranes are moist    Eyes:      Extraocular Movements: Extraocular movements intact  Pupils: Pupils are equal, round, and reactive to light  Pulmonary:      Effort: Pulmonary effort is normal    Abdominal:      Palpations: Abdomen is soft  Musculoskeletal:         General: Normal range of motion  Cervical back: Normal range of motion and neck supple  Neurological:      General: No focal deficit present  Mental Status: He is alert and oriented to person, place, and time  Mental status is at baseline  Motor: Weakness present  Telemedicine consent    Patient: Jon Ren  Provider: Anil Hernandes MD  Provider located at [address] [address] [phone]    The patient was identified by name and date of birth  Jon Ren was informed that this is a telemedicine visit and that the visit is being conducted through face time and patient was informed that this is not a secure, HIPAA-compliant platform  He agrees to proceed     My office door was closed  No one else was in the room  He acknowledged consent and understanding of privacy and security of the video platform  The patient has agreed to participate and understands they can discontinue the visit at any time  Patient is aware this is a billable service  I spent 25 minutes with the patient during this visit

## 2022-07-26 ENCOUNTER — EVALUATION (OUTPATIENT)
Dept: PHYSICAL THERAPY | Age: 72
End: 2022-07-26
Payer: COMMERCIAL

## 2022-07-26 DIAGNOSIS — U07.1 COVID-19: ICD-10-CM

## 2022-07-26 DIAGNOSIS — R53.1 WEAKNESS: ICD-10-CM

## 2022-07-26 DIAGNOSIS — R26.89 IMBALANCE: Primary | ICD-10-CM

## 2022-07-26 PROCEDURE — 97162 PT EVAL MOD COMPLEX 30 MIN: CPT | Performed by: PHYSICAL THERAPIST

## 2022-07-26 PROCEDURE — 97110 THERAPEUTIC EXERCISES: CPT | Performed by: PHYSICAL THERAPIST

## 2022-07-26 NOTE — PROGRESS NOTES
PT Evaluation     Today's date: 2022  Patient name: Pao Chand  : 1950  MRN: [id number]  Referring provider: Faustino Pérze MD  Dx:   Encounter Diagnosis     ICD-10-CM    1  Imbalance  R26 89 Ambulatory Referral to Physical Therapy    Physical therapy advised  Diabetic neuropathy is also present  2  Weakness  R53 1 Ambulatory Referral to Physical Therapy    Physical therapy advised  3  COVID-19  U07 1 Ambulatory Referral to Physical Therapy    Improving slowly completed antiviral   A lot of weakness and deconditioning  Assessment  Assessment details: PT IE: 2022  Patient reported his current deficits are due to + COVID which occurred 3 weeks ago  Patient noted he had both shots and the booster and still was + for COVID  Patient noted at the time of + COVID, he had a deep cough, mild fever, extreme weakness, slept most of the time and hand no apatite  Patient noted 1 year ago, he had an inner ear infection that caused him to be dizzy, vomit and was hospitalized  Patient noted since then, his balance has been limited  Plus, since + for COVID, his balance has decreased  Patient noted his balance limits bed transfers, chair transfers, stair climbing, carrying items, bending and lifting items  Patient denies any recent falls  Patient denies pain currently  Patient noted he does wear a CPAP for sleep apnea  Patient noted he does have weakness in bilateral le, that has increased since + COVID  Patient noted he does get more fatigued since onset of COVID, which also increases his balance deficits and weakness presentation in bilateral le  Patient denies unilateral ue and le parasthesias  Patient noted his regular walking has been reduced since onset of COVID    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 and he is back to helping him with some activities, but will not lift and carry nearly as much now as he did in the past   Impairments: abnormal gait, abnormal or restricted ROM, abnormal movement, activity intolerance, lacks appropriate home exercise program and pain with function  Understanding of Dx/Px/POC: excellent   Prognosis: good  Prognosis details: Patient is a 71y o  year old male seen for outpatient PT evaluation with mobility deficits, weakness and balance limitations due to effects of COVID  Patient presents to PT IE with the following problems, concerns, deficits and impairments: decreased bilateral LE range of motion, decreased bilateral ue and le strength, transfer deficits, gait and stair dysfunctions, balance deficits, functional limitations and decreased tolerance to activity  Patient would benefit from skilled PT services under the following PT treatment plan to address the above noted deficits: therapeutic exercises and activities to facilitate bilateral ue and le rom and strength, modalities, manual therapy techniques, gait and stair training, balance and proprioception transfer training, activities, IASTM techniques, Kinesio taping techniques and a hep  Thank you for the referral      Goals  Short Term goals 4 - 6 weeks  1  Patient will be independent HEP  2   Patient will report a 25 - 50% decrease in pain complaints  3   Increase strength 1/2 grade  4   Increase ROM 5-10 degrees  Long Term goals 8 - 12 weeks  1  Patient will report elimination of pain complaints  2   Patient will return to all recreational activities without restriction  3   ROM WFL  4   Strength 5/5   5   Patient will return to all work activities with out deficits  6   Patient will report all transfers without deficits  7   Patient will report stair climbing is without deficits and without use of ue railing  8   Patient will report ability to carry all house hold and work items without deficits  9   Patient will report ability to resume all bending and lifting activities without deficits or lob      Plan  Patient would benefit from: skilled physical therapy  Planned modality interventions: cryotherapy, TENS, thermotherapy: hydrocollator packs, traction, ultrasound and unattended electrical stimulation  Planned therapy interventions: joint mobilization, manual therapy, massage, aquatic therapy, balance, balance/weight bearing training, neuromuscular re-education, patient education, postural training, body mechanics training, compression, self care, strengthening, stretching, therapeutic activities, therapeutic exercise, therapeutic training, transfer training, flexibility, functional ROM exercises, gait training, graded activity, graded exercise, graded motor and home exercise program  Frequency: 2x week  Duration in weeks: 12  Treatment plan discussed with: patient        Subjective Evaluation    History of Present Illness  Mechanism of injury: Patient's PMHX is remarkable for DM, sleep apnea, HTN, and vertebral artery stenosis      Patient Goals  Patient goals for therapy: decreased pain, improved balance, increased motion, increased strength, independence with ADLs/IADLs and return to sport/leisure activities          Objective     Active Range of Motion   Left Hip   Flexion: 116 degrees   Abduction: 24 degrees     Right Hip   Flexion: 114 degrees   Abduction: 34 degrees   Left Knee   Flexion: 142 degrees   Extension: 2 degrees   Extensor la degrees     Right Knee   Flexion: 132 degrees   Extension: 2 degrees   Extensor lag: 10 degrees   Left Ankle/Foot   Dorsiflexion (ke): 6 degrees   Plantar flexion: 46 degrees     Right Ankle/Foot   Dorsiflexion (ke): 8 degrees   Plantar flexion: 42 degrees     Strength/Myotome Testing     Left Shoulder     Planes of Motion   Flexion: 4-   Abduction: 4-   External rotation at 0°: 4   Internal rotation at 0°: 4+     Right Shoulder     Planes of Motion   Flexion: 4   Abduction: 4   External rotation at 0°: 4   Internal rotation at 0°: 4+     Left Elbow   Flexion: 4+  Extension: 4    Right Elbow   Flexion: 5  Extension: 4+    Left Hip   Planes of Motion   Flexion: 4  Extension: 4+  Abduction: 4+  Adduction: 4+    Right Hip   Planes of Motion   Flexion: 4-  Extension: 4+  Abduction: 4+  Adduction: 4+    Left Knee   Flexion: 4+  Extension: 4    Right Knee   Flexion: 4  Extension: 4    Left Ankle/Foot   Dorsiflexion: 4  Plantar flexion: 5    Right Ankle/Foot   Dorsiflexion: 4  Plantar flexion: 5    Ambulation     Ambulation: Level Surfaces   Ambulation without assistive device: independent    Additional Level Surfaces Ambulation Details  Patient ambulates with decrease in pace, increase in base of support and bilateral toe out / hip ER with both swing and stance phase  Ambulation: Stairs   Ascend stairs: independent  Pattern: reciprocal  Railings: one rail  Descend stairs: independent  Pattern: reciprocal  Railings: one rail    Comments   PT IE: 07/26/2022  TUG is at 13 25 seconds  Limits of Stability  No Bilateral UE support  Skill level: Easy  Time to complete test: 46 seconds  Overall:         Actual:     39         Goal:      65  Forward:         Actual:     80          Goal:     65   Backward:          Actual:     37          Goal:     30  Left:         Actual:     47         Goal:      65  Right:         Actual:     54          Goal:     65  Forward / Left:          Actual:     58          Goal:     30  Forward / Right:         Actual:     52         Goal:      65   Backward / Left:         Actual:     29          Goal:     65   Backward / Right:          Actual:     26          Goal:     30     Limits of Stability:  Eyes Open on a Firm Surface with Patient Sway Index is at 0 62; Eyes Closed on a Firm Surface with Patient Sway Index is at 1 92; Eyes Open on a Foam Surface with Patient Sway Index is at 1 58; Eyes Closed on a Foam Surface with Patient Sway Index is at 3 82              Flowsheet Rows    Flowsheet Row Most Recent Value   PT/OT G-Codes    Current Score 51   Projected Score 70 Precautions: Fall Risk due to decrease in balance      Manuals 7/26                                                                Neuro Re-Ed                                                                                                        Ther Ex                          Bike             LAQ:B:             Hip flexion:B:                          Supine slr flexion:B:             bridges             SAQ:B:             Prone hip extension:B:                          Standing slr x 3:B:             Standing hr and tr:B: 10 x             Mini squats             Lunges:B: on foam 10 x 1                          SLS and tandem stance:B: 30 sec x 2                         Side stepping and tandem ambulation on foam             Forward step ups:B:             Lateral step ups:B:             Step downs:B:             Monster walking and side stepping with tband                          Cart push and pull 25" 25#            [place] resistance walking fwd, bwd, and to each side                          [employer] balance system  LOS                         Box lifting and carry 25"                          Ther Activity                                       Gait Training                                       Modalities

## 2022-08-03 ENCOUNTER — OFFICE VISIT (OUTPATIENT)
Dept: PHYSICAL THERAPY | Age: 72
End: 2022-08-03
Payer: COMMERCIAL

## 2022-08-03 DIAGNOSIS — U07.1 COVID-19: ICD-10-CM

## 2022-08-03 DIAGNOSIS — R53.1 WEAKNESS: ICD-10-CM

## 2022-08-03 DIAGNOSIS — R26.89 IMBALANCE: Primary | ICD-10-CM

## 2022-08-03 PROCEDURE — 97110 THERAPEUTIC EXERCISES: CPT

## 2022-08-03 PROCEDURE — 97112 NEUROMUSCULAR REEDUCATION: CPT

## 2022-08-03 NOTE — PROGRESS NOTES
Daily Note     Today's date: 8/3/2022  Patient name: Anisa Tidwell  : 1950  MRN: [id number]  Referring provider: Abimbola Galarza MD  Dx:   Encounter Diagnosis     ICD-10-CM    1  Imbalance  R26 89    2  Weakness  R53 1    3  COVID-19  U07 1                   Subjective: Patient reports he is getting over COVID; at times feeling short of breath and coughing  Does note some fatigue  Patient states he has not had much time to work on [employer]  Objective: See treatment diary below      Assessment: Tolerated treatment well  Patient demonstrated adequate challenge to strengthening and balance exercises throughout session  Required some HHA from railing for balance exercises  Decreased endurance noted through session with some rest breaks required throughout due to fatigue  Patient exhibited good technique with therapeutic exercises and would benefit from continued PT      Plan: Continue per plan of care  Progress treatment as tolerated         Precautions: Fall Risk due to decrease in balance      Manuals 7/26 8/3                                                               Neuro Re-Ed                                                                                                        Ther Ex                          Bike  8/3           LAQ:B:             Hip flexion:B:                          Supine slr flexion:B:  2x10           bridges  2x10           SAQ:B:             Prone hip extension:B:                          Standing slr x 3:B:             Standing hr and tr:B: 10 x  2x10           Mini squats  2x10           Lunges:B: on foam 10 x 1                          SLS and tandem stance:B: 30 sec x 2 30s x 2                        Side stepping and tandem ambulation on foam             Forward step ups:B:             Lateral step ups:B:             Step downs:B:             Monster walking and side stepping with tband                          Cart push and pull 25" 25#            Fe resistance walking fwd, bwd, and to each side                          [employer] balance system  LOS                         Box lifting and carry 25"                          Ther Activity                                       Gait Training                                       Modalities

## 2022-08-05 ENCOUNTER — OFFICE VISIT (OUTPATIENT)
Dept: PHYSICAL THERAPY | Age: 72
End: 2022-08-05
Payer: COMMERCIAL

## 2022-08-05 DIAGNOSIS — R26.89 IMBALANCE: Primary | ICD-10-CM

## 2022-08-05 DIAGNOSIS — R53.1 WEAKNESS: ICD-10-CM

## 2022-08-05 DIAGNOSIS — U07.1 COVID-19: ICD-10-CM

## 2022-08-05 PROCEDURE — 97530 THERAPEUTIC ACTIVITIES: CPT

## 2022-08-05 PROCEDURE — 97110 THERAPEUTIC EXERCISES: CPT

## 2022-08-05 NOTE — PROGRESS NOTES
Daily Note     Today's date: 2022  Patient name: Anisa Tidwell  : 1950  MRN: [id number]  Referring provider: Abimbola Galarza MD  Dx:   Encounter Diagnosis     ICD-10-CM    1  Imbalance  R26 89    2  Weakness  R53 1    3  COVID-19  U07 1                   Subjective: Patient reports feeling good after his last session  He walked home from PT last visit and he states it felt ok  Objective: See treatment diary below      Assessment: Tolerated treatment well  Progressed as outlined with resistance, he required short rest break due to decreased muscular endurance  He progressed to tandem amb and was able to perform firm surface with minimal sway, on foam he had mod trunk sway demonstrating hip and ankle strategy, he had one LOB but was able to self recover with use of single handrail  Step ups were added to challenge balance and endurance  He was able to comlete all reps with no use of HR for balance  Patient demonstrated fatigue post treatment and would benefit from continued PT      Plan: Progress treatment as tolerated         Precautions: Fall Risk due to decrease in balance      Manuals 7/26 8/3 8/5                                                              Neuro Re-Ed                                                                                                        Ther Ex                          Bike  8/3 5 min no resistance           LAQ:B:             Hip flexion:B:                          Supine slr flexion:B:  2x10 2x15          bridges  2x10 2x15          SAQ:B:             Prone hip extension:B:                          Standing slr x 3:B:             Standing hr and tr:B: 10 x  2x10           Mini squats  2x10           Lunges:B: on foam 10 x 1                          SLS and tandem stance:B: 30 sec x 2 30s x 2                        Side stepping and tandem ambulation on foam   tandem amb  3 laps          Forward step ups:B:   4" 2x10          Lateral step ups:B:             Step downs:B:             Monster walking and side stepping with tband                          Cart push and pull 25" 25# 3 laps 25#  3 laps 25#           [place] resistance walking fwd, bwd, and to each side                          [employer] balance system  LOS                         Box lifting and carry 25"   25" 1x   +5# 1x                       Ther Activity                                       Gait Training                                       Modalities

## 2022-08-10 ENCOUNTER — OFFICE VISIT (OUTPATIENT)
Dept: PHYSICAL THERAPY | Age: 72
End: 2022-08-10
Payer: COMMERCIAL

## 2022-08-10 DIAGNOSIS — R53.1 WEAKNESS: ICD-10-CM

## 2022-08-10 DIAGNOSIS — U07.1 COVID-19: ICD-10-CM

## 2022-08-10 DIAGNOSIS — R26.89 IMBALANCE: Primary | ICD-10-CM

## 2022-08-10 PROCEDURE — 97110 THERAPEUTIC EXERCISES: CPT

## 2022-08-10 NOTE — PROGRESS NOTES
Daily Note     Today's date: 8/10/2022  Patient name: Chad Nieto  : 1950  MRN: [id number]  Referring provider: Mai Warner MD  Dx:   Encounter Diagnosis     ICD-10-CM    1  Imbalance  R26 89    2  Weakness  R53 1    3  COVID-19  U07 1                   Subjective: Patient reports he is tired today from working out in his yard yesterday  Objective: See treatment diary below      Assessment: Pt with good tolerance to progressions this visit  Able to increase step height for step ups and introduce lateral step ups without issue  Pt completes tandem amb on foam with occ HHA for steadying, no overt LOB  Only one small rest break required due to fatigue following all standing activity  Only able to complete 1 set of bridges this visit at end of treatment due to fatigue  Pt would benefit from continued skilled PT to improve current deficits and maximize activity tolerance  Plan: Progress treatment as tolerated         Precautions: Fall Risk due to decrease in balance       Manuals 7/26 8/3 8/5 8/10                                                             Neuro Re-Ed                                                                                                        Ther Ex                          Bike  8/3 5 min no resistance  5 min no resistance         LAQ:B:             Hip flexion:B:                          Supine slr flexion:B:  2x10 2x15 2x15         bridges  2x10 2x15 1x15         SAQ:B:             Prone hip extension:B:                          Standing slr x 3:B:             Standing hr and tr:B: 10 x  2x10  2x10         Mini squats  2x10           Lunges:B: on foam 10 x 1                          SLS and tandem stance:B: 30 sec x 2 30s x 2                        Side stepping and tandem ambulation on foam   tandem amb  3 laps Tandem 3 laps         Forward step ups:B:   4" 2x10 6" 2x10         Lateral step ups:B:    6" 1x10         Step downs:B:             Monster walking and side stepping with tband                          Cart push and pull 25" 25# 3 laps 25#  3 laps 25#  3 laps 25#         Fe resistance walking fwd, bwd, and to each side                          [employer] balance system  LOS                         Box lifting and carry 25"   25" 1x   +5# 1x 25' x3  5#                      Ther Activity                                       Gait Training                                       Modalities

## 2022-08-11 ENCOUNTER — OFFICE VISIT (OUTPATIENT)
Dept: PHYSICAL THERAPY | Age: 72
End: 2022-08-11
Payer: COMMERCIAL

## 2022-08-11 DIAGNOSIS — R26.89 IMBALANCE: Primary | ICD-10-CM

## 2022-08-11 DIAGNOSIS — R53.1 WEAKNESS: ICD-10-CM

## 2022-08-11 DIAGNOSIS — U07.1 COVID-19: ICD-10-CM

## 2022-08-11 PROCEDURE — 97530 THERAPEUTIC ACTIVITIES: CPT

## 2022-08-11 PROCEDURE — 97110 THERAPEUTIC EXERCISES: CPT

## 2022-08-11 NOTE — PROGRESS NOTES
Daily Note     Today's date: 2022  Patient name: Bhargavi Hernandez  : 1950  MRN: [id number]  Referring provider: Tristian Smith MD  Dx:   Encounter Diagnosis     ICD-10-CM    1  Imbalance  R26 89    2  Weakness  R53 1    3  COVID-19  U07 1                   Subjective: Patient reports no improvement in his balance  He states he cannot walk away from the bed first thing when he wakes up due to feeling off balance  Objective: See treatment diary below      Assessment: Tolerated treatment well  Focus on functional strengthening and balance  Increased time on bike today, patient required no rest break after  Progressed to foam with tandem amb and side stepping, patient had 1 LOB during tandem amb but was able to self recover with use of handrail  Added suitcase carry with turns to challenge proprioception, appropriate lateral shift noted no LOB  Patient demonstrated fatigue post treatment and would benefit from continued PT      Plan: Continue per plan of care        Precautions: Fall Risk due to decrease in balance       Manuals 7/26 8/3 8/5 8/10 8/11                                                            Neuro Re-Ed                                                                                                        Ther Ex                          Bike  8/3 5 min no resistance  5 min no resistance 8 min no resistance        LAQ:B:             Hip flexion:B:                          Supine slr flexion:B:  2x10 2x15 2x15 NT        bridges  2x10 2x15 1x15 NT        SAQ:B:             Prone hip extension:B:                          Standing slr x 3:B:             Standing hr and tr:B: 10 x  2x10  2x10 2x10  On foam        Mini squats  2x10           Lunges:B: on foam 10 x 1             Leg Press     2x10         SLS and tandem stance:B: 30 sec x 2 30s x 2   SLS  foam   30"x2 B                      Side stepping and tandem ambulation on foam   tandem amb  3 laps Tandem 3 laps Both on foam 3 laps ea Forward step ups:B:   4" 2x10 6" 2x10 6" 2x10  5# DB        Lateral step ups:B:    6" 1x10 6" 2x10 1 UE        Step downs:B:             Monster walking and side stepping with tband                          Cart push and pull 25" 25# 3 laps 25#  3 laps 25#  3 laps 25# 3 laps 35#        Fe resistance walking fwd, bwd, and to each side                          [employer] balance system  LOS    LOS  2x lvl 12                      Box lifting and carry 25"   25" 1x   +5# 1x 25' x3  5# 25' x3   5#                     Ther Activity             Suitcase carry      w/ turns5# 1 min ea                     Gait Training                                       Modalities

## 2022-08-12 ENCOUNTER — APPOINTMENT (OUTPATIENT)
Dept: PHYSICAL THERAPY | Age: 72
End: 2022-08-12
Payer: COMMERCIAL

## 2022-08-17 ENCOUNTER — OFFICE VISIT (OUTPATIENT)
Dept: PHYSICAL THERAPY | Age: 72
End: 2022-08-17
Payer: COMMERCIAL

## 2022-08-17 DIAGNOSIS — R53.1 WEAKNESS: Primary | ICD-10-CM

## 2022-08-17 DIAGNOSIS — R26.89 IMBALANCE: ICD-10-CM

## 2022-08-17 DIAGNOSIS — U07.1 COVID-19: ICD-10-CM

## 2022-08-17 PROCEDURE — 97110 THERAPEUTIC EXERCISES: CPT

## 2022-08-17 PROCEDURE — 97530 THERAPEUTIC ACTIVITIES: CPT

## 2022-08-17 NOTE — PROGRESS NOTES
Daily Note     Today's date: 2022  Patient name: Bharathi Black  : 1950  MRN: [id number]  Referring provider: Camilo Jim MD  Dx:   Encounter Diagnosis     ICD-10-CM    1  Weakness  R53 1    2  COVID-19  U07 1    3  Imbalance  R26 89                   Subjective: Patient reported improved balance since initiating PT "I feel a bit stronger"       Objective: See treatment diary below      Assessment: Improving with balance and endurance slowly  Plan: Continue per plan of care        Precautions: Fall Risk due to decrease in balance       Manuals 7/26 8/3 8/5 8/10 8/11 8/17                    Neuro Re-Ed                          Ther Ex                          Bike  8/3 5 min no resistance  5 min no resistance 8 min no resistance 10 min        LAQ:B:      NT       Hip flexion:B:      NT                    Supine slr flexion:B:  2x10 2x15 2x15 NT NT       bridges  2x10 2x15 1x15 NT NT       SAQ:B:             Prone hip extension:B:      NT                    Standing slr x 3:B:      20X on foam 3#        Standing hr and tr:B: 10 x  2x10  2x10 2x10  On foam 30X on foam        Mini squats  2x10           Lunges:B: on foam 10 x 1      NT       Leg Press     2x10  NT       SLS and tandem stance:B: 30 sec x 2 30s x 2   SLS  foam   30"x2 B  10X 5SEC                     Side stepping and tandem ambulation on foam   tandem amb  3 laps Tandem 3 laps Both on foam 3 laps ea 6X on foam        Forward step ups:B:   4" 2x10 6" 2x10 6" 2x10  5# DB 20X 8" 5#         Lateral step ups:B:    6" 1x10 6" 2x10 1 UE 8" 5# 20x        Step downs:B:      20x 8" 5#        Monster walking and side stepping with tband      NT                    Cart push and pull 25" 25# 3 laps 25#  3 laps 25#  3 laps 25# 3 laps 35# NT       [place] resistance walking fwd, bwd, and to each side      NT                    [employer] balance system  LOS    LOS  2x lvl 12  LOS    2x   On foam                     Box lifting and carry 25"   25" 1x   +5# 1x 25' x3  5# 25' x3   5# NT                    Ther Activity             Suitcase carry      w/ turns5# 1 min ea NT                    Gait Training                                       Modalities

## 2022-08-19 ENCOUNTER — OFFICE VISIT (OUTPATIENT)
Dept: PHYSICAL THERAPY | Age: 72
End: 2022-08-19
Payer: COMMERCIAL

## 2022-08-19 DIAGNOSIS — R53.1 WEAKNESS: Primary | ICD-10-CM

## 2022-08-19 DIAGNOSIS — U07.1 COVID-19: ICD-10-CM

## 2022-08-19 DIAGNOSIS — R26.89 IMBALANCE: ICD-10-CM

## 2022-08-19 PROCEDURE — 97110 THERAPEUTIC EXERCISES: CPT

## 2022-08-19 PROCEDURE — 97112 NEUROMUSCULAR REEDUCATION: CPT

## 2022-08-19 NOTE — PROGRESS NOTES
Daily Note     Today's date: 2022  Patient name: Oumou Pham  : 1950  MRN: [id number]  Referring provider: Sonia Holland MD  Dx:   Encounter Diagnosis     ICD-10-CM    1  Weakness  R53 1    2  COVID-19  U07 1    3  Imbalance  R26 89        Start Time: 1000  Stop Time: 1055  Total time in clinic (min): 55 minutes    Subjective: Patient reports that he feels like he is getting stronger  Objective: See treatment diary below      Assessment: Tolerated treatment well  Patient demonstrated fatigue post treatment, exhibited good technique with therapeutic exercises and would benefit from continued PT Patient challenged and fatigued with balance and stability exercises  Rest breaks required t/o session and patient reminded to not hold breath during exercises  Continue to advance program in order to promote increased BLE strength and stability  Plan: Continue per plan of care  Progress treatment as tolerated         Precautions: Fall Risk due to decrease in balance       Manuals 7/26 8/3 8/5 8/10 8/11 8/17 8/19                   Neuro Re-Ed                          Ther Ex                          Bike  8/3 5 min no resistance  5 min no resistance 8 min no resistance 10 min  Nu-Step  10 min L1      LAQ:B:      NT NT      Hip flexion:B:      NT NT                   Supine slr flexion:B:  2x10 2x15 2x15 NT NT NT      bridges  2x10 2x15 1x15 NT NT NT      SAQ:B:             Prone hip extension:B:      NT NT                   Standing slr x 3:B:      20X on foam 3#  30x ea  Foam 3#      Standing hr and tr:B: 10 x  2x10  2x10 2x10  On foam 30X on foam  30x Foam      Mini squats  2x10           Lunges:B: on foam 10 x 1      NT NT      Leg Press     2x10  NT NT      SLS and tandem stance:B: 30 sec x 2 30s x 2   SLS  foam   30"x2 B  10X 5SEC  15"x3  Foam                   Side stepping and tandem ambulation on foam   tandem amb  3 laps Tandem 3 laps Both on foam 3 laps ea 6X on foam  3 laps (down and back)ea      Forward step ups:B:   4" 2x10 6" 2x10 6" 2x10  5# DB 20X 8" 5#   8" 20x  5# DB      Lateral step ups:B:    6" 1x10 6" 2x10 1 UE 8" 5# 20x  8" 5# DB  20x      Step downs:B:      20x 8" 5#        Monster walking and side stepping with tband      NT NT                    Cart push and pull 25" 25# 3 laps 25#  3 laps 25#  3 laps 25# 3 laps 35# NT NT      Fe resistance walking fwd, bwd, and to each side      NT NT                   [employer] balance system  LOS    LOS  2x lvl 12  LOS    2x   On foam  LOS  2x  Foam                   Box lifting and carry 25"   25" 1x   +5# 1x 25' x3  5# 25' x3   5# NT NT                   Ther Activity             Suitcase carry      w/ turns5# 1 min ea NT NT                   Gait Training                                       Modalities

## 2022-08-23 ENCOUNTER — OFFICE VISIT (OUTPATIENT)
Dept: PHYSICAL THERAPY | Age: 72
End: 2022-08-23
Payer: COMMERCIAL

## 2022-08-23 DIAGNOSIS — R26.89 IMBALANCE: ICD-10-CM

## 2022-08-23 DIAGNOSIS — R53.1 WEAKNESS: Primary | ICD-10-CM

## 2022-08-23 DIAGNOSIS — U07.1 COVID-19: ICD-10-CM

## 2022-08-23 PROCEDURE — 97112 NEUROMUSCULAR REEDUCATION: CPT

## 2022-08-23 PROCEDURE — 97110 THERAPEUTIC EXERCISES: CPT

## 2022-08-23 PROCEDURE — 97750 PHYSICAL PERFORMANCE TEST: CPT

## 2022-08-23 NOTE — PROGRESS NOTES
Daily Note     Today's date: 2022  Patient name: Maura Madison  : 1950  MRN: [id number]  Referring provider: Adan Belcher MD  Dx:   Encounter Diagnosis     ICD-10-CM    1  Weakness  R53 1    2  COVID-19  U07 1    3  Imbalance  R26 89                   Subjective: Patient reported that he feels a bit stronger and stable "I stepped on a door knob on the my foot arch in the middle of the night"       Objective: See treatment diary below      Assessment: Improved tolerance to balance exercises  See re-eval for details       Plan: Continue per plan of care  Progress treatment as tolerated         Precautions: Fall Risk due to decrease in balance       Manuals 7/26 8/3 8/5 8/10 8/11 8/17 8/19 8/23                  Neuro Re-Ed                          Ther Ex                          Bike  8/3 5 min no resistance  5 min no resistance 8 min no resistance 10 min  Nu-Step  10 min L1 10 min      LAQ:B:      NT NT NT     Hip flexion:B:      NT NT NT                  Supine slr flexion:B:  2x10 2x15 2x15 NT NT NT NT     bridges  2x10 2x15 1x15 NT NT NT NT     SAQ:B:        NT     Prone hip extension:B:      NT NT NT                  Standing slr x 3:B:      20X on foam 3#  30x ea  Foam 3# 30X 3#     Standing hr and tr:B: 10 x  2x10  2x10 2x10  On foam 30X on foam  30x Foam 30X on foam      Mini squats  2x10      30x     Lunges:B: on foam 10 x 1      NT NT NT     Leg Press     2x10  NT NT NT     SLS and tandem stance:B: 30 sec x 2 30s x 2   SLS  foam   30"x2 B  10X 5SEC  15"x3  Foam NT     Side stepping  Tandem walk   Retro walk   Over hurdles         8x      Side stepping and tandem ambulation on foam   tandem amb  3 laps Tandem 3 laps Both on foam 3 laps ea 6X on foam  3 laps (down and back)ea  8X      Forward step ups:B:   4" 2x10 6" 2x10 6" 2x10  5# DB 20X 8" 5#   8" 20x  5# DB NT     Lateral step ups:B:    6" 1x10 6" 2x10 1 UE 8" 5# 20x  8" 5# DB  20x NT     Step downs:B:      20x 8" 5#   NT     Monster walking and side stepping with tband      NT NT  NT                  Cart push and pull 25" 25# 3 laps 25#  3 laps 25#  3 laps 25# 3 laps 35# NT NT NT     Fe resistance walking fwd, bwd, and to each side      NT NT NT                  Biodex balance system  LOS    LOS  2x lvl 12  LOS    2x   On foam  LOS  2x  Foam LOS   On foam   5x                   Box lifting and carry 25"   25" 1x   +5# 1x 25' x3  5# 25' x3   5# NT NT NT                  Ther Activity             Suitcase carry      w/ turns5# 1 min ea NT NT NT                  Gait Training                                       Modalities

## 2022-08-23 NOTE — PROGRESS NOTES
PT Evaluation  / PT Reassessment    Today's date: 2022  Patient name: Shasta Helms  : 1950  MRN: [id number]  Referring provider: Caitlyn Singh MD  Dx:   Encounter Diagnosis     ICD-10-CM    1  Weakness  R53 1    2  COVID-19  U07 1    3  Imbalance  R26 89        Start Time: 1000  Stop Time: 1045  Total time in clinic (min): 45 minutes    Assessment  Assessment details: PT Reassessment: 2022  Patient reported the following progress since onset of PT: improvement in balance and stability, increase in bilateral le strength, standing activities improved, and able to lift better but especially with utilizing proper lifting technique  But, he noted the following deficits that still persists: balance deficits especially in the morning, carrying items, bilateral le fatigue and weakness and heavy lifting / repeated lifting  PT IE: 2022  Patient reported his current deficits are due to + COVID which occurred 3 weeks ago  Patient noted he had both shots and the booster and still was + for COVID  Patient noted at the time of + COVID, he had a deep cough, mild fever, extreme weakness, slept most of the time and hand no apatite  Patient noted 1 year ago, he had an inner ear infection that caused him to be dizzy, vomit and was hospitalized  Patient noted since then, his balance has been limited  Plus, since + for COVID, his balance has decreased  Patient noted his balance limits bed transfers, chair transfers, stair climbing, carrying items, bending and lifting items  Patient denies any recent falls  Patient denies pain currently  Patient noted he does wear a CPAP for sleep apnea  Patient noted he does have weakness in bilateral le, that has increased since + COVID  Patient noted he does get more fatigued since onset of COVID, which also increases his balance deficits and weakness presentation in bilateral le  Patient denies unilateral ue and le parasthesias    Patient noted his regular walking has been reduced since onset of COVID  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 and he is back to helping him with some activities, but will not lift and carry nearly as much now as he did in the past   Impairments: abnormal gait, abnormal or restricted ROM, abnormal movement, activity intolerance, lacks appropriate home exercise program and pain with function  Understanding of Dx/Px/POC: excellent   Prognosis: good  Prognosis details: Patient is a 71y o  year old male seen for outpatient PT reevaluation with mobility deficits, weakness and balance limitations due to effects of COVID  Patient presents with the following progress since onset of PT: gait improvements, transfer improvements, balance progress, increase in bilateral le rom and strength, self and house hold functional progress  But, he presents to PT on reassessment with the following problems, concerns, deficits and impairments: decreased bilateral LE range of motion, decreased bilateral ue and le strength, transfer deficits, gait and stair dysfunctions, balance deficits, functional limitations and decreased tolerance to activity  Patient would benefit from skilled PT services under the following PT treatment plan to address the above noted deficits: therapeutic exercises and activities to facilitate bilateral ue and le rom and strength, modalities, manual therapy techniques, gait and stair training, balance and proprioception transfer training, activities, IASTM techniques, Kinesio taping techniques and a hep  Thank you for the referral      Goals  Short Term goals 4 - 6 weeks  1  Patient will be independent HEP   MET  2   Patient will report a 25 - 50% decrease in pain complaints  MET  3   Increase strength 1/2 grade  MET  4   Increase ROM 5-10 degrees  MET  Long Term goals 8 - 12 weeks  1  Patient will report elimination of pain complaints  MET    2   Patient will return to all recreational activities without restriction  Partially MET  3   ROM WFL  Partially MET  4   Strength 5/5  Partially MET  5   Patient will return to all work activities with out deficits  Partially MET  6   Patient will report all transfers without deficits  Partially MET  7   Patient will report stair climbing is without deficits and without use of ue railing  Partially MET  8   Patient will report ability to carry all house hold and work items without deficits  Partially MET  9   Patient will report ability to resume all bending and lifting activities without deficits or lob  Partially MET  Plan  Patient would benefit from: skilled physical therapy  Planned modality interventions: cryotherapy, TENS, thermotherapy: hydrocollator packs, traction, ultrasound and unattended electrical stimulation  Planned therapy interventions: joint mobilization, manual therapy, massage, aquatic therapy, balance, balance/weight bearing training, neuromuscular re-education, patient education, postural training, body mechanics training, compression, self care, strengthening, stretching, therapeutic activities, therapeutic exercise, therapeutic training, transfer training, flexibility, functional ROM exercises, gait training, graded activity, graded exercise, graded motor and home exercise program  Frequency: 2x week  Duration in weeks: 12  Treatment plan discussed with: patient        Subjective Evaluation    History of Present Illness  Mechanism of injury: Patient's PMHX is remarkable for DM, sleep apnea, HTN, and vertebral artery stenosis      Patient Goals  Patient goals for therapy: decreased pain, improved balance, increased motion, increased strength, independence with ADLs/IADLs and return to sport/leisure activities          Objective     Active Range of Motion   Left Hip   Flexion: 120 degrees   Abduction: 28 degrees     Right Hip   Flexion: 116 degrees   Abduction: 34 degrees   Left Knee   Flexion: 142 degrees   Extension: 2 degrees   Extensor la degrees     Right Knee   Flexion: 132 degrees   Extension: 2 degrees   Extensor la degrees   Left Ankle/Foot   Dorsiflexion (ke): 6 degrees   Plantar flexion: 46 degrees     Right Ankle/Foot   Dorsiflexion (ke): 8 degrees   Plantar flexion: 42 degrees     Strength/Myotome Testing     Left Shoulder     Planes of Motion   Flexion: 4-   Abduction: 4-   External rotation at 0°: 4   Internal rotation at 0°: 4+     Right Shoulder     Planes of Motion   Flexion: 4   Abduction: 4   External rotation at 0°: 4   Internal rotation at 0°: 4+     Left Elbow   Flexion: 4+  Extension: 4    Right Elbow   Flexion: 5  Extension: 4+    Left Hip   Planes of Motion   Flexion: 4  Extension: 4+  Abduction: 4+  Adduction: 5    Right Hip   Planes of Motion   Flexion: 4  Extension: 4+  Abduction: 4+  Adduction: 5    Left Knee   Flexion: 4+  Extension: 4    Right Knee   Flexion: 4+  Extension: 4    Left Ankle/Foot   Dorsiflexion: 4  Plantar flexion: 5    Right Ankle/Foot   Dorsiflexion: 4  Plantar flexion: 5    Ambulation     Ambulation: Level Surfaces   Ambulation without assistive device: independent    Additional Level Surfaces Ambulation Details  Patient ambulates with decrease in pace and bilateral toe out / hip ER with both swing and stance phase  Ambulation: Stairs   Ascend stairs: independent  Pattern: reciprocal  Railings: one rail  Descend stairs: independent  Pattern: reciprocal  Railings: one rail    Comments   PT IE: 2022  TUG is at 13 25 seconds  Limits of Stability  No Bilateral UE support  Skill level: Easy  Time to complete test: 46 seconds  Overall:         Actual:     39         Goal:      65  Forward:         Actual:     80          Goal:     65   Backward:          Actual:     37          Goal:     30  Left:         Actual:     47         Goal:      65  Right:         Actual:     54          Goal:     65    Forward / Left:          Actual:     58          Goal: 65   Forward / Right:         Actual:     52         Goal:      65   Backward / Left:         Actual:     29          Goal:     65   Backward / Right:          Actual:     26          Goal:     65  MCTSIB:  Eyes Open on a Firm Surface with Patient Sway Index is at 0 62; Eyes Closed on a Firm Surface with Patient Sway Index is at 1 92; Eyes Open on a Foam Surface with Patient Sway Index is at 1 58; Eyes Closed on a Foam Surface with Patient Sway Index is at 3 82  PT IE: 08/23/2022  TUG is at 12 74 seconds  Limits of Stability  No Bilateral UE support  Skill level: Easy  Time to complete test: 29 seconds  Overall:         Actual:     61         Goal:      65  Forward:         Actual:     64          Goal:     65   Backward:          Actual:     49          Goal:     30  Left:         Actual:     67         Goal:      65  Right:         Actual:     80          Goal:     65  Forward / Left:          Actual:     60          Goal:     65  Forward / Right:         Actual:     84         Goal:      65   Backward / Left:         Actual:     68          Goal:     65   Backward / Right:          Actual:     59          Goal:     65     Limits of Stability  No Bilateral UE support  Skill level: Moderate  Time to complete test: 50 seconds  Overall:         Actual:     42         Goal:      65  Forward:         Actual:     52          Goal:     65   Backward:          Actual:     49          Goal:     30  Left:         Actual:     51         Goal:      65  Right:         Actual:     50          Goal:     65  Forward / Left:          Actual:     48          Goal:     65  Forward / Right:         Actual:     43         Goal:      65   Backward / Left:         Actual:     43          Goal:     65   Backward / Right:          Actual:     35          Goal:     65      MCTSIB:  Eyes Open on a Firm Surface with Patient Sway Index is at 0 90; Eyes Closed on a Firm Surface with Patient Sway Index is at 2 59; Eyes Open on a Foam Surface with Patient Sway Index is at 1 14; Eyes Closed on a Foam Surface with Patient Sway Index is at 4 35                     Precautions: Fall Risk due to decrease in balance      Manuals 7/26                                                                Neuro Re-Ed                                                                                                        Ther Ex                          Bike             LAQ:B:             Hip flexion:B:                          Supine slr flexion:B:             bridges             SAQ:B:             Prone hip extension:B:                          Standing slr x 3:B:             Standing hr and tr:B: 10 x             Mini squats             Lunges:B: on foam 10 x 1                          SLS and tandem stance:B: 30 sec x 2                         Side stepping and tandem ambulation on foam             Forward step ups:B:             Lateral step ups:B:             Step downs:B:             Monster walking and side stepping with tband                          Cart push and pull 25" 25#            [place] resistance walking fwd, bwd, and to each side                          [employer] balance system  LOS                         Box lifting and carry 25"                          Ther Activity                                       Gait Training                                       Modalities

## 2022-08-26 ENCOUNTER — APPOINTMENT (OUTPATIENT)
Dept: PHYSICAL THERAPY | Age: 72
End: 2022-08-26
Payer: COMMERCIAL

## 2022-08-29 ENCOUNTER — OFFICE VISIT (OUTPATIENT)
Dept: PHYSICAL THERAPY | Age: 72
End: 2022-08-29
Payer: COMMERCIAL

## 2022-08-29 DIAGNOSIS — U07.1 COVID-19: ICD-10-CM

## 2022-08-29 DIAGNOSIS — R53.1 WEAKNESS: Primary | ICD-10-CM

## 2022-08-29 DIAGNOSIS — R26.89 IMBALANCE: ICD-10-CM

## 2022-08-29 PROCEDURE — 97110 THERAPEUTIC EXERCISES: CPT | Performed by: PHYSICAL THERAPIST

## 2022-08-29 NOTE — PROGRESS NOTES
Daily Note     Today's date: 2022  Patient name: Gregg Choi  : 1950  MRN: [id number]  Referring provider: Juliana Guerrero MD  Dx:   Encounter Diagnosis     ICD-10-CM    1  Weakness  R53 1    2  COVID-19  U07 1    3  Imbalance  R26 89                   Subjective: Patient reported despite feeling stronger and more stable, but he noted walking on uneven surfaces remains limited by balance deficits, weakness and fatigue  Objective: See treatment diary below  Assessment: Patient presents with lob with all prolonged standing activities on uneven surfaces that mimic standing balance deficits  Thus, PT is warranted to facilitate balance improvements to progress to full independent activities both inside and out  Plan: Continue per plan of care  Progress treatment as tolerated         Precautions: Fall Risk due to decrease in balance       Manuals 7/26 8/3 8/5 8/10 8/11 8/17 8/19 8/23 8/29                 Neuro Re-Ed                          Ther Ex                          Bike  8/3 5 min no resistance  5 min no resistance 8 min no resistance 10 min  Nu-Step  10 min L1 10 min  10 min    LAQ:B:      NT NT NT     Hip flexion:B:      NT NT NT                  Supine slr flexion:B:  2x10 2x15 2x15 NT NT NT NT     bridges  2x10 2x15 1x15 NT NT NT NT     SAQ:B:        NT     Prone hip extension:B:      NT NT NT                  Standing slr x 3:B:      20X on foam 3#  30x ea  Foam 3# 30X 3# 3# x 30    Standing hr and tr:B: 10 x  2x10  2x10 2x10  On foam 30X on foam  30x Foam 30X on foam  3 x 10    Mini squats  2x10      30x 3 x 10    Lunges:B: on foam 10 x 1      NT NT NT 3 x 10 on foam    Leg Press     2x10  NT NT NT     SLS and tandem stance:B: 30 sec x 2 30s x 2   SLS  foam   30"x2 B  10X 5SEC  15"x3  Foam NT 30 sec x 2 on foam    Side stepping  Tandem walk   Retro walk   Over hurdles         8x  8 x on foam    Side stepping and tandem ambulation on foam   tandem amb  3 laps Tandem 3 laps Both on foam 3 laps ea 6X on foam  3 laps (down and back)ea  8X  8 x on foam    Forward step ups:B:   4" 2x10 6" 2x10 6" 2x10  5# DB 20X 8" 5#   8" 20x  5# DB NT NT    Lateral step ups:B:    6" 1x10 6" 2x10 1 UE 8" 5# 20x  8" 5# DB  20x NT NT    Step downs:B:      20x 8" 5#   NT NT    Monster walking and side stepping with tband      NT NT  NT NT                 Cart push and pull 25" 25# 3 laps 25#  3 laps 25#  3 laps 25# 3 laps 35# NT NT NT NT    Fe resistance walking fwd, bwd, and to each side      NT NT NT NT ADD                Biode[employer] balance system  LOS    LOS  2x lvl 12  LOS    2x   On foam  LOS  2x  Foam LOS   On foam   5x  LOS on foam x 6                 Box lifting and carry 25"   25" 1x   +5# 1x 25' x3  5# 25' x3   5# NT NT NT                  Ther Activity             Suitcase carry      w/ turns5# 1 min ea NT NT NT                  Gait Training                                       Modalities

## 2022-08-31 ENCOUNTER — APPOINTMENT (OUTPATIENT)
Dept: PHYSICAL THERAPY | Age: 72
End: 2022-08-31
Payer: COMMERCIAL

## 2022-09-01 ENCOUNTER — VBI (OUTPATIENT)
Dept: ADMINISTRATIVE | Facility: OTHER | Age: 72
End: 2022-09-01

## 2022-09-02 ENCOUNTER — TELEPHONE (OUTPATIENT)
Dept: INTERNAL MEDICINE CLINIC | Facility: CLINIC | Age: 72
End: 2022-09-02

## 2022-09-02 DIAGNOSIS — I10 ESSENTIAL HYPERTENSION: Primary | ICD-10-CM

## 2022-09-02 DIAGNOSIS — Z79.4 TYPE 2 DIABETES MELLITUS WITH HYPERGLYCEMIA, WITH LONG-TERM CURRENT USE OF INSULIN (HCC): ICD-10-CM

## 2022-09-02 DIAGNOSIS — E11.65 TYPE 2 DIABETES MELLITUS WITH HYPERGLYCEMIA, WITH LONG-TERM CURRENT USE OF INSULIN (HCC): ICD-10-CM

## 2022-09-19 ENCOUNTER — TELEPHONE (OUTPATIENT)
Dept: ENDOCRINOLOGY | Facility: CLINIC | Age: 72
End: 2022-09-19

## 2022-09-20 ENCOUNTER — VBI (OUTPATIENT)
Dept: ADMINISTRATIVE | Facility: OTHER | Age: 72
End: 2022-09-20

## 2022-09-21 DIAGNOSIS — Z79.4 TYPE 2 DIABETES MELLITUS WITH HYPERGLYCEMIA, WITH LONG-TERM CURRENT USE OF INSULIN (HCC): Primary | ICD-10-CM

## 2022-09-21 DIAGNOSIS — E11.65 TYPE 2 DIABETES MELLITUS WITH HYPERGLYCEMIA, WITH LONG-TERM CURRENT USE OF INSULIN (HCC): Primary | ICD-10-CM

## 2022-09-29 ENCOUNTER — VBI (OUTPATIENT)
Dept: ADMINISTRATIVE | Facility: OTHER | Age: 72
End: 2022-09-29

## 2022-09-29 ENCOUNTER — RA CDI HCC (OUTPATIENT)
Dept: OTHER | Facility: HOSPITAL | Age: 72
End: 2022-09-29

## 2022-09-29 NOTE — PROGRESS NOTES
[place] [facility]  coding opportunities          Chart Reviewed number of suggestions sent to Provider: 2   [phone], [phone]  [phone]  Patients Insurance     Medicare Insurance: [employer]

## 2022-10-01 ENCOUNTER — APPOINTMENT (OUTPATIENT)
Dept: LAB | Age: 72
End: 2022-10-01
Payer: COMMERCIAL

## 2022-10-01 DIAGNOSIS — E11.65 TYPE 2 DIABETES MELLITUS WITH HYPERGLYCEMIA, WITH LONG-TERM CURRENT USE OF INSULIN (HCC): ICD-10-CM

## 2022-10-01 DIAGNOSIS — Z79.4 TYPE 2 DIABETES MELLITUS WITH HYPERGLYCEMIA, WITH LONG-TERM CURRENT USE OF INSULIN (HCC): ICD-10-CM

## 2022-10-01 DIAGNOSIS — I10 ESSENTIAL HYPERTENSION: ICD-10-CM

## 2022-10-01 LAB
ALBUMIN SERPL BCP-MCNC: 3.6 G/DL (ref 3.5–5)
ALP SERPL-CCNC: 79 U/L (ref 46–116)
ALT SERPL W P-5'-P-CCNC: 22 U/L (ref 12–78)
ANION GAP SERPL CALCULATED.3IONS-SCNC: 5 MMOL/L (ref 4–13)
AST SERPL W P-5'-P-CCNC: 11 U/L (ref 5–45)
BASOPHILS # BLD AUTO: 0.04 THOUSANDS/ΜL (ref 0–0.1)
BASOPHILS NFR BLD AUTO: 1 % (ref 0–1)
BILIRUB SERPL-MCNC: 0.91 MG/DL (ref 0.2–1)
BUN SERPL-MCNC: 20 MG/DL (ref 5–25)
CALCIUM SERPL-MCNC: 9.4 MG/DL (ref 8.3–10.1)
CHLORIDE SERPL-SCNC: 108 MMOL/L (ref 96–108)
CO2 SERPL-SCNC: 27 MMOL/L (ref 21–32)
CREAT SERPL-MCNC: 1.18 MG/DL (ref 0.6–1.3)
EOSINOPHIL # BLD AUTO: 0.31 THOUSAND/ΜL (ref 0–0.61)
EOSINOPHIL NFR BLD AUTO: 5 % (ref 0–6)
ERYTHROCYTE [DISTWIDTH] IN BLOOD BY AUTOMATED COUNT: 12.7 % (ref 11.6–15.1)
EST. AVERAGE GLUCOSE BLD GHB EST-MCNC: 255 MG/DL
GFR SERPL CREATININE-BSD FRML MDRD: 61 ML/MIN/1.73SQ M
GLUCOSE P FAST SERPL-MCNC: 130 MG/DL (ref 65–99)
HBA1C MFR BLD: 10.5 %
HCT VFR BLD AUTO: 44.8 % (ref 36.5–49.3)
HGB BLD-MCNC: 14.4 G/DL (ref 12–17)
IMM GRANULOCYTES # BLD AUTO: 0.02 THOUSAND/UL (ref 0–0.2)
IMM GRANULOCYTES NFR BLD AUTO: 0 % (ref 0–2)
LYMPHOCYTES # BLD AUTO: 2.63 THOUSANDS/ΜL (ref 0.6–4.47)
LYMPHOCYTES NFR BLD AUTO: 38 % (ref 14–44)
MCH RBC QN AUTO: 29.4 PG (ref 26.8–34.3)
MCHC RBC AUTO-ENTMCNC: 32.1 G/DL (ref 31.4–37.4)
MCV RBC AUTO: 92 FL (ref 82–98)
MONOCYTES # BLD AUTO: 0.49 THOUSAND/ΜL (ref 0.17–1.22)
MONOCYTES NFR BLD AUTO: 7 % (ref 4–12)
NEUTROPHILS # BLD AUTO: 3.43 THOUSANDS/ΜL (ref 1.85–7.62)
NEUTS SEG NFR BLD AUTO: 49 % (ref 43–75)
NRBC BLD AUTO-RTO: 0 /100 WBCS
PLATELET # BLD AUTO: 226 THOUSANDS/UL (ref 149–390)
PMV BLD AUTO: 12.1 FL (ref 8.9–12.7)
POTASSIUM SERPL-SCNC: 4.5 MMOL/L (ref 3.5–5.3)
PROT SERPL-MCNC: 7 G/DL (ref 6.4–8.4)
RBC # BLD AUTO: 4.89 MILLION/UL (ref 3.88–5.62)
SODIUM SERPL-SCNC: 140 MMOL/L (ref 135–147)
WBC # BLD AUTO: 6.92 THOUSAND/UL (ref 4.31–10.16)

## 2022-10-01 PROCEDURE — 83036 HEMOGLOBIN GLYCOSYLATED A1C: CPT

## 2022-10-01 PROCEDURE — 36415 COLL VENOUS BLD VENIPUNCTURE: CPT

## 2022-10-01 PROCEDURE — 85025 COMPLETE CBC W/AUTO DIFF WBC: CPT

## 2022-10-01 PROCEDURE — 80053 COMPREHEN METABOLIC PANEL: CPT

## 2022-10-03 DIAGNOSIS — Z79.4 TYPE 2 DIABETES MELLITUS WITH HYPERGLYCEMIA, WITH LONG-TERM CURRENT USE OF INSULIN (HCC): ICD-10-CM

## 2022-10-03 DIAGNOSIS — E11.65 TYPE 2 DIABETES MELLITUS WITH HYPERGLYCEMIA, WITH LONG-TERM CURRENT USE OF INSULIN (HCC): ICD-10-CM

## 2022-10-03 DIAGNOSIS — I10 ESSENTIAL HYPERTENSION: ICD-10-CM

## 2022-10-03 RX ORDER — INSULIN GLARGINE 100 [IU]/ML
25 INJECTION, SOLUTION SUBCUTANEOUS
Qty: 20 ML | Refills: 1 | Status: SHIPPED | OUTPATIENT
Start: 2022-10-03 | End: 2023-01-03

## 2022-10-03 RX ORDER — ATORVASTATIN CALCIUM 40 MG/1
40 TABLET, FILM COATED ORAL
Qty: 90 TABLET | Refills: 1 | Status: SHIPPED | OUTPATIENT
Start: 2022-10-03

## 2022-10-03 RX ORDER — AMLODIPINE BESYLATE 10 MG/1
10 TABLET ORAL DAILY
Qty: 90 TABLET | Refills: 1 | Status: SHIPPED | OUTPATIENT
Start: 2022-10-03

## 2022-10-03 RX ORDER — METFORMIN HYDROCHLORIDE 500 MG/1
1000 TABLET, EXTENDED RELEASE ORAL 2 TIMES DAILY
Qty: 360 TABLET | Refills: 1 | Status: SHIPPED | OUTPATIENT
Start: 2022-10-03 | End: 2023-01-01

## 2022-10-03 RX ORDER — VALSARTAN 320 MG/1
320 TABLET ORAL DAILY
Qty: 90 TABLET | Refills: 1 | Status: SHIPPED | OUTPATIENT
Start: 2022-10-03

## 2022-10-04 ENCOUNTER — OFFICE VISIT (OUTPATIENT)
Dept: ENDOCRINOLOGY | Facility: CLINIC | Age: 72
End: 2022-10-04
Payer: COMMERCIAL

## 2022-10-04 ENCOUNTER — TELEPHONE (OUTPATIENT)
Dept: ENDOCRINOLOGY | Facility: CLINIC | Age: 72
End: 2022-10-04

## 2022-10-04 VITALS
HEIGHT: 66 IN | WEIGHT: 141.8 LBS | SYSTOLIC BLOOD PRESSURE: 166 MMHG | DIASTOLIC BLOOD PRESSURE: 92 MMHG | HEART RATE: 76 BPM | BODY MASS INDEX: 22.79 KG/M2

## 2022-10-04 DIAGNOSIS — Z79.4 TYPE 2 DIABETES MELLITUS WITH HYPERGLYCEMIA, WITH LONG-TERM CURRENT USE OF INSULIN (HCC): Primary | ICD-10-CM

## 2022-10-04 DIAGNOSIS — E78.2 MIXED HYPERLIPIDEMIA: ICD-10-CM

## 2022-10-04 DIAGNOSIS — G47.33 OBSTRUCTIVE SLEEP APNEA ON CPAP: ICD-10-CM

## 2022-10-04 DIAGNOSIS — E11.65 TYPE 2 DIABETES MELLITUS WITH HYPERGLYCEMIA, WITH LONG-TERM CURRENT USE OF INSULIN (HCC): Primary | ICD-10-CM

## 2022-10-04 DIAGNOSIS — Z99.89 OBSTRUCTIVE SLEEP APNEA ON CPAP: ICD-10-CM

## 2022-10-04 PROCEDURE — 95250 CONT GLUC MNTR PHYS/QHP EQP: CPT

## 2022-10-04 PROCEDURE — 99214 OFFICE O/P EST MOD 30 MIN: CPT | Performed by: INTERNAL MEDICINE

## 2022-10-04 NOTE — PROGRESS NOTES
Continous glucose monitoring eddie placement     Date/Time 10/4/2022 12:00 PM     Performed by  Cornelia Conte by Vera Durham MD      Universal Protocol   Consent: Verbal consent obtained  Written consent obtained  Risks and benefits: risks, benefits and alternatives were discussed  Consent given by: patient  Patient understanding: patient states understanding of the procedure being performed  Patient consent: the patient's understanding of the procedure matches consent given  Procedure consent: procedure consent matches procedure scheduled  Relevant documents: relevant documents present and verified  Site marked: the operative site was marked  Required items: required blood products, implants, devices, and special equipment available  Patient identity confirmed: verbally with patient        Local anesthesia used: no     Anesthesia   Local anesthesia used: no     Sedation   Patient sedated: no        Specimen: no    Culture: no   Procedure Details   Procedure Notes: Eddie Pro sensor placed on left arm     Patient tolerance: patient tolerated the procedure well with no immediate complications

## 2022-10-04 NOTE — PROGRESS NOTES
Follow-up Patient Progress Note      CC: type 2 diabetes     History of Present Illness:   71 yr male with type 2 diabetes for 20 yrs, HTN, HLD and microalbuminuria  No reported MI, CVA or PVD  NO smoking or alcohol excess  Last visit was 4/25/22       Since last visit, he lost 4 lbs  No polyuria, polydipsia or blurred vision    Diet: regular  Activity: active physically      Home blood glucose monitoring: no regular glucose checks  Does not wish to use a CGM  Hypoglycemia: no symptoms     Current meds:  Lantus 25u daily bedtime  Missed upto twice weekly    Jardiance 25mg PO daily  Metformin 1000mg PO BID     Opthamology: no  Podiatry: no  Influenza: no; covid - yes  Dental:  Pancreatitis: No     Ace/ARB: valsartan  Statin: lipitor  Thyroid issues: none    Patient Active Problem List   Diagnosis    Type 2 diabetes mellitus with hyperglycemia, with long-term current use of insulin ([facility]    Essential hypertension    Hyperkalemia    Mixed hyperlipidemia    Obstructive sleep apnea on CPAP    Vertebral artery stenosis, bilateral    Intractable vomiting     Past Medical History:   Diagnosis Date    COVID-19 07/2022    Diabetes mellitus ([facility] )     Type 2    Dyslipidemia     Hypertension     Obstructive sleep apnea on CPAP       Past Surgical History:   Procedure Laterality Date    COLONOSCOPY  2008      Family History   Problem Relation Age of Onset    Hypertension Mother      Social History     Tobacco Use    Smoking status: Former Smoker    Smokeless tobacco: Never Used   Substance Use Topics    Alcohol use: Not Currently     No Known Allergies      Current Outpatient Medications:     amLODIPine (NORVASC) 10 mg tablet, Take 1 tablet (10 mg total) by mouth daily Use as directed, Disp: 90 tablet, Rfl: 1    aspirin 81 mg chewable tablet, Chew 1 tablet (81 mg total) daily, Disp: 90 tablet, Rfl: 0    atorvastatin (LIPITOR) 40 mg tablet, Take 1 tablet (40 mg total) by mouth daily at bedtime, Disp: 90 tablet, Rfl: 1    Empagliflozin (Jardiance) 25 MG TABS, Take 1 tablet (25 mg total) by mouth every morning, Disp: 90 tablet, Rfl: 1    glucose blood (FreeStyle InsuLinx Test) test strip, Use 1 each 2 (two) times a day, Disp: 200 each, Rfl: 1    insulin glargine (Lantus) 100 units/mL subcutaneous injection, Inject 25 Units under the skin daily at bedtime, Disp: 20 mL, Rfl: 1    Insulin Syringe-Needle U-100 30G X 1/2" 0 3 ML MISC, Use daily Use daily box of 100 BD ultrafine needles 30g, Disp: 100 each, Rfl: 2    metFORMIN (GLUCOPHAGE-XR) 500 mg 24 hr tablet, Take 2 tablets (1,000 mg total) by mouth 2 (two) times a day, Disp: 360 tablet, Rfl: 1    valsartan (DIOVAN) 320 MG tablet, Take 1 tablet (320 mg total) by mouth daily, Disp: 90 tablet, Rfl: 1    sildenafil (VIAGRA) 100 mg tablet, Take 1 tablet (100 mg total) by mouth daily as needed for erectile dysfunction (Patient not taking: No sig reported), Disp: 10 tablet, Rfl: 0    Review of Systems   Constitutional: Positive for activity change, appetite change and fatigue  HENT: Negative  Eyes: Negative  Respiratory: Negative  Cardiovascular: Negative for chest pain  Gastrointestinal: Negative  Endocrine: Negative  Genitourinary: Negative  Musculoskeletal: Negative  Skin: Negative  Allergic/Immunologic: Negative  Neurological: Negative  Hematological: Negative  Psychiatric/Behavioral: Negative  All other systems reviewed and are negative  Physical Exam:  Body mass index is 22 89 kg/m²  /92   Pulse 76   Ht 5' 6" (1 676 m)   Wt 64 3 kg (141 lb 12 8 oz)   BMI 22 89 kg/m²    Vitals:    10/04/22 0936   Weight: 64 3 kg (141 lb 12 8 oz)        Physical Exam  Constitutional:       Appearance: He is well-developed  HENT:      Head: Normocephalic  Eyes:      Pupils: Pupils are equal, round, and reactive to light  Neck:      Thyroid: No thyromegaly  Cardiovascular:      Rate and Rhythm: Normal rate        Heart sounds: Normal heart sounds  Pulmonary:      Effort: Pulmonary effort is normal       Breath sounds: Normal breath sounds  Abdominal:      General: Bowel sounds are normal       Palpations: Abdomen is soft  Musculoskeletal:         General: No deformity  Cervical back: Normal range of motion  Skin:     Capillary Refill: Capillary refill takes less than 2 seconds  Coloration: Skin is not pale  Findings: No rash  Neurological:      Mental Status: He is alert and oriented to person, place, and time  Labs:   Lab Results   Component Value Date    HGBA1C 10 5 (H) 10/01/2022       Lab Results   Component Value Date    [id number] 1 290 12/14/2021       Lab Results   Component Value Date    CREATININE 1 18 10/01/2022    CREATININE 1 16 04/19/2022    CREATININE 1 28 12/14/2021    BUN 20 10/01/2022     04/23/2015    K 4 5 10/01/2022     10/01/2022    CO2 27 10/01/2022     eGFR   Date Value Ref Range Status   10/01/2022 61 ml/min/1 73sq m Final       Lab Results   Component Value Date    ALT 22 10/01/2022    AST 11 10/01/2022    ALKPHOS 79 10/01/2022    BILITOT 0 5 04/23/2015       Lab Results   Component Value Date    CHOLESTEROL 142 04/27/2021    CHOLESTEROL 132 07/21/2019    CHOLESTEROL 108 12/30/2018     Lab Results   Component Value Date    HDL 47 04/27/2021    HDL 42 07/21/2019    HDL 37 (L) 12/30/2018     Lab Results   Component Value Date    TRIG 99 04/27/2021    TRIG 112 07/21/2019    TRIG 101 12/30/2018     Lab Results   Component Value Date    [facility] 90 07/21/2019    [facility] 71 12/30/2018         Impression:  1  Type 2 diabetes mellitus with hyperglycemia, with long-term current use of insulin ([address] )    2  Obstructive sleep apnea on CPAP    3  Mixed hyperlipidemia         Plan:    Diagnoses and all orders for this visit:    Type 2 diabetes mellitus with hyperglycemia, with long-term current use of insulin ([address] )  He is uncontrolled with A1c 10 5%   this is worse than before  Since he doesn't check fingersticks, unclear about the pattern of hyperglycemia  Today he agreed to use a professional sensor for the next 2 weeks to guide therapy  Advised to use basal regularly at current dose or increase to 30u qhs based on his discretion  Modify diet and activity to help improve glycemia  Will r/o discrepant A1c as he does not report hyerglycemia symptoms  Follow up in 6-12 weeks  -     Continous glucose monitoring tamiko placement; Future  -     Continous glucose monitoring tamiko intrepretation; Future  -     Continous glucose monitoring tamiko placement    Obstructive sleep apnea on CPAP    Mixed hyperlipidemia        I have spent 35 minutes with patient today in which greater than 50% of this time was spent in counseling/coordination of care  Discussed with the patient and all questioned fully answered  He will call me if any problems arise  Educated/ Counseled patient on diagnostic test results, prognosis, risk vs benefit of treatment options, importance of treatment compliance, healthy life and lifestyle choices        [address]

## 2022-10-04 NOTE — TELEPHONE ENCOUNTER
----- Message from Roland Knapp MD sent at 10/3/2022  5:45 PM EDT -----  A1c high - review next visit

## 2022-10-10 ENCOUNTER — OFFICE VISIT (OUTPATIENT)
Dept: INTERNAL MEDICINE CLINIC | Facility: CLINIC | Age: 72
End: 2022-10-10
Payer: COMMERCIAL

## 2022-10-10 VITALS
DIASTOLIC BLOOD PRESSURE: 78 MMHG | SYSTOLIC BLOOD PRESSURE: 120 MMHG | BODY MASS INDEX: 22.92 KG/M2 | HEART RATE: 80 BPM | WEIGHT: 142.6 LBS | OXYGEN SATURATION: 94 % | HEIGHT: 66 IN | TEMPERATURE: 98.1 F

## 2022-10-10 DIAGNOSIS — E78.2 MIXED HYPERLIPIDEMIA: ICD-10-CM

## 2022-10-10 DIAGNOSIS — Z23 NEEDS FLU SHOT: ICD-10-CM

## 2022-10-10 DIAGNOSIS — H35.00 RETINOPATHY: ICD-10-CM

## 2022-10-10 DIAGNOSIS — Z79.4 TYPE 2 DIABETES MELLITUS WITH HYPERGLYCEMIA, WITH LONG-TERM CURRENT USE OF INSULIN (HCC): Primary | ICD-10-CM

## 2022-10-10 DIAGNOSIS — I10 ESSENTIAL HYPERTENSION: ICD-10-CM

## 2022-10-10 DIAGNOSIS — E11.65 TYPE 2 DIABETES MELLITUS WITH HYPERGLYCEMIA, WITH LONG-TERM CURRENT USE OF INSULIN (HCC): Primary | ICD-10-CM

## 2022-10-10 PROCEDURE — G0008 ADMIN INFLUENZA VIRUS VAC: HCPCS

## 2022-10-10 PROCEDURE — 90662 IIV NO PRSV INCREASED AG IM: CPT

## 2022-10-10 PROCEDURE — 99214 OFFICE O/P EST MOD 30 MIN: CPT | Performed by: INTERNAL MEDICINE

## 2022-10-10 RX ORDER — BLOOD SUGAR DIAGNOSTIC
1 STRIP MISCELLANEOUS 2 TIMES DAILY
Qty: 200 EACH | Refills: 1 | Status: SHIPPED | OUTPATIENT
Start: 2022-10-10

## 2022-10-10 NOTE — PROGRESS NOTES
Assessment/Plan:           1  Type 2 diabetes mellitus with hyperglycemia, with long-term current use of insulin ([facility])  Comments:  He admits to not taking his insulin regularly and also drinking regular Coke daily  Fourteen day sensor is in place from endocrine  Orders:  -     glucose blood (FreeStyle InsuLinx Test) test strip; Use 1 each 2 (two) times a day  -     Ambulatory Referral to Ophthalmology; Future  -     Hemoglobin A1C; Future; Expected date: 01/10/2023    2  Essential hypertension  Comments:  Continue amlodipine and valsartan  3  Mixed hyperlipidemia    4  Retinopathy  Comments:  Ophthalmology appointment advised  Orders:  -     Ambulatory Referral to Ophthalmology; Future  -     Hemoglobin A1C; Future; Expected date: 01/10/2023    5  Needs flu shot  -     FLUZONE HIGH-DOSE: influenza vaccine, high-dose, preservative-free 0 7 mL         1  Type 2 diabetes mellitus with hyperglycemia, with long-term current use of insulin ([facility] )      2  Essential hypertension      3  Mixed hyperlipidemia         No problem-specific Assessment & Plan notes found for this encounter  Subjective:      Patient ID: Margarito Nova is a 71 y o  male  HPI    The following portions of the patient's history were reviewed and updated as appropriate: He  has a past medical history of COVID-19 (07/2022), Diabetes mellitus ([address] ), Dyslipidemia, Hypertension, and Obstructive sleep apnea on CPAP  He   Patient Active Problem List    Diagnosis Date Noted   • Intractable vomiting 05/06/2021   • Vertebral artery stenosis, bilateral 04/26/2021   • Obstructive sleep apnea on CPAP    • Type 2 diabetes mellitus with hyperglycemia, with long-term current use of insulin ([address] ) 12/08/2020   • Essential hypertension 12/08/2020   • Hyperkalemia 12/08/2020   • Mixed hyperlipidemia 12/08/2020     He  has a past surgical history that includes Colonoscopy (2008)  His family history includes Hypertension in his mother    He  reports that he has quit smoking  He has never used smokeless tobacco  He reports previous alcohol use  He reports that he does not use drugs  Current Outpatient Medications   Medication Sig Dispense Refill   • amLODIPine (NORVASC) 10 mg tablet Take 1 tablet (10 mg total) by mouth daily Use as directed 90 tablet 1   • aspirin 81 mg chewable tablet Chew 1 tablet (81 mg total) daily 90 tablet 0   • atorvastatin (LIPITOR) 40 mg tablet Take 1 tablet (40 mg total) by mouth daily at bedtime 90 tablet 1   • Empagliflozin (Jardiance) 25 MG TABS Take 1 tablet (25 mg total) by mouth every morning 90 tablet 1   • glucose blood (FreeStyle InsuLinx Test) test strip Use 1 each 2 (two) times a day 200 each 1   • insulin glargine (Lantus) 100 units/mL subcutaneous injection Inject 25 Units under the skin daily at bedtime 20 mL 1   • Insulin Syringe-Needle U-100 30G X 1/2" 0 3 ML MISC Use daily Use daily box of 100 BD ultrafine needles 30g 100 each 2   • metFORMIN (GLUCOPHAGE-XR) 500 mg 24 hr tablet Take 2 tablets (1,000 mg total) by mouth 2 (two) times a day 360 tablet 1   • valsartan (DIOVAN) 320 MG tablet Take 1 tablet (320 mg total) by mouth daily 90 tablet 1   • sildenafil (VIAGRA) 100 mg tablet Take 1 tablet (100 mg total) by mouth daily as needed for erectile dysfunction (Patient not taking: No sig reported) 10 tablet 0     No current facility-administered medications for this visit       Current Outpatient Medications on File Prior to Visit   Medication Sig   • amLODIPine (NORVASC) 10 mg tablet Take 1 tablet (10 mg total) by mouth daily Use as directed   • aspirin 81 mg chewable tablet Chew 1 tablet (81 mg total) daily   • atorvastatin (LIPITOR) 40 mg tablet Take 1 tablet (40 mg total) by mouth daily at bedtime   • Empagliflozin (Jardiance) 25 MG TABS Take 1 tablet (25 mg total) by mouth every morning   • insulin glargine (Lantus) 100 units/mL subcutaneous injection Inject 25 Units under the skin daily at bedtime   • Insulin Syringe-Needle U-100 30G X 1/2" 0 3 ML MISC Use daily Use daily box of 100 BD ultrafine needles 30g   • metFORMIN (GLUCOPHAGE-XR) 500 mg 24 hr tablet Take 2 tablets (1,000 mg total) by mouth 2 (two) times a day   • valsartan (DIOVAN) 320 MG tablet Take 1 tablet (320 mg total) by mouth daily   • [DISCONTINUED] glucose blood (FreeStyle InsuLinx Test) test strip Use 1 each 2 (two) times a day   • sildenafil (VIAGRA) 100 mg tablet Take 1 tablet (100 mg total) by mouth daily as needed for erectile dysfunction (Patient not taking: No sig reported)     No current facility-administered medications on file prior to visit  He has No Known Allergies       Review of Systems   Constitutional: Negative for appetite change, chills, fatigue and fever  HENT: Negative for sore throat and trouble swallowing  Eyes: Negative for redness  Respiratory: Negative for shortness of breath  Cardiovascular: Negative for chest pain and palpitations  Gastrointestinal: Negative for abdominal pain, constipation and diarrhea  Genitourinary: Negative for dysuria and hematuria  Musculoskeletal: Negative for back pain and neck pain  Skin: Negative for rash  Neurological: Negative for seizures, weakness and headaches  Hematological: Negative for adenopathy  Psychiatric/Behavioral: Negative for confusion  The patient is not nervous/anxious            Objective:      /78 (BP Location: Left arm, Patient Position: Sitting, Cuff Size: Adult)   Pulse 80   Temp 98 1 °F (36 7 °C) (Temporal)   Ht 5' 6" (1 676 m)   Wt 64 7 kg (142 lb 9 6 oz)   SpO2 94%   BMI 23 02 kg/m²     Results Reviewed     None          Recent Results (from the past 1344 hour(s))   CBC and differential    Collection Time: 10/01/22  9:40 AM   Result Value Ref Range    WBC 6 92 4 31 - 10 16 Thousand/uL    RBC 4 89 3 88 - 5 62 Million/uL    Hemoglobin 14 4 12 0 - 17 0 g/dL    Hematocrit 44 8 36 5 - 49 3 %    MCV 92 82 - 98 fL    MCH 29 4 26 8 - 34 3 pg    MCHC 32 1 31 4 - 37 4 g/dL    RDW 12 7 11 6 - 15 1 %    MPV 12 1 8 9 - 12 7 fL    Platelets 144 055 - 312 Thousands/uL    nRBC 0 /100 WBCs    Neutrophils Relative 49 43 - 75 %    Immat GRANS % 0 0 - 2 %    Lymphocytes Relative 38 14 - 44 %    Monocytes Relative 7 4 - 12 %    Eosinophils Relative 5 0 - 6 %    Basophils Relative 1 0 - 1 %    Neutrophils Absolute 3 43 1 85 - 7 62 Thousands/µL    Immature Grans Absolute 0 02 0 00 - 0 20 Thousand/uL    Lymphocytes Absolute 2 63 0 60 - 4 47 Thousands/µL    Monocytes Absolute 0 49 0 17 - 1 22 Thousand/µL    Eosinophils Absolute 0 31 0 00 - 0 61 Thousand/µL    Basophils Absolute 0 04 0 00 - 0 10 Thousands/µL   Comprehensive metabolic panel    Collection Time: 10/01/22  9:40 AM   Result Value Ref Range    Sodium 140 135 - 147 mmol/L    Potassium 4 5 3 5 - 5 3 mmol/L    Chloride 108 96 - 108 mmol/L    CO2 27 21 - 32 mmol/L    ANION GAP 5 4 - 13 mmol/L    BUN 20 5 - 25 mg/dL    Creatinine 1 18 0 60 - 1 30 mg/dL    Glucose, Fasting 130 (H) 65 - 99 mg/dL    Calcium 9 4 8 3 - 10 1 mg/dL    AST 11 5 - 45 U/L    ALT 22 12 - 78 U/L    Alkaline Phosphatase 79 46 - 116 U/L    Total Protein 7 0 6 4 - 8 4 g/dL    Albumin 3 6 3 5 - 5 0 g/dL    Total Bilirubin 0 91 0 20 - 1 00 mg/dL    eGFR 61 ml/min/1 73sq m   Hemoglobin A1C    Collection Time: 10/01/22  9:40 AM   Result Value Ref Range    Hemoglobin A1C 10 5 (H) Normal 3 8-5 6%; PreDiabetic 5 7-6 4%; Diabetic >=6 5%; Glycemic control for adults with diabetes <7 0% %     mg/dl        Physical Exam  Constitutional:       General: He is not in acute distress  Appearance: Normal appearance  HENT:      Head: Normocephalic and atraumatic  Nose: Nose normal       Mouth/Throat:      Mouth: Mucous membranes are moist    Eyes:      Extraocular Movements: Extraocular movements intact  Pupils: Pupils are equal, round, and reactive to light  Cardiovascular:      Rate and Rhythm: Normal rate and regular rhythm  Pulses: Normal pulses  Heart sounds: Normal heart sounds  No murmur heard  No friction rub  Pulmonary:      Effort: Pulmonary effort is normal  No respiratory distress  Breath sounds: Normal breath sounds  No wheezing  Abdominal:      General: Abdomen is flat  Bowel sounds are normal  There is no distension  Palpations: Abdomen is soft  There is no mass  Tenderness: There is no abdominal tenderness  There is no guarding  Musculoskeletal:         General: Normal range of motion  Neurological:      General: No focal deficit present  Mental Status: He is alert and oriented to person, place, and time  Mental status is at baseline  Cranial Nerves: No cranial nerve deficit     Psychiatric:         Mood and Affect: Mood normal          Behavior: Behavior normal

## 2022-10-14 ENCOUNTER — TELEPHONE (OUTPATIENT)
Dept: ENDOCRINOLOGY | Facility: CLINIC | Age: 72
End: 2022-10-14

## 2022-10-14 NOTE — TELEPHONE ENCOUNTER
Pt called in and lm on [address] line regarding a monitor that was put in his arm about a week and a half ago and would like a call back regarding an appt to get it removed  Monday 10/17/22 will be 2 weeks he had it put on  Please advise

## 2022-10-25 ENCOUNTER — TELEPHONE (OUTPATIENT)
Dept: ENDOCRINOLOGY | Facility: CLINIC | Age: 72
End: 2022-10-25

## 2022-10-25 ENCOUNTER — CLINICAL SUPPORT (OUTPATIENT)
Dept: ENDOCRINOLOGY | Facility: CLINIC | Age: 72
End: 2022-10-25

## 2022-10-25 DIAGNOSIS — Z79.4 TYPE 2 DIABETES MELLITUS WITH HYPERGLYCEMIA, WITH LONG-TERM CURRENT USE OF INSULIN (HCC): ICD-10-CM

## 2022-10-25 DIAGNOSIS — E11.65 TYPE 2 DIABETES MELLITUS WITH HYPERGLYCEMIA, WITH LONG-TERM CURRENT USE OF INSULIN (HCC): ICD-10-CM

## 2022-10-25 NOTE — TELEPHONE ENCOUNTER
Patient was contacted and advised to increase Lantus to 30u  I also advised that I sent the CGM interpretation to him through my chart

## 2022-10-25 NOTE — TELEPHONE ENCOUNTER
----- Message from Sabina Clinton MD sent at 10/25/2022  2:05 PM EDT -----  [address] shows severe hyperglycemia reaching upto 400mg  Will need to increase lantus to 30u qhs  We will need to consider other medications for better control ” In my opinion but we can discuss that next visit  We can also send the report to him to review    Thanks

## 2022-10-25 NOTE — PROGRESS NOTES
Continous glucose monitoring tamiko intrepretation     Date/Time 10/25/2022 10:47 AM     Performed by  Gustavo Singh     Authorized by Jennifer Mzt MD      Universal Protocol   Consent: Verbal consent obtained  Risks and benefits: risks, benefits and alternatives were discussed  Consent given by: patient  Timeout called at: 10/25/2022 10:47 AM   Patient understanding: patient states understanding of the procedure being performed  Patient consent: the patient's understanding of the procedure matches consent given  Procedure consent: procedure consent matches procedure scheduled  Relevant documents: relevant documents present and verified  Site marked: the operative site was marked  Required items: required blood products, implants, devices, and special equipment available  Patient identity confirmed: verbally with patient        Local anesthesia used: no     Anesthesia   Local anesthesia used: no     Sedation   Patient sedated: no        Specimen: no    Culture: no   Procedure Details   Procedure Notes: Interpretation has been uploaded  15 days of data     Patient tolerance: patient tolerated the procedure well with no immediate complications

## 2022-10-31 ENCOUNTER — VBI (OUTPATIENT)
Dept: ADMINISTRATIVE | Facility: OTHER | Age: 72
End: 2022-10-31

## 2022-11-09 ENCOUNTER — VBI (OUTPATIENT)
Dept: ADMINISTRATIVE | Facility: OTHER | Age: 72
End: 2022-11-09

## 2022-11-20 ENCOUNTER — VBI (OUTPATIENT)
Dept: ADMINISTRATIVE | Facility: OTHER | Age: 72
End: 2022-11-20

## 2022-11-21 ENCOUNTER — DIABETIC EYE EXAM (OUTPATIENT)
Dept: URBAN - METROPOLITAN AREA CLINIC 6 | Facility: CLINIC | Age: 72
End: 2022-11-21

## 2022-11-21 DIAGNOSIS — H25.813: ICD-10-CM

## 2022-11-21 DIAGNOSIS — E11.3293: ICD-10-CM

## 2022-11-21 DIAGNOSIS — Z79.4: ICD-10-CM

## 2022-11-21 LAB
LEFT EYE DIABETIC RETINOPATHY: NORMAL
RIGHT EYE DIABETIC RETINOPATHY: NORMAL

## 2022-11-21 PROCEDURE — 92134 CPTRZ OPH DX IMG PST SGM RTA: CPT

## 2022-11-21 PROCEDURE — 92014 COMPRE OPH EXAM EST PT 1/>: CPT

## 2022-11-21 ASSESSMENT — TONOMETRY
OD_IOP_MMHG: 15
OS_IOP_MMHG: 14

## 2022-11-21 ASSESSMENT — VISUAL ACUITY
OS_PH: 20/30
OS_SC: 20/40
OD_SC: 20/40
OU_CC: J1+

## 2022-12-06 ENCOUNTER — VBI (OUTPATIENT)
Dept: ADMINISTRATIVE | Facility: OTHER | Age: 72
End: 2022-12-06

## 2022-12-08 ENCOUNTER — VBI (OUTPATIENT)
Dept: ADMINISTRATIVE | Facility: OTHER | Age: 72
End: 2022-12-08

## 2022-12-29 ENCOUNTER — VBI (OUTPATIENT)
Dept: ADMINISTRATIVE | Facility: OTHER | Age: 72
End: 2022-12-29

## 2023-01-04 ENCOUNTER — RA CDI HCC (OUTPATIENT)
Dept: OTHER | Facility: HOSPITAL | Age: 73
End: 2023-01-04

## 2023-01-04 NOTE — PROGRESS NOTES
[facility]  coding opportunities          Chart Reviewed number of suggestions sent to Provider: 2   [id number] [phone]  [id number]    This is a reminder to address ALL HCC (risk adjustment) codes as found on active problem list for 2023 as patient scores reset to zero SARINA    Patients Insurance     Medicare Insurance: [employer]

## 2023-01-11 ENCOUNTER — OFFICE VISIT (OUTPATIENT)
Dept: INTERNAL MEDICINE CLINIC | Facility: CLINIC | Age: 73
End: 2023-01-11

## 2023-01-11 VITALS
BODY MASS INDEX: 22.66 KG/M2 | TEMPERATURE: 98.3 F | HEIGHT: 66 IN | HEART RATE: 84 BPM | SYSTOLIC BLOOD PRESSURE: 164 MMHG | WEIGHT: 141 LBS | OXYGEN SATURATION: 96 % | DIASTOLIC BLOOD PRESSURE: 90 MMHG

## 2023-01-11 DIAGNOSIS — H35.00 RETINOPATHY: ICD-10-CM

## 2023-01-11 DIAGNOSIS — Z79.4 TYPE 2 DIABETES MELLITUS WITH HYPERGLYCEMIA, WITH LONG-TERM CURRENT USE OF INSULIN (HCC): Primary | ICD-10-CM

## 2023-01-11 DIAGNOSIS — E78.2 MIXED HYPERLIPIDEMIA: ICD-10-CM

## 2023-01-11 DIAGNOSIS — I10 ESSENTIAL HYPERTENSION: ICD-10-CM

## 2023-01-11 DIAGNOSIS — E11.65 TYPE 2 DIABETES MELLITUS WITH HYPERGLYCEMIA, WITH LONG-TERM CURRENT USE OF INSULIN (HCC): Primary | ICD-10-CM

## 2023-01-11 NOTE — PROGRESS NOTES
Assessment and Plan:     Problem List Items Addressed This Visit        Endocrine    Type 2 diabetes mellitus with hyperglycemia, with long-term current use of insulin ([facility] [address] ) - Primary       Cardiovascular and Mediastinum    Essential hypertension       Other    Mixed hyperlipidemia   Other Visit Diagnoses     Retinopathy               Preventive health issues were discussed with patient, and age appropriate screening tests were ordered as noted in patient's After Visit Summary  Personalized health advice and appropriate referrals for health education or preventive services given if needed, as noted in patient's After Visit Summary  History of Present Illness:     Patient presents for a Medicare Wellness Visit    HPI   Patient Care Team:  Kristal Clark MD as PCP - General (Internal Medicine)  Anne Marie Fernández MD (Endocrinology)     Review of Systems:     Review of Systems   Constitutional: Negative for appetite change, chills, fatigue and fever  HENT: Negative for sore throat and trouble swallowing  Eyes: Negative for redness  Respiratory: Negative for shortness of breath  Cardiovascular: Negative for chest pain and palpitations  Gastrointestinal: Negative for abdominal pain, constipation and diarrhea  Genitourinary: Negative for dysuria and hematuria  Musculoskeletal: Negative for back pain and neck pain  Skin: Negative for rash  Neurological: Negative for seizures, weakness and headaches  Hematological: Negative for adenopathy  Psychiatric/Behavioral: Negative for confusion  The patient is not nervous/anxious           Problem List:     Patient Active Problem List   Diagnosis   • Type 2 diabetes mellitus with hyperglycemia, with long-term current use of insulin ([facility])   • Essential hypertension   • Hyperkalemia   • Mixed hyperlipidemia   • Obstructive sleep apnea on CPAP   • Vertebral artery stenosis, bilateral   • Intractable vomiting      Past Medical and Surgical History:     Past Medical History:   Diagnosis Date   • COVID-19 07/2022   • Diabetes mellitus ([facility] [address] )     Type 2   • Dyslipidemia    • Hypertension    • Obstructive sleep apnea on CPAP      Past Surgical History:   Procedure Laterality Date   • COLONOSCOPY  2008      Family History:     Family History   Problem Relation Age of Onset   • Hypertension Mother       Social History:     Social History     Socioeconomic History   • Marital status: /Civil Union     Spouse name: None   • Number of children: None   • Years of education: None   • Highest education level: None   Occupational History   • None   Tobacco Use   • Smoking status: Former   • Smokeless tobacco: Never   Vaping Use   • Vaping Use: Never used   Substance and Sexual Activity   • Alcohol use: Not Currently   • Drug use: Never   • Sexual activity: None   Other Topics Concern   • None   Social History Narrative   • None     Social Determinants of Health     Financial Resource Strain: Low Risk    • Difficulty of Paying Living Expenses: Not hard at all   Food Insecurity: Not on file   Transportation Needs: No Transportation Needs   • Lack of Transportation (Medical): No   • Lack of Transportation (Non-Medical):  No   Physical Activity: Not on file   Stress: Not on file   Social Connections: Not on file   Intimate Partner Violence: Not on file   Housing Stability: Not on file      Medications and Allergies:     Current Outpatient Medications   Medication Sig Dispense Refill   • amLODIPine (NORVASC) 10 mg tablet Take 1 tablet (10 mg total) by mouth daily Use as directed 90 tablet 1   • aspirin 81 mg chewable tablet Chew 1 tablet (81 mg total) daily 90 tablet 0   • atorvastatin (LIPITOR) 40 mg tablet Take 1 tablet (40 mg total) by mouth daily at bedtime 90 tablet 1   • Empagliflozin (Jardiance) 25 MG TABS Take 1 tablet (25 mg total) by mouth every morning 90 tablet 1   • glucose blood (FreeStyle InsuLinx Test) test strip Use 1 each 2 (two) times a day 200 each 1   • insulin glargine (Lantus) 100 units/mL subcutaneous injection Inject 25 Units under the skin daily at bedtime 20 mL 1   • Insulin Syringe-Needle U-100 30G X 1/2" 0 3 ML MISC Use daily Use daily box of 100 BD ultrafine needles 30g 100 each 2   • metFORMIN (GLUCOPHAGE-XR) 500 mg 24 hr tablet Take 2 tablets (1,000 mg total) by mouth 2 (two) times a day 360 tablet 1   • valsartan (DIOVAN) 320 MG tablet Take 1 tablet (320 mg total) by mouth daily 90 tablet 1   • sildenafil (VIAGRA) 100 mg tablet Take 1 tablet (100 mg total) by mouth daily as needed for erectile dysfunction (Patient not taking: No sig reported) 10 tablet 0     No current facility-administered medications for this visit  No Known Allergies   Immunizations:     Immunization History   Administered Date(s) Administered   • COVID-19 PFIZER VACCINE 0 3 ML IM 03/12/2021, 04/05/2021, 02/05/2022, 02/05/2022   • INFLUENZA 09/08/2020   • Influenza, high dose seasonal 0 7 mL 10/22/2021, 10/10/2022   • Pneumococcal 10/22/2008   • Pneumococcal Conjugate 13-Valent 04/17/2017   • Pneumococcal Polysaccharide PPV23 12/19/2019   • Tdap 04/17/2017   • influenza, trivalent, adjuvanted 09/08/2020      Health Maintenance:         Topic Date Due   • Colorectal Cancer Screening  12/11/2025   • Hepatitis C Screening  Completed         Topic Date Due   • COVID-19 Vaccine (4 - Booster for [place] series) 04/02/2022      Medicare Screening Tests and Risk Assessments:     Jose R Simmons is here for his Subsequent Wellness visit  Health Risk Assessment:   Patient rates overall health as very good  Patient feels that their physical health rating is same  Patient is satisfied with their life  Eyesight was rated as same  Hearing was rated as same  Patient feels that their emotional and mental health rating is same  Patients states they are never, rarely angry  Patient states they are sometimes unusually tired/fatigued  Pain experienced in the last 7 days has been none   Patient states that he has experienced no weight loss or gain in last 6 months  Depression Screening:   PHQ-2 Score: 0      Fall Risk Screening: In the past year, patient has experienced: no history of falling in past year      Home Safety:  Patient has trouble with stairs inside or outside of their home  Patient has working smoke alarms and has working carbon monoxide detector  Home safety hazards include: none  Nutrition:   Current diet is Regular and Limited junk food  Medications:   Patient is not currently taking any over-the-counter supplements  Patient is able to manage medications  Activities of Daily Living (ADLs)/Instrumental Activities of Daily Living (IADLs):   Walk and transfer into and out of bed and chair?: Yes  Dress and groom yourself?: Yes    Bathe or shower yourself?: Yes    Feed yourself? Yes  Do your laundry/housekeeping?: Yes  Manage your money, pay your bills and track your expenses?: Yes  Make your own meals?: Yes    Do your own shopping?: Yes    Previous Hospitalizations:   Any hospitalizations or ED visits within the last 12 months?: No      Advance Care Planning:   Living will: No    Advanced directive: No    Five wishes given: Yes      PREVENTIVE SCREENINGS      Cardiovascular Screening:    General: Screening Not Indicated and History Lipid Disorder      Diabetes Screening:     General: Screening Not Indicated and History Diabetes      Colorectal Cancer Screening:     General: Screening Current      Abdominal Aortic Aneurysm (AAA) Screening:    Risk factors include: age between 73-69 yo and tobacco use        Lung Cancer Screening:     General: Screening Not Indicated      Hepatitis C Screening:    General: Screening Current    Screening, Brief Intervention, and Referral to Treatment (SBIRT)    Screening  Typical number of drinks in a day: 0  Typical number of drinks in a week: 0  Interpretation: Low risk drinking behavior      Single Item Drug Screening:  How often have you used an illegal drug (including marijuana) or a prescription medication for non-medical reasons in the past year? never    Single Item Drug Screen Score: 0  Interpretation: Negative screen for possible drug use disorder    Other Counseling Topics:   Car/seat belt/driving safety, skin self-exam, sunscreen and calcium and vitamin D intake and regular weightbearing exercise  No results found  Physical Exam:     /90 (BP Location: Left arm, Patient Position: Sitting, Cuff Size: Standard)   Pulse 84   Temp 98 3 °F (36 8 °C) (Temporal)   Ht 5' 6" (1 676 m)   Wt 64 kg (141 lb)   SpO2 96% Comment: ra  BMI 22 76 kg/m²     Physical Exam  Vitals and nursing note reviewed  Constitutional:       General: He is not in acute distress  Appearance: He is well-developed  HENT:      Head: Normocephalic and atraumatic  Mouth/Throat:      Mouth: Mucous membranes are moist    Eyes:      Conjunctiva/sclera: Conjunctivae normal    Cardiovascular:      Rate and Rhythm: Normal rate and regular rhythm  Heart sounds: No murmur heard  Pulmonary:      Effort: Pulmonary effort is normal  No respiratory distress  Breath sounds: Normal breath sounds  Abdominal:      General: Abdomen is flat  Palpations: Abdomen is soft  Tenderness: There is no abdominal tenderness  Musculoskeletal:         General: No swelling  Normal range of motion  Cervical back: Normal range of motion and neck supple  Skin:     General: Skin is warm and dry  Capillary Refill: Capillary refill takes less than 2 seconds  Neurological:      General: No focal deficit present  Mental Status: He is alert     Psychiatric:         Mood and Affect: Mood normal           Renata Mota MD

## 2023-01-18 ENCOUNTER — TELEPHONE (OUTPATIENT)
Dept: ADMINISTRATIVE | Facility: OTHER | Age: 73
End: 2023-01-18

## 2023-01-18 NOTE — TELEPHONE ENCOUNTER
Upon review of the In Basket request we were able to locate, review, and update the patient chart as requested for Diabetic Eye Exam     Any additional questions or concerns should be emailed to the Practice Liaisons via the appropriate education email address, please do not reply via In Basket      Thank you  Effie Bain MA

## 2023-02-02 ENCOUNTER — TELEPHONE (OUTPATIENT)
Dept: INTERNAL MEDICINE CLINIC | Facility: CLINIC | Age: 73
End: 2023-02-02

## 2023-02-02 NOTE — TELEPHONE ENCOUNTER
Patient wife called and stated her  Corrinne Loffler needs c-pap supplies head gear and mask for his machine can you please put into parachute for order his last office visit was 01/11/2023

## 2023-02-03 ENCOUNTER — TELEPHONE (OUTPATIENT)
Dept: ENDOCRINOLOGY | Facility: CLINIC | Age: 73
End: 2023-02-03

## 2023-02-03 LAB
DME PARACHUTE DELIVERY DATE REQUESTED: NORMAL
DME PARACHUTE ITEM DESCRIPTION: NORMAL
DME PARACHUTE ORDER STATUS: NORMAL
DME PARACHUTE SUPPLIER NAME: NORMAL
DME PARACHUTE SUPPLIER PHONE: NORMAL

## 2023-02-06 LAB
DME PARACHUTE DELIVERY DATE ACTUAL: NORMAL
DME PARACHUTE DELIVERY DATE REQUESTED: NORMAL
DME PARACHUTE ITEM DESCRIPTION: NORMAL
DME PARACHUTE ORDER STATUS: NORMAL
DME PARACHUTE SUPPLIER NAME: NORMAL
DME PARACHUTE SUPPLIER PHONE: NORMAL

## 2023-03-15 ENCOUNTER — NURSE TRIAGE (OUTPATIENT)
Dept: OTHER | Facility: OTHER | Age: 73
End: 2023-03-15

## 2023-03-16 NOTE — TELEPHONE ENCOUNTER
Regarding: syringes refill  ----- Message from Nahid Bush sent at 3/15/2023  8:40 PM EDT -----  "My  can not find his box of syringes and he needs it for his insulin "

## 2023-03-16 NOTE — TELEPHONE ENCOUNTER
Reason for Disposition  • Third attempt to contact caller AND no contact made  Phone number verified  (4 attempts made)      Protocols used: NO CONTACT OR DUPLICATE CONTACT CALL-ADULT-

## 2023-03-17 DIAGNOSIS — Z79.4 TYPE 2 DIABETES MELLITUS WITH HYPERGLYCEMIA, WITH LONG-TERM CURRENT USE OF INSULIN (HCC): ICD-10-CM

## 2023-03-17 DIAGNOSIS — E11.65 TYPE 2 DIABETES MELLITUS WITH HYPERGLYCEMIA, WITH LONG-TERM CURRENT USE OF INSULIN (HCC): ICD-10-CM

## 2023-03-22 ENCOUNTER — VBI (OUTPATIENT)
Dept: ADMINISTRATIVE | Facility: OTHER | Age: 73
End: 2023-03-22

## 2023-04-14 ENCOUNTER — OFFICE VISIT (OUTPATIENT)
Dept: INTERNAL MEDICINE CLINIC | Facility: CLINIC | Age: 73
End: 2023-04-14
Payer: COMMERCIAL

## 2023-04-14 VITALS
WEIGHT: 138 LBS | BODY MASS INDEX: 22.18 KG/M2 | OXYGEN SATURATION: 95 % | HEART RATE: 75 BPM | SYSTOLIC BLOOD PRESSURE: 149 MMHG | TEMPERATURE: 98.6 F | HEIGHT: 66 IN | DIASTOLIC BLOOD PRESSURE: 83 MMHG

## 2023-04-14 DIAGNOSIS — Z00.00 MEDICARE ANNUAL WELLNESS VISIT, SUBSEQUENT: ICD-10-CM

## 2023-04-14 DIAGNOSIS — Z79.4 TYPE 2 DIABETES MELLITUS WITH HYPERGLYCEMIA, WITH LONG-TERM CURRENT USE OF INSULIN (HCC): Primary | ICD-10-CM

## 2023-04-14 DIAGNOSIS — Z99.89 OBSTRUCTIVE SLEEP APNEA ON CPAP: ICD-10-CM

## 2023-04-14 DIAGNOSIS — I10 ESSENTIAL HYPERTENSION: ICD-10-CM

## 2023-04-14 DIAGNOSIS — G47.33 OBSTRUCTIVE SLEEP APNEA ON CPAP: ICD-10-CM

## 2023-04-14 DIAGNOSIS — E78.2 MIXED HYPERLIPIDEMIA: ICD-10-CM

## 2023-04-14 DIAGNOSIS — E11.65 TYPE 2 DIABETES MELLITUS WITH HYPERGLYCEMIA, WITH LONG-TERM CURRENT USE OF INSULIN (HCC): Primary | ICD-10-CM

## 2023-04-14 PROCEDURE — 99214 OFFICE O/P EST MOD 30 MIN: CPT | Performed by: INTERNAL MEDICINE

## 2023-04-14 PROCEDURE — G0439 PPPS, SUBSEQ VISIT: HCPCS | Performed by: INTERNAL MEDICINE

## 2023-04-14 NOTE — PROGRESS NOTES
Assessment and Plan:     Problem List Items Addressed This Visit        Endocrine    Type 2 diabetes mellitus with hyperglycemia, with long-term current use of insulin ([facility] [address] ) - Primary    Relevant Orders    Ambulatory referral to Diabetic Education - use to refer for diabetes group classes, individual diabetes education, medical nutrition therapy, device training       Respiratory    Obstructive sleep apnea on CPAP       Cardiovascular and Mediastinum    Essential hypertension       Other    Mixed hyperlipidemia   Other Visit Diagnoses     Medicare annual wellness visit, subsequent                1  Type 2 diabetes mellitus with hyperglycemia, with long-term current use of insulin ([facility] [address] )  Ambulatory referral to Diabetic Education - use to refer for diabetes group classes, individual diabetes education, medical nutrition therapy, device training    This is not well controlled and he is not very compliant with his meds  2  Essential hypertension      Continue amlodipine  3  Mixed hyperlipidemia        4  Medicare annual wellness visit, subsequent        5  Obstructive sleep apnea on CPAP            Preventive health issues were discussed with patient, and age appropriate screening tests were ordered as noted in patient's After Visit Summary  Personalized health advice and appropriate referrals for health education or preventive services given if needed, as noted in patient's After Visit Summary  History of Present Illness:     Patient presents for a Medicare Wellness Visit    This is a 78-year-old gentleman with a history of poorly controlled diabetes mellitus  He denies any chest pain or shortness of breath       Patient Care Team:  Roge Muhammad MD as PCP - General (Internal Medicine)  [address] MD as PCP - Endocrinology (Endocrinology)  [address] MD (Endocrinology)     Review of Systems:     Review of Systems   Constitutional: Negative for appetite change, chills, fatigue and fever    HENT: Negative for sore throat and trouble swallowing  Eyes: Negative for redness  Respiratory: Negative for shortness of breath  Cardiovascular: Negative for chest pain and palpitations  Gastrointestinal: Negative for abdominal pain, constipation and diarrhea  Genitourinary: Negative for dysuria and hematuria  Musculoskeletal: Negative for back pain and neck pain  Skin: Negative for rash  Neurological: Negative for seizures, weakness and headaches  Hematological: Negative for adenopathy  Psychiatric/Behavioral: Negative for confusion  The patient is not nervous/anxious           Problem List:     Patient Active Problem List   Diagnosis   • Type 2 diabetes mellitus with hyperglycemia, with long-term current use of insulin (HCC)   • Essential hypertension   • Hyperkalemia   • Mixed hyperlipidemia   • Obstructive sleep apnea on CPAP   • Vertebral artery stenosis, bilateral   • Intractable vomiting      Past Medical and Surgical History:     Past Medical History:   Diagnosis Date   • COVID-19 07/2022   • Diabetes mellitus ([facility] [address] )     Type 2   • Dyslipidemia    • Hypertension    • Obstructive sleep apnea on CPAP      Past Surgical History:   Procedure Laterality Date   • COLONOSCOPY  2008      Family History:     Family History   Problem Relation Age of Onset   • Hypertension Mother       Social History:     Social History     Socioeconomic History   • Marital status: /Civil Union     Spouse name: None   • Number of children: None   • Years of education: None   • Highest education level: None   Occupational History   • None   Tobacco Use   • Smoking status: Former   • Smokeless tobacco: Never   Vaping Use   • Vaping Use: Never used   Substance and Sexual Activity   • Alcohol use: Not Currently   • Drug use: Never   • Sexual activity: None   Other Topics Concern   • None   Social History Narrative   • None     Social Determinants of Health     Financial Resource Strain: Low Risk "(4/14/2023)    Overall Financial Resource Strain (CARDIA)    • Difficulty of Paying Living Expenses: Not hard at all   Food Insecurity: Not on file   Transportation Needs: No Transportation Needs (4/14/2023)    PRAPARE - Transportation    • Lack of Transportation (Medical): No    • Lack of Transportation (Non-Medical): No   Physical Activity: Not on file   Stress: Not on file   Social Connections: Not on file   Intimate Partner Violence: Not on file   Housing Stability: Not on file      Medications and Allergies:     Current Outpatient Medications   Medication Sig Dispense Refill   • amLODIPine (NORVASC) 10 mg tablet Take 1 tablet (10 mg total) by mouth daily Use as directed 90 tablet 1   • aspirin 81 mg chewable tablet Chew 1 tablet (81 mg total) daily 90 tablet 0   • atorvastatin (LIPITOR) 40 mg tablet Take 1 tablet (40 mg total) by mouth daily at bedtime 90 tablet 1   • Empagliflozin (Jardiance) 25 MG TABS Take 1 tablet (25 mg total) by mouth every morning 90 tablet 1   • glucose blood (FreeStyle InsuLinx Test) test strip Use 1 each 2 (two) times a day 200 each 1   • insulin glargine (Lantus) 100 units/mL subcutaneous injection Inject 25 Units under the skin daily at bedtime 20 mL 1   • Insulin Syringe-Needle U-100 30G X 1/2\" 0 3 ML MISC Use daily Use daily box of 100 BD ultrafine needles 30g 100 each 3   • metFORMIN (GLUCOPHAGE-XR) 500 mg 24 hr tablet Take 2 tablets (1,000 mg total) by mouth 2 (two) times a day 360 tablet 1   • valsartan (DIOVAN) 320 MG tablet Take 1 tablet (320 mg total) by mouth daily 90 tablet 1   • glimepiride (AMARYL) 2 mg tablet Take 1 tablet (2 mg total) by mouth 2 (two) times a day with meals 180 tablet 1   • sildenafil (VIAGRA) 100 mg tablet Take 1 tablet (100 mg total) by mouth daily as needed for erectile dysfunction (Patient not taking: No sig reported) 10 tablet 0     No current facility-administered medications for this visit       No Known Allergies   Immunizations: " Immunization History   Administered Date(s) Administered   • COVID-19 PFIZER VACCINE 0 3 ML IM 03/12/2021, 04/05/2021, 02/05/2022, 02/05/2022   • INFLUENZA 09/08/2020   • Influenza, high dose seasonal 0 7 mL 10/22/2021, 10/10/2022   • Pneumococcal 10/22/2008   • Pneumococcal Conjugate 13-Valent 04/17/2017   • Pneumococcal Polysaccharide PPV23 12/19/2019   • Tdap 04/17/2017   • influenza, trivalent, adjuvanted 09/08/2020      Health Maintenance:         Topic Date Due   • Colorectal Cancer Screening  12/11/2025   • Hepatitis C Screening  Completed         Topic Date Due   • COVID-19 Vaccine (5 - Pfizer series) 04/02/2022      Medicare Screening Tests and Risk Assessments:     Kiki Dorado is here for his Subsequent Wellness visit  Last Medicare Wellness visit information reviewed, patient interviewed and updates made to the record today  Health Risk Assessment:   Patient rates overall health as very good  Patient feels that their physical health rating is slightly worse  Patient is very satisfied with their life  Eyesight was rated as same  Hearing was rated as same  Patient feels that their emotional and mental health rating is same  Patients states they are never, rarely angry  Patient states they are often unusually tired/fatigued  Pain experienced in the last 7 days has been none  Patient states that he has experienced no weight loss or gain in last 6 months  Fall Risk Screening: In the past year, patient has experienced: no history of falling in past year      Home Safety:  Patient does not have trouble with stairs inside or outside of their home  Patient has working smoke alarms and has working carbon monoxide detector  Home safety hazards include: none  Nutrition:   Current diet is Diabetic and Limited junk food  Medications:   Patient is currently taking over-the-counter supplements  OTC medications include: see medication list  Patient is able to manage medications       Activities of Daily "Living (ADLs)/Instrumental Activities of Daily Living (IADLs):   Walk and transfer into and out of bed and chair?: Yes  Dress and groom yourself?: Yes    Bathe or shower yourself?: Yes    Feed yourself? Yes  Do your laundry/housekeeping?: Yes  Manage your money, pay your bills and track your expenses?: Yes  Make your own meals?: Yes    Do your own shopping?: Yes    Previous Hospitalizations:   Any hospitalizations or ED visits within the last 12 months?: No      Advance Care Planning:   Living will: No    Durable POA for healthcare: No    Advanced directive: No    Five wishes given: Yes      PREVENTIVE SCREENINGS      Cardiovascular Screening:    General: Screening Not Indicated and History Lipid Disorder      Diabetes Screening:     General: Screening Not Indicated and History Diabetes      Colorectal Cancer Screening:     General: Screening Current      Abdominal Aortic Aneurysm (AAA) Screening:    Risk factors include: age between 73-67 yo and tobacco use        Lung Cancer Screening:     General: Screening Not Indicated      Hepatitis C Screening:    General: Screening Current    Screening, Brief Intervention, and Referral to Treatment (SBIRT)    Screening  Typical number of drinks in a day: 0  Typical number of drinks in a week: 0  Interpretation: Low risk drinking behavior  Single Item Drug Screening:  How often have you used an illegal drug (including marijuana) or a prescription medication for non-medical reasons in the past year? never    Single Item Drug Screen Score: 0  Interpretation: Negative screen for possible drug use disorder    Other Counseling Topics:   Car/seat belt/driving safety, skin self-exam, sunscreen and calcium and vitamin D intake and regular weightbearing exercise  No results found       Physical Exam:     /83 (BP Location: Left arm, Patient Position: Sitting, Cuff Size: Large)   Pulse 75   Temp 98 6 °F (37 °C) (Temporal)   Ht 5' 6\" (1 676 m)   Wt 62 6 kg (138 lb)   " SpO2 95%   BMI 22 27 kg/m²     Physical Exam  Vitals and nursing note reviewed  Constitutional:       General: He is not in acute distress  Appearance: He is well-developed  HENT:      Head: Normocephalic and atraumatic  Eyes:      Conjunctiva/sclera: Conjunctivae normal    Cardiovascular:      Rate and Rhythm: Normal rate and regular rhythm  Heart sounds: No murmur heard  Pulmonary:      Effort: Pulmonary effort is normal  No respiratory distress  Breath sounds: Normal breath sounds  Abdominal:      Palpations: Abdomen is soft  Tenderness: There is no abdominal tenderness  Musculoskeletal:         General: No swelling  Cervical back: Normal range of motion and neck supple  Skin:     General: Skin is warm and dry  Capillary Refill: Capillary refill takes less than 2 seconds  Neurological:      Mental Status: He is alert     Psychiatric:         Mood and Affect: Mood normal           Randy Figueroa MD

## 2023-05-05 DIAGNOSIS — I10 ESSENTIAL HYPERTENSION: ICD-10-CM

## 2023-05-05 RX ORDER — VALSARTAN 320 MG/1
320 TABLET ORAL DAILY
Qty: 90 TABLET | Refills: 1 | Status: SHIPPED | OUTPATIENT
Start: 2023-05-05

## 2023-05-18 ENCOUNTER — VBI (OUTPATIENT)
Dept: ADMINISTRATIVE | Facility: OTHER | Age: 73
End: 2023-05-18

## 2023-05-25 ENCOUNTER — TELEPHONE (OUTPATIENT)
Dept: ENDOCRINOLOGY | Facility: CLINIC | Age: 73
End: 2023-05-25

## 2023-05-25 NOTE — TELEPHONE ENCOUNTER
Note to chart:    Pt called today and wanted to know about labs Dr Opal Mena wanted him to do in about 1 1/2 mos, instead of 3 mos  Pt said Dr Opal Mena said he wanted to see those results sooner this time  Pt found the orders in mychart and will get the labs done

## 2023-05-27 ENCOUNTER — APPOINTMENT (OUTPATIENT)
Dept: LAB | Age: 73
End: 2023-05-27

## 2023-05-27 DIAGNOSIS — Z79.4 TYPE 2 DIABETES MELLITUS WITH HYPERGLYCEMIA, WITH LONG-TERM CURRENT USE OF INSULIN (HCC): ICD-10-CM

## 2023-05-27 DIAGNOSIS — E11.65 TYPE 2 DIABETES MELLITUS WITH HYPERGLYCEMIA, WITH LONG-TERM CURRENT USE OF INSULIN (HCC): ICD-10-CM

## 2023-05-27 LAB
CREAT UR-MCNC: 100 MG/DL
EST. AVERAGE GLUCOSE BLD GHB EST-MCNC: 220 MG/DL
HBA1C MFR BLD: 9.3 %
MICROALBUMIN UR-MCNC: 232 MG/L (ref 0–20)
MICROALBUMIN/CREAT 24H UR: 232 MG/G CREATININE (ref 0–30)

## 2023-05-28 LAB — FRUCTOSAMINE SERPL-SCNC: 343 UMOL/L (ref 0–285)

## 2023-06-29 NOTE — PATIENT INSTRUCTIONS
Type 2 Diabetes Management for Adults   AMBULATORY CARE:   Type 2 diabetes  is a disease that affects how your body uses glucose (sugar)  Either your body cannot make enough insulin, or it cannot use the insulin correctly  It is important to keep diabetes controlled to prevent damage to your heart, blood vessels, and other organs  Management will help you feel well and enjoy your daily activities  Your diabetes care team providers can help you make a plan to fit diabetes care into your schedule  Your plan can change over time to fit your needs and your family's needs  Have someone call your local emergency number (911 in the [address]) if:   • You cannot be woken  • You have signs of diabetic ketoacidosis:     ? confusion, fatigue    ? vomiting    ? rapid heartbeat    ? fruity smelling breath    ? extreme thirst    ? dry mouth and skin    • You have any of the following signs of a heart attack:      ? Squeezing, pressure, or pain in your chest    ? You may  also have any of the following:     - Discomfort or pain in your back, neck, jaw, stomach, or arm    - Shortness of breath    - Nausea or vomiting    - Lightheadedness or a sudden cold sweat    • You have any of the following signs of a stroke:      ? Numbness or drooping on one side of your face     ? Weakness in an arm or leg    ? Confusion or difficulty speaking    ? Dizziness, a severe headache, or vision loss    Call your doctor or diabetes care team provider if:   • You have a sore or wound that will not heal     • You have a change in the amount you urinate  • Your blood sugar levels are higher than your target goals  • You often have lower blood sugar levels than your target goals  • Your skin is red, dry, warm, or swollen  • You have trouble coping with diabetes, or you feel anxious or depressed  • You have questions or concerns about your condition or care      What you need to know about high blood sugar levels:  High blood sugar levels may not cause any symptoms  You may feel more thirsty or urinate more often than usual  Over time, high blood sugar levels can damage your nerves, blood vessels, tissues, and organs  The following can increase your blood sugar levels:  • Large meals or large amounts of carbohydrates at one time    • Less physical activity    • Stress    • Illness    • A lower dose of diabetes medicine or insulin, or a late dose    What you need to know about low blood sugar levels:  Symptoms include feeling shaky, dizzy, irritable, or confused  You can prevent symptoms by keeping your blood sugar levels from going too low  • Treat a low blood sugar level right away:      ? Drink 4 ounces of juice or have 1 tube of glucose gel  ? Check your blood sugar level again 10 to 15 minutes later  ? When the level goes back to normal, eat a meal or snack to prevent another decrease  • Keep glucose gel, raisins, or hard candy with you at all times to treat a low blood sugar level  • Your blood sugar level can get too low if you take diabetes medicine or insulin and do not eat enough food  • If you use insulin, check your blood sugar level before you exercise  ? If your blood sugar level is below 100 mg/dL, eat 4 crackers or 2 ounces of raisins, or drink 4 ounces of juice  ? Check your level every 30 minutes if you exercise longer than 1 hour  ? You may need a snack during or after exercise  What you can do to manage your blood sugar levels:   • Check your blood sugar levels as directed and as needed  Several items are available to use to check your levels  You may need to check by testing a drop of blood in a glucose monitor  You may instead be given a continuous glucose monitoring (CGM) device  The device is worn at all times  The CGM checks your blood sugar level every 5 minutes  It sends results to an electronic device such as a smart phone  A CGM can be used with or without an insulin pump   You and your diabetes care team providers will decide on the best method for you  The goal for blood sugar levels before meals  is between 80 and 130 mg/dL and 2 hours after eating  is lower than 180 mg/dL  • Make healthy food choices  Work with a dietitian to develop a meal plan that works for you and your schedule  A dietitian can help you learn how to eat the right amount of carbohydrates during your meals and snacks  Carbohydrates can raise your blood sugar level if you eat too many at one time  Examples of foods that contain carbohydrates are breads, cereals, rice, pasta, and sweets  • Eat high-fiber foods as directed  Fiber helps improve blood sugar levels  Fiber also lowers your risk for heart disease and other problems diabetes can cause  Examples of high-fiber foods include vegetables, whole-grain bread, and beans such as steen beans  Your dietitian can tell you how much fiber to have each day  • Get regular physical activity  Physical activity can help you get to your target blood sugar level goal and manage your weight  Get at least 150 minutes of moderate to vigorous aerobic physical activity each week  Do not miss more than 2 days in a row  Do not sit longer than 30 minutes at a time  Your healthcare provider can help you create an activity plan  The plan can include the best activities for you and can help you build your strength and endurance  • Maintain a healthy weight  Ask your team what a healthy weight is for you  A healthy weight can help you control diabetes and prevent heart disease  Ask your team to help you create a weight loss plan, if needed  Weight loss can help make a difference in managing diabetes  Your team will help you set a weight-loss goal, such as 10 to 15 pounds, or 5% of your extra weight  Together you and your team can set manageable weight loss goals  • Take your diabetes medicine or insulin as directed    You may need diabetes medicine, insulin, or both to help control your blood sugar levels  Your healthcare provider will teach you how and when to take your diabetes medicine or insulin  You will also be taught about side effects oral diabetes medicine can cause  Insulin may be injected or given through a pump or pen  You and your providers will decide on the best method for you:    ? An insulin pump  is an implanted device that gives your insulin 24 hours a day  An insulin pump prevents the need for multiple insulin injections in a day  ? An insulin pen  is a device prefilled with the right amount of insulin  ? You and your family members will be taught how to draw up and give insulin  if this is the best method for you  Your providers will also teach you how to dispose of needles and syringes  ? You will learn how much insulin you need  and when to give it  You will be taught when not to give insulin  You will also be taught what to do if your blood sugar level drops too low  This may happen if you take insulin and do not eat the right amount of carbohydrates  More ways to manage type 2 diabetes:   • Wear medical alert identification  Wear medical alert jewelry or carry a card that says you have diabetes  Ask your provider where to get these items  • Do not smoke  Nicotine and other chemicals in cigarettes and cigars can cause lung and blood vessel damage  It also makes it more difficult to manage your diabetes  Ask your provider for information if you currently smoke and need help to quit  Do not use e-cigarettes or smokeless tobacco in place of cigarettes or to help you quit  They still contain nicotine  • Check your feet each day for cuts, scratches, calluses, or other wounds  Look for redness and swelling, and feel for warmth  Wear shoes that fit well  Check your shoes for rocks or other objects that can hurt your feet  Do not walk barefoot or wear shoes without socks   Wear cotton socks to help keep your feet dry  • Ask about vaccines you may need  You have a higher risk for serious illness if you get the flu, pneumonia, COVID-19, or hepatitis  Ask your provider if you should get vaccines to prevent these or other diseases, and when to get the vaccines  • Talk to your provider if you become stressed about diabetes care  Sometimes being able to fit diabetes care into your life can cause increased stress  The stress can cause you not to take care of yourself properly  Your care team providers can help by offering tips about self-care  Your providers may suggest you talk to a mental health provider who can listen and offer help with self-care issues  • Have your A1c checked as directed  Your provider may check your A1c every 3 months, or 2 times each year if your diabetes is controlled  An A1c test shows the average amount of sugar in your blood over the past 2 to 3 months  Your provider will tell you what your A1c level should be  • Have screening tests as directed  Your provider may recommend screening for complications of diabetes and other conditions that may develop  Some screenings may begin right away and some may happen within the first 5 years of diagnosis:    ? Examples of diabetes complications  include kidney problems, high cholesterol, high blood pressure, blood vessel problems, eye problems, and sleep apnea  ? You may be screened for a low vitamin B level  if you take oral diabetes medicine for a long time  ? Women of childbearing years may be screened  for polycystic ovarian syndrome (PCOS)  Follow up with your doctor or diabetes care team providers as directed: You may need to have blood tests done before your follow-up visit  The test results will show if changes need to be made in your treatment or self-care  Talk to your provider if you cannot afford your medicine  Write down your questions so you remember to ask them during your visits    © Copyright [employer] 2022 Information is for End User's use only and may not be sold, redistributed or otherwise used for commercial purposes  The above information is an  only  It is not intended as medical advice for individual conditions or treatments  Talk to your doctor, nurse or pharmacist before following any medical regimen to see if it is safe and effective for you

## 2023-07-07 DIAGNOSIS — Z79.4 TYPE 2 DIABETES MELLITUS WITH HYPERGLYCEMIA, WITH LONG-TERM CURRENT USE OF INSULIN (HCC): ICD-10-CM

## 2023-07-07 DIAGNOSIS — E11.65 TYPE 2 DIABETES MELLITUS WITH HYPERGLYCEMIA, WITH LONG-TERM CURRENT USE OF INSULIN (HCC): ICD-10-CM

## 2023-07-07 RX ORDER — ATORVASTATIN CALCIUM 40 MG/1
40 TABLET, FILM COATED ORAL
Qty: 90 TABLET | Refills: 1 | Status: SHIPPED | OUTPATIENT
Start: 2023-07-07

## 2023-07-31 ENCOUNTER — OFFICE VISIT (OUTPATIENT)
Dept: URGENT CARE | Age: 73
End: 2023-07-31
Payer: COMMERCIAL

## 2023-07-31 ENCOUNTER — APPOINTMENT (OUTPATIENT)
Dept: RADIOLOGY | Age: 73
End: 2023-07-31
Payer: COMMERCIAL

## 2023-07-31 VITALS
TEMPERATURE: 97.9 F | SYSTOLIC BLOOD PRESSURE: 173 MMHG | WEIGHT: 155 LBS | OXYGEN SATURATION: 96 % | RESPIRATION RATE: 18 BRPM | DIASTOLIC BLOOD PRESSURE: 83 MMHG | HEART RATE: 88 BPM | BODY MASS INDEX: 25.02 KG/M2

## 2023-07-31 DIAGNOSIS — R06.02 SHORTNESS OF BREATH: ICD-10-CM

## 2023-07-31 DIAGNOSIS — R06.02 SHORTNESS OF BREATH: Primary | ICD-10-CM

## 2023-07-31 PROCEDURE — 99213 OFFICE O/P EST LOW 20 MIN: CPT | Performed by: NURSE PRACTITIONER

## 2023-07-31 PROCEDURE — 71046 X-RAY EXAM CHEST 2 VIEWS: CPT

## 2023-07-31 RX ORDER — PREDNISONE 20 MG/1
20 TABLET ORAL 2 TIMES DAILY WITH MEALS
Qty: 10 TABLET | Refills: 0 | Status: SHIPPED | OUTPATIENT
Start: 2023-07-31 | End: 2023-08-05

## 2023-07-31 NOTE — PROGRESS NOTES
[facility]        NAME: Molly Mcgregor is a 68 y.o. male  : 1950    MRN: [id number]  DATE: 2023  TIME: 2:26 PM    Assessment and Plan   Shortness of breath [R06.02]  1. Shortness of breath  XR chest pa & lateral    predniSONE 20 mg tablet            Patient Instructions     Normal chest x-rays  Mucinex OTC BID prn for pain  Follow up with PCP in 3-5 days. Proceed to  ER if symptoms worsen. Chief Complaint     Chief Complaint   Patient presents with   • Cough   • Shortness of Breath   • Wheezing     Cough, shortness and when he is taking a inhaling and exhaling he started wheezing it's been 5 days. History of Present Illness       HPI   Presents with cold symptoms for about 5 days. States he has been coughing and having some shortness of breath. Initially was only on inhalation now it is on inhalation and exhalation. States he feels like there is fluid in his lungs. Denies asthma or COPD. Reports history of hypertension and diabetes. Intermittent wheezing. Symptoms are worse when lying down. Denies any cardiac conditions. Last EGFR in 2023 was 63 ml/min/1.73/sq. his A1c has been running around 10%. BP is elevated. Oxygen sat is 96% room air    Review of Systems   Review of Systems   Constitutional: Negative for fever. HENT: Positive for congestion. Respiratory: Positive for cough, shortness of breath and wheezing. Cardiovascular: Negative for chest pain. Gastrointestinal: Negative for abdominal pain, nausea and vomiting. Neurological: Negative for light-headedness.          Current Medications       Current Outpatient Medications:   •  Empagliflozin (Jardiance) 25 MG TABS, Take 1 tablet (25 mg total) by mouth every morning, Disp: 90 tablet, Rfl: 1  •  predniSONE 20 mg tablet, Take 1 tablet (20 mg total) by mouth 2 (two) times a day with meals for 5 days, Disp: 10 tablet, Rfl: 0  •  valsartan (DIOVAN) 320 MG tablet, Take 1 tablet (320 mg total) by mouth daily, Disp: 90 tablet, Rfl: 1  •  amLODIPine (NORVASC) 10 mg tablet, Take 1 tablet (10 mg total) by mouth daily Use as directed, Disp: 90 tablet, Rfl: 1  •  aspirin 81 mg chewable tablet, Chew 1 tablet (81 mg total) daily, Disp: 90 tablet, Rfl: 0  •  atorvastatin (LIPITOR) 40 mg tablet, Take 1 tablet (40 mg total) by mouth daily at bedtime, Disp: 90 tablet, Rfl: 1  •  glimepiride (AMARYL) 2 mg tablet, Take 1 tablet (2 mg total) by mouth 2 (two) times a day with meals, Disp: 180 tablet, Rfl: 1  •  glucose blood (FreeStyle InsuLinx Test) test strip, Use 1 each 2 (two) times a day, Disp: 200 each, Rfl: 1  •  insulin glargine (Lantus) 100 units/mL subcutaneous injection, Inject 25 Units under the skin daily at bedtime, Disp: 20 mL, Rfl: 1  •  Insulin Syringe-Needle U-100 30G X 1/2" 0.3 ML MISC, Use daily Use daily box of 100 BD ultrafine needles 30g, Disp: 100 each, Rfl: 3  •  metFORMIN (GLUCOPHAGE-XR) 500 mg 24 hr tablet, Take 2 tablets (1,000 mg total) by mouth 2 (two) times a day, Disp: 360 tablet, Rfl: 1  •  sildenafil (VIAGRA) 100 mg tablet, Take 1 tablet (100 mg total) by mouth daily as needed for erectile dysfunction (Patient not taking: No sig reported), Disp: 10 tablet, Rfl: 0    Current Allergies     Allergies as of 07/31/2023   • (No Known Allergies)            The following portions of the patient's history were reviewed and updated as appropriate: allergies, current medications, past family history, past medical history, past social history, past surgical history and problem list.     Past Medical History:   Diagnosis Date   • COVID-19 07/2022   • Diabetes mellitus ([address])     Type 2   • Dyslipidemia    • Hypertension    • Obstructive sleep apnea on CPAP        Past Surgical History:   Procedure Laterality Date   • COLONOSCOPY  2008       Family History   Problem Relation Age of Onset   • Hypertension Mother          Medications have been verified.         Objective   BP (!) 173/83   Pulse 88   Temp 97.9 °F (36.6 °C) (Temporal)   Resp 18   Wt 70.3 kg (155 lb)   SpO2 96%   BMI 25.02 kg/m²   No LMP for male patient. Physical Exam     Physical Exam  Constitutional:       General: He is not in acute distress. Cardiovascular:      Rate and Rhythm: Regular rhythm. Heart sounds: Normal heart sounds. Pulmonary:      Breath sounds: Rales present. No wheezing.       Comments: Severe congestion in the bilateral lung fields, especially at the lower lobes

## 2023-08-01 ENCOUNTER — TELEMEDICINE (OUTPATIENT)
Dept: INTERNAL MEDICINE CLINIC | Facility: CLINIC | Age: 73
End: 2023-08-01
Payer: COMMERCIAL

## 2023-08-01 VITALS — HEIGHT: 66 IN | WEIGHT: 155 LBS | BODY MASS INDEX: 24.91 KG/M2

## 2023-08-01 DIAGNOSIS — J06.9 UPPER RESPIRATORY TRACT INFECTION, UNSPECIFIED TYPE: Primary | ICD-10-CM

## 2023-08-01 PROCEDURE — 99213 OFFICE O/P EST LOW 20 MIN: CPT | Performed by: INTERNAL MEDICINE

## 2023-08-01 NOTE — PROGRESS NOTES
Virtual Regular Visit    Verification of patient location:    Patient is located at Home in the following state in which I hold an active license [place]      Assessment/Plan:    Problem List Items Addressed This Visit    None  Visit Diagnoses     Upper respiratory tract infection, unspecified type    -  Primary        Patient reports few days of cough, congestion and rhinorrhea, reports wheezing, and a sensation of fluid movement or deep inspiration for the last few days. Denies any expectoration fever chills nausea vomiting diarrhea chest pain or shortness of breath. Chest x-ray in the urgent care done yesterday was negative, patient was prescribed short-term steroids and notes her symptoms are improving. Recommend patient to come to the office for examination, patient wants to defer since he seems to be improving. Follow-up as needed       Reason for visit is   Chief Complaint   Patient presents with   • Follow-up     not feeling well, follow up from urgent care, fluid in both lungs        Encounter provider Juana Obando MD    Provider located at [address] [place] [address]  [address]  [address]  [facility] [id number] [phone]      Recent Visits  No visits were found meeting these conditions. Showing recent visits within past 7 days and meeting all other requirements  Today's Visits  Date Type Provider Dept   08/01/23 Telemedicine Juana Obando MD [address] [place] [address]   Showing today's visits and meeting all other requirements  Future Appointments  No visits were found meeting these conditions. Showing future appointments within next 150 days and meeting all other requirements       The patient was identified by name and date of birth. Rahul Jones was informed that this is a telemedicine visit and that the visit is being conducted through the [address] [place] [address] platform. He agrees to proceed. .  My office door was closed. No one else was in the room.   He acknowledged consent and understanding of privacy and security of the video platform. The patient has agreed to participate and understands they can discontinue the visit at any time. Patient is aware this is a billable service. Mare Richardson is a 68 y.o. male  .       HPI     Past Medical History:   Diagnosis Date   • COVID-19 07/2022   • Diabetes mellitus ([address])     Type 2   • Dyslipidemia    • Hypertension    • Obstructive sleep apnea on CPAP        Past Surgical History:   Procedure Laterality Date   • COLONOSCOPY  2008       Current Outpatient Medications   Medication Sig Dispense Refill   • amLODIPine (NORVASC) 10 mg tablet Take 1 tablet (10 mg total) by mouth daily Use as directed 90 tablet 1   • atorvastatin (LIPITOR) 40 mg tablet Take 1 tablet (40 mg total) by mouth daily at bedtime 90 tablet 1   • Empagliflozin (Jardiance) 25 MG TABS Take 1 tablet (25 mg total) by mouth every morning 90 tablet 1   • glimepiride (AMARYL) 2 mg tablet Take 1 tablet (2 mg total) by mouth 2 (two) times a day with meals 180 tablet 1   • glucose blood (FreeStyle InsuLinx Test) test strip Use 1 each 2 (two) times a day 200 each 1   • insulin glargine (Lantus) 100 units/mL subcutaneous injection Inject 25 Units under the skin daily at bedtime 20 mL 1   • Insulin Syringe-Needle U-100 30G X 1/2" 0.3 ML MISC Use daily Use daily box of 100 BD ultrafine needles 30g 100 each 3   • metFORMIN (GLUCOPHAGE-XR) 500 mg 24 hr tablet Take 2 tablets (1,000 mg total) by mouth 2 (two) times a day 360 tablet 1   • predniSONE 20 mg tablet Take 1 tablet (20 mg total) by mouth 2 (two) times a day with meals for 5 days 10 tablet 0   • valsartan (DIOVAN) 320 MG tablet Take 1 tablet (320 mg total) by mouth daily 90 tablet 1   • aspirin 81 mg chewable tablet Chew 1 tablet (81 mg total) daily (Patient not taking: Reported on 8/1/2023) 90 tablet 0   • sildenafil (VIAGRA) 100 mg tablet Take 1 tablet (100 mg total) by mouth daily as needed for erectile dysfunction (Patient not taking: Reported on 7/20/2022) 10 tablet 0     No current facility-administered medications for this visit. No Known Allergies    Review of Systems   Constitutional: Negative for activity change, appetite change, chills, diaphoresis, fatigue, fever and unexpected weight change. HENT: Positive for congestion and rhinorrhea. Negative for drooling, ear discharge, ear pain, facial swelling, hearing loss, postnasal drip, sinus pressure, sinus pain, sneezing, sore throat, tinnitus, trouble swallowing and voice change. Eyes: Negative for discharge. Respiratory: Positive for cough and wheezing. Negative for apnea, choking, chest tightness, shortness of breath and stridor. Cardiovascular: Negative for chest pain, palpitations and leg swelling. Gastrointestinal: Negative for abdominal distention, abdominal pain, blood in stool, constipation, diarrhea, nausea, rectal pain and vomiting. Genitourinary: Negative for dysuria, flank pain, frequency and urgency. Musculoskeletal: Negative for arthralgias, back pain, gait problem, joint swelling and myalgias. Skin: Negative for color change, pallor and rash. Neurological: Negative for dizziness and headaches. Video Exam    Vitals:    08/01/23 0915   Weight: 70.3 kg (155 lb)   Height: 5' 6" (1.676 m)       Physical Exam  Constitutional:       General: He is not in acute distress. Appearance: Normal appearance. He is not ill-appearing, toxic-appearing or diaphoretic. Neurological:      Mental Status: He is alert.           Visit Time  Total Visit Duration: 15

## 2023-08-04 ENCOUNTER — RA CDI HCC (OUTPATIENT)
Dept: OTHER | Facility: HOSPITAL | Age: 73
End: 2023-08-04

## 2023-08-04 NOTE — PROGRESS NOTES
[address] [facility] coding opportunities          Chart Reviewed number of suggestions sent to Provider: 3   E11.29  W15.2704  E11.22  Patients Insurance        Commercial Insurance: [employer]

## 2023-08-09 ENCOUNTER — TELEPHONE (OUTPATIENT)
Dept: INTERNAL MEDICINE CLINIC | Facility: CLINIC | Age: 73
End: 2023-08-09

## 2023-08-18 ENCOUNTER — OFFICE VISIT (OUTPATIENT)
Dept: INTERNAL MEDICINE CLINIC | Facility: CLINIC | Age: 73
End: 2023-08-18
Payer: COMMERCIAL

## 2023-08-18 VITALS
OXYGEN SATURATION: 96 % | BODY MASS INDEX: 22.24 KG/M2 | HEART RATE: 87 BPM | HEIGHT: 66 IN | SYSTOLIC BLOOD PRESSURE: 130 MMHG | TEMPERATURE: 97.6 F | WEIGHT: 138.4 LBS | DIASTOLIC BLOOD PRESSURE: 62 MMHG

## 2023-08-18 DIAGNOSIS — Z79.4 TYPE 2 DIABETES MELLITUS WITH HYPERGLYCEMIA, WITH LONG-TERM CURRENT USE OF INSULIN (HCC): Primary | ICD-10-CM

## 2023-08-18 DIAGNOSIS — R53.1 WEAKNESS: ICD-10-CM
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
## 2023-08-18 DIAGNOSIS — E11.65 TYPE 2 DIABETES MELLITUS WITH HYPERGLYCEMIA, WITH LONG-TERM CURRENT USE OF INSULIN (HCC): Primary | ICD-10-CM

## 2023-08-18 DIAGNOSIS — J04.10 TRACHEITIS: ICD-10-CM

## 2023-08-18 DIAGNOSIS — S80.862A INSECT BITE OF LEFT LOWER LEG, INITIAL ENCOUNTER: ICD-10-CM

## 2023-08-18 PROCEDURE — 99214 OFFICE O/P EST MOD 30 MIN: CPT | Performed by: INTERNAL MEDICINE

## 2023-08-18 RX ORDER — DOXYCYCLINE HYCLATE 100 MG/1
100 CAPSULE ORAL EVERY 12 HOURS SCHEDULED
Qty: 14 CAPSULE | Refills: 0 | Status: SHIPPED | OUTPATIENT
Start: 2023-08-18 | End: 2023-08-25

## 2023-08-18 NOTE — PROGRESS NOTES
Assessment/Plan:           1. Type 2 diabetes mellitus with hyperglycemia, with long-term current use of insulin (HCC)  Comments:  Pertinent consults,  labs and other investigations were reviewed and discussed with patient. Continue current management  Orders:  -     CBC and differential; Future  -     Comprehensive metabolic panel; Future  -     Hemoglobin A1C; Future  -     Albumin / creatinine urine ratio    2. Insect bite of left lower leg, initial encounter  -     Lyme Disease Serology w/Reflex; Future    3. Tracheitis  -     doxycycline hyclate (VIBRAMYCIN) 100 mg capsule; Take 1 capsule (100 mg total) by mouth every 12 (twelve) hours for 7 days    4. Weakness  -     Ambulatory Referral to Physical Therapy; Future           1. Type 2 diabetes mellitus with hyperglycemia, with long-term current use of insulin (HCC)         No problem-specific Assessment & Plan notes found for this encounter.           Subjective:      Patient ID: Jose Maria Bethea is a 73 y.o. male.    HPI    The following portions of the patient's history were reviewed and updated as appropriate: He  has a past medical history of COVID-19 (07/2022), Diabetes mellitus (HCC), Dyslipidemia, Hypertension, and Obstructive sleep apnea on CPAP.  He   Patient Active Problem List    Diagnosis Date Noted    Intractable vomiting 05/06/2021    Vertebral artery stenosis, bilateral 04/26/2021    Obstructive sleep apnea on CPAP     Type 2 diabetes mellitus with hyperglycemia, with long-term current use of insulin ([facility]) 12/08/2020    Essential hypertension 12/08/2020    Hyperkalemia 12/08/2020    Mixed hyperlipidemia 12/08/2020     He  has a past surgical history that includes Colonoscopy (2008).  His family history includes Hypertension in his mother.  He  reports that he has quit smoking. He has never used smokeless tobacco. He reports that he does not currently use alcohol. He reports that he does not use drugs.  Current Outpatient Medications   Medication Sig  "Dispense Refill    glucose blood (FreeStyle InsuLinx Test) test strip Use 1 each 2 (two) times a day 200 each 1    amLODIPine (NORVASC) 10 mg tablet Take 1 tablet (10 mg total) by mouth daily Use as directed 90 tablet 1    aspirin 81 mg chewable tablet Chew 1 tablet (81 mg total) daily (Patient not taking: Reported on 8/1/2023) 90 tablet 0    atorvastatin (LIPITOR) 40 mg tablet TAKE 1 TABLET BY MOUTH DAILY AT BEDTIME 90 tablet 1    Empagliflozin (Jardiance) 25 MG TABS Take 1 tablet (25 mg total) by mouth every morning 30 tablet 3    glimepiride (AMARYL) 2 mg tablet TAKE 2 TABLETS (4 MG TOTAL) BY MOUTH 2 (TWO) TIMES A DAY WITH MEALS 360 tablet 1    insulin glargine (Lantus) 100 units/mL subcutaneous injection Inject 40 Units under the skin daily at bedtime 36 mL 1    Insulin Pen Needle (Comfort Touch Insulin Pen Need) 31G X 6 MM MISC Use 4 (four) times a day 360 each 3    Insulin Syringe-Needle U-100 (BD Insulin Syringe U/F) 31G X 5/16\" 1 ML MISC Use 4 (four) times a day 400 each 3    Lantus SoloStar 100 units/mL SOPN       metFORMIN (GLUCOPHAGE-XR) 500 mg 24 hr tablet Take 2 tablets (1,000 mg total) by mouth 2 (two) times a day 360 tablet 1    sildenafil (VIAGRA) 100 mg tablet Take 1 tablet (100 mg total) by mouth daily as needed for erectile dysfunction 10 tablet 0    valsartan (DIOVAN) 320 MG tablet Take 1 tablet (320 mg total) by mouth daily 90 tablet 1     No current facility-administered medications for this visit.     Current Outpatient Medications on File Prior to Visit   Medication Sig    glucose blood (FreeStyle InsuLinx Test) test strip Use 1 each 2 (two) times a day    aspirin 81 mg chewable tablet Chew 1 tablet (81 mg total) daily (Patient not taking: Reported on 8/1/2023)    sildenafil (VIAGRA) 100 mg tablet Take 1 tablet (100 mg total) by mouth daily as needed for erectile dysfunction     No current facility-administered medications on file prior to visit.     There are no discontinued medications. " "  He has No Known Allergies..    Review of Systems      Objective:      /62 (BP Location: Left arm, Patient Position: Sitting, Cuff Size: Standard)   Pulse 87   Temp 97.6 °F (36.4 °C) (Temporal)   Ht 5' 6\" (1.676 m)   Wt 62.8 kg (138 lb 6.4 oz)   SpO2 96%   BMI 22.34 kg/m²     Results Reviewed       None            Recent Results (from the past 1344 hour(s))   Albumin / creatinine urine ratio    Collection Time: 12/06/23  8:41 AM   Result Value Ref Range    Creatinine, Ur 95.4 Reference range not established. mg/dL    Albumin,U,Random 185.3 (H) <20.0 mg/L    Albumin Creat Ratio 194 (H) 0 - 30 mg/g creatinine   Fructosamine    Collection Time: 12/06/23  8:41 AM   Result Value Ref Range    Fructosamine 275 0 - 285 umol/L        Physical Exam    "

## 2023-09-09 ENCOUNTER — APPOINTMENT (OUTPATIENT)
Dept: LAB | Age: 73
End: 2023-09-09
Payer: COMMERCIAL

## 2023-09-09 DIAGNOSIS — E11.65 TYPE 2 DIABETES MELLITUS WITH HYPERGLYCEMIA, WITH LONG-TERM CURRENT USE OF INSULIN (HCC): ICD-10-CM

## 2023-09-09 DIAGNOSIS — S80.862A INSECT BITE OF LEFT LOWER LEG, INITIAL ENCOUNTER: ICD-10-CM
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
## 2023-09-09 DIAGNOSIS — Z79.4 TYPE 2 DIABETES MELLITUS WITH HYPERGLYCEMIA, WITH LONG-TERM CURRENT USE OF INSULIN (HCC): ICD-10-CM

## 2023-09-09 LAB
ALBUMIN SERPL BCP-MCNC: 4.1 G/DL (ref 3.5–5)
ALP SERPL-CCNC: 67 U/L (ref 34–104)
ALT SERPL W P-5'-P-CCNC: 11 U/L (ref 7–52)
ANION GAP SERPL CALCULATED.3IONS-SCNC: 6 MMOL/L
AST SERPL W P-5'-P-CCNC: 14 U/L (ref 13–39)
B BURGDOR IGG+IGM SER QL IA: NEGATIVE
BASOPHILS # BLD AUTO: 0.05 THOUSANDS/ÂΜL (ref 0–0.1)
BASOPHILS NFR BLD AUTO: 1 % (ref 0–1)
BILIRUB SERPL-MCNC: 0.7 MG/DL (ref 0.2–1)
BUN SERPL-MCNC: 21 MG/DL (ref 5–25)
CALCIUM SERPL-MCNC: 9.2 MG/DL (ref 8.4–10.2)
CHLORIDE SERPL-SCNC: 107 MMOL/L (ref 96–108)
CO2 SERPL-SCNC: 28 MMOL/L (ref 21–32)
CREAT SERPL-MCNC: 1.02 MG/DL (ref 0.6–1.3)
EOSINOPHIL # BLD AUTO: 0.31 THOUSAND/ÂΜL (ref 0–0.61)
EOSINOPHIL NFR BLD AUTO: 4 % (ref 0–6)
ERYTHROCYTE [DISTWIDTH] IN BLOOD BY AUTOMATED COUNT: 12.6 % (ref 11.6–15.1)
EST. AVERAGE GLUCOSE BLD GHB EST-MCNC: 260 MG/DL
GFR SERPL CREATININE-BSD FRML MDRD: 73 ML/MIN/1.73SQ M
GLUCOSE P FAST SERPL-MCNC: 111 MG/DL (ref 65–99)
HBA1C MFR BLD: 10.7 %
HCT VFR BLD AUTO: 46 % (ref 36.5–49.3)
HGB BLD-MCNC: 14.6 G/DL (ref 12–17)
IMM GRANULOCYTES # BLD AUTO: 0.01 THOUSAND/UL (ref 0–0.2)
IMM GRANULOCYTES NFR BLD AUTO: 0 % (ref 0–2)
LYMPHOCYTES # BLD AUTO: 2.01 THOUSANDS/ÂΜL (ref 0.6–4.47)
LYMPHOCYTES NFR BLD AUTO: 29 % (ref 14–44)
MCH RBC QN AUTO: 29.1 PG (ref 26.8–34.3)
MCHC RBC AUTO-ENTMCNC: 31.7 G/DL (ref 31.4–37.4)
MCV RBC AUTO: 92 FL (ref 82–98)
MONOCYTES # BLD AUTO: 0.54 THOUSAND/ÂΜL (ref 0.17–1.22)
MONOCYTES NFR BLD AUTO: 8 % (ref 4–12)
NEUTROPHILS # BLD AUTO: 4.11 THOUSANDS/ÂΜL (ref 1.85–7.62)
NEUTS SEG NFR BLD AUTO: 58 % (ref 43–75)
NRBC BLD AUTO-RTO: 0 /100 WBCS
PLATELET # BLD AUTO: 237 THOUSANDS/UL (ref 149–390)
PMV BLD AUTO: 11.6 FL (ref 8.9–12.7)
POTASSIUM SERPL-SCNC: 4.6 MMOL/L (ref 3.5–5.3)
PROT SERPL-MCNC: 6.8 G/DL (ref 6.4–8.4)
RBC # BLD AUTO: 5.01 MILLION/UL (ref 3.88–5.62)
SODIUM SERPL-SCNC: 141 MMOL/L (ref 135–147)
WBC # BLD AUTO: 7.03 THOUSAND/UL (ref 4.31–10.16)

## 2023-09-09 PROCEDURE — 86618 LYME DISEASE ANTIBODY: CPT

## 2023-09-09 PROCEDURE — 85025 COMPLETE CBC W/AUTO DIFF WBC: CPT

## 2023-09-09 PROCEDURE — 83036 HEMOGLOBIN GLYCOSYLATED A1C: CPT

## 2023-09-09 PROCEDURE — 80053 COMPREHEN METABOLIC PANEL: CPT

## 2023-09-09 PROCEDURE — 36415 COLL VENOUS BLD VENIPUNCTURE: CPT

## 2023-09-13 ENCOUNTER — OFFICE VISIT (OUTPATIENT)
Dept: ENDOCRINOLOGY | Facility: CLINIC | Age: 73
End: 2023-09-13
Payer: COMMERCIAL

## 2023-09-13 VITALS
TEMPERATURE: 97.1 F | SYSTOLIC BLOOD PRESSURE: 150 MMHG | OXYGEN SATURATION: 97 % | WEIGHT: 141.8 LBS | DIASTOLIC BLOOD PRESSURE: 76 MMHG | HEIGHT: 66 IN | HEART RATE: 78 BPM | BODY MASS INDEX: 22.79 KG/M2

## 2023-09-13 DIAGNOSIS — E55.9 VITAMIN D DEFICIENCY: ICD-10-CM

## 2023-09-13 DIAGNOSIS — Z79.4 TYPE 2 DIABETES MELLITUS WITH HYPERGLYCEMIA, WITH LONG-TERM CURRENT USE OF INSULIN (HCC): Primary | ICD-10-CM

## 2023-09-13 DIAGNOSIS — E78.2 MIXED HYPERLIPIDEMIA: ICD-10-CM

## 2023-09-13 DIAGNOSIS — E11.65 TYPE 2 DIABETES MELLITUS WITH HYPERGLYCEMIA, WITH LONG-TERM CURRENT USE OF INSULIN (HCC): Primary | ICD-10-CM

## 2023-09-13 PROCEDURE — 99214 OFFICE O/P EST MOD 30 MIN: CPT | Performed by: INTERNAL MEDICINE

## 2023-09-13 RX ORDER — INSULIN GLARGINE 100 [IU]/ML
40 INJECTION, SOLUTION SUBCUTANEOUS
Qty: 36 ML | Refills: 1 | Status: SHIPPED | OUTPATIENT
Start: 2023-09-13 | End: 2024-03-11

## 2023-09-13 RX ORDER — GLIMEPIRIDE 2 MG/1
4 TABLET ORAL 2 TIMES DAILY WITH MEALS
Qty: 180 TABLET | Refills: 1 | Status: SHIPPED | OUTPATIENT
Start: 2023-09-13

## 2023-09-13 NOTE — PROGRESS NOTES
Follow-up Patient Progress Note        CC: type 2 diabetes     History of Present Illness:   72 yr male with type 2 diabetes for 20 yrs, HTN, HLD, retinopathy and microalbuminuria. Last visit was 4/13/23.      Since last visit, he gained 3 lbs. No polyuria, polydipsia or blurred vision.      Diet: regular. Activity: active physically.     Home blood glucose monitoring: does not do glucose checks. Does not wish to use a CGM. Hypoglycemia: no symptoms     Current meds:  Lantus 25u daily bedtime.  Missed upto once weekly  Jardiance 25mg PO daily  Metformin 1000mg PO BID  Amaryl 2mg po bid     Opthamology: yes, retinopathy+ve de 11/2023  Podiatry: no  Influenza: no; covid - yes  Dental:  Pancreatitis: No     Ace/ARB: valsartan  Statin: lipitor  Thyroid issues: none       Patient Active Problem List   Diagnosis   • Type 2 diabetes mellitus with hyperglycemia, with long-term current use of insulin ([address])   • Essential hypertension   • Hyperkalemia   • Mixed hyperlipidemia   • Obstructive sleep apnea on CPAP   • Vertebral artery stenosis, bilateral   • Intractable vomiting     Past Medical History:   Diagnosis Date   • COVID-19 07/2022   • Diabetes mellitus ([address])     Type 2   • Dyslipidemia    • Hypertension    • Obstructive sleep apnea on CPAP       Past Surgical History:   Procedure Laterality Date   • COLONOSCOPY  2008      Family History   Problem Relation Age of Onset   • Hypertension Mother      Social History     Tobacco Use   • Smoking status: Former   • Smokeless tobacco: Never   Substance Use Topics   • Alcohol use: Not Currently     No Known Allergies      Current Outpatient Medications:   •  amLODIPine (NORVASC) 10 mg tablet, Take 1 tablet (10 mg total) by mouth daily Use as directed, Disp: 90 tablet, Rfl: 1  •  atorvastatin (LIPITOR) 40 mg tablet, Take 1 tablet (40 mg total) by mouth daily at bedtime, Disp: 90 tablet, Rfl: 1  •  Empagliflozin (Jardiance) 25 MG TABS, Take 1 tablet (25 mg total) by mouth every morning, Disp: 90 tablet, Rfl: 1  •  glimepiride (AMARYL) 2 mg tablet, Take 1 tablet (2 mg total) by mouth 2 (two) times a day with meals, Disp: 180 tablet, Rfl: 1  •  glucose blood (FreeStyle InsuLinx Test) test strip, Use 1 each 2 (two) times a day, Disp: 200 each, Rfl: 1  •  insulin glargine (Lantus) 100 units/mL subcutaneous injection, Inject 25 Units under the skin daily at bedtime, Disp: 20 mL, Rfl: 1  •  Insulin Syringe-Needle U-100 30G X 1/2" 0.3 ML MISC, Use daily Use daily box of 100 BD ultrafine needles 30g, Disp: 100 each, Rfl: 3  •  metFORMIN (GLUCOPHAGE-XR) 500 mg 24 hr tablet, Take 2 tablets (1,000 mg total) by mouth 2 (two) times a day, Disp: 360 tablet, Rfl: 1  •  sildenafil (VIAGRA) 100 mg tablet, Take 1 tablet (100 mg total) by mouth daily as needed for erectile dysfunction, Disp: 10 tablet, Rfl: 0  •  valsartan (DIOVAN) 320 MG tablet, Take 1 tablet (320 mg total) by mouth daily, Disp: 90 tablet, Rfl: 1  •  aspirin 81 mg chewable tablet, Chew 1 tablet (81 mg total) daily (Patient not taking: Reported on 8/1/2023), Disp: 90 tablet, Rfl: 0    Review of Systems   Constitutional: Positive for activity change and appetite change. HENT: Negative. Eyes: Negative. Respiratory: Negative. Cardiovascular: Negative for chest pain. Gastrointestinal: Negative. Endocrine: Negative. Genitourinary: Negative. Musculoskeletal: Negative. Skin: Negative. Allergic/Immunologic: Negative. Neurological: Negative. Hematological: Negative. Psychiatric/Behavioral: Negative. All other systems reviewed and are negative. Physical Exam:  Body mass index is 22.89 kg/m².   /76 (BP Location: Left arm, Patient Position: Sitting, Cuff Size: Large)   Pulse 78   Temp (!) 97.1 °F (36.2 °C) (Tympanic)   Ht 5' 6" (1.676 m)   Wt 64.3 kg (141 lb 12.8 oz)   SpO2 97%   BMI 22.89 kg/m²    Vitals:    09/13/23 0953   Weight: 64.3 kg (141 lb 12.8 oz)        Physical Exam  Constitutional: General: He is not in acute distress. Appearance: He is well-developed. He is not ill-appearing. HENT:      Head: Normocephalic and atraumatic. Nose: Nose normal.      Mouth/Throat:      Pharynx: Oropharynx is clear. Eyes:      Extraocular Movements: Extraocular movements intact. Conjunctiva/sclera: Conjunctivae normal.   Neck:      Thyroid: No thyromegaly. Cardiovascular:      Rate and Rhythm: Normal rate. Pulmonary:      Effort: Pulmonary effort is normal.   Musculoskeletal:         General: No deformity. Cervical back: Normal range of motion. Skin:     Capillary Refill: Capillary refill takes less than 2 seconds. Coloration: Skin is not pale. Findings: No rash. Neurological:      Mental Status: He is alert and oriented to person, place, and time. Psychiatric:         Behavior: Behavior normal.         Labs:   Lab Results   Component Value Date    HGBA1C 10.7 (H) 09/09/2023       Lab Results   Component Value Date    XMC7XXTPXHTP 1.290 12/14/2021       Lab Results   Component Value Date    CREATININE 1.02 09/09/2023    CREATININE 1.15 04/11/2023    CREATININE 1.18 10/01/2022    BUN 21 09/09/2023     04/23/2015    K 4.6 09/09/2023     09/09/2023    CO2 28 09/09/2023     eGFR   Date Value Ref Range Status   09/09/2023 73 ml/min/1.73sq m Final       Lab Results   Component Value Date    ALT 11 09/09/2023    AST 14 09/09/2023    ALKPHOS 67 09/09/2023    BILITOT 0.5 04/23/2015       Lab Results   Component Value Date    CHOLESTEROL 142 04/27/2021    CHOLESTEROL 132 07/21/2019    CHOLESTEROL 108 12/30/2018     Lab Results   Component Value Date    HDL 47 04/27/2021    HDL 42 07/21/2019    HDL 37 (L) 12/30/2018     Lab Results   Component Value Date    TRIG 99 04/27/2021    TRIG 112 07/21/2019    TRIG 101 12/30/2018     Lab Results   Component Value Date    [address] 90 07/21/2019    [address] 71 12/30/2018         Impression:  1.  Type 2 diabetes mellitus with hyperglycemia, with long-term current use of insulin ([address])    2. Mixed hyperlipidemia    3. Vitamin D deficiency         Plan:    Riccardo Carias was seen today for follow-up. Diagnoses and all orders for this visit:    Type 2 diabetes mellitus with hyperglycemia, with long-term current use of insulin ([address] He is uncontrolled based on A1c 10.7%. Goal is 7.5%. Today we discussed options and increase amaryl and lantus and repeat labs prior to next visit. Continue jardiance. Follow up in 3-4 months. -     glimepiride (AMARYL) 2 mg tablet; Take 2 tablets (4 mg total) by mouth 2 (two) times a day with meals  -     insulin glargine (Lantus) 100 units/mL subcutaneous injection; Inject 40 Units under the skin daily at bedtime  -     Hemoglobin A1C; Future  -     Albumin / creatinine urine ratio; Future    Mixed hyperlipidemia. Continue lipitor. Vitamin D deficiency  -     Vitamin D 25 hydroxy; Future        I have spent 32 minutes with patient today in which greater than 50% of this time was spent in counseling/coordination of care. Discussed with the patient and all questioned fully answered. He will call me if any problems arise. Educated/ Counseled patient on diagnostic test results, prognosis, risk vs benefit of treatment options, importance of treatment compliance, healthy life and lifestyle choices.       [facility]

## 2023-09-15 ENCOUNTER — OFFICE VISIT (OUTPATIENT)
Dept: INTERNAL MEDICINE CLINIC | Facility: CLINIC | Age: 73
End: 2023-09-15
Payer: COMMERCIAL

## 2023-09-15 VITALS
DIASTOLIC BLOOD PRESSURE: 79 MMHG | HEART RATE: 76 BPM | BODY MASS INDEX: 22.98 KG/M2 | HEIGHT: 66 IN | OXYGEN SATURATION: 96 % | WEIGHT: 143 LBS | SYSTOLIC BLOOD PRESSURE: 155 MMHG | TEMPERATURE: 98.2 F

## 2023-09-15 DIAGNOSIS — G47.33 OBSTRUCTIVE SLEEP APNEA ON CPAP: ICD-10-CM

## 2023-09-15 DIAGNOSIS — E78.2 MIXED HYPERLIPIDEMIA: ICD-10-CM

## 2023-09-15 DIAGNOSIS — E11.65 TYPE 2 DIABETES MELLITUS WITH HYPERGLYCEMIA, WITH LONG-TERM CURRENT USE OF INSULIN (HCC): ICD-10-CM

## 2023-09-15 DIAGNOSIS — Z79.4 TYPE 2 DIABETES MELLITUS WITH HYPERGLYCEMIA, WITH LONG-TERM CURRENT USE OF INSULIN (HCC): ICD-10-CM

## 2023-09-15 DIAGNOSIS — I10 ESSENTIAL HYPERTENSION: ICD-10-CM

## 2023-09-15 DIAGNOSIS — I10 ESSENTIAL HYPERTENSION: Primary | ICD-10-CM

## 2023-09-15 PROCEDURE — 99214 OFFICE O/P EST MOD 30 MIN: CPT | Performed by: INTERNAL MEDICINE

## 2023-09-15 RX ORDER — AMLODIPINE BESYLATE 10 MG/1
10 TABLET ORAL DAILY
Qty: 90 TABLET | Refills: 1 | Status: SHIPPED | OUTPATIENT
Start: 2023-09-15 | End: 2023-09-15 | Stop reason: SDUPTHER

## 2023-09-15 RX ORDER — VALSARTAN 320 MG/1
320 TABLET ORAL DAILY
Qty: 90 TABLET | Refills: 1 | Status: SHIPPED | OUTPATIENT
Start: 2023-09-15 | End: 2023-09-15 | Stop reason: SDUPTHER

## 2023-09-15 RX ORDER — INSULIN GLARGINE 100 [IU]/ML
INJECTION, SOLUTION SUBCUTANEOUS
COMMUNITY
Start: 2023-09-13

## 2023-09-15 RX ORDER — METFORMIN HYDROCHLORIDE 500 MG/1
1000 TABLET, EXTENDED RELEASE ORAL 2 TIMES DAILY
Qty: 360 TABLET | Refills: 1 | Status: SHIPPED | OUTPATIENT
Start: 2023-09-15 | End: 2023-09-15 | Stop reason: SDUPTHER

## 2023-09-15 NOTE — PATIENT INSTRUCTIONS
Type 2 Diabetes Management for Adults   AMBULATORY CARE:   Type 2 diabetes  is a disease that affects how your body uses glucose (sugar). Either your body cannot make enough insulin, or it cannot use the insulin correctly. It is important to keep diabetes controlled to prevent damage to your heart, blood vessels, and other organs. Management will help you feel well and enjoy your daily activities. Your diabetes care team providers can help you make a plan to fit diabetes care into your schedule. Your plan can change over time to fit your needs and your family's needs.       Have someone call your local emergency number (911 in the ) if:   • You cannot be woken.    • You have signs of diabetic ketoacidosis:     ? confusion, fatigue    ? vomiting    ? rapid heartbeat    ? fruity smelling breath    ? extreme thirst    ? dry mouth and skin    • You have any of the following signs of a heart attack:      ? Squeezing, pressure, or pain in your chest    ? You may  also have any of the following:     - Discomfort or pain in your back, neck, jaw, stomach, or arm    - Shortness of breath    - Nausea or vomiting    - Lightheadedness or a sudden cold sweat    • You have any of the following signs of a stroke:      ? Numbness or drooping on one side of your face     ? Weakness in an arm or leg    ? Confusion or difficulty speaking    ? Dizziness, a severe headache, or vision loss    Call your doctor or diabetes care team provider if:   • You have a sore or wound that will not heal.    • You have a change in the amount you urinate.    • Your blood sugar levels are higher than your target goals.    • You often have lower blood sugar levels than your target goals.    • Your skin is red, dry, warm, or swollen.    • You have trouble coping with diabetes, or you feel anxious or depressed.    • You have questions or concerns about your condition or care.    What you need to know about high blood sugar levels:  High blood sugar  levels may not cause any symptoms. You may feel more thirsty or urinate more often than usual. Over time, high blood sugar levels can damage your nerves, blood vessels, tissues, and organs. The following can increase your blood sugar levels:  • Large meals or large amounts of carbohydrates at one time    • Less physical activity    • Stress    • Illness    • A lower dose of diabetes medicine or insulin, or a late dose    What you need to know about low blood sugar levels:  Symptoms include feeling shaky, dizzy, irritable, or confused. You can prevent symptoms by keeping your blood sugar levels from going too low.  • Treat a low blood sugar level right away:      ? Drink 4 ounces of juice or have 1 tube of glucose gel.    ? Check your blood sugar level again 10 to 15 minutes later.    ? When the level goes back to normal, eat a meal or snack to prevent another decrease.       • Keep glucose gel, raisins, or hard candy with you at all times to treat a low blood sugar level.     • Your blood sugar level can get too low if you take diabetes medicine or insulin and do not eat enough food.     • If you use insulin, check your blood sugar level before you exercise.      ? If your blood sugar level is below 100 mg/dL, eat 4 crackers or 2 ounces of raisins, or drink 4 ounces of juice.    ? Check your level every 30 minutes if you exercise longer than 1 hour.    ? You may need a snack during or after exercise.    What you can do to manage your blood sugar levels:   • Check your blood sugar levels as directed and as needed.  Several items are available to use to check your levels. You may need to check by testing a drop of blood in a glucose monitor. You may instead be given a continuous glucose monitoring (CGM) device. The device is worn at all times. The CGM checks your blood sugar level every 5 minutes. It sends results to an electronic device such as a smart phone. A CGM can be used with or without an insulin pump. You and  your diabetes care team providers will decide on the best method for you. The goal for blood sugar levels before meals  is between 80 and 130 mg/dL and 2 hours after eating  is lower than 180 mg/dL.            • Make healthy food choices.  Work with a dietitian to develop a meal plan that works for you and your schedule. A dietitian can help you learn how to eat the right amount of carbohydrates during your meals and snacks. Carbohydrates can raise your blood sugar level if you eat too many at one time. Examples of foods that contain carbohydrates are breads, cereals, rice, pasta, and sweets.         • Eat high-fiber foods as directed.  Fiber helps improve blood sugar levels. Fiber also lowers your risk for heart disease and other problems diabetes can cause. Examples of high-fiber foods include vegetables, whole-grain bread, and beans such as steen beans. Your dietitian can tell you how much fiber to have each day.         • Get regular physical activity.  Physical activity can help you get to your target blood sugar level goal and manage your weight. Get at least 150 minutes of moderate to vigorous aerobic physical activity each week. Do not miss more than 2 days in a row. Do not sit longer than 30 minutes at a time. Your healthcare provider can help you create an activity plan. The plan can include the best activities for you and can help you build your strength and endurance.            • Maintain a healthy weight.  Ask your team what a healthy weight is for you. A healthy weight can help you control diabetes and prevent heart disease. Ask your team to help you create a weight loss plan, if needed. Weight loss can help make a difference in managing diabetes. Your team will help you set a weight-loss goal, such as 10 to 15 pounds, or 5% of your extra weight. Together you and your team can set manageable weight loss goals.    • Take your diabetes medicine or insulin as directed.  You may need diabetes medicine,  insulin, or both to help control your blood sugar levels. Your healthcare provider will teach you how and when to take your diabetes medicine or insulin. You will also be taught about side effects oral diabetes medicine can cause. Insulin may be injected or given through a pump or pen. You and your providers will decide on the best method for you:    ? An insulin pump  is an implanted device that gives your insulin 24 hours a day. An insulin pump prevents the need for multiple insulin injections in a day.         ? An insulin pen  is a device prefilled with the right amount of insulin.         ? You and your family members will be taught how to draw up and give insulin  if this is the best method for you. Your providers will also teach you how to dispose of needles and syringes.    ? You will learn how much insulin you need  and when to give it. You will be taught when not to give insulin. You will also be taught what to do if your blood sugar level drops too low. This may happen if you take insulin and do not eat the right amount of carbohydrates.    More ways to manage type 2 diabetes:   • Wear medical alert identification.  Wear medical alert jewelry or carry a card that says you have diabetes. Ask your provider where to get these items.         • Do not smoke.  Nicotine and other chemicals in cigarettes and cigars can cause lung and blood vessel damage. It also makes it more difficult to manage your diabetes. Ask your provider for information if you currently smoke and need help to quit. Do not use e-cigarettes or smokeless tobacco in place of cigarettes or to help you quit. They still contain nicotine.    • Check your feet each day for cuts, scratches, calluses, or other wounds.  Look for redness and swelling, and feel for warmth. Wear shoes that fit well. Check your shoes for rocks or other objects that can hurt your feet. Do not walk barefoot or wear shoes without socks. Wear cotton socks to help keep your  feet dry.         • Ask about vaccines you may need.  You have a higher risk for serious illness if you get the flu, pneumonia, COVID-19, or hepatitis. Ask your provider if you should get vaccines to prevent these or other diseases, and when to get the vaccines.    • Talk to your provider if you become stressed about diabetes care.  Sometimes being able to fit diabetes care into your life can cause increased stress. The stress can cause you not to take care of yourself properly. Your care team providers can help by offering tips about self-care. Your providers may suggest you talk to a mental health provider who can listen and offer help with self-care issues.    • Have your A1c checked as directed.  Your provider may check your A1c every 3 months, or 2 times each year if your diabetes is controlled. An A1c test shows the average amount of sugar in your blood over the past 2 to 3 months. Your provider will tell you what your A1c level should be.    • Have screening tests as directed.  Your provider may recommend screening for complications of diabetes and other conditions that may develop. Some screenings may begin right away and some may happen within the first 5 years of diagnosis:    ? Examples of diabetes complications  include kidney problems, high cholesterol, high blood pressure, blood vessel problems, eye problems, and sleep apnea.    ? You may be screened for a low vitamin B level  if you take oral diabetes medicine for a long time.    ? Women of childbearing years may be screened  for polycystic ovarian syndrome (PCOS).    Follow up with your doctor or diabetes care team providers as directed:  You may need to have blood tests done before your follow-up visit. The test results will show if changes need to be made in your treatment or self-care. Talk to your provider if you cannot afford your medicine. Write down your questions so you remember to ask them during your visits.  © Copyright [employer] 2022  Information is for End User's use only and may not be sold, redistributed or otherwise used for commercial purposes.  The above information is an  only. It is not intended as medical advice for individual conditions or treatments. Talk to your doctor, nurse or pharmacist before following any medical regimen to see if it is safe and effective for you.

## 2023-09-15 NOTE — PROGRESS NOTES
"Assessment/Plan:           1. Essential hypertension  -     valsartan (DIOVAN) 320 MG tablet; Take 1 tablet (320 mg total) by mouth daily  -     amLODIPine (NORVASC) 10 mg tablet; Take 1 tablet (10 mg total) by mouth daily Use as directed    2. Type 2 diabetes mellitus with hyperglycemia, with long-term current use of insulin (HCC)  -     metFORMIN (GLUCOPHAGE-XR) 500 mg 24 hr tablet; Take 2 tablets (1,000 mg total) by mouth 2 (two) times a day  -     Insulin Syringe-Needle U-100 30G X 1/2\" 0.3 ML MISC; Use daily Use daily box of 100 BD ultrafine needles 30g    3. Obstructive sleep apnea on CPAP           1. Essential hypertension    - valsartan (DIOVAN) 320 MG tablet; Take 1 tablet (320 mg total) by mouth daily  Dispense: 90 tablet; Refill: 1  - amLODIPine (NORVASC) 10 mg tablet; Take 1 tablet (10 mg total) by mouth daily Use as directed  Dispense: 90 tablet; Refill: 1    2. Type 2 diabetes mellitus with hyperglycemia, with long-term current use of insulin (HCC)    - metFORMIN (GLUCOPHAGE-XR) 500 mg 24 hr tablet; Take 2 tablets (1,000 mg total) by mouth 2 (two) times a day  Dispense: 360 tablet; Refill: 1  - Insulin Syringe-Needle U-100 30G X 1/2\" 0.3 ML MISC; Use daily Use daily box of 100 BD ultrafine needles 30g  Dispense: 100 each; Refill: 3       No problem-specific Assessment & Plan notes found for this encounter.           Subjective:      Patient ID: Jose Maria Bethea is a 73 y.o. male.    HPI    The following portions of the patient's history were reviewed and updated as appropriate: He  has a past medical history of COVID-19 (07/2022), Diabetes mellitus (HCC), Dyslipidemia, Hypertension, and Obstructive sleep apnea on CPAP.  He   Patient Active Problem List    Diagnosis Date Noted   • Intractable vomiting 05/06/2021   • Vertebral artery stenosis, bilateral 04/26/2021   • Obstructive sleep apnea on CPAP    • Type 2 diabetes mellitus with hyperglycemia, with long-term current use of insulin ([facility]) 12/08/2020   • " "Essential hypertension 12/08/2020   • Hyperkalemia 12/08/2020   • Mixed hyperlipidemia 12/08/2020     He  has a past surgical history that includes Colonoscopy (2008).  His family history includes Hypertension in his mother.  He  reports that he has quit smoking. He has never used smokeless tobacco. He reports that he does not currently use alcohol. He reports that he does not use drugs.  Current Outpatient Medications   Medication Sig Dispense Refill   • amLODIPine (NORVASC) 10 mg tablet Take 1 tablet (10 mg total) by mouth daily Use as directed 90 tablet 1   • atorvastatin (LIPITOR) 40 mg tablet Take 1 tablet (40 mg total) by mouth daily at bedtime 90 tablet 1   • Empagliflozin (Jardiance) 25 MG TABS Take 1 tablet (25 mg total) by mouth every morning 90 tablet 1   • glimepiride (AMARYL) 2 mg tablet Take 2 tablets (4 mg total) by mouth 2 (two) times a day with meals 180 tablet 1   • glucose blood (FreeStyle [employer] Test) test strip Use 1 each 2 (two) times a day 200 each 1   • insulin glargine (Lantus) 100 units/mL subcutaneous injection Inject 40 Units under the skin daily at bedtime 36 mL 1   • Insulin Syringe-Needle U-100 30G X 1/2\" 0.3 ML MISC Use daily Use daily box of 100 BD ultrafine needles 30g 100 each 3   • Lantus SoloStar 100 units/mL SOPN      • metFORMIN (GLUCOPHAGE-XR) 500 mg 24 hr tablet Take 2 tablets (1,000 mg total) by mouth 2 (two) times a day 360 tablet 1   • sildenafil (VIAGRA) 100 mg tablet Take 1 tablet (100 mg total) by mouth daily as needed for erectile dysfunction 10 tablet 0   • valsartan (DIOVAN) 320 MG tablet Take 1 tablet (320 mg total) by mouth daily 90 tablet 1   • aspirin 81 mg chewable tablet Chew 1 tablet (81 mg total) daily (Patient not taking: Reported on 8/1/2023) 90 tablet 0     No current facility-administered medications for this visit.     Current Outpatient Medications on File Prior to Visit   Medication Sig   • atorvastatin (LIPITOR) 40 mg tablet Take 1 tablet (40 mg " "total) by mouth daily at bedtime   • Empagliflozin (Jardiance) 25 MG TABS Take 1 tablet (25 mg total) by mouth every morning   • glimepiride (AMARYL) 2 mg tablet Take 2 tablets (4 mg total) by mouth 2 (two) times a day with meals   • glucose blood (FreeStyle [employer] Test) test strip Use 1 each 2 (two) times a day   • insulin glargine (Lantus) 100 units/mL subcutaneous injection Inject 40 Units under the skin daily at bedtime   • Lantus SoloStar 100 units/mL SOPN    • sildenafil (VIAGRA) 100 mg tablet Take 1 tablet (100 mg total) by mouth daily as needed for erectile dysfunction   • [DISCONTINUED] amLODIPine (NORVASC) 10 mg tablet Take 1 tablet (10 mg total) by mouth daily Use as directed   • [DISCONTINUED] Insulin Syringe-Needle U-100 30G X 1/2\" 0.3 ML MISC Use daily Use daily box of 100 BD ultrafine needles 30g   • [DISCONTINUED] metFORMIN (GLUCOPHAGE-XR) 500 mg 24 hr tablet Take 2 tablets (1,000 mg total) by mouth 2 (two) times a day   • [DISCONTINUED] valsartan (DIOVAN) 320 MG tablet Take 1 tablet (320 mg total) by mouth daily   • aspirin 81 mg chewable tablet Chew 1 tablet (81 mg total) daily (Patient not taking: Reported on 8/1/2023)     No current facility-administered medications on file prior to visit.     Medications Discontinued During This Encounter   Medication Reason   • amLODIPine (NORVASC) 10 mg tablet Reorder   • metFORMIN (GLUCOPHAGE-XR) 500 mg 24 hr tablet Reorder   • Insulin Syringe-Needle U-100 30G X 1/2\" 0.3 ML MISC Reorder   • valsartan (DIOVAN) 320 MG tablet Reorder      He has No Known Allergies..    Review of Systems   Constitutional: Negative for appetite change, chills, fatigue and fever.   HENT: Negative for sore throat and trouble swallowing.    Eyes: Negative for redness.   Respiratory: Negative for shortness of breath.    Cardiovascular: Negative for chest pain and palpitations.   Gastrointestinal: Negative for abdominal pain, constipation and diarrhea.   Genitourinary: Negative for " "dysuria and hematuria.   Musculoskeletal: Negative for back pain and neck pain.   Skin: Negative for rash.   Neurological: Negative for seizures, weakness and headaches.   Hematological: Negative for adenopathy.   Psychiatric/Behavioral: Negative for confusion. The patient is not nervous/anxious.          Objective:      /79 (BP Location: Left arm, Patient Position: Sitting, Cuff Size: Large)   Pulse 76   Temp 98.2 °F (36.8 °C) (Temporal)   Ht 5' 6\" (1.676 m)   Wt 64.9 kg (143 lb)   SpO2 96%   BMI 23.08 kg/m²     Results Reviewed     None          Recent Results (from the past 1344 hour(s))   CBC and differential    Collection Time: 09/09/23  9:26 AM   Result Value Ref Range    WBC 7.03 4.31 - 10.16 Thousand/uL    RBC 5.01 3.88 - 5.62 Million/uL    Hemoglobin 14.6 12.0 - 17.0 g/dL    Hematocrit 46.0 36.5 - 49.3 %    MCV 92 82 - 98 fL    MCH 29.1 26.8 - 34.3 pg    MCHC 31.7 31.4 - 37.4 g/dL    RDW 12.6 11.6 - 15.1 %    MPV 11.6 8.9 - 12.7 fL    Platelets 237 149 - 390 Thousands/uL    nRBC 0 /100 WBCs    Neutrophils Relative 58 43 - 75 %    Immat GRANS % 0 0 - 2 %    Lymphocytes Relative 29 14 - 44 %    Monocytes Relative 8 4 - 12 %    Eosinophils Relative 4 0 - 6 %    Basophils Relative 1 0 - 1 %    Neutrophils Absolute 4.11 1.85 - 7.62 Thousands/µL    Immature Grans Absolute 0.01 0.00 - 0.20 Thousand/uL    Lymphocytes Absolute 2.01 0.60 - 4.47 Thousands/µL    Monocytes Absolute 0.54 0.17 - 1.22 Thousand/µL    Eosinophils Absolute 0.31 0.00 - 0.61 Thousand/µL    Basophils Absolute 0.05 0.00 - 0.10 Thousands/µL   Comprehensive metabolic panel    Collection Time: 09/09/23  9:26 AM   Result Value Ref Range    Sodium 141 135 - 147 mmol/L    Potassium 4.6 3.5 - 5.3 mmol/L    Chloride 107 96 - 108 mmol/L    CO2 28 21 - 32 mmol/L    ANION GAP 6 mmol/L    BUN 21 5 - 25 mg/dL    Creatinine 1.02 0.60 - 1.30 mg/dL    Glucose, Fasting 111 (H) 65 - 99 mg/dL    Calcium 9.2 8.4 - 10.2 mg/dL    AST 14 13 - 39 U/L    ALT " 11 7 - 52 U/L    Alkaline Phosphatase 67 34 - 104 U/L    Total Protein 6.8 6.4 - 8.4 g/dL    Albumin 4.1 3.5 - 5.0 g/dL    Total Bilirubin 0.70 0.20 - 1.00 mg/dL    eGFR 73 ml/min/1.73sq m   Hemoglobin A1C    Collection Time: 09/09/23  9:26 AM   Result Value Ref Range    Hemoglobin A1C 10.7 (H) Normal 4.0-5.6%; PreDiabetic 5.7-6.4%; Diabetic >=6.5%; Glycemic control for adults with diabetes <7.0% %     mg/dl   [place] Total AB W Reflex to IGM/IGG    Collection Time: 09/09/23  9:26 AM   Result Value Ref Range    Lyme Total Antibodies Negative Negative        Physical Exam  Constitutional:       General: He is not in acute distress.     Appearance: Normal appearance.   HENT:      Head: Normocephalic and atraumatic.      Nose: Nose normal.      Mouth/Throat:      Mouth: Mucous membranes are moist.   Eyes:      Extraocular Movements: Extraocular movements intact.      Pupils: Pupils are equal, round, and reactive to light.   Cardiovascular:      Rate and Rhythm: Normal rate and regular rhythm.      Pulses: Normal pulses.      Heart sounds: Normal heart sounds. No murmur heard.     No friction rub.   Pulmonary:      Effort: Pulmonary effort is normal. No respiratory distress.      Breath sounds: Normal breath sounds. No wheezing.   Abdominal:      General: Abdomen is flat. Bowel sounds are normal. There is no distension.      Palpations: Abdomen is soft. There is no mass.      Tenderness: There is no abdominal tenderness. There is no guarding.   Musculoskeletal:         General: Normal range of motion.   Neurological:      General: No focal deficit present.      Mental Status: He is alert and oriented to person, place, and time. Mental status is at baseline.      Cranial Nerves: No cranial nerve deficit.   Psychiatric:         Mood and Affect: Mood normal.         Behavior: Behavior normal.

## 2023-09-18 RX ORDER — METFORMIN HYDROCHLORIDE 500 MG/1
1000 TABLET, EXTENDED RELEASE ORAL 2 TIMES DAILY
Qty: 360 TABLET | Refills: 1 | Status: SHIPPED | OUTPATIENT
Start: 2023-09-18 | End: 2023-12-17

## 2023-09-18 RX ORDER — VALSARTAN 320 MG/1
320 TABLET ORAL DAILY
Qty: 90 TABLET | Refills: 1 | Status: SHIPPED | OUTPATIENT
Start: 2023-09-18

## 2023-09-18 RX ORDER — AMLODIPINE BESYLATE 10 MG/1
10 TABLET ORAL DAILY
Qty: 90 TABLET | Refills: 1 | Status: SHIPPED | OUTPATIENT
Start: 2023-09-18

## 2023-09-18 NOTE — PROGRESS NOTES
PT Evaluation     Today's date: 2023  Patient name: Morgan Fregoso  : 1950  MRN: [id number]  Referring provider: Rekha Vergara MD  Dx:   Encounter Diagnosis     ICD-10-CM    1. Weakness of both lower extremities  R29.898                      Assessment  Assessment details: PT IE: 23. Patient reported he has weakness in bilateral lower extremity. Patient noted he had a challenging bout of COVID with symptoms occurring for 2 months. Patient noted prior to COVID he had a bout vertigo that still limits transfers. Patient noted COVID deficits are bilateral lower weakness, balance limitations. Patient noted he has a vision disturbance as well, which limits his balance and dizziness as well. Patient denies bilateral feet paresthesias. Patient denies pain limitations. Patient denies any recent falls, but he noted he has balance deficits. Patient noted he has near falls, but no falls. Patient noted the following deficits that still persists: walking, static standing activities, transfers, has to sit to dress, endurance is limited, stair climbing, bending, squatting and lifting activities. Patient noted he is very cautious with all standing, walking and stair climbing activities. Patient noted his balance can limit his walking with walking with a bias to the side when he attempting to walk straight. Impairments: abnormal gait, abnormal or restricted ROM, abnormal movement, activity intolerance, impaired balance, impaired physical strength, lacks appropriate home exercise program and pain with function  Understanding of Dx/Px/POC: excellent   Prognosis: good  Prognosis details: Patient is a 68y.o. year old male seen for outpatient PT evaluation with mobility, balance and functional deficits due to bilateral lower extremity weakness.  Patient presents to PT IE with the following problems, concerns, deficits and impairments: decreased bilateral lower extremity range of motion, decreased bilateral lower extremity strength, gait and stair dysfunctions, transfer dysfunctions, balance deficits and limitations, functional limitations and decreased tolerance to activity. Patient would benefit from skilled PT services under the following PT treatment plan to address the above noted deficits: therapeutic exercises and activities to facilitate bilateral lower extremity mobility and strength, modalities, manual therapy techniques, postural reeducation and strengthening, DLS and abdominal strengthening, modalities, manual therapy techniques, gait and stair training, transfer training, balance and proprioception activities, IASTM techniques, Kinesio taping techniques and a hep. Thank you for the referral.     Goals  Short Term goals - 4 weeks  1. Patient will be independent HEP. 2.  Patient will report a 25 - 50% decrease in pain complaints. 3.  Increase strength 1/2 grade. 4.  Increase ROM 5-10 degrees. Long Term goals - 8 weeks  1. Patient will report elimination of pain complaints. 2.  Patient will return to all recreational activities without restriction. 3.  ROM WFL. 4.  Strength 5/5.  5.  Patient will report ability to walk around the house without balance deficits or limitations. 6.  Patient will report stair climbing ascent is reciprocal and descent is without use of railing due to balance progress. 7.  Patient will report all transfers are without balance deficits or limitations. 8.  Patient will report ability to resume house hold squatting and lifting activities without balance deficits or limitations. 9.  Patient will report ability to resume bending house hold activities without balance deficits or limitations. 10.  Patient will deny near falls as exhibited by balance testing > 25%.     Plan  Patient would benefit from: skilled physical therapy  Planned modality interventions: cryotherapy, TENS, thermotherapy: hydrocollator packs, traction, ultrasound and unattended electrical stimulation  Planned therapy interventions: IASTM, joint mobilization, kinesiology taping, manual therapy, massage, aquatic therapy, balance, balance/weight bearing training, neuromuscular re-education, patient education, postural training, self care, body mechanics training, compression, strengthening, stretching, therapeutic activities, therapeutic exercise, therapeutic training, transfer training, flexibility, functional ROM exercises, gait training, graded activity, graded exercise, graded motor, home exercise program and IADL retraining  Frequency: 2x week  Duration in weeks: 8  Treatment plan discussed with: patient        Subjective Evaluation    History of Present Illness  Mechanism of injury: Patient's PMHx is remarkable for DM, HTN, sleep apnea and bilateral vertebral artery stenosis. Patient Goals  Patient goals for therapy: decreased pain, improved balance, increased motion, increased strength, independence with ADLs/IADLs and return to sport/leisure activities  Patient goal: Patient's goal" is to improve his endurance, strength and balance to perform his daily activities".   Pain  At best pain ratin  At worst pain ratin  Location: bilateral lower extremity          Objective     Active Range of Motion   Left Hip   Flexion: 94 degrees   Abduction: 22 degrees     Right Hip   Flexion: 98 degrees   Abduction: 30 degrees   Left Knee   Flexion: 110 degrees   Extension: -2 degrees   Extensor la degrees     Right Knee   Flexion: 110 degrees   Extension: -2 degrees   Extensor la degrees   Left Ankle/Foot   Dorsiflexion (ke): 4 degrees   Plantar flexion: 52 degrees     Right Ankle/Foot   Dorsiflexion (ke): 8 degrees   Plantar flexion: 44 degrees     Strength/Myotome Testing     Left Hip   Planes of Motion   Flexion: 4  Extension: 4  Abduction: 4+  Adduction: 5    Right Hip   Planes of Motion   Flexion: 4  Extension: 4  Abduction: 4+  Adduction: 5    Left Knee   Flexion: 4  Extension: 4-    Right Knee   Flexion: 4+  Extension: 4    Left Ankle/Foot   Dorsiflexion: 4-  Plantar flexion: 4+    Right Ankle/Foot   Dorsiflexion: 4  Plantar flexion: 5    Ambulation     Ambulation: Level Surfaces   Ambulation without assistive device: independent    Additional Level Surfaces Ambulation Details  Patient ambulates with out assistive device with decrease in pace, decrease in bilateral step length, decrease in bilateral ankle PF with terminal stance phase and decrease in bilateral ankle DF with swing phase. Ambulation: Stairs   Ascend stairs: independent  Pattern: non-reciprocal  Railings: two rails  Descend stairs: independent  Pattern: non-reciprocal  Railings: two rails    Comments   PT IE: 09/19/23. Tug is at 15.62 seconds. Skill level: easy  Time to complete test:  36  seconds  No Bilateral UE support. Overall:          Actual:      53    Goal:     65. Forward:          Actual:      62    Goal:     65.  Backward:          Actual:     67     Goal:     30. Left:          Actual:     56     Goal:     65. Right:          Actual:     41     Goal:     65. Forward / Left:          Actual:   54       Goal:     65. Forward / Right:          Actual:    61      Goal:     65.  Backward / Left:          Actual:       58   Goal:     65.  Backward / Right:          Actual:     60     Goal:     65. MCTSIB: Eyes Open on Firm Surface with Patient Sway Index at 0.49; Eyes Closed on Firm Surface with Patient Sway Index at 2.10;  Eyes Open on Foam Surface with Patient Sway Index at 0.95; Eyes Closed on Foam Surface with Patient Sway Index at 3.45. Precautions: Fall Risk. Patient's PMHx is remarkable for DM, HTN, sleep apnea and bilateral vertebral artery stenosis.       Manuals 9/19                                                                Neuro Re-Ed                                                                                                        Ther Ex                          Recumbent bike             Seated hr and tr:B: 2 x hep            LAQ:B: 2 x hep            Hip flexion:B                          Supine ankle pumps:B: 2 x hep            slr flexion:B: 2 x hep            Bridges 2 x hep            SAQ:B:                          Side lying hip abduction:B:                          Prone hip extension:B             Prone TKE:B:             Prone knee flexion:B:                          Standing slr x 3:B:             Standing hr and tr:B:             Standing hamstring curls:B:             Mini squats             Lunges:B:             SLS on foam:B             Tandem stance on foam:B:             Side stepping and tandem ambulation             Monster walking with tband             Side stepping with mini squats with tband             Forward step ups on foam:B: 8"            Lateral step ups on foam:B: 8"            Step downs:B: 6"                         Fe resistance walking fwd, bwd, and side to side             [employer] balance system  LOS                                     Ther Activity                                       Gait Training                                       Modalities

## 2023-09-19 ENCOUNTER — EVALUATION (OUTPATIENT)
Dept: PHYSICAL THERAPY | Age: 73
End: 2023-09-19
Payer: COMMERCIAL

## 2023-09-19 DIAGNOSIS — R53.1 WEAKNESS: ICD-10-CM

## 2023-09-19 DIAGNOSIS — R29.898 WEAKNESS OF BOTH LOWER EXTREMITIES: Primary | ICD-10-CM

## 2023-09-19 PROCEDURE — 97162 PT EVAL MOD COMPLEX 30 MIN: CPT | Performed by: PHYSICAL THERAPIST

## 2023-09-19 PROCEDURE — 97110 THERAPEUTIC EXERCISES: CPT | Performed by: PHYSICAL THERAPIST

## 2023-09-22 ENCOUNTER — OFFICE VISIT (OUTPATIENT)
Dept: PHYSICAL THERAPY | Age: 73
End: 2023-09-22
Payer: COMMERCIAL

## 2023-09-22 DIAGNOSIS — R53.1 WEAKNESS: ICD-10-CM

## 2023-09-22 DIAGNOSIS — R29.898 WEAKNESS OF BOTH LOWER EXTREMITIES: Primary | ICD-10-CM

## 2023-09-22 PROCEDURE — 97112 NEUROMUSCULAR REEDUCATION: CPT | Performed by: PHYSICAL THERAPIST

## 2023-09-22 PROCEDURE — 97110 THERAPEUTIC EXERCISES: CPT | Performed by: PHYSICAL THERAPIST

## 2023-09-22 NOTE — PROGRESS NOTES
Daily Note     Today's date: 2023  Patient name: Jeramie Kellogg  : 1950  MRN: [id number]  Referring provider: Chelo Steele MD  Dx:   Encounter Diagnosis     ICD-10-CM    1. Weakness of both lower extremities  R29.898       2. Weakness  R53.1                      Subjective: Patient reported balance, weakness and fatigue limits prolonged standing based functional activities. Objective: See treatment diary below      Assessment: Tolerated treatment well. Patient demonstrated fatigue post treatment with proper technique exhibited during new introduction of therapeutic exercises. PT to address weakness, endurance, and balance to promote standing based functional progress and balance improvements. Thus, PT is warranted to facilitate bilateral lower extremity strength, endurance and balance to progress to full functional status without balance deficits. Pawan Oh DPT, assisted with 1:1 from 800 am to 825 am.      Plan: Continue per plan of care. Precautions: Fall Risk. Patient's PMHx is remarkable for DM, HTN, sleep apnea and bilateral vertebral artery stenosis.       Manuals                                                                Neuro Re-Ed                                                                                                        Ther Ex                          Recumbent bike  8 min           Seated hr and tr:B: 2 x hep 2 x 10           LAQ:B: 2 x hep 2 x 10           Hip flexion:B  2 x 10                        Supine ankle pumps:B: 2 x hep 2 x 10           slr flexion:B: 2 x hep 2 x 10           Bridges 2 x hep 3 sec x 20           SAQ:B:  2 x 10                        Side lying hip abduction:B:  NT                        Prone hip extension:B  NT           Prone TKE:B:  NT           Prone knee flexion:B:  NT                        Standing slr x 3:B:  NT           Standing hr and tr:B:  NT           Standing hamstring curls:B:  NT           Mini squats  2 x 10           Lunges:B:  2 x 10           SLS on foam:B  30 sec x 2           Tandem stance on foam:B:  20 sec x 2           Side stepping and tandem ambulation  3 x each           Monster walking with tband  NT           Side stepping with mini squats with tband  NT           Forward step ups on foam:B: 8" NT           Lateral step ups on foam:B: 8" NT           Step downs:B: 6" NT                        [place] resistance walking fwd, bwd, and side to side  NT           [employer] balance system  LOS level 7 pattern moderate with bilateral ue support x 5                                     Ther Activity                                       Gait Training                                       Modalities

## 2023-09-26 ENCOUNTER — APPOINTMENT (OUTPATIENT)
Dept: PHYSICAL THERAPY | Age: 73
End: 2023-09-26
Payer: COMMERCIAL

## 2023-09-29 ENCOUNTER — OFFICE VISIT (OUTPATIENT)
Dept: PHYSICAL THERAPY | Age: 73
End: 2023-09-29
Payer: COMMERCIAL

## 2023-09-29 DIAGNOSIS — R53.1 WEAKNESS: ICD-10-CM

## 2023-09-29 DIAGNOSIS — R29.898 WEAKNESS OF BOTH LOWER EXTREMITIES: Primary | ICD-10-CM

## 2023-09-29 PROCEDURE — 97110 THERAPEUTIC EXERCISES: CPT

## 2023-09-29 NOTE — PROGRESS NOTES
Daily Note     Today's date: 2023  Patient name: Riya Castellanos  : 1950  MRN: [id number]  Referring provider: Nando Baltazar MD  Dx:   Encounter Diagnosis     ICD-10-CM    1. Weakness of both lower extremities  R29.898       2. Weakness  R53.1                      Subjective: Patient reported "I hit my head the other day, it feels ok today. Objective: See treatment diary below      Assessment: Good tolerance to exercises. Progress as able       Plan: Continue per plan of care. Precautions: Fall Risk. Patient's PMHx is remarkable for DM, HTN, sleep apnea and bilateral vertebral artery stenosis.       Manuals                                                               Neuro Re-Ed                                                                                                        Ther Ex             Nustep    10 min           Recumbent bike  8 min NT          Seated hr and tr:B: 2 x hep 2 x 10 NT          LAQ:B: 2 x hep 2 x 10 20X           Hip flexion:B  2 x 10 20X                        Supine ankle pumps:B: 2 x hep 2 x 10 20X           slr flexion:B: 2 x hep 2 x 10 20X           Bridges 2 x hep 3 sec x 20 20X 3SEC           SAQ:B:  2 x 10 20X 3SEC                        Side lying hip abduction:B:  NT NT                       Prone hip extension:B  NT NT          Prone TKE:B:  NT NT          Prone knee flexion:B:  NT NT                       Standing slr x 3:B:  NT 20X           Standing hr and tr:B:  NT 20X           Standing hamstring curls:B:  NT NT          Mini squats  2 x 10 20X           Lunges:B:  2 x 10 NT          SLS on foam:B  30 sec x 2 NT          Tandem stance on foam:B:  20 sec x 2 NT          Side stepping and tandem ambulation  3 x each NT          Monster walking with tband  NT NT          Side stepping with mini squats with tband  NT NT          Forward step ups on foam:B: 8" NT NT          Lateral step ups on foam:B: 8" NT NT          Step downs:B: 6" NT NT                       [place] resistance walking fwd, bwd, and side to side  NT NT          [employer] balance system  LOS level 7 pattern moderate with bilateral ue support x 5 NT                                    Ther Activity                                       Gait Training                                       Modalities

## 2023-10-03 ENCOUNTER — OFFICE VISIT (OUTPATIENT)
Dept: PHYSICAL THERAPY | Age: 73
End: 2023-10-03
Payer: COMMERCIAL

## 2023-10-03 DIAGNOSIS — R53.1 WEAKNESS: ICD-10-CM

## 2023-10-03 DIAGNOSIS — R29.898 WEAKNESS OF BOTH LOWER EXTREMITIES: Primary | ICD-10-CM

## 2023-10-03 PROCEDURE — 97110 THERAPEUTIC EXERCISES: CPT | Performed by: SPECIALIST/TECHNOLOGIST

## 2023-10-03 NOTE — PROGRESS NOTES
Daily Note     Today's date: 10/3/2023  Patient name: Ganesh Friend  : 1950  MRN: [id number]  Referring provider: Keyla Celis MD  Dx:   Encounter Diagnosis     ICD-10-CM    1. Weakness of both lower extremities  R29.898       2. Weakness  R53.1                      Subjective: Pt denies soreness following PT treatment sessions. Objective: See treatment diary below    Assessment: Pt demonstrates appropriate levels of muscular fatigue with increased reps this visit. Tolerated treatment well. Patient demonstrated fatigue post treatment, exhibited good technique with therapeutic exercises and would benefit from continued PT    Plan: Continue per plan of care. Progress treatment as tolerated. Precautions: Fall Risk. Patient's PMHx is remarkable for DM, HTN, sleep apnea and bilateral vertebral artery stenosis.     Manuals 9/19 9/22 9/29 10/3                                                             Neuro Re-Ed                                                                                                        Ther Ex             Nustep    10 min           Recumbent bike  8 min NT 10 min         Seated hr and tr:B: 2 x hep 2 x 10 NT          LAQ:B: 2 x hep 2 x 10 20X  30x         Hip flexion:B  2 x 10 20X  30x                      Supine ankle pumps:B: 2 x hep 2 x 10 20X           slr flexion:B: 2 x hep 2 x 10 20X  3x10         Bridges 2 x hep 3 sec x 20 20X 3SEC  3x10          SAQ:B:  2 x 10 20X 3SEC  3x10                      Side lying hip abduction:B:  NT NT 3x10                      Prone hip extension:B  NT NT          Prone TKE:B:  NT NT          Prone knee flexion:B:  NT NT                       Standing slr x 3:B:  NT 20X           Standing hr and tr:B:  NT 20X  30x         Standing hamstring curls:B:  NT NT          Mini squats  2 x 10 20X  3x10 sit to stand          Lunges:B:  2 x 10 NT          SLS on foam:B  30 sec x 2 NT          Tandem stance on foam:B:  20 sec x 2 NT Side stepping and tandem ambulation  3 x each NT          Monster walking with tband  NT NT          Side stepping with mini squats with tband  NT NT          Forward step ups on foam:B: 8" NT NT 6" fw 20x         Lateral step ups on foam:B: 8" NT NT 6" step overs 15x         Step downs:B: 6" NT NT                       [place] resistance walking fwd, bwd, and side to side  NT NT          [employer]x balance system  LOS level 7 pattern moderate with bilateral ue support x 5 NT                                    Ther Activity                                       Gait Training                                       Modalities

## 2023-10-05 ENCOUNTER — APPOINTMENT (OUTPATIENT)
Dept: PHYSICAL THERAPY | Age: 73
End: 2023-10-05
Payer: COMMERCIAL

## 2023-10-10 ENCOUNTER — OFFICE VISIT (OUTPATIENT)
Dept: PHYSICAL THERAPY | Age: 73
End: 2023-10-10
Payer: COMMERCIAL

## 2023-10-10 DIAGNOSIS — R29.898 WEAKNESS OF BOTH LOWER EXTREMITIES: Primary | ICD-10-CM

## 2023-10-10 DIAGNOSIS — R53.1 WEAKNESS: ICD-10-CM

## 2023-10-10 PROCEDURE — 97110 THERAPEUTIC EXERCISES: CPT

## 2023-10-10 PROCEDURE — 97112 NEUROMUSCULAR REEDUCATION: CPT

## 2023-10-10 NOTE — PROGRESS NOTES
Daily Note     Today's date: 10/10/2023  Patient name: Belkys Puentes  : 1950  MRN: [id number]  Referring provider: Latoya Allen MD  Dx:   Encounter Diagnosis     ICD-10-CM    1. Weakness of both lower extremities  R29.898       2. Weakness  R53.1                      Subjective: Pt reported "I am stiff, my right leg is sore I think I over did it"     Objective: See treatment diary below    Assessment: Pt was challenged with dynamic exercises. Progress as able      Plan: Cont with Plan of care         Precautions: Fall Risk. Patient's PMHx is remarkable for DM, HTN, sleep apnea and bilateral vertebral artery stenosis.     Manuals 9/19 9/22 9/29 10/3 10/10                                  Neuro Re-Ed                                                    Ther Ex             Nustep    10 min   10 min         Recumbent bike  8 min NT 10 min NT        Seated hr and tr:B: 2 x hep 2 x 10 NT  NT        LAQ:B: 2 x hep 2 x 10 20X  30x 30X 2#         Hip flexion:B  2 x 10 20X  30x 30X 2#                      Supine ankle pumps:B: 2 x hep 2 x 10 20X   NT        slr flexion:B: 2 x hep 2 x 10 20X  3x10 30X 2#         Bridges 2 x hep 3 sec x 20 20X 3SEC  3x10  NT        SAQ:B:  2 x 10 20X 3SEC  3x10 NT                     Side lying hip abduction:B:  NT NT 3x10 NT                     Prone hip extension:B  NT NT  NT        Prone TKE:B:  NT NT  NT        Prone knee flexion:B:  NT NT  NT                     Standing slr x 3:B:  NT 20X   20X 2#         Standing hr and tr:B:  NT 20X  30x 20X         Standing hamstring curls:B:  NT NT  20X 2#         Mini squats  2 x 10 20X  3x10 sit to stand  30X         Lunges:B:  2 x 10 NT  NT        SLS on foam:B  30 sec x 2 NT  10X 10SEC         Tandem stance on foam:B:  20 sec x 2 NT  10X 10SEC         Side stepping over hurdles        6x        Forwad/ retro over hurdles      6x                      Side stepping and tandem ambulation  3 x each NT  NT        Monster walking with tband NT NT  NT        Side stepping with mini squats with tband  NT NT  NT        Forward step ups on foam:B: 8" NT NT 6" fw 20x NT        Lateral step ups on foam:B: 8" NT NT 6" step overs 15x NT        Step downs:B: 6" NT NT  NT                     [place] resistance walking fwd, bwd, and side to side  NT NT  NT        [employer] balance system  LOS level 7 pattern moderate with bilateral ue support x 5 NT  NT                     Ther Activity                          Gait Training                                       Modalities

## 2023-10-12 ENCOUNTER — OFFICE VISIT (OUTPATIENT)
Dept: PHYSICAL THERAPY | Age: 73
End: 2023-10-12
Payer: COMMERCIAL

## 2023-10-12 ENCOUNTER — TELEPHONE (OUTPATIENT)
Dept: ENDOCRINOLOGY | Facility: CLINIC | Age: 73
End: 2023-10-12

## 2023-10-12 DIAGNOSIS — R29.898 WEAKNESS OF BOTH LOWER EXTREMITIES: Primary | ICD-10-CM

## 2023-10-12 DIAGNOSIS — R53.1 WEAKNESS: ICD-10-CM

## 2023-10-12 PROCEDURE — 97112 NEUROMUSCULAR REEDUCATION: CPT

## 2023-10-12 PROCEDURE — 97110 THERAPEUTIC EXERCISES: CPT

## 2023-10-12 PROCEDURE — 97750 PHYSICAL PERFORMANCE TEST: CPT

## 2023-10-12 NOTE — TELEPHONE ENCOUNTER
Missed call from pt. Returned call to see if it was in regards to rescheduling his appt with [address] in January. He said he was not sure since his wife usually handles that, so he would check with her and call back.

## 2023-10-12 NOTE — PROGRESS NOTES
Daily Note     Today's date: 10/12/2023  Patient name: Zee Hollins  : 1950  MRN: [id number]  Referring provider: Lois Rogers MD  Dx:   Encounter Diagnosis     ICD-10-CM    1. Weakness of both lower extremities  R29.898       2. Weakness  R53.1           Start Time: 0915  Stop Time: 1000  Total time in clinic (min): 45 minutes    Subjective: Patient reports that he is tired today. Objective: See treatment diary below      Assessment: Patient tolerated treatment well and was able to complete all exercises without complaints of pain or increase in symptoms. Patient able to perform static tandem on foam without LOB or difficulty. Increased difficulty noted with SLS on left. Pt will benefit from continued skilled PT intervention in order to address remaining limitations and to restore maximal function. Plan: Continue per plan of care. Precautions: Fall Risk. Patient's PMHx is remarkable for DM, HTN, sleep apnea and bilateral vertebral artery stenosis.     Manuals 9/19 9/22 9/29 10/3 10/10 10/12                                 Neuro Re-Ed                                                    Ther Ex             Nustep    10 min   10 min  10'       Recumbent bike  8 min NT 10 min NT NT       Seated hr and tr:B: 2 x hep 2 x 10 NT  NT        LAQ:B: 2 x hep 2 x 10 20X  30x 30X 2#  2# 2x10       Hip flexion:B  2 x 10 20X  30x 30X 2#  30x 2#                    Supine ankle pumps:B: 2 x hep 2 x 10 20X   NT        slr flexion:B: 2 x hep 2 x 10 20X  3x10 30X 2#  2# 20x       Bridges 2 x hep 3 sec x 20 20X 3SEC  3x10  NT        SAQ:B:  2 x 10 20X 3SEC  3x10 NT                     Side lying hip abduction:B:  NT NT 3x10 NT                     Prone hip extension:B  NT NT  NT        Prone TKE:B:  NT NT  NT        Prone knee flexion:B:  NT NT  NT                     Standing slr x 3:B:  NT 20X   20X 2#  2# 20x       Standing hr and tr:B:  NT 20X  30x 20X  20x       Standing hamstring curls:B:  NT NT  20X 2#  20x 2#       Mini squats  2 x 10 20X  3x10 sit to stand  30X  30x       Lunges:B:  2 x 10 NT  NT        SLS on foam:B  30 sec x 2 NT  10X 10SEC  10"x5       Tandem stance on foam:B:  20 sec x 2 NT  10X 10SEC  10"x5       Side stepping over hurdles        6x        Forwad/ retro over hurdles      6x                      Side stepping and tandem ambulation  3 x each NT  NT        Monster walking with tband  NT NT  NT        Side stepping with mini squats with tband  NT NT  NT        Forward step ups on foam:B: 8" NT NT 6" fw 20x NT Up and over 6" x10 each       Lateral step ups on foam:B: 8" NT NT 6" step overs 15x NT        Step downs:B: 6" NT NT  NT                     Fe resistance walking fwd, bwd, and side to side  NT NT  NT        [place] balance system  LOS level 7 pattern moderate with bilateral ue support x 5 NT  NT                     Ther Activity                          Gait Training                                       Modalities

## 2023-10-12 NOTE — PROGRESS NOTES
PT Evaluation     Today's date: 2023  Patient name: Tong Mccain  : 1950  MRN: [id number]  Referring provider: Ilene Mejia MD  Dx:   Encounter Diagnosis     ICD-10-CM    1. Weakness of both lower extremities  R29.898       2. Weakness  R53.1           Start Time: 915  Stop Time: 1000  Total time in clinic (min): 45 minutes    Assessment  Assessment details: PT Reassessment: 10/12/23:   Patient reported the following progress since onset of PT: balance, strength, transfers, lifting and he denies any falls since onset of PT. But, he noted the following deficits that still persists: prolonged walking, prolonged static standing activities, stair climbing, squatting, lifting and balance all remain limited since onset of PT. PT IE: 23. Patient reported he has weakness in bilateral lower extremity. Patient noted he had a challenging bout of COVID with symptoms occurring for 2 months. Patient noted prior to COVID he had a bout vertigo that still limits transfers. Patient noted COVID deficits are bilateral lower weakness, balance limitations. Patient noted he has a vision disturbance as well, which limits his balance and dizziness as well. Patient denies bilateral feet paresthesias. Patient denies pain limitations. Patient denies any recent falls, but he noted he has balance deficits. Patient noted he has near falls, but no falls. Patient noted the following deficits that still persists: walking, static standing activities, transfers, has to sit to dress, endurance is limited, stair climbing, bending, squatting and lifting activities. Patient noted he is very cautious with all standing, walking and stair climbing activities. Patient noted his balance can limit his walking with walking with a bias to the side when he attempting to walk straight.    Impairments: abnormal gait, abnormal or restricted ROM, abnormal movement, activity intolerance, impaired balance, impaired physical strength, lacks appropriate home exercise program and pain with function  Understanding of Dx/Px/POC: excellent   Prognosis: good  Prognosis details: Patient is a 68y.o. year old male seen for outpatient PT reevaluation with mobility, balance and functional deficits due to bilateral lower extremity weakness. Patient presents with the following progress since onset of PT: balance, bilateral lower extremity strength, transfers, walking and standing based functional activities. Patient presents to PT reassessment with the following problems, concerns, deficits and impairments: decreased bilateral lower extremity range of motion, decreased bilateral lower extremity strength, gait and stair dysfunctions, transfer dysfunctions, balance deficits and limitations, functional limitations and decreased tolerance to activity. Patient would benefit from skilled PT services under the following PT treatment plan to address the above noted deficits: therapeutic exercises and activities to facilitate bilateral lower extremity mobility and strength, modalities, manual therapy techniques, postural reeducation and strengthening, DLS and abdominal strengthening, modalities, manual therapy techniques, gait and stair training, transfer training, balance and proprioception activities, IASTM techniques, Kinesio taping techniques and a hep. Thank you for the referral.     Goals  Short Term goals - 4 weeks  1. Patient will be independent HEP.  MET. 2.  Patient will report a 25 - 50% decrease in pain complaints. MET. 3.  Increase strength 1/2 grade. MET. 4.  Increase ROM 5-10 degrees. MET. Long Term goals - 8 weeks  1. Patient will report elimination of pain complaints. Partially MET. 2.  Patient will return to all recreational activities without restriction. Partially MET. 3.  ROM WFL. Partially MET. 4.  Strength 5/5. Partially MET.   5.  Patient will report ability to walk around the house without balance deficits or limitations. Partially MET. 6.  Patient will report stair climbing ascent is reciprocal and descent is without use of railing due to balance progress. Partially MET. 7.  Patient will report all transfers are without balance deficits or limitations. Partially MET. 8.  Patient will report ability to resume house hold squatting and lifting activities without balance deficits or limitations. Partially MET. 9.  Patient will report ability to resume bending house hold activities without balance deficits or limitations. Partially MET. 10.  Patient will deny near falls as exhibited by balance testing > 25%. Partially MET. Plan  Patient would benefit from: skilled physical therapy  Planned modality interventions: cryotherapy, TENS, thermotherapy: hydrocollator packs, traction, ultrasound and unattended electrical stimulation  Planned therapy interventions: IASTM, joint mobilization, kinesiology taping, manual therapy, massage, aquatic therapy, balance, balance/weight bearing training, neuromuscular re-education, patient education, postural training, self care, body mechanics training, compression, strengthening, stretching, therapeutic activities, therapeutic exercise, therapeutic training, transfer training, flexibility, functional ROM exercises, gait training, graded activity, graded exercise, graded motor, home exercise program and IADL retraining  Frequency: 2x week  Duration in weeks: 8  Treatment plan discussed with: patient      Subjective Evaluation    History of Present Illness  Mechanism of injury: Patient's PMHx is remarkable for DM, HTN, sleep apnea and bilateral vertebral artery stenosis. Patient Goals  Patient goals for therapy: decreased pain, improved balance, increased motion, increased strength, independence with ADLs/IADLs and return to sport/leisure activities  Patient goal: Patient's goal" is to improve his endurance, strength and balance to perform his daily activities".   Pain  At best pain ratin  At worst pain ratin  Location: bilateral lower extremity        Objective     Active Range of Motion   Left Hip   Flexion: 94 degrees   Abduction: 22 degrees     Right Hip   Flexion: 98 degrees   Abduction: 30 degrees   Left Knee   Flexion: 110 degrees   Extension: -2 degrees   Extensor la degrees     Right Knee   Flexion: 110 degrees   Extension: -2 degrees   Extensor la degrees   Left Ankle/Foot   Dorsiflexion (ke): 4 degrees   Plantar flexion: 52 degrees     Right Ankle/Foot   Dorsiflexion (ke): 8 degrees   Plantar flexion: 44 degrees     Strength/Myotome Testing     Left Hip   Planes of Motion   Flexion: 4  Extension: 4  Abduction: 4+  Adduction: 5    Right Hip   Planes of Motion   Flexion: 4  Extension: 4  Abduction: 4+  Adduction: 5    Left Knee   Flexion: 4+  Extension: 4    Right Knee   Flexion: 4+  Extension: 4    Left Ankle/Foot   Dorsiflexion: 4-  Plantar flexion: 5    Right Ankle/Foot   Dorsiflexion: 4  Plantar flexion: 5    Ambulation     Ambulation: Level Surfaces   Ambulation without assistive device: independent    Additional Level Surfaces Ambulation Details  Patient ambulates with out assistive device with decrease in pace, decrease in bilateral step length, decrease in bilateral ankle PF with terminal stance phase and decrease in bilateral ankle DF with swing phase. Ambulation: Stairs   Ascend stairs: independent  Pattern: non-reciprocal  Railings: two rails  Descend stairs: independent  Pattern: non-reciprocal  Railings: two rails    Comments   PT IE: 23. Tug is at 15.62 seconds. Skill level: easy  Time to complete test:  36  seconds  No Bilateral UE support. Overall:          Actual:      53    Goal:     65. Forward:          Actual:      62    Goal:     65.  Backward:          Actual:     67     Goal:     30. Left:          Actual:     56     Goal:     65. Right:          Actual:     41     Goal:     65.   Forward / Left:          Actual:   54 Goal:     65. Forward / Right:          Actual:    61      Goal:     65.  Backward / Left:          Actual:       58   Goal:     65.  Backward / Right:          Actual:     60     Goal:     65. MCTSIB: Eyes Open on Firm Surface with Patient Sway Index at 0.49; Eyes Closed on Firm Surface with Patient Sway Index at 2.10;  Eyes Open on Foam Surface with Patient Sway Index at 0.95; Eyes Closed on Foam Surface with Patient Sway Index at 3.45. PT IE: 10/12/23. Tug is at 13.01 seconds. Skill level: easy  Time to complete test:  42  seconds  No Bilateral UE support. Overall:          Actual:      51    Goal:     65. Forward:          Actual:      74    Goal:     65.  Backward:          Actual:     47     Goal:     30. Left:          Actual:     78     Goal:     65. Right:          Actual:     70     Goal:     65. Forward / Left:          Actual:   55       Goal:     65. Forward / Right:          Actual:    71      Goal:     65.  Backward / Left:          Actual:       48   Goal:     65.  Backward / Right:          Actual:     47     Goal:     65. MCTSIB: Eyes Open on Firm Surface with Patient Sway Index at 1.79; Eyes Closed on Firm Surface with Patient Sway Index at 2.63;  Eyes Open on Foam Surface with Patient Sway Index at 1.20; Eyes Closed on Foam Surface with Patient Sway Index at 2.40. ? Precautions: Fall Risk. Patient's PMHx is remarkable for DM, HTN, sleep apnea and bilateral vertebral artery stenosis.       Manuals 9/19                                                                Neuro Re-Ed                                                                                                        Ther Ex                          Recumbent bike             Seated hr and tr:B: 2 x hep            LAQ:B: 2 x hep            Hip flexion:B                          Supine ankle pumps:B: 2 x hep            slr flexion:B: 2 x hep            Bridges 2 x hep            SAQ:B: Side lying hip abduction:B:                          Prone hip extension:B             Prone TKE:B:             Prone knee flexion:B:                          Standing slr x 3:B:             Standing hr and tr:B:             Standing hamstring curls:B:             Mini squats             Lunges:B:             SLS on foam:B             Tandem stance on foam:B:             Side stepping and tandem ambulation             Monster walking with tband             Side stepping with mini squats with tband             Forward step ups on foam:B: 8"            Lateral step ups on foam:B: 8"            Step downs:B: 6"                         [place] resistance walking fwd, bwd, and side to side             Biodex balance system  LOS                                     Ther Activity                                       Gait Training                                       Modalities

## 2023-10-18 ENCOUNTER — VBI (OUTPATIENT)
Dept: ADMINISTRATIVE | Facility: OTHER | Age: 73
End: 2023-10-18

## 2023-10-22 DIAGNOSIS — Z79.4 TYPE 2 DIABETES MELLITUS WITH HYPERGLYCEMIA, WITH LONG-TERM CURRENT USE OF INSULIN (HCC): ICD-10-CM

## 2023-10-22 DIAGNOSIS — E11.65 TYPE 2 DIABETES MELLITUS WITH HYPERGLYCEMIA, WITH LONG-TERM CURRENT USE OF INSULIN (HCC): ICD-10-CM

## 2023-10-23 ENCOUNTER — TELEPHONE (OUTPATIENT)
Dept: INTERNAL MEDICINE CLINIC | Facility: CLINIC | Age: 73
End: 2023-10-23

## 2023-10-23 DIAGNOSIS — E11.65 TYPE 2 DIABETES MELLITUS WITH HYPERGLYCEMIA, WITH LONG-TERM CURRENT USE OF INSULIN (HCC): Primary | ICD-10-CM

## 2023-10-23 DIAGNOSIS — Z79.4 TYPE 2 DIABETES MELLITUS WITH HYPERGLYCEMIA, WITH LONG-TERM CURRENT USE OF INSULIN (HCC): Primary | ICD-10-CM

## 2023-10-23 RX ORDER — PEN NEEDLE, DIABETIC 31G X5/32"
NEEDLE, DISPOSABLE MISCELLANEOUS 4 TIMES DAILY
Qty: 360 EACH | Refills: 3 | Status: SHIPPED | OUTPATIENT
Start: 2023-10-23 | End: 2023-10-25 | Stop reason: SDUPTHER

## 2023-10-23 RX ORDER — GLIMEPIRIDE 2 MG/1
4 TABLET ORAL 2 TIMES DAILY WITH MEALS
Qty: 360 TABLET | Refills: 1 | Status: SHIPPED | OUTPATIENT
Start: 2023-10-23

## 2023-10-24 DIAGNOSIS — Z79.4 TYPE 2 DIABETES MELLITUS WITH HYPERGLYCEMIA, WITH LONG-TERM CURRENT USE OF INSULIN (HCC): ICD-10-CM

## 2023-10-24 DIAGNOSIS — E11.65 TYPE 2 DIABETES MELLITUS WITH HYPERGLYCEMIA, WITH LONG-TERM CURRENT USE OF INSULIN (HCC): ICD-10-CM

## 2023-10-24 RX ORDER — EMPAGLIFLOZIN 25 MG/1
25 TABLET, FILM COATED ORAL EVERY MORNING
Qty: 90 TABLET | Refills: 1 | OUTPATIENT
Start: 2023-10-24

## 2023-10-25 ENCOUNTER — OFFICE VISIT (OUTPATIENT)
Dept: PHYSICAL THERAPY | Age: 73
End: 2023-10-25
Payer: COMMERCIAL

## 2023-10-25 DIAGNOSIS — E11.65 TYPE 2 DIABETES MELLITUS WITH HYPERGLYCEMIA, WITH LONG-TERM CURRENT USE OF INSULIN (HCC): ICD-10-CM

## 2023-10-25 DIAGNOSIS — R29.898 WEAKNESS OF BOTH LOWER EXTREMITIES: Primary | ICD-10-CM

## 2023-10-25 DIAGNOSIS — R53.1 WEAKNESS: ICD-10-CM

## 2023-10-25 DIAGNOSIS — Z79.4 TYPE 2 DIABETES MELLITUS WITH HYPERGLYCEMIA, WITH LONG-TERM CURRENT USE OF INSULIN (HCC): ICD-10-CM

## 2023-10-25 PROCEDURE — 97110 THERAPEUTIC EXERCISES: CPT

## 2023-10-25 RX ORDER — PEN NEEDLE, DIABETIC 31G X5/32"
NEEDLE, DISPOSABLE MISCELLANEOUS 4 TIMES DAILY
Qty: 360 EACH | Refills: 3 | Status: SHIPPED | OUTPATIENT
Start: 2023-10-25 | End: 2024-01-23

## 2023-10-25 NOTE — PROGRESS NOTES
Daily Note     Today's date: 10/25/2023  Patient name: Yuli Rapp  : 1950  MRN: [id number]  Referring provider: Greg Cox MD  Dx:   Encounter Diagnosis     ICD-10-CM    1. Weakness of both lower extremities  R29.898       2. Weakness  R53.1                      Subjective: Pt noted no pain, imbalance or fatigue today. Pt noted "feeling pretty good". Objective: See treatment diary below      Assessment: pt required a few verbal cues for proper form during standing exercises. pt required a short break during the standing exercises. Pt noted fatigue after treatment but no pain or discomfort. Plan: Continue per plan of care. Precautions: Fall Risk. Patient's PMHx is remarkable for DM, HTN, sleep apnea and bilateral vertebral artery stenosis.     Manuals 9/19 9/22 9/29 10/3 10/10 10/12 10/25                                Neuro Re-Ed                                                    Ther Ex             Nustep    10 min   10 min  10' NT      Recumbent bike  8 min NT 10 min NT NT 10'      Seated hr and tr:B: 2 x hep 2 x 10 NT  NT  NT      LAQ:B: 2 x hep 2 x 10 20X  30x 30X 2#  2# 2x10 2# 20x      Hip flexion:B  2 x 10 20X  30x 30X 2#  30x 2# 30x 2#                   Supine ankle pumps:B: 2 x hep 2 x 10 20X   NT  NT      slr flexion:B: 2 x hep 2 x 10 20X  3x10 30X 2#  2# 20x 2# 20x      Bridges 2 x hep 3 sec x 20 20X 3SEC  3x10  NT  NT      SAQ:B:  2 x 10 20X 3SEC  3x10 NT  2# 20x                   Side lying hip abduction:B:  NT NT 3x10 NT  NT                   Prone hip extension:B  NT NT  NT  NT      Prone TKE:B:  NT NT  NT  NT      Prone knee flexion:B:  NT NT  NT  NT                   Standing slr x 3:B:  NT 20X   20X 2#  2# 20x 2# 20x      Standing hr and tr:B:  NT 20X  30x 20X  20x 20x      Standing hamstring curls:B:  NT NT  20X 2#  20x 2# 20x 2#      Mini squats  2 x 10 20X  3x10 sit to stand  30X  30x 30x      Lunges:B:  2 x 10 NT  NT  NT      SLS on foam:B  30 sec x 2 NT  10X 10SEC  10"x5 10"x5      Tandem stance on foam:B:  20 sec x 2 NT  10X 10SEC  10"x5 10"x5      Side stepping over hurdles        6x  NT      Forward/ retro over hurdles      6x   NT                   Side stepping and tandem ambulation  3 x each NT  NT  NT      Monster walking with tband  NT NT  NT  NT      Side stepping with mini squats with tband  NT NT  NT  NT      Forward step ups on foam:B: 8" NT NT 6" fw 20x NT Up and over 6" x10 each NT      Lateral step ups on foam:B: 8" NT NT 6" step overs 15x NT  NT      Step downs:B: 6" NT NT  NT  NT                   Fe resistance walking fwd, bwd, and side to side  NT NT  NT  NT      [employer] balance system  LOS level 7 pattern moderate with bilateral ue support x 5 NT  NT  NT                   Ther Activity                          Gait Training                                       Modalities

## 2023-10-27 ENCOUNTER — TELEPHONE (OUTPATIENT)
Dept: INTERNAL MEDICINE CLINIC | Facility: CLINIC | Age: 73
End: 2023-10-27

## 2023-10-27 NOTE — TELEPHONE ENCOUNTER
Pt requesting 50g of insulin syringe unable to find states his insulin is now going up to inject 50 units of the lantus and needs needles

## 2023-10-30 ENCOUNTER — TELEPHONE (OUTPATIENT)
Dept: INTERNAL MEDICINE CLINIC | Facility: CLINIC | Age: 73
End: 2023-10-30

## 2023-10-30 DIAGNOSIS — E11.65 TYPE 2 DIABETES MELLITUS WITH HYPERGLYCEMIA, WITH LONG-TERM CURRENT USE OF INSULIN (HCC): Primary | ICD-10-CM

## 2023-10-30 DIAGNOSIS — Z79.4 TYPE 2 DIABETES MELLITUS WITH HYPERGLYCEMIA, WITH LONG-TERM CURRENT USE OF INSULIN (HCC): Primary | ICD-10-CM

## 2023-10-30 DIAGNOSIS — Z79.4 TYPE 2 DIABETES MELLITUS WITH HYPERGLYCEMIA, WITH LONG-TERM CURRENT USE OF INSULIN (HCC): ICD-10-CM

## 2023-10-30 DIAGNOSIS — E11.65 TYPE 2 DIABETES MELLITUS WITH HYPERGLYCEMIA, WITH LONG-TERM CURRENT USE OF INSULIN (HCC): ICD-10-CM

## 2023-10-30 RX ORDER — SYRINGE-NEEDLE,INSULIN,0.5 ML 27GX1/2"
SYRINGE, EMPTY DISPOSABLE MISCELLANEOUS 4 TIMES DAILY
Qty: 360 EACH | Refills: 3 | Status: SHIPPED | OUTPATIENT
Start: 2023-10-30 | End: 2023-10-31

## 2023-10-30 NOTE — TELEPHONE ENCOUNTER
need the .5 unit syringes. He currently has the 3 unit syringes and he needs the .5 unit syringes, and [facility] still doesn't have that order. They only received an order for . 3. We need to .5 unit syringe for insulin but also need needle wise. they need it for the Connor Sachs needles for the pens

## 2023-10-30 NOTE — TELEPHONE ENCOUNTER
Hi, this is for Ismael Plasencia [birth year] birth date. I'm Jennifer Walker, phone number [phone]. Been having some issues getting new syringes and needles for his insulin pens. So they eat needles that were ordered. [facility] does not have the needles for the actual insulin pens. They said it needs to be the [employer], [employer], [employer], [employer]. Those are the ones that they have in stock. So if you're able to put through an order for that, they said they have lots of them. And then as far as the syringes for the insulin bottles, to use those up, he needs a .5 CC or sorry, .5 unit syringe, . 5 unit syringe. He currently has a .3 unit syringe and you needs a .5 unit syringe. You guys need clarification on that? Thank you.  anny Sullivan.

## 2023-10-31 RX ORDER — SYRINGE-NEEDLE,INSULIN,0.5 ML 27GX1/2"
SYRINGE, EMPTY DISPOSABLE MISCELLANEOUS 4 TIMES DAILY
Qty: 400 EACH | Refills: 3 | Status: SHIPPED | OUTPATIENT
Start: 2023-10-31 | End: 2024-02-08

## 2023-10-31 RX ORDER — SYRINGE-NEEDLE,INSULIN,0.5 ML 27GX1/2"
SYRINGE, EMPTY DISPOSABLE MISCELLANEOUS
Qty: 360 EACH | Refills: 3 | Status: CANCELLED | OUTPATIENT
Start: 2023-10-31 | End: 2024-01-29

## 2023-10-31 NOTE — TELEPHONE ENCOUNTER
Spoke with pharmacy they stated the 1 ml syringes is what is used for 50 units , because the 0.5 ml would be the whole insulin , quantity would be 20 for two boxes.  If we can put the orderi n for the 1 ML syringes

## 2023-11-01 ENCOUNTER — OFFICE VISIT (OUTPATIENT)
Dept: PHYSICAL THERAPY | Age: 73
End: 2023-11-01
Payer: COMMERCIAL

## 2023-11-01 DIAGNOSIS — R29.898 WEAKNESS OF BOTH LOWER EXTREMITIES: Primary | ICD-10-CM

## 2023-11-01 DIAGNOSIS — R53.1 WEAKNESS: ICD-10-CM

## 2023-11-01 PROCEDURE — 97110 THERAPEUTIC EXERCISES: CPT

## 2023-11-01 PROCEDURE — 97750 PHYSICAL PERFORMANCE TEST: CPT

## 2023-11-01 NOTE — PROGRESS NOTES
PT Evaluation     Today's date: 2023  Patient name: Leslie Wetzel  : 1950  MRN: [id number]  Referring provider: Jesse Beaver MD  Dx:   Encounter Diagnosis     ICD-10-CM    1. Weakness of both lower extremities  R29.898       2. Weakness  R53.1           Start Time: 915  Stop Time: 6203  Total time in clinic (min): 60 minutes    Assessment  Assessment details: PT Reassessment: 23. Patient reported the following progress since onset of PT: improvement in balance, improvement in ambulation, transfers and static standing activities. But, he noted the following deficits that still persists: prolonged walking, stair climbing with descent more challenging than ascent, static standing activities and balance deficits. PT IE: 23. Patient reported he has weakness in bilateral lower extremity. Patient noted he had a challenging bout of COVID with symptoms occurring for 2 months. Patient noted prior to COVID he had a bout vertigo that still limits transfers. Patient noted COVID deficits are bilateral lower weakness, balance limitations. Patient noted he has a vision disturbance as well, which limits his balance and dizziness as well. Patient denies bilateral feet paresthesias. Patient denies pain limitations. Patient denies any recent falls, but he noted he has balance deficits. Patient noted he has near falls, but no falls. Patient noted the following deficits that still persists: walking, static standing activities, transfers, has to sit to dress, endurance is limited, stair climbing, bending, squatting and lifting activities. Patient noted he is very cautious with all standing, walking and stair climbing activities. Patient noted his balance can limit his walking with walking with a bias to the side when he attempting to walk straight.    Impairments: abnormal gait, abnormal or restricted ROM, abnormal movement, activity intolerance, impaired balance, impaired physical strength, lacks appropriate home exercise program and pain with function  Understanding of Dx/Px/POC: excellent   Prognosis: good  Prognosis details: Patient is a 68y.o. year old male seen for outpatient PT reevaluation with mobility, balance and functional deficits due to bilateral lower extremity weakness. Patient presents with the following progress since onset of PT: increase in bilateral lower extremity strength, balance progress, walking progress, stair climbing, transfers and self iadls and functional activities. Patient presents to PT reassessment with the following problems, concerns, deficits and impairments: decreased bilateral lower extremity range of motion, decreased bilateral lower extremity strength, gait and stair dysfunctions, transfer dysfunctions, balance deficits and limitations, functional limitations and decreased tolerance to activity. Patient would benefit from skilled PT services under the following PT treatment plan to address the above noted deficits: therapeutic exercises and activities to facilitate bilateral lower extremity mobility and strength, modalities, manual therapy techniques, postural reeducation and strengthening, DLS and abdominal strengthening, modalities, manual therapy techniques, gait and stair training, transfer training, balance and proprioception activities, IASTM techniques, Kinesio taping techniques and a hep. Thank you for the referral.     Goals  Short Term goals - 4 weeks  1. Patient will be independent HEP. MET. 2.  Patient will report a 25 - 50% decrease in pain complaints. MET.  3.  Increase strength 1/2 grade. MET. 4.  Increase ROM 5-10 degrees. MET. Long Term goals - 8 weeks  1. Patient will report elimination of pain complaints. Partially MET. 2.  Patient will return to all recreational activities without restriction. Partially MET. 3.  ROM WFL. Partially MET. 4.  Strength 5/5. Partially MET.   5.  Patient will report ability to walk around the house without balance deficits or limitations. Partially MET. 6.  Patient will report stair climbing ascent is reciprocal and descent is without use of railing due to balance progress. Partially MET. 7.  Patient will report all transfers are without balance deficits or limitations. Partially MET. 8.  Patient will report ability to resume house hold squatting and lifting activities without balance deficits or limitations. Partially MET. 9.  Patient will report ability to resume bending house hold activities without balance deficits or limitations. Partially MET. 10.  Patient will deny near falls as exhibited by balance testing > 25%. Partially MET. Plan  Patient would benefit from: skilled physical therapy  Planned modality interventions: cryotherapy, TENS, thermotherapy: hydrocollator packs, traction, ultrasound and unattended electrical stimulation  Planned therapy interventions: IASTM, joint mobilization, kinesiology taping, manual therapy, massage, aquatic therapy, balance, balance/weight bearing training, neuromuscular re-education, patient education, postural training, self care, body mechanics training, compression, strengthening, stretching, therapeutic activities, therapeutic exercise, therapeutic training, transfer training, flexibility, functional ROM exercises, gait training, graded activity, graded exercise, graded motor, home exercise program and IADL retraining  Frequency: 2x week  Duration in weeks: 8  Treatment plan discussed with: patient      Subjective Evaluation    History of Present Illness  Mechanism of injury: Patient's PMHx is remarkable for DM, HTN, sleep apnea and bilateral vertebral artery stenosis.   Patient Goals  Patient goals for therapy: decreased pain, improved balance, increased motion, increased strength, independence with ADLs/IADLs and return to sport/leisure activities  Patient goal: Patient's goal" is to improve his endurance, strength and balance to perform his daily activities". Pain  At best pain ratin  At worst pain ratin  Location: bilateral lower extremity        Objective     Active Range of Motion   Left Hip   Flexion: 94 degrees   Abduction: 22 degrees     Right Hip   Flexion: 98 degrees   Abduction: 30 degrees   Left Knee   Flexion: 110 degrees   Extension: -2 degrees   Extensor la degrees     Right Knee   Flexion: 110 degrees   Extension: -2 degrees   Extensor la degrees   Left Ankle/Foot   Dorsiflexion (ke): 4 degrees   Plantar flexion: 52 degrees     Right Ankle/Foot   Dorsiflexion (ke): 8 degrees   Plantar flexion: 44 degrees     Strength/Myotome Testing     Left Hip   Planes of Motion   Flexion: 4  Extension: 4  Abduction: 4+  Adduction: 5    Right Hip   Planes of Motion   Flexion: 4  Extension: 4  Abduction: 4+  Adduction: 5    Left Knee   Flexion: 4  Extension: 4    Right Knee   Flexion: 4+  Extension: 4    Left Ankle/Foot   Dorsiflexion: 4  Plantar flexion: 4+    Right Ankle/Foot   Dorsiflexion: 4  Plantar flexion: 5    Ambulation     Ambulation: Level Surfaces   Ambulation without assistive device: independent    Additional Level Surfaces Ambulation Details  Patient ambulates with out assistive device with decrease in pace, decrease in bilateral step length, and decrease in bilateral ankle DF with swing phase. Ambulation: Stairs   Ascend stairs: independent  Pattern: non-reciprocal  Railings: two rails  Descend stairs: independent  Pattern: non-reciprocal  Railings: two rails    Comments   PT IE: 23. Tug is at 15.62 seconds. Skill level: easy  Time to complete test:  36  seconds  No Bilateral UE support. Overall:          Actual:      53    Goal:     65. Forward:          Actual:      62    Goal:     65.  Backward:          Actual:     67     Goal:     30. Left:          Actual:     56     Goal:     65. Right:          Actual:     41     Goal:     65. Forward / Left:          Actual:   54       Goal:     65.   Forward / Right:          Actual:    61      Goal:     65.  Backward / Left:          Actual:       58   Goal:     65.  Backward / Right:          Actual:     60     Goal:     65. MCTSIB: Eyes Open on Firm Surface with Patient Sway Index at 0.49; Eyes Closed on Firm Surface with Patient Sway Index at 2.10;  Eyes Open on Foam Surface with Patient Sway Index at 0.95; Eyes Closed on Foam Surface with Patient Sway Index at 3.45. PT IE: 10/01/23. Tug is at 14.34 seconds. Skill level: easy  Time to complete test:  363 seconds  No Bilateral UE support. Overall:          Actual:      71    Goal:     65. Forward:          Actual:      93    Goal:     65.  Backward:          Actual:     77     Goal:     30. Left:          Actual:     70     Goal:     65. Right:          Actual:     85     Goal:     65. Forward / Left:          Actual:   72       Goal:     65. Forward / Right:          Actual:    92      Goal:     65.  Backward / Left:          Actual:       82   Goal:     65.  Backward / Right:          Actual:     44     Goal:     65. MCTSIB: Eyes Open on Firm Surface with Patient Sway Index at 0.74; Eyes Closed on Firm Surface with Patient Sway Index at 1.91;  Eyes Open on Foam Surface with Patient Sway Index at 1.31; Eyes Closed on Foam Surface with Patient Sway Index at 2.36.            ? Precautions: Fall Risk. Patient's PMHx is remarkable for DM, HTN, sleep apnea and bilateral vertebral artery stenosis.       Manuals 9/19                                                                Neuro Re-Ed                                                                                                        Ther Ex                          Recumbent bike             Seated hr and tr:B: 2 x hep            LAQ:B: 2 x hep            Hip flexion:B                          Supine ankle pumps:B: 2 x hep            slr flexion:B: 2 x hep            Bridges 2 x hep            SAQ:B:                          Side lying hip abduction:B:                          Prone hip extension:B             Prone TKE:B:             Prone knee flexion:B:                          Standing slr x 3:B:             Standing hr and tr:B:             Standing hamstring curls:B:             Mini squats             Lunges:B:             SLS on foam:B             Tandem stance on foam:B:             Side stepping and tandem ambulation             Monster walking with tband             Side stepping with mini squats with tband             Forward step ups on foam:B: 8"            Lateral step ups on foam:B: 8"            Step downs:B: 6"                         Fe resistance walking fwd, bwd, and side to side             [employer] balance system  LOS                                     Ther Activity                                       Gait Training                                       Modalities

## 2023-11-01 NOTE — PROGRESS NOTES
Daily Note     Today's date: 2023  Patient name: Kaylan Arteaga  : 1950  MRN: [id number]  Referring provider: Aleshia Thomas MD  Dx:   No diagnosis found. Subjective: Pt noted no pain or weakness today. Pt noted working on his son's house and is having some issues with bending the wire he is installing. Objective: See treatment diary below      Assessment: Pt required a couple of breaks during weightbearing exercises. Pt noted he is feeling progress in his balance and strength during weightbearing exercises. Pt required contact guarding during [place] dynamic exercises, but was able to use proper form when pulled. Pt noted feeling apprehensive during [place] CT SIB testing, specifically when going backwards. Plan: Continue per plan of care. Precautions: Fall Risk. Patient's PMHx is remarkable for DM, HTN, sleep apnea and bilateral vertebral artery stenosis.     Manuals 9/19 9/22 9/29 10/3 10/10 10/12 10/25 11/1                               Neuro Re-Ed                                                    Ther Ex             Nustep    10 min   10 min  10' NT NT     Recumbent bike  8 min NT 10 min NT NT 10' 10'     Seated hr and tr:B: 2 x hep 2 x 10 NT  NT  NT NT     LAQ:B: 2 x hep 2 x 10 20X  30x 30X 2#  2# 2x10 2# 20x NT     Hip flexion:B  2 x 10 20X  30x 30X 2#  30x 2# 30x 2# 2# 20X                  Supine ankle pumps:B: 2 x hep 2 x 10 20X   NT  NT NT     slr flexion:B: 2 x hep 2 x 10 20X  3x10 30X 2#  2# 20x 2# 20x NT     Bridges 2 x hep 3 sec x 20 20X 3SEC  3x10  NT  NT NT     SAQ:B:  2 x 10 20X 3SEC  3x10 NT  2# 20x NT                  Side lying hip abduction:B:  NT NT 3x10 NT  NT NT                  Prone hip extension:B  NT NT  NT  NT NT     Prone TKE:B:  NT NT  NT  NT NT     Prone knee flexion:B:  NT NT  NT  NT NT                  Standing slr x 3:B:  NT 20X   20X 2#  2# 20x 2# 20x 2# 20x     Standing hr and tr:B:  NT 20X  30x 20X  20x 20x 20x     Standing hamstring curls:B:  NT NT  20X 2#  20x 2# 20x 2# 2# 20x     Mini squats  2 x 10 20X  3x10 sit to stand  30X  30x 30x 30X     Lunges:B:  2 x 10 NT  NT  NT NT     SLS on foam:B  30 sec x 2 NT  10X 10SEC  10"x5 10"x5 NT     Tandem stance on foam:B:  20 sec x 2 NT  10X 10SEC  10"x5 10"x5 NT     Side stepping over hurdles        6x  NT NT     Forward/ retro over hurdles      6x   NT NT                  Side stepping and tandem ambulation  3 x each NT  NT  NT NT     Monster walking with tband  NT NT  NT  NT NT     Side stepping with mini squats with tband  NT NT  NT  NT NT     Forward step ups on foam:B: 8" NT NT 6" fw 20x NT Up and over 6" x10 each NT NT     Lateral step ups on foam:B: 8" NT NT 6" step overs 15x NT  NT NT     Step downs:B: 6" NT NT  NT  NT NT                  Fe resistance walking fwd, bwd, and side to side  NT NT  NT  NT 8# 5x     [employer] balance system  LOS level 7 pattern moderate with bilateral ue support x 5 NT  NT  NT LOS     CTSIB testing                   Ther Activity                          Gait Training                                       Modalities

## 2023-11-08 ENCOUNTER — OFFICE VISIT (OUTPATIENT)
Dept: PHYSICAL THERAPY | Age: 73
End: 2023-11-08
Payer: COMMERCIAL

## 2023-11-08 DIAGNOSIS — R29.898 WEAKNESS OF BOTH LOWER EXTREMITIES: Primary | ICD-10-CM

## 2023-11-08 DIAGNOSIS — R53.1 WEAKNESS: ICD-10-CM

## 2023-11-08 PROCEDURE — 97110 THERAPEUTIC EXERCISES: CPT

## 2023-11-08 PROCEDURE — 97112 NEUROMUSCULAR REEDUCATION: CPT

## 2023-11-08 NOTE — PROGRESS NOTES
Daily Note     Today's date: 2023  Patient name: Casey Shen  : 1950  MRN: [id number]  Referring provider: Yasmeen Sow MD  Dx:   Encounter Diagnosis     ICD-10-CM    1. Weakness of both lower extremities  R29.898       2. Weakness  R53.1                        Subjective: Pt noted no new complaints       Objective: See treatment diary below      Assessment: Challenged with dynamic exercises. Progress as able         Plan: Continue per plan of care. Precautions: Fall Risk. Patient's PMHx is remarkable for DM, HTN, sleep apnea and bilateral vertebral artery stenosis.     Manuals 9/19 9/22 9/29 10/3 10/10 10/12 10/25 11/1 11/8                              Neuro Re-Ed                                                    Ther Ex             Nustep    10 min   10 min  10' NT NT 12 min     Recumbent bike  8 min NT 10 min NT NT 10' 10' NT    Seated hr and tr:B: 2 x hep 2 x 10 NT  NT  NT NT NT    LAQ:B: 2 x hep 2 x 10 20X  30x 30X 2#  2# 2x10 2# 20x NT NT    Hip flexion:B  2 x 10 20X  30x 30X 2#  30x 2# 30x 2# 2# 20X NT                 Supine ankle pumps:B: 2 x hep 2 x 10 20X   NT  NT NT NT    slr flexion:B: 2 x hep 2 x 10 20X  3x10 30X 2#  2# 20x 2# 20x NT NT    Bridges 2 x hep 3 sec x 20 20X 3SEC  3x10  NT  NT NT NT    SAQ:B:  2 x 10 20X 3SEC  3x10 NT  2# 20x NT NT                 Side lying hip abduction:B:  NT NT 3x10 NT  NT NT NT                 Prone hip extension:B  NT NT  NT  NT NT NT    Prone TKE:B:  NT NT  NT  NT NT NT    Prone knee flexion:B:  NT NT  NT  NT NT NT                 Standing slr x 3:B:  NT 20X   20X 2#  2# 20x 2# 20x 2# 20x 20X 2.5#     Standing hr and tr:B:  NT 20X  30x 20X  20x 20x 20x 20X 2.5#     Standing hamstring curls:B:  NT NT  20X 2#  20x 2# 20x 2# 2# 20x 20X 2.5#     Mini squats  2 x 10 20X  3x10 sit to stand  30X  30x 30x 30X 30X     Lunges:B:  2 x 10 NT  NT  NT NT NT    SLS on foam:B  30 sec x 2 NT  10X 10SEC  10"x5 10"x5 NT 20SEC 5X     Tandem stance on foam:B:  20 sec x 2 NT  10X 10SEC  10"x5 10"x5 NT 30SEC 5X     Side stepping over hurdles on foam        6x  NT NT 10X     Forward/ retro over hurdles on foam      6x   NT NT 10x                                Monster walking with tband  NT NT  NT  NT NT NT    Side stepping with mini squats with tband  NT NT  NT  NT NT NT    Forward step ups on foam:B: 8" NT NT 6" fw 20x NT Up and over 6" x10 each NT NT NT    Lateral step ups on foam:B: 8" NT NT 6" step overs 15x NT  NT NT NT    Step downs:B: 6" NT NT  NT  NT NT NT                 Fe resistance walking fwd, bwd, and side to side  NT NT  NT  NT 8# 5x NT    [employer] balance system  LOS level 7 pattern moderate with bilateral ue support x 5 NT  NT  NT LOS     CTSIB testing  NT                 Ther Activity                          Gait Training                                       Modalities

## 2023-11-15 ENCOUNTER — OFFICE VISIT (OUTPATIENT)
Dept: PHYSICAL THERAPY | Age: 73
End: 2023-11-15
Payer: COMMERCIAL

## 2023-11-15 DIAGNOSIS — R29.898 WEAKNESS OF BOTH LOWER EXTREMITIES: Primary | ICD-10-CM

## 2023-11-15 DIAGNOSIS — R53.1 WEAKNESS: ICD-10-CM

## 2023-11-15 PROCEDURE — 97110 THERAPEUTIC EXERCISES: CPT

## 2023-11-15 PROCEDURE — 97112 NEUROMUSCULAR REEDUCATION: CPT

## 2023-11-15 NOTE — PROGRESS NOTES
Daily Note     Today's date: 11/15/2023  Patient name: Casey Shen  : 1950  MRN: [id number]  Referring provider: Yasmeen Sow MD  Dx:   Encounter Diagnosis     ICD-10-CM    1. Weakness of both lower extremities  R29.898       2. Weakness  R53.1                          Subjective: Pt noted "I am feeling stronger"       Objective: See treatment diary below      Assessment: Challenged with balance exercises. Progress as able         Plan: Continue per plan of care. Precautions: Fall Risk. Patient's PMHx is remarkable for DM, HTN, sleep apnea and bilateral vertebral artery stenosis.     Manuals     10/3 10/10 10/12 10/25 11/1 11/8 11/15                             Neuro Re-Ed                                                    Ther Ex              Nustep      10 min  10' NT NT 12 min  10 min    Recumbent bike    10 min NT NT 10' 10' NT NT        Seated hr and tr:B:     NT  NT NT NT NT   LAQ:B:    30x 30X 2#  2# 2x10 2# 20x NT NT 20X 3#    Hip flexion:B    30x 30X 2#  30x 2# 30x 2# 2# 20X NT 20X 3#                 Supine ankle pumps:B:     NT  NT NT NT NT   slr flexion:B:    3x10 30X 2#  2# 20x 2# 20x NT NT NT   Bridges    3x10  NT  NT NT NT NT   SAQ:B:    3x10 NT  2# 20x NT NT NT                Side lying hip abduction:B:    3x10 NT  NT NT NT NT                Prone hip extension:B     NT  NT NT NT NT   Prone TKE:B:     NT  NT NT NT NT   Prone knee flexion:B:     NT  NT NT NT NT                 Standing slr x 3:B:     20X 2#  2# 20x 2# 20x 2# 20x 20X 2.5#  20X 3#    Standing hr and tr:B:    30x 20X  20x 20x 20x 20X 2.5#  NT   Standing hamstring curls:B:     20X 2#  20x 2# 20x 2# 2# 20x 20X 2.5#  20X 3#    Mini squats    3x10 sit to stand  30X  30x 30x 30X 30X  30X    Lunges on foam       NT  NT NT NT NT   SLS on foam:B     10X 10SEC  10"x5 10"x5 NT 20SEC 5X  20SEC 5X    Tandem stance on foam:B:     10X 10SEC  10"x5 10"x5 NT 30SEC 5X  30SEC 5X    Side stepping over hurdles on foam        6x  NT NT 10X 10X    Forward/ retro over hurdles on foam      6x   NT NT 10x  10X                                Monster walking with tband     NT  NT NT NT NT   Side stepping with mini squats with tband     NT  NT NT NT NT   Forward step ups on foam B     8"     6" fw 20x NT Up and over 6" x10 each NT NT NT 20X 3#    Lateral step ups on foam    8"      6" step overs 15x NT  NT NT NT 20X 3#    Step downs:B:     NT  NT NT NT 20X 3#                 Fe resistance walking fwd, bwd, and side to side      NT  NT 8# 5x NT NT   [employer] balance system      NT  NT LOS     CTSIB testing  NT NT                Ther Activity                          Gait Training                                       Modalities

## 2023-11-22 ENCOUNTER — APPOINTMENT (OUTPATIENT)
Dept: PHYSICAL THERAPY | Age: 73
End: 2023-11-22
Payer: COMMERCIAL

## 2023-11-29 ENCOUNTER — EVALUATION (OUTPATIENT)
Dept: PHYSICAL THERAPY | Age: 73
End: 2023-11-29
Payer: COMMERCIAL

## 2023-11-29 DIAGNOSIS — R29.898 WEAKNESS OF BOTH LOWER EXTREMITIES: Primary | ICD-10-CM

## 2023-11-29 DIAGNOSIS — R53.1 WEAKNESS: ICD-10-CM

## 2023-11-29 PROCEDURE — 97110 THERAPEUTIC EXERCISES: CPT | Performed by: PHYSICAL THERAPIST

## 2023-11-29 PROCEDURE — 97750 PHYSICAL PERFORMANCE TEST: CPT | Performed by: PHYSICAL THERAPIST

## 2023-11-29 PROCEDURE — 97112 NEUROMUSCULAR REEDUCATION: CPT | Performed by: PHYSICAL THERAPIST

## 2023-11-29 NOTE — PROGRESS NOTES
PT Evaluation  / PT Reassessment    Today's date: 2023  Patient name: Harini Pabon  : 1950  MRN: [id number]  Referring provider: Dana Pruett MD  Dx:   Encounter Diagnosis     ICD-10-CM    1. Weakness of both lower extremities  R29.898       2. Weakness  R53.1                      Assessment  Assessment details: PT Reassessment: 23. Patient reported the following progress since onset of PT: walking, balance, bilateral lower extremity strength, house hold activities and self iadls. But, he noted the following deficits that still persists: prolonged walking, stair climbing, prolonged standing, balance, endurance deficits, squatting, bending, house hold chores and activities, mobility deficits and he feels his balance limits all static and dynamic activities. Plus, he noted his balance is decreased with fatigue. Plus, he denies any falls since onset of PT. PT Reassessment: 23. Patient reported the following progress since onset of PT: improvement in balance, improvement in ambulation, transfers and static standing activities. But, he noted the following deficits that still persists: prolonged walking, stair climbing with descent more challenging than ascent, static standing activities and balance deficits. PT IE: 23. Patient reported he has weakness in bilateral lower extremity. Patient noted he had a challenging bout of COVID with symptoms occurring for 2 months. Patient noted prior to COVID he had a bout vertigo that still limits transfers. Patient noted COVID deficits are bilateral lower weakness, balance limitations. Patient noted he has a vision disturbance as well, which limits his balance and dizziness as well. Patient denies bilateral feet paresthesias. Patient denies pain limitations. Patient denies any recent falls, but he noted he has balance deficits. Patient noted he has near falls, but no falls.   Patient noted the following deficits that still persists: walking, static standing activities, transfers, has to sit to dress, endurance is limited, stair climbing, bending, squatting and lifting activities. Patient noted he is very cautious with all standing, walking and stair climbing activities. Patient noted his balance can limit his walking with walking with a bias to the side when he attempting to walk straight. Impairments: abnormal gait, abnormal or restricted ROM, abnormal movement, activity intolerance, impaired balance, impaired physical strength, lacks appropriate home exercise program and pain with function  Understanding of Dx/Px/POC: excellent   Prognosis: good  Prognosis details: Patient is a 68y.o. year old male seen for outpatient PT reevaluation with mobility, balance and functional deficits due to bilateral lower extremity weakness. Patient presents with the following progress since onset of PT: increase in bilateral lower extremity strength, balance progress, ambulation progress, stair climbing, transfers improved, and self iadls and functional activities. Patient presents to PT reassessment with the following problems, concerns, deficits and impairments: decreased bilateral lower extremity range of motion, decreased bilateral lower extremity strength, gait and stair dysfunctions, transfer dysfunctions, balance deficits and limitations, functional limitations, self and house hold functional deficits, bending based functional activities and decreased tolerance to activity.   Patient would benefit from skilled PT services under the following PT treatment plan to address the above noted deficits: therapeutic exercises and activities to facilitate bilateral lower extremity mobility and strength, modalities, manual therapy techniques, postural reeducation and strengthening, DLS and abdominal strengthening, modalities, manual therapy techniques, gait and stair training, transfer training, balance and proprioception activities, IASTM techniques, Kinesio taping techniques and a hep. Thank you for the referral.     Goals  Short Term goals - 4 weeks  1. Patient will be independent HEP. MET. 2.  Patient will report a 25 - 50% decrease in pain complaints. MET.  3.  Increase strength 1/2 grade. MET. 4.  Increase ROM 5-10 degrees. MET. Long Term goals - 8 weeks  1. Patient will report elimination of pain complaints. Partially MET. 2.  Patient will return to all recreational activities without restriction. Partially MET. 3.  ROM WFL. Partially MET. 4.  Strength 5/5. Partially MET. 5.  Patient will report ability to walk around the house without balance deficits or limitations. Partially MET. 6.  Patient will report stair climbing ascent is reciprocal and descent is without use of railing due to balance progress. Partially MET. 7.  Patient will report all transfers are without balance deficits or limitations. Partially MET. 8.  Patient will report ability to resume house hold squatting and lifting activities without balance deficits or limitations. Partially MET. 9.  Patient will report ability to resume bending house hold activities without balance deficits or limitations. Partially MET. 10.  Patient will deny near falls as exhibited by balance testing > 25%. Partially MET.     Plan  Patient would benefit from: skilled physical therapy  Planned modality interventions: cryotherapy, TENS, thermotherapy: hydrocollator packs, traction, ultrasound and unattended electrical stimulation  Planned therapy interventions: IASTM, joint mobilization, kinesiology taping, manual therapy, massage, aquatic therapy, balance, balance/weight bearing training, neuromuscular re-education, patient education, postural training, self care, body mechanics training, compression, strengthening, stretching, therapeutic activities, therapeutic exercise, therapeutic training, transfer training, flexibility, functional ROM exercises, gait training, graded activity, graded exercise, graded motor, home exercise program and IADL retraining  Frequency: 2x week  Duration in weeks: 8  Treatment plan discussed with: patient      Subjective Evaluation    History of Present Illness  Mechanism of injury: Patient's PMHx is remarkable for DM, HTN, sleep apnea and bilateral vertebral artery stenosis. Patient Goals  Patient goals for therapy: decreased pain, improved balance, increased motion, increased strength, independence with ADLs/IADLs and return to sport/leisure activities  Patient goal: Patient's goal" is to improve his endurance, strength and balance to perform his daily activities".   Pain  At best pain ratin  At worst pain ratin  Location: bilateral lower extremity        Objective     Active Range of Motion   Left Hip   Flexion: 94 degrees   Abduction: 22 degrees     Right Hip   Flexion: 98 degrees   Abduction: 30 degrees   Left Knee   Flexion: 110 degrees   Extension: -2 degrees   Extensor la degrees     Right Knee   Flexion: 110 degrees   Extension: -2 degrees   Extensor la degrees   Left Ankle/Foot   Dorsiflexion (ke): 4 degrees   Plantar flexion: 52 degrees     Right Ankle/Foot   Dorsiflexion (ke): 8 degrees   Plantar flexion: 44 degrees     Strength/Myotome Testing     Left Hip   Planes of Motion   Flexion: 4  Extension: 4  Abduction: 4+  Adduction: 5    Right Hip   Planes of Motion   Flexion: 4+  Extension: 4+  Abduction: 4+  Adduction: 5    Left Knee   Flexion: 4  Extension: 4    Right Knee   Flexion: 4+  Extension: 4+    Left Ankle/Foot   Dorsiflexion: 4  Plantar flexion: 4+    Right Ankle/Foot   Dorsiflexion: 4+  Plantar flexion: 5    Ambulation     Ambulation: Level Surfaces   Ambulation without assistive device: independent    Additional Level Surfaces Ambulation Details  Patient ambulates with out assistive device with decrease in pace, decrease in bilateral step length, and decrease in bilateral ankle DF with swing phase and decrease in bilateral ankle PF with terminal stance phase. Ambulation: Stairs   Ascend stairs: independent  Pattern: reciprocal  Railings: two rails  Descend stairs: independent  Pattern: non-reciprocal  Railings: two rails    Comments   PT IE: 09/19/23. Tug is at 15.62 seconds. Skill level: easy  Time to complete test:  36  seconds  No Bilateral UE support. Overall:          Actual:      53    Goal:     65. Forward:          Actual:      62    Goal:     65.  Backward:          Actual:     67     Goal:     30. Left:          Actual:     56     Goal:     65. Right:          Actual:     41     Goal:     65. Forward / Left:          Actual:   54       Goal:     65. Forward / Right:          Actual:    61      Goal:     65.  Backward / Left:          Actual:       58   Goal:     65.  Backward / Right:          Actual:     60     Goal:     65. MCTSIB: Eyes Open on Firm Surface with Patient Sway Index at 0.49; Eyes Closed on Firm Surface with Patient Sway Index at 2.10;  Eyes Open on Foam Surface with Patient Sway Index at 0.95; Eyes Closed on Foam Surface with Patient Sway Index at 3.45. PT IE: 10/01/23. Tug is at 14.34 seconds. Skill level: easy  Time to complete test:  33 seconds  No Bilateral UE support. Overall:          Actual:      71    Goal:     65. Forward:          Actual:      93    Goal:     65.  Backward:          Actual:     77     Goal:     30. Left:          Actual:     70     Goal:     65. Right:          Actual:     85     Goal:     65. Forward / Left:          Actual:   72       Goal:     65. Forward / Right:          Actual:    92      Goal:     65.  Backward / Left:          Actual:       82   Goal:     65.  Backward / Right:          Actual:     44     Goal:     65.     MCTSIB: Eyes Open on Firm Surface with Patient Sway Index at 0.74; Eyes Closed on Firm Surface with Patient Sway Index at 1.91;  Eyes Open on Foam Surface with Patient Sway Index at 1.31; Eyes Closed on Foam Surface with Patient Sway Index at 2.36. PT IE: 11/29/23. Tug is at 11.70 seconds. Skill level: easy  Time to complete test:  21 seconds  Bilateral UE support. Overall:          Actual:      83    Goal:     65. Forward:          Actual:      93    Goal:     65.  Backward:          Actual:     72     Goal:     30. Left:          Actual:     77     Goal:     65. Right:          Actual:     98     Goal:     65. Forward / Left:          Actual:   87       Goal:     65. Forward / Right:          Actual:    86      Goal:     65.  Backward / Left:          Actual:       98   Goal:     65.  Backward / Right:          Actual:     74     Goal:     65.    Skill level: easy  Time to complete test:  43 seconds  No Bilateral UE support. Overall:          Actual:      40    Goal:     65. Forward:          Actual:      59    Goal:     65.  Backward:          Actual:     53     Goal:     30. Left:          Actual:     47     Goal:     65. Right:          Actual:     59     Goal:     65. Forward / Left:          Actual:   50       Goal:     65. Forward / Right:          Actual:    71      Goal:     65.  Backward / Left:          Actual:       24   Goal:     65.  Backward / Right:          Actual:     31     Goal:     65. MCTSIB: Eyes Open on Firm Surface with Patient Sway Index at 1.44; Eyes Closed on Firm Surface with Patient Sway Index at 2.06;  Eyes Open on Foam Surface with Patient Sway Index at 1.65; Eyes Closed on Foam Surface with Patient Sway Index at 3.44. Precautions: Fall Risk. Patient's PMHx is remarkable for DM, HTN, sleep apnea and bilateral vertebral artery stenosis.     Manuals  11/29   10/3 10/10 10/12 10/25 11/1 11/8 11/15                             Neuro Re-Ed                                                    Ther Ex              Nustep  10 min    10 min  10' NT NT 12 min  10 min    Recumbent bike    10 min NT NT 10' 10' NT NT        Seated hr and tr:B:     NT  NT NT NT NT   LAQ:B: 2# x 20   30x 30X 2#  2# 2x10 2# 20x NT NT 20X 3#    Hip flexion:B 2# x 20   30x 30X 2#  30x 2# 30x 2# 2# 20X NT 20X 3#                 Supine ankle pumps:B:     NT  NT NT NT NT   slr flexion:B:    3x10 30X 2#  2# 20x 2# 20x NT NT NT   Bridges    3x10  NT  NT NT NT NT   SAQ:B:    3x10 NT  2# 20x NT NT NT                Side lying hip abduction:B:    3x10 NT  NT NT NT NT                Prone hip extension:B     NT  NT NT NT NT   Prone TKE:B:     NT  NT NT NT NT   Prone knee flexion:B:     NT  NT NT NT NT                 Standing slr x 3:B: 2# x 20    20X 2#  2# 20x 2# 20x 2# 20x 20X 2.5#  20X 3#    Standing hr and tr:B: 2 x 10   30x 20X  20x 20x 20x 20X 2.5#  NT   Standing hamstring curls:B: NT    20X 2#  20x 2# 20x 2# 2# 20x 20X 2.5#  20X 3#    Mini squats 2 x 10   3x10 sit to stand  30X  30x 30x 30X 30X  30X    Lunges on foam  2 x 10     NT  NT NT NT NT   SLS on foam:B 30 sec x 4    10X 10SEC  10"x5 10"x5 NT 20SEC 5X  20SEC 5X    Tandem stance on foam:B: 30 sec x 4    10X 10SEC  10"x5 10"x5 NT 30SEC 5X  30SEC 5X    Side stepping over hurdles on foam    10 feet at the ballet bar x 4    6x  NT NT 10X  10X    Forward/ retro over hurdles on foam  10 feet at the ballet bar x 4     6x   NT NT 10x  10X                                Monster walking with tband     NT  NT NT NT NT   Side stepping with mini squats with tband     NT  NT NT NT NT   Forward step ups on foam B     8"     6" fw 20x NT Up and over 6" x10 each NT NT NT 20X 3#    Lateral step ups on foam    8"      6" step overs 15x NT  NT NT NT 20X 3#    Step downs:B:     NT  NT NT NT 20X 3#                 Fe resistance walking fwd, bwd, and side to side      NT  NT 8# 5x NT NT   Phorm balance system LOS and MTSIB testing     NT  NT LOS     CTSIB testing  NT NT                Ther Activity                          Gait Training                                       Modalities

## 2023-11-30 ENCOUNTER — ESTABLISHED COMPREHENSIVE EXAM (OUTPATIENT)
Dept: URBAN - METROPOLITAN AREA CLINIC 6 | Facility: CLINIC | Age: 73
End: 2023-11-30

## 2023-11-30 ENCOUNTER — RA CDI HCC (OUTPATIENT)
Dept: OTHER | Facility: HOSPITAL | Age: 73
End: 2023-11-30

## 2023-11-30 DIAGNOSIS — E11.3293: ICD-10-CM

## 2023-11-30 DIAGNOSIS — Z79.4: ICD-10-CM

## 2023-11-30 DIAGNOSIS — H25.813: ICD-10-CM

## 2023-11-30 LAB
LEFT EYE DIABETIC RETINOPATHY: POSITIVE
RIGHT EYE DIABETIC RETINOPATHY: POSITIVE

## 2023-11-30 PROCEDURE — 92014 COMPRE OPH EXAM EST PT 1/>: CPT

## 2023-11-30 ASSESSMENT — TONOMETRY
OS_IOP_MMHG: 13
OD_IOP_MMHG: 12

## 2023-11-30 ASSESSMENT — VISUAL ACUITY
OS_SC: 20/25-1
OD_SC: 20/40-1

## 2023-11-30 NOTE — PROGRESS NOTES
[address] [facility] coding opportunities          Chart Reviewed number of suggestions sent to Provider: 3    E11.29  [id number]  E11.22  Patients Insurance     Medicare Insurance: [employer]

## 2023-12-06 ENCOUNTER — EVALUATION (OUTPATIENT)
Dept: PHYSICAL THERAPY | Age: 73
End: 2023-12-06
Payer: COMMERCIAL

## 2023-12-06 ENCOUNTER — APPOINTMENT (OUTPATIENT)
Dept: LAB | Age: 73
End: 2023-12-06
Payer: COMMERCIAL

## 2023-12-06 DIAGNOSIS — R53.1 WEAKNESS: ICD-10-CM

## 2023-12-06 DIAGNOSIS — R29.898 WEAKNESS OF BOTH LOWER EXTREMITIES: Primary | ICD-10-CM

## 2023-12-06 DIAGNOSIS — E11.65 TYPE 2 DIABETES MELLITUS WITH HYPERGLYCEMIA, WITH LONG-TERM CURRENT USE OF INSULIN (HCC): ICD-10-CM

## 2023-12-06 DIAGNOSIS — Z79.4 TYPE 2 DIABETES MELLITUS WITH HYPERGLYCEMIA, WITH LONG-TERM CURRENT USE OF INSULIN (HCC): ICD-10-CM

## 2023-12-06 DIAGNOSIS — E55.9 VITAMIN D DEFICIENCY: ICD-10-CM

## 2023-12-06 LAB
CREAT UR-MCNC: 95.4 MG/DL
MICROALBUMIN UR-MCNC: 185.3 MG/L
MICROALBUMIN/CREAT 24H UR: 194 MG/G CREATININE (ref 0–30)

## 2023-12-06 PROCEDURE — 82570 ASSAY OF URINE CREATININE: CPT | Performed by: INTERNAL MEDICINE

## 2023-12-06 PROCEDURE — 97112 NEUROMUSCULAR REEDUCATION: CPT | Performed by: PHYSICAL THERAPIST

## 2023-12-06 PROCEDURE — 97110 THERAPEUTIC EXERCISES: CPT | Performed by: PHYSICAL THERAPIST

## 2023-12-06 PROCEDURE — 82043 UR ALBUMIN QUANTITATIVE: CPT | Performed by: INTERNAL MEDICINE

## 2023-12-06 PROCEDURE — 82985 ASSAY OF GLYCATED PROTEIN: CPT

## 2023-12-06 PROCEDURE — 36415 COLL VENOUS BLD VENIPUNCTURE: CPT

## 2023-12-06 NOTE — PROGRESS NOTES
Daily Note     Today's date: 2023  Patient name: Kaylan Arteaga  : 1950  MRN: [id number]  Referring provider: Aleshia Thomas MD  Dx:   Encounter Diagnosis     ICD-10-CM    1. Weakness of both lower extremities  R29.898       2. Weakness  R53.1                          Subjective: Patient reported fatigue persists with all standing, walking and stair climbing activities. Plus, he noted balance is limited when fatigued, but denies any new falls. Objective: See treatment diary below      Assessment: Patient presents with bilateral lower extremity fatigue with closed kinetic chain activities that mimics his standing activities fatigue response. Patient exhibits lob when fatigue thus he perform bilateral upper extremity support with all standing activities. Patient exhibits challenge with both static and dynamic balance activities. Thus, PT is warranted to facilitate bilateral lower extremity mobility, strength and balance response to reduce fall risk and return to all self and house hold chores and activities. Plan: Continue per plan of care. Precautions: Fall Risk. Patient's PMHx is remarkable for DM, HTN, sleep apnea and bilateral vertebral artery stenosis.     Manuals  11/29 12/05  10/3 10/10 10/12 10/25 11/1 11/8 11/15                             Neuro Re-Ed                                                    Ther Ex              Nustep  10 min 10 min   10 min  10' NT NT 12 min  10 min    Recumbent bike    10 min NT NT 10' 10' NT NT        Seated hr and tr:B:     NT  NT NT NT NT   LAQ:B: 2# x 20 2.5# x 20  30x 30X 2#  2# 2x10 2# 20x NT NT 20X 3#    Hip flexion:B 2# x 20 2.5# x 20  30x 30X 2#  30x 2# 30x 2# 2# 20X NT 20X 3#                 Supine ankle pumps:B:     NT  NT NT NT NT   slr flexion:B:    3x10 30X 2#  2# 20x 2# 20x NT NT NT   Bridges    3x10  NT  NT NT NT NT   SAQ:B:    3x10 NT  2# 20x NT NT NT                Side lying hip abduction:B:    3x10 NT  NT NT NT NT Prone hip extension:B     NT  NT NT NT NT   Prone TKE:B:     NT  NT NT NT NT   Prone knee flexion:B:     NT  NT NT NT NT                 Standing slr x 3:B: 2# x 20 2.5# x 20   20X 2#  2# 20x 2# 20x 2# 20x 20X 2.5#  20X 3#    Standing hr and tr:B: 2 x 10 2 x 10 with 2.5#  30x 20X  20x 20x 20x 20X 2.5#  NT   Standing hamstring curls:B: NT 2 x 10 with 2.5#   20X 2#  20x 2# 20x 2# 2# 20x 20X 2.5#  20X 3#    Mini squats 2 x 10 2 x 10  3x10 sit to stand  30X  30x 30x 30X 30X  30X    Lunges on foam  2 x 10  2.5# x 20   NT  NT NT NT NT   SLS on foam:B 30 sec x 4 30 sec x 4   10X 10SEC  10"x5 10"x5 NT 20SEC 5X  20SEC 5X    Tandem stance on foam:B: 30 sec x 4 30 sec x 4   10X 10SEC  10"x5 10"x5 NT 30SEC 5X  30SEC 5X    Side stepping over hurdles on foam    10 feet at the ballet bar x 4 10 feet at the ballet bar on foam x 4   6x  NT NT 10X  10X    Forward/ retro over hurdles on foam  10 feet at the ballet bar x 4  10 feet at the ballet bar on foam x 4   6x   NT NT 10x  10X                                Monster walking with tband     NT  NT NT NT NT   Side stepping with mini squats with tband     NT  NT NT NT NT   Forward step ups on foam B     8"     6" fw 20x NT Up and over 6" x10 each NT NT NT 20X 3#    Lateral step ups on foam    8"      6" step overs 15x NT  NT NT NT 20X 3#    Step downs:B:     NT  NT NT NT 20X 3#                 Fe resistance walking fwd, bwd, and side to side      NT  NT 8# 5x NT NT   [employer] balance system LOS and MTSIB testing     NT  NT LOS     CTSIB testing  NT NT                Ther Activity                          Gait Training                                       Modalities

## 2023-12-07 ENCOUNTER — VBI (OUTPATIENT)
Dept: ADMINISTRATIVE | Facility: OTHER | Age: 73
End: 2023-12-07

## 2023-12-07 LAB — FRUCTOSAMINE SERPL-SCNC: 275 UMOL/L (ref 0–285)

## 2023-12-08 ENCOUNTER — OFFICE VISIT (OUTPATIENT)
Dept: INTERNAL MEDICINE CLINIC | Facility: CLINIC | Age: 73
End: 2023-12-08
Payer: COMMERCIAL

## 2023-12-08 VITALS
HEART RATE: 76 BPM | BODY MASS INDEX: 23.46 KG/M2 | OXYGEN SATURATION: 95 % | DIASTOLIC BLOOD PRESSURE: 84 MMHG | WEIGHT: 146 LBS | HEIGHT: 66 IN | TEMPERATURE: 97.6 F | SYSTOLIC BLOOD PRESSURE: 138 MMHG

## 2023-12-08 DIAGNOSIS — I10 ESSENTIAL HYPERTENSION: ICD-10-CM

## 2023-12-08 DIAGNOSIS — Z79.4 TYPE 2 DIABETES MELLITUS WITH HYPERGLYCEMIA, WITH LONG-TERM CURRENT USE OF INSULIN (HCC): Primary | ICD-10-CM

## 2023-12-08 DIAGNOSIS — G47.33 OBSTRUCTIVE SLEEP APNEA ON CPAP: ICD-10-CM

## 2023-12-08 DIAGNOSIS — E11.65 TYPE 2 DIABETES MELLITUS WITH HYPERGLYCEMIA, WITH LONG-TERM CURRENT USE OF INSULIN (HCC): Primary | ICD-10-CM

## 2023-12-08 PROCEDURE — 99214 OFFICE O/P EST MOD 30 MIN: CPT | Performed by: INTERNAL MEDICINE

## 2023-12-08 NOTE — PROGRESS NOTES
Assessment/Plan:           1. Type 2 diabetes mellitus with hyperglycemia, with long-term current use of insulin (HCC)  Comments:  Pertinant laba and investigations reviewed.  Continue currents medications except as noted. Pt follows with endocrinology.    2. Essential hypertension  Comments:  Pertinant laba and investigations reviewed.  Continue currents medications except as noted.    3. Obstructive sleep apnea on CPAP           1. Type 2 diabetes mellitus with hyperglycemia, with long-term current use of insulin (HCC)         No problem-specific Assessment & Plan notes found for this encounter.           Subjective:      Patient ID: Jose Maria Bethea is a 73 y.o. male.    HPI    The following portions of the patient's history were reviewed and updated as appropriate: He  has a past medical history of COVID-19 (07/2022), Diabetes mellitus (HCC), Dyslipidemia, Hypertension, and Obstructive sleep apnea on CPAP.  He   Patient Active Problem List    Diagnosis Date Noted    Intractable vomiting 05/06/2021    Vertebral artery stenosis, bilateral 04/26/2021    Obstructive sleep apnea on CPAP     Type 2 diabetes mellitus with hyperglycemia, with long-term current use of insulin (HCC) 12/08/2020    Essential hypertension 12/08/2020    Hyperkalemia 12/08/2020    Mixed hyperlipidemia 12/08/2020     He  has a past surgical history that includes Colonoscopy (2008).  His family history includes Hypertension in his mother.  He  reports that he has quit smoking. His smoking use included cigarettes. He has never used smokeless tobacco. He reports that he does not currently use alcohol. He reports that he does not currently use drugs.  Current Outpatient Medications   Medication Sig Dispense Refill    amLODIPine (NORVASC) 10 mg tablet Take 1 tablet (10 mg total) by mouth daily Use as directed 90 tablet 1    Empagliflozin (Jardiance) 25 MG TABS Take 1 tablet (25 mg total) by mouth every morning 30 tablet 3    glimepiride (AMARYL) 2 mg  "tablet TAKE 2 TABLETS (4 MG TOTAL) BY MOUTH 2 (TWO) TIMES A DAY WITH MEALS 360 tablet 1    glucose blood (FreeStyle [employer] Test) test strip Use 1 each 2 (two) times a day 200 each 1    insulin glargine (Lantus) 100 units/mL subcutaneous injection Inject 40 Units under the skin daily at bedtime 36 mL 1    Insulin Pen Needle (Comfort Touch Insulin Pen Need) 31G X 6 MM MISC Use 4 (four) times a day 360 each 3    Insulin Syringe-Needle U-100 (BD Insulin Syringe U/F) 31G X 5/16\" 1 ML MISC Use 4 (four) times a day 400 each 3    Lantus SoloStar 100 units/mL SOPN       metFORMIN (GLUCOPHAGE-XR) 500 mg 24 hr tablet Take 2 tablets (1,000 mg total) by mouth 2 (two) times a day 360 tablet 1    valsartan (DIOVAN) 320 MG tablet Take 1 tablet (320 mg total) by mouth daily 90 tablet 1    aspirin 81 mg chewable tablet Chew 1 tablet (81 mg total) daily (Patient not taking: Reported on 8/1/2023) 90 tablet 0    nirmatrelvir & ritonavir (Paxlovid, 300/100,) tablet therapy pack Take 3 tablets by mouth 2 (two) times a day for 5 days Take 2 nirmatrelvir tablets + 1 ritonavir tablet together per dose 30 tablet 0     No current facility-administered medications for this visit.     Current Outpatient Medications on File Prior to Visit   Medication Sig    amLODIPine (NORVASC) 10 mg tablet Take 1 tablet (10 mg total) by mouth daily Use as directed    Empagliflozin (Jardiance) 25 MG TABS Take 1 tablet (25 mg total) by mouth every morning    glimepiride (AMARYL) 2 mg tablet TAKE 2 TABLETS (4 MG TOTAL) BY MOUTH 2 (TWO) TIMES A DAY WITH MEALS    glucose blood (FreeStyle [employer] Test) test strip Use 1 each 2 (two) times a day    insulin glargine (Lantus) 100 units/mL subcutaneous injection Inject 40 Units under the skin daily at bedtime    Insulin Pen Needle (Comfort Touch Insulin Pen Need) 31G X 6 MM MISC Use 4 (four) times a day    Insulin Syringe-Needle U-100 (BD Insulin Syringe U/F) 31G X 5/16\" 1 ML MISC Use 4 (four) times a day    Lantus " "SoloStar 100 units/mL SOPN     metFORMIN (GLUCOPHAGE-XR) 500 mg 24 hr tablet Take 2 tablets (1,000 mg total) by mouth 2 (two) times a day    valsartan (DIOVAN) 320 MG tablet Take 1 tablet (320 mg total) by mouth daily    aspirin 81 mg chewable tablet Chew 1 tablet (81 mg total) daily (Patient not taking: Reported on 8/1/2023)     No current facility-administered medications on file prior to visit.     There are no discontinued medications.   He has No Known Allergies..    Review of Systems   Constitutional:  Negative for appetite change, chills, fatigue and fever.   HENT:  Negative for sore throat and trouble swallowing.    Eyes:  Negative for redness.   Respiratory:  Negative for shortness of breath.    Cardiovascular:  Negative for chest pain and palpitations.   Gastrointestinal:  Negative for abdominal pain, constipation and diarrhea.   Genitourinary:  Negative for dysuria and hematuria.   Musculoskeletal:  Negative for back pain and neck pain.   Skin:  Negative for rash.   Neurological:  Negative for seizures, weakness and headaches.   Hematological:  Negative for adenopathy.   Psychiatric/Behavioral:  Negative for confusion. The patient is not nervous/anxious.          Objective:      /84   Pulse 76   Temp 97.6 °F (36.4 °C) (Temporal)   Ht 5' 6\" (1.676 m)   Wt 66.2 kg (146 lb)   SpO2 95%   BMI 23.57 kg/m²     Results Reviewed       None            Recent Results (from the past 1344 hour(s))   Albumin / creatinine urine ratio    Collection Time: 12/06/23  8:41 AM   Result Value Ref Range    Creatinine, Ur 95.4 Reference range not established. mg/dL    Albumin,U,Random 185.3 (H) <20.0 mg/L    Albumin Creat Ratio 194 (H) 0 - 30 mg/g creatinine   Fructosamine    Collection Time: 12/06/23  8:41 AM   Result Value Ref Range    Fructosamine 275 0 - 285 umol/L        Physical Exam  Constitutional:       General: He is not in acute distress.     Appearance: Normal appearance.   HENT:      Head: Normocephalic " and atraumatic.      Nose: Nose normal.      Mouth/Throat:      Mouth: Mucous membranes are moist.   Eyes:      Extraocular Movements: Extraocular movements intact.      Pupils: Pupils are equal, round, and reactive to light.   Cardiovascular:      Rate and Rhythm: Normal rate and regular rhythm.      Pulses: Normal pulses.      Heart sounds: Normal heart sounds. No murmur heard.     No friction rub.   Pulmonary:      Effort: Pulmonary effort is normal. No respiratory distress.      Breath sounds: Normal breath sounds. No wheezing.   Abdominal:      General: Abdomen is flat. Bowel sounds are normal. There is no distension.      Palpations: Abdomen is soft. There is no mass.      Tenderness: There is no abdominal tenderness. There is no guarding.   Musculoskeletal:         General: Normal range of motion.      Cervical back: Neck supple.   Skin:     General: Skin is warm.   Neurological:      General: No focal deficit present.      Mental Status: He is alert and oriented to person, place, and time. Mental status is at baseline.      Cranial Nerves: No cranial nerve deficit.   Psychiatric:         Mood and Affect: Mood normal.         Behavior: Behavior normal.

## 2023-12-10 DIAGNOSIS — E11.65 TYPE 2 DIABETES MELLITUS WITH HYPERGLYCEMIA, WITH LONG-TERM CURRENT USE OF INSULIN (HCC): ICD-10-CM

## 2023-12-10 DIAGNOSIS — Z79.4 TYPE 2 DIABETES MELLITUS WITH HYPERGLYCEMIA, WITH LONG-TERM CURRENT USE OF INSULIN (HCC): ICD-10-CM

## 2023-12-11 RX ORDER — ATORVASTATIN CALCIUM 40 MG/1
40 TABLET, FILM COATED ORAL
Qty: 90 TABLET | Refills: 1 | Status: SHIPPED | OUTPATIENT
Start: 2023-12-11

## 2024-01-08 ENCOUNTER — VBI (OUTPATIENT)
Dept: ADMINISTRATIVE | Facility: OTHER | Age: 74
End: 2024-01-08

## 2024-01-25 ENCOUNTER — TELEPHONE (OUTPATIENT)
Dept: INTERNAL MEDICINE CLINIC | Facility: CLINIC | Age: 74
End: 2024-01-25

## 2024-01-25 ENCOUNTER — TELEMEDICINE (OUTPATIENT)
Dept: INTERNAL MEDICINE CLINIC | Facility: CLINIC | Age: 74
End: 2024-01-25
Payer: COMMERCIAL

## 2024-01-25 DIAGNOSIS — Z79.4 TYPE 2 DIABETES MELLITUS WITH HYPERGLYCEMIA, WITH LONG-TERM CURRENT USE OF INSULIN (HCC): ICD-10-CM

## 2024-01-25 DIAGNOSIS — E11.65 TYPE 2 DIABETES MELLITUS WITH HYPERGLYCEMIA, WITH LONG-TERM CURRENT USE OF INSULIN (HCC): ICD-10-CM

## 2024-01-25 DIAGNOSIS — U07.1 COVID-19: Primary | ICD-10-CM

## 2024-01-25 DIAGNOSIS — J04.10 ACUTE TRACHEITIS WITHOUT AIRWAY OBSTRUCTION: Primary | ICD-10-CM

## 2024-01-25 DIAGNOSIS — I10 ESSENTIAL HYPERTENSION: ICD-10-CM

## 2024-01-25 PROCEDURE — 99213 OFFICE O/P EST LOW 20 MIN: CPT | Performed by: INTERNAL MEDICINE

## 2024-01-25 RX ORDER — NIRMATRELVIR AND RITONAVIR 300-100 MG
3 KIT ORAL 2 TIMES DAILY
Qty: 30 TABLET | Refills: 0 | Status: SHIPPED | OUTPATIENT
Start: 2024-01-25 | End: 2024-01-30

## 2024-01-25 RX ORDER — DOXYCYCLINE HYCLATE 100 MG/1
100 CAPSULE ORAL EVERY 12 HOURS SCHEDULED
Qty: 10 CAPSULE | Refills: 0 | Status: SHIPPED | OUTPATIENT
Start: 2024-01-25 | End: 2024-01-25

## 2024-01-25 NOTE — TELEPHONE ENCOUNTER
Pt's wife called back to say that his covid test is positive, wants to know if medication is going to be sent to pharmacy because he is not doing well. She says she is going to take a test also and will let us know if positive

## 2024-01-25 NOTE — TELEPHONE ENCOUNTER
Paxlovid sent to pharmacy.  Please instruct patient to stop atorvastatin and sildenafil while taking Paxlovid, may resume on completion of therapy

## 2024-01-25 NOTE — TELEPHONE ENCOUNTER
Patient's wife called office to let us know patient has tested positive for Covid. Per our conversation, I informed patient's wife that you will be sending in Paxlovid to his pharmacy and for him to not take the antibiotic (just the paxlovid). Please cancel antibiotic and send Paxlovid to pharmacy

## 2024-02-19 DIAGNOSIS — E11.65 TYPE 2 DIABETES MELLITUS WITH HYPERGLYCEMIA, WITH LONG-TERM CURRENT USE OF INSULIN (HCC): ICD-10-CM

## 2024-02-19 DIAGNOSIS — I10 ESSENTIAL HYPERTENSION: ICD-10-CM

## 2024-02-19 DIAGNOSIS — Z79.4 TYPE 2 DIABETES MELLITUS WITH HYPERGLYCEMIA, WITH LONG-TERM CURRENT USE OF INSULIN (HCC): ICD-10-CM

## 2024-02-19 RX ORDER — VALSARTAN 320 MG/1
320 TABLET ORAL DAILY
Qty: 90 TABLET | Refills: 1 | Status: CANCELLED | OUTPATIENT
Start: 2024-02-19

## 2024-02-19 RX ORDER — VALSARTAN 320 MG/1
320 TABLET ORAL DAILY
Qty: 90 TABLET | Refills: 1 | Status: SHIPPED | OUTPATIENT
Start: 2024-02-19

## 2024-02-20 RX ORDER — GLIMEPIRIDE 2 MG/1
4 TABLET ORAL 2 TIMES DAILY WITH MEALS
Qty: 360 TABLET | Refills: 1 | Status: SHIPPED | OUTPATIENT
Start: 2024-02-20

## 2024-03-03 ENCOUNTER — APPOINTMENT (OUTPATIENT)
Dept: LAB | Age: 74
End: 2024-03-03
Payer: COMMERCIAL

## 2024-03-03 LAB
25(OH)D3 SERPL-MCNC: 34.7 NG/ML (ref 30–100)
CREAT UR-MCNC: 75.7 MG/DL
EST. AVERAGE GLUCOSE BLD GHB EST-MCNC: 197 MG/DL
HBA1C MFR BLD: 8.5 %
MICROALBUMIN UR-MCNC: 142.7 MG/L
MICROALBUMIN/CREAT 24H UR: 189 MG/G CREATININE (ref 0–30)

## 2024-03-05 ENCOUNTER — OFFICE VISIT (OUTPATIENT)
Dept: ENDOCRINOLOGY | Facility: CLINIC | Age: 74
End: 2024-03-05
Payer: COMMERCIAL

## 2024-03-05 VITALS
BODY MASS INDEX: 24.11 KG/M2 | OXYGEN SATURATION: 96 % | RESPIRATION RATE: 14 BRPM | HEART RATE: 76 BPM | TEMPERATURE: 97.4 F | DIASTOLIC BLOOD PRESSURE: 94 MMHG | WEIGHT: 150 LBS | SYSTOLIC BLOOD PRESSURE: 142 MMHG | HEIGHT: 66 IN

## 2024-03-05 DIAGNOSIS — E11.65 TYPE 2 DIABETES MELLITUS WITH HYPERGLYCEMIA, WITH LONG-TERM CURRENT USE OF INSULIN (HCC): Primary | ICD-10-CM

## 2024-03-05 DIAGNOSIS — E78.2 MIXED HYPERLIPIDEMIA: ICD-10-CM

## 2024-03-05 DIAGNOSIS — Z79.4 TYPE 2 DIABETES MELLITUS WITH HYPERGLYCEMIA, WITH LONG-TERM CURRENT USE OF INSULIN (HCC): Primary | ICD-10-CM

## 2024-03-05 DIAGNOSIS — E55.9 VITAMIN D DEFICIENCY: ICD-10-CM

## 2024-03-05 PROCEDURE — G2211 COMPLEX E/M VISIT ADD ON: HCPCS | Performed by: INTERNAL MEDICINE

## 2024-03-05 PROCEDURE — 99214 OFFICE O/P EST MOD 30 MIN: CPT | Performed by: INTERNAL MEDICINE

## 2024-03-05 NOTE — PROGRESS NOTES
"  Follow-up Patient Progress Note      CC: type 2 diabetes     History of Present Illness:   72 yr male with type 2 diabetes since 2002, HTN, HLD, DEWEY, Hx vertebral artery stenosis, retinopathy and microalbuminuria. Last visit was 9/13/23.      Since last visit, he gained 9 lbs. No polyuria, polydipsia or blurred vision.       Diet: regular.  Activity: active physically.     Home blood glucose monitoring: does not do glucose checks. Does not wish to use a CGM.  Hypoglycemia: no symptoms     Current meds:  Lantus 40u daily bedtime.  Jardiance 25mg PO daily  Metformin 1000mg PO BID  Amaryl 4mg po bid     Opthamology: yes, retinopathy+ve de 11/2023  Podiatry: no  Influenza: no; covid - yes  Dental:  Pancreatitis: No     Ace/ARB: valsartan  Statin: no- ? intolerance  Thyroid issues: none       Physical Exam:  Body mass index is 24.21 kg/m².  /94 (BP Location: Left arm, Patient Position: Sitting)   Pulse 76   Temp (!) 97.4 °F (36.3 °C) (Tympanic)   Resp 14   Ht 5' 6\" (1.676 m)   Wt 68 kg (150 lb)   SpO2 96%   BMI 24.21 kg/m²    Vitals:    03/05/24 0939   Weight: 68 kg (150 lb)        Physical Exam  Constitutional:       General: He is not in acute distress.     Appearance: He is well-developed.   HENT:      Head: Normocephalic and atraumatic.      Nose: Nose normal.   Eyes:      Conjunctiva/sclera: Conjunctivae normal.   Pulmonary:      Effort: Pulmonary effort is normal.   Abdominal:      General: There is no distension.   Musculoskeletal:      Cervical back: Normal range of motion and neck supple.   Skin:     Findings: No rash.      Comments: No icterus   Neurological:      Mental Status: He is alert and oriented to person, place, and time.       Labs:   Lab Results   Component Value Date    HGBA1C 8.5 (H) 03/03/2024       Lab Results   Component Value Date    BTP1ATIIJFWL 1.290 12/14/2021       Lab Results   Component Value Date    CREATININE 1.02 09/09/2023    CREATININE 1.15 04/11/2023    CREATININE " 1.18 10/01/2022    BUN 21 09/09/2023     04/23/2015    K 4.6 09/09/2023     09/09/2023    CO2 28 09/09/2023     eGFR   Date Value Ref Range Status   09/09/2023 73 ml/min/1.73sq m Final       Lab Results   Component Value Date    ALT 11 09/09/2023    AST 14 09/09/2023    ALKPHOS 67 09/09/2023    BILITOT 0.5 04/23/2015       Lab Results   Component Value Date    CHOLESTEROL 142 04/27/2021    CHOLESTEROL 132 07/21/2019    CHOLESTEROL 108 12/30/2018     Lab Results   Component Value Date    HDL 47 04/27/2021    HDL 42 07/21/2019    HDL 37 (L) 12/30/2018     Lab Results   Component Value Date    TRIG 99 04/27/2021    TRIG 112 07/21/2019    TRIG 101 12/30/2018     Lab Results   Component Value Date    NONHDLC 90 07/21/2019    NONHDLC 71 12/30/2018         Assessment/Plan:    Problem List Items Addressed This Visit          Endocrine    Type 2 diabetes mellitus with hyperglycemia, with long-term current use of insulin (HCC) - Primary     He is improved from before mostly with insulin and glimepiride use.    Today we agreed to continue Jardiance 25mg qdaily, Lantus 40u qhs, glimepiride 4mg po bid and metfomrin 1000mg po bid.  Use a cgm pro to monitor for hypoglycemia.    Follow up in 3 months.    Lab Results   Component Value Date    HGBA1C 8.5 (H) 03/03/2024            Relevant Orders    Hemoglobin A1C       Other    Mixed hyperlipidemia     Not on statin. Sent Rx crestor.  Check lipid profile - should be on a statin per guideline. Wll review next visit.         Relevant Medications    rosuvastatin (CRESTOR) 5 mg tablet    Other Relevant Orders    Lipid panel    Vitamin D deficiency     Take vit D3 2000IU daily.              I have spent a total time of 35 minutes on 03/05/24 in caring for this patient including greater than 50% of this time was spent in counseling/coordination of care as listed above.       Discussed with the patient and all questioned fully answered. He will contact me with concerns.    Umm  Sheryl

## 2024-03-07 ENCOUNTER — TELEPHONE (OUTPATIENT)
Dept: ENDOCRINOLOGY | Facility: CLINIC | Age: 74
End: 2024-03-07

## 2024-03-07 RX ORDER — ROSUVASTATIN CALCIUM 5 MG/1
5 TABLET, COATED ORAL DAILY
Qty: 90 TABLET | Refills: 0 | Status: SHIPPED | OUTPATIENT
Start: 2024-03-07

## 2024-03-07 NOTE — ASSESSMENT & PLAN NOTE
He is improved from before mostly with insulin and glimepiride use.    Today we agreed to continue Jardiance 25mg qdaily, Lantus 40u qhs, glimepiride 4mg po bid and metfomrin 1000mg po bid.  Use a cgm pro to monitor for hypoglycemia.    Follow up in 3 months.    Lab Results   Component Value Date    HGBA1C 8.5 (H) 03/03/2024

## 2024-03-07 NOTE — ASSESSMENT & PLAN NOTE
Not on statin. Sent Rx crestor.  Check lipid profile - should be on a statin per guideline. Wll review next visit.

## 2024-03-07 NOTE — TELEPHONE ENCOUNTER
----- Message from Umm Saucedo MD sent at 3/7/2024  2:02 AM EST -----  Please inform patient that since there is some weight gain and increased body fat, we may see increased cholesterol levels. I ordered a lipid profile and sent a Rx for a cholesterol pill.   We can look at the lipid values next visit and decide dose adjustments.  thanks

## 2024-04-01 ENCOUNTER — RA CDI HCC (OUTPATIENT)
Dept: OTHER | Facility: HOSPITAL | Age: 74
End: 2024-04-01

## 2024-04-01 NOTE — PROGRESS NOTES
HCC coding opportunities          Chart Reviewed number of suggestions sent to Provider: 3   E11.3293  E11.36  E11.29      Patients Insurance     Medicare Insurance: [employer]

## 2024-04-14 DIAGNOSIS — E78.2 MIXED HYPERLIPIDEMIA: ICD-10-CM

## 2024-04-16 ENCOUNTER — TELEPHONE (OUTPATIENT)
Dept: INTERNAL MEDICINE CLINIC | Facility: OTHER | Age: 74
End: 2024-04-16

## 2024-04-16 RX ORDER — ROSUVASTATIN CALCIUM 5 MG/1
5 TABLET, COATED ORAL DAILY
Qty: 90 TABLET | Refills: 0 | Status: SHIPPED | OUTPATIENT
Start: 2024-04-16

## 2024-04-17 NOTE — TELEPHONE ENCOUNTER
Future appts are cancelled. Please see if pt is following with Dr Charles.  If yes, please schedule diabetic f/u and AWV.

## 2024-05-13 ENCOUNTER — OFFICE VISIT (OUTPATIENT)
Dept: INTERNAL MEDICINE CLINIC | Facility: CLINIC | Age: 74
End: 2024-05-13
Payer: COMMERCIAL

## 2024-05-13 VITALS
HEART RATE: 87 BPM | WEIGHT: 153 LBS | SYSTOLIC BLOOD PRESSURE: 142 MMHG | OXYGEN SATURATION: 95 % | TEMPERATURE: 98.6 F | HEIGHT: 66 IN | DIASTOLIC BLOOD PRESSURE: 78 MMHG | BODY MASS INDEX: 24.59 KG/M2

## 2024-05-13 DIAGNOSIS — I10 ESSENTIAL HYPERTENSION: ICD-10-CM

## 2024-05-13 DIAGNOSIS — N40.1 BENIGN PROSTATIC HYPERPLASIA WITH NOCTURIA: ICD-10-CM

## 2024-05-13 DIAGNOSIS — E78.2 MIXED HYPERLIPIDEMIA: ICD-10-CM

## 2024-05-13 DIAGNOSIS — Z00.00 MEDICARE ANNUAL WELLNESS VISIT, SUBSEQUENT: ICD-10-CM

## 2024-05-13 DIAGNOSIS — Z79.4 TYPE 2 DIABETES MELLITUS WITH HYPERGLYCEMIA, WITH LONG-TERM CURRENT USE OF INSULIN (HCC): Primary | ICD-10-CM

## 2024-05-13 DIAGNOSIS — G47.33 OBSTRUCTIVE SLEEP APNEA ON CPAP: ICD-10-CM

## 2024-05-13 DIAGNOSIS — E11.65 TYPE 2 DIABETES MELLITUS WITH HYPERGLYCEMIA, WITH LONG-TERM CURRENT USE OF INSULIN (HCC): Primary | ICD-10-CM

## 2024-05-13 DIAGNOSIS — R35.1 BENIGN PROSTATIC HYPERPLASIA WITH NOCTURIA: ICD-10-CM

## 2024-05-13 DIAGNOSIS — K90.89 OTHER SPECIFIED INTESTINAL MALABSORPTION: ICD-10-CM

## 2024-05-13 PROBLEM — R11.10 INTRACTABLE VOMITING: Status: RESOLVED | Noted: 2021-05-06 | Resolved: 2024-05-13

## 2024-05-13 PROCEDURE — G0439 PPPS, SUBSEQ VISIT: HCPCS | Performed by: INTERNAL MEDICINE

## 2024-05-13 PROCEDURE — 99214 OFFICE O/P EST MOD 30 MIN: CPT | Performed by: INTERNAL MEDICINE

## 2024-05-13 NOTE — PROGRESS NOTES
Diabetic Foot Exam    Patient's shoes and socks removed.    Right Foot/Ankle   Right Foot Inspection  Skin Exam: skin normal and skin intact. No dry skin, no warmth, no callus, no erythema, no maceration, no abnormal color, no pre-ulcer, no ulcer and no callus.     Sensory   Monofilament testing: intact    Vascular  The right DP pulse is 1+. The right PT pulse is 1+.     Left Foot/Ankle  Left Foot Inspection  Skin Exam: skin normal and skin intact. No dry skin, no warmth, no erythema, no maceration, normal color, no pre-ulcer, no ulcer and no callus.     Sensory   Monofilament testing: intact    Vascular  The left DP pulse is 1+. The left PT pulse is 1+.     Assign Risk Category  No deformity present  No loss of protective sensation  No weak pulses  Risk: 0

## 2024-05-13 NOTE — PROGRESS NOTES
Assessment and Plan:     Problem List Items Addressed This Visit          Cardiovascular and Mediastinum    Essential hypertension    Relevant Orders    CBC and differential    Comprehensive metabolic panel    TSH, 3rd generation    Urinalysis with microscopic       Respiratory    Obstructive sleep apnea on CPAP       Endocrine    Type 2 diabetes mellitus with hyperglycemia, with long-term current use of insulin (HCC) - Primary    Relevant Orders    VAS screening       Other    Mixed hyperlipidemia     Other Visit Diagnoses       Medicare annual wellness visit, subsequent        Benign prostatic hyperplasia with nocturia        Relevant Orders    PSA Total, Diagnostic    Other specified intestinal malabsorption        Relevant Orders    Vitamin B12             1. Type 2 diabetes mellitus with hyperglycemia, with long-term current use of insulin (HCC)  VAS screening    Following with endocrinology and sugar has improved.  Continue current management importance of compliance discussed      2. Essential hypertension  CBC and differential    Comprehensive metabolic panel    TSH, 3rd generation    Urinalysis with microscopic    Advised home monitoring of blood pressure.      3. Mixed hyperlipidemia      Continue rosuvastatin.      4. Medicare annual wellness visit, subsequent        5. Obstructive sleep apnea on CPAP        6. Benign prostatic hyperplasia with nocturia  PSA Total, Diagnostic      7. Other specified intestinal malabsorption  Vitamin B12    CANCELED: Vitamin D 25 hydroxy         Preventive health issues were discussed with patient, and age appropriate screening tests were ordered as noted in patient's After Visit Summary.  Personalized health advice and appropriate referrals for health education or preventive services given if needed, as noted in patient's After Visit Summary.     History of Present Illness:     Patient presents for a Medicare Wellness Visit    This is a 73-year-old gentleman with a  history of diabetes mellitus sleep apnea hyperlipidemia hypertension actinic keratosis.  He denies any chest pain or shortness of breath       Patient Care Team:  Puneet Charles MD as PCP - General (Internal Medicine)  Umm Saucedo MD as PCP - Endocrinology (Endocrinology)  Umm Saucedo MD (Endocrinology)     Review of Systems:     Review of Systems   Constitutional:  Negative for appetite change, chills, fatigue and fever.   HENT:  Negative for sore throat and trouble swallowing.    Eyes:  Negative for redness.   Respiratory:  Negative for shortness of breath.    Cardiovascular:  Negative for chest pain and palpitations.   Gastrointestinal:  Negative for abdominal pain, constipation and diarrhea.   Genitourinary:  Negative for dysuria and hematuria.   Musculoskeletal:  Negative for back pain and neck pain.   Skin:  Negative for rash.   Neurological:  Negative for seizures, weakness and headaches.   Hematological:  Negative for adenopathy.   Psychiatric/Behavioral:  Negative for confusion. The patient is not nervous/anxious.         Problem List:     Patient Active Problem List   Diagnosis    Type 2 diabetes mellitus with hyperglycemia, with long-term current use of insulin (HCC)    Essential hypertension    Hyperkalemia    Mixed hyperlipidemia    Obstructive sleep apnea on CPAP    Vertebral artery stenosis, bilateral    Intractable vomiting    Vitamin D deficiency      Past Medical and Surgical History:     Past Medical History:   Diagnosis Date    COVID-19 07/2022    Diabetes mellitus (HCC)     Type 2    Dyslipidemia     Hypertension     Obstructive sleep apnea on CPAP      Past Surgical History:   Procedure Laterality Date    COLONOSCOPY  2008      Family History:     Family History   Problem Relation Age of Onset    Hypertension Mother       Social History:     Social History     Socioeconomic History    Marital status: /Civil Union     Spouse name: None    Number of children: None    Years  of education: None    Highest education level: None   Occupational History    None   Tobacco Use    Smoking status: Former     Types: Cigarettes    Smokeless tobacco: Never    Tobacco comments:     1 year as a teenager   Vaping Use    Vaping status: Never Used   Substance and Sexual Activity    Alcohol use: Not Currently    Drug use: Not Currently    Sexual activity: Not Currently     Partners: Female   Other Topics Concern    None   Social History Narrative    None     Social Determinants of Health     Financial Resource Strain: Low Risk  (4/14/2023)    Overall Financial Resource Strain (CARDIA)     Difficulty of Paying Living Expenses: Not hard at all   Food Insecurity: No Food Insecurity (5/13/2024)    Hunger Vital Sign     Worried About Running Out of Food in the Last Year: Never true     Ran Out of Food in the Last Year: Never true   Transportation Needs: No Transportation Needs (5/13/2024)    PRAPARE - Transportation     Lack of Transportation (Medical): No     Lack of Transportation (Non-Medical): No   Physical Activity: Not on file   Stress: Not on file   Social Connections: Not on file   Intimate Partner Violence: Not on file   Housing Stability: Low Risk  (5/13/2024)    Housing Stability Vital Sign     Unable to Pay for Housing in the Last Year: No     Number of Places Lived in the Last Year: 1     Unstable Housing in the Last Year: No      Medications and Allergies:     Current Outpatient Medications   Medication Sig Dispense Refill    amLODIPine (NORVASC) 10 mg tablet Take 1 tablet (10 mg total) by mouth daily Use as directed 90 tablet 1    aspirin 81 mg chewable tablet Chew 1 tablet (81 mg total) daily 90 tablet 0    Empagliflozin (Jardiance) 25 MG TABS Take 1 tablet (25 mg total) by mouth every morning 30 tablet 3    glimepiride (AMARYL) 2 mg tablet TAKE 2 TABLETS (4 MG TOTAL) BY MOUTH 2 (TWO) TIMES A DAY WITH MEALS 360 tablet 1    glucose blood (FreeStyle InsuLinx Test) test strip Use 1 each 2 (two)  "times a day 200 each 1    insulin glargine (Lantus) 100 units/mL subcutaneous injection Inject 40 Units under the skin daily at bedtime 36 mL 1    Insulin Pen Needle (Comfort Touch Insulin Pen Need) 31G X 6 MM MISC Use 4 (four) times a day 360 each 3    Insulin Syringe-Needle U-100 (BD Insulin Syringe U/F) 31G X 5/16\" 1 ML MISC Use 4 (four) times a day 400 each 3    Lantus SoloStar 100 units/mL SOPN       metFORMIN (GLUCOPHAGE-XR) 500 mg 24 hr tablet Take 2 tablets (1,000 mg total) by mouth 2 (two) times a day 360 tablet 1    rosuvastatin (CRESTOR) 5 mg tablet TAKE 1 TABLET (5 MG TOTAL) BY MOUTH DAILY. 90 tablet 0    valsartan (DIOVAN) 320 MG tablet Take 1 tablet (320 mg total) by mouth daily 90 tablet 1     No current facility-administered medications for this visit.     No Known Allergies   Immunizations:     Immunization History   Administered Date(s) Administered    COVID-19 PFIZER VACCINE 0.3 ML IM 03/12/2021, 04/05/2021, 02/05/2022, 02/05/2022    INFLUENZA 09/08/2020, 10/10/2022    Influenza, high dose seasonal 0.7 mL 10/22/2021, 10/10/2022    Pneumococcal 10/22/2008    Pneumococcal Conjugate 13-Valent 04/17/2017    Pneumococcal Polysaccharide PPV23 12/19/2019    Tdap 04/17/2017    influenza, trivalent, adjuvanted 09/08/2020      Health Maintenance:         Topic Date Due    Colorectal Cancer Screening  12/11/2025    Hepatitis C Screening  Completed         Topic Date Due    COVID-19 Vaccine (5 - 2023-24 season) 09/01/2023      Medicare Screening Tests and Risk Assessments:     Jose Maria is here for his Subsequent Wellness visit. Last Medicare Wellness visit information reviewed, patient interviewed and updates made to the record today.      Health Risk Assessment:   Patient rates overall health as very good. Patient feels that their physical health rating is slightly better. Patient is very satisfied with their life. Eyesight was rated as same. Hearing was rated as slightly worse. Patient feels that their " emotional and mental health rating is same. Patients states they are never, rarely angry. Patient states they are sometimes unusually tired/fatigued. Pain experienced in the last 7 days has been none. Patient states that he has experienced no weight loss or gain in last 6 months.     Depression Screening:   PHQ-2 Score: 0      Fall Risk Screening:   In the past year, patient has experienced: no history of falling in past year      Home Safety:  Patient does not have trouble with stairs inside or outside of their home. Patient has working smoke alarms and has working carbon monoxide detector. Home safety hazards include: none.     Nutrition:   Current diet is Regular.     Medications:   Patient is currently taking over-the-counter supplements. OTC medications include: see medication list. Patient is able to manage medications.     Activities of Daily Living (ADLs)/Instrumental Activities of Daily Living (IADLs):   Walk and transfer into and out of bed and chair?: Yes  Dress and groom yourself?: Yes    Bathe or shower yourself?: Yes    Feed yourself? Yes  Do your laundry/housekeeping?: Yes  Manage your money, pay your bills and track your expenses?: Yes  Make your own meals?: Yes    Do your own shopping?: Yes    Previous Hospitalizations:   Any hospitalizations or ED visits within the last 12 months?: No      Advance Care Planning:   Living will: No    Durable POA for healthcare: No    Advanced directive: No    ACP document given: Yes      PREVENTIVE SCREENINGS      Cardiovascular Screening:    General: Screening Not Indicated and History Lipid Disorder      Diabetes Screening:     General: Screening Not Indicated and History Diabetes      Colorectal Cancer Screening:     General: Screening Current      Abdominal Aortic Aneurysm (AAA) Screening:    Risk factors include: age between 65-76 yo and tobacco use        Lung Cancer Screening:     General: Screening Not Indicated      Hepatitis C Screening:    General:  "Screening Current    Screening, Brief Intervention, and Referral to Treatment (SBIRT)    Screening  Typical number of drinks in a day: 0  Typical number of drinks in a week: 0  Interpretation: Low risk drinking behavior.    Single Item Drug Screening:  How often have you used an illegal drug (including marijuana) or a prescription medication for non-medical reasons in the past year? never    Single Item Drug Screen Score: 0  Interpretation: Negative screen for possible drug use disorder    Other Counseling Topics:   Car/seat belt/driving safety, skin self-exam, sunscreen and calcium and vitamin D intake and regular weightbearing exercise.     No results found.     Physical Exam:     /78 (BP Location: Left arm, Patient Position: Sitting, Cuff Size: Large)   Pulse 87   Temp 98.6 °F (37 °C) (Temporal)   Ht 5' 6\" (1.676 m)   Wt 69.4 kg (153 lb)   SpO2 95%   BMI 24.69 kg/m²     Physical Exam  Vitals and nursing note reviewed.   Constitutional:       General: He is not in acute distress.     Appearance: He is well-developed.   HENT:      Head: Normocephalic and atraumatic.      Mouth/Throat:      Mouth: Mucous membranes are moist.   Eyes:      Conjunctiva/sclera: Conjunctivae normal.   Cardiovascular:      Rate and Rhythm: Normal rate and regular rhythm.      Heart sounds: No murmur heard.  Pulmonary:      Effort: Pulmonary effort is normal. No respiratory distress.      Breath sounds: Normal breath sounds.   Abdominal:      Palpations: Abdomen is soft.      Tenderness: There is no abdominal tenderness.   Musculoskeletal:         General: No swelling.      Cervical back: Normal range of motion and neck supple.   Skin:     General: Skin is warm and dry.      Capillary Refill: Capillary refill takes less than 2 seconds.      Findings: Lesion present.   Neurological:      General: No focal deficit present.      Mental Status: He is alert.   Psychiatric:         Mood and Affect: Mood normal.          Puneet " MD Araceli

## 2024-05-14 ENCOUNTER — TELEPHONE (OUTPATIENT)
Dept: INTERNAL MEDICINE CLINIC | Facility: CLINIC | Age: 74
End: 2024-05-14

## 2024-05-14 NOTE — TELEPHONE ENCOUNTER
----- Message from Puneet Charles MD sent at 5/13/2024  6:32 PM EDT -----  Please tell him not to take vitamins for 24 hours when he does his blood test.

## 2024-05-28 ENCOUNTER — TELEPHONE (OUTPATIENT)
Age: 74
End: 2024-05-28

## 2024-05-28 DIAGNOSIS — Z79.4 TYPE 2 DIABETES MELLITUS WITH HYPERGLYCEMIA, WITH LONG-TERM CURRENT USE OF INSULIN (HCC): Primary | ICD-10-CM

## 2024-05-28 DIAGNOSIS — G47.33 OBSTRUCTIVE SLEEP APNEA ON CPAP: Primary | ICD-10-CM

## 2024-05-28 DIAGNOSIS — E11.65 TYPE 2 DIABETES MELLITUS WITH HYPERGLYCEMIA, WITH LONG-TERM CURRENT USE OF INSULIN (HCC): Primary | ICD-10-CM

## 2024-05-28 NOTE — TELEPHONE ENCOUNTER
Doc - Patient wife Celestina Martinez    Patient is asking for a new CPAP Machine with supplies.    Pressure: 8 mm    QMES is who needs the script    CB: [phone] - Wife

## 2024-05-28 NOTE — TELEPHONE ENCOUNTER
Patient's wife is requesting a refill of Lantus SoloStar 100 units/mL SOPN be sent to [facility]/pharmacy #[id number] - [place], [place] - [address].    Thank you.

## 2024-05-29 ENCOUNTER — TELEPHONE (OUTPATIENT)
Age: 74
End: 2024-05-29

## 2024-05-29 NOTE — TELEPHONE ENCOUNTER
Patient wife Celestina called and said the patient is completely out of insulin L  insulin glargine (Lantus) 100 units/mL subcutaneous injection. She said [facility] does not have the pens in stock. Celestina is requesting that a vial of insulin be sent in. She said the patient have syringes at home to draw up the insulin. Please review to see if this can be done.

## 2024-05-31 ENCOUNTER — TELEPHONE (OUTPATIENT)
Dept: SLEEP CENTER | Facility: CLINIC | Age: 74
End: 2024-05-31

## 2024-05-31 DIAGNOSIS — Z79.4 TYPE 2 DIABETES MELLITUS WITH HYPERGLYCEMIA, WITH LONG-TERM CURRENT USE OF INSULIN (HCC): ICD-10-CM

## 2024-05-31 DIAGNOSIS — E11.65 TYPE 2 DIABETES MELLITUS WITH HYPERGLYCEMIA, WITH LONG-TERM CURRENT USE OF INSULIN (HCC): ICD-10-CM

## 2024-05-31 RX ORDER — INSULIN GLARGINE 100 [IU]/ML
40 INJECTION, SOLUTION SUBCUTANEOUS
Qty: 36 ML | Refills: 1 | Status: SHIPPED | OUTPATIENT
Start: 2024-05-31

## 2024-05-31 RX ORDER — INSULIN GLARGINE 100 [IU]/ML
40 INJECTION, SOLUTION SUBCUTANEOUS
Qty: 36 ML | Refills: 1 | Status: SHIPPED | OUTPATIENT
Start: 2024-05-31 | End: 2024-11-27

## 2024-05-31 NOTE — TELEPHONE ENCOUNTER
Returned call from patient's spouse Celestina.      Advised patient would need to be seen in a consultation appointment as it has been more than 3 years since last office visit.    Patient scheduled for consultation with Dr. Ayala 6/21/2024 in the [place] office.

## 2024-06-11 ENCOUNTER — TELEPHONE (OUTPATIENT)
Age: 74
End: 2024-06-11

## 2024-06-11 NOTE — TELEPHONE ENCOUNTER
Efrain from [employer] called in stating that she will be faxing over form request for the patient to be able to receive a c-pac machine order, as well as the recent office notes.     Please advise back to [employer] once forms have been faxed over.

## 2024-06-24 DIAGNOSIS — E11.65 TYPE 2 DIABETES MELLITUS WITH HYPERGLYCEMIA, WITH LONG-TERM CURRENT USE OF INSULIN (HCC): ICD-10-CM

## 2024-06-24 DIAGNOSIS — Z79.4 TYPE 2 DIABETES MELLITUS WITH HYPERGLYCEMIA, WITH LONG-TERM CURRENT USE OF INSULIN (HCC): ICD-10-CM

## 2024-06-24 RX ORDER — METFORMIN HYDROCHLORIDE 500 MG/1
1000 TABLET, EXTENDED RELEASE ORAL 2 TIMES DAILY
Qty: 360 TABLET | Refills: 1 | Status: SHIPPED | OUTPATIENT
Start: 2024-06-24 | End: 2024-12-21

## 2024-06-24 RX ORDER — GLIMEPIRIDE 2 MG/1
4 TABLET ORAL 2 TIMES DAILY WITH MEALS
Qty: 360 TABLET | Refills: 1 | Status: SHIPPED | OUTPATIENT
Start: 2024-06-24

## 2024-07-28 DIAGNOSIS — Z79.4 TYPE 2 DIABETES MELLITUS WITH HYPERGLYCEMIA, WITH LONG-TERM CURRENT USE OF INSULIN (HCC): ICD-10-CM

## 2024-07-28 DIAGNOSIS — E78.2 MIXED HYPERLIPIDEMIA: ICD-10-CM

## 2024-07-28 DIAGNOSIS — E11.65 TYPE 2 DIABETES MELLITUS WITH HYPERGLYCEMIA, WITH LONG-TERM CURRENT USE OF INSULIN (HCC): ICD-10-CM

## 2024-07-28 RX ORDER — ROSUVASTATIN CALCIUM 5 MG/1
5 TABLET, COATED ORAL DAILY
Qty: 30 TABLET | Refills: 0 | Status: SHIPPED | OUTPATIENT
Start: 2024-07-28

## 2024-07-28 RX ORDER — INSULIN GLARGINE 100 [IU]/ML
40 INJECTION, SOLUTION SUBCUTANEOUS
Qty: 15 ML | Refills: 4 | Status: SHIPPED | OUTPATIENT
Start: 2024-07-28

## 2024-07-28 NOTE — TELEPHONE ENCOUNTER
Patient needs updated blood work and has previously placed orders. Please contact patient to go for labs. Courtesy refill provided.    Lipid panel

## 2024-08-01 ENCOUNTER — VBI (OUTPATIENT)
Dept: ADMINISTRATIVE | Facility: OTHER | Age: 74
End: 2024-08-01

## 2024-08-01 NOTE — TELEPHONE ENCOUNTER
08/01/24 1:54 PM     Chart reviewed for Diabetic Eye Exam was/were not submitted to the patient's insurance.     Shelly Galvez MA   [facility]

## 2024-08-30 ENCOUNTER — RA CDI HCC (OUTPATIENT)
Dept: OTHER | Facility: HOSPITAL | Age: 74
End: 2024-08-30

## 2024-09-10 NOTE — PROGRESS NOTES
Thank you for choosing [facility] for your Health Care needs.  Also, thank you for allowing us to take you and your families preferences into account when determining your discharge plan.  Stay well!    Your Care Transitions Team Member:Ivana Rosen Robin and Angy [phone]    For questions about your medications listed on your discharge instructions, please call the Nurses Station at [phone].     Virtual Regular Visit    Verification of patient location:    Patient is located at Home in the following state in which I hold an active license [place]      Assessment/Plan:    Problem List Items Addressed This Visit       Type 2 diabetes mellitus with hyperglycemia, with long-term current use of insulin ([facility])    Essential hypertension     Other Visit Diagnoses       Acute tracheitis without airway obstruction    -  Primary    Relevant Medications    doxycycline hyclate (VIBRAMYCIN) 100 mg capsule          Patient reports a cough congestion, fatigue for the last 3 days with the loss of smell.  No home COVID test.  Denies any fever, chills, shortness of breath, belly pain nausea vomiting or diarrhea.  Patient has type 2 diabetes, with hyperglycemia on insulin and oral agents.  He does not monitor his fingerstick glucose.  Reports a suboptimal p.o. intake in the last few days.  Patient was offered an in person same-day office visit so we can test COVID/flu, and have blood sugars and physical exam done.  Patient cannot make into the office today, will attempt to come in tomorrow.  Will give empirical antibiotic therapy until COVID test can be done.  Recommend to test blood glucose at home, and decrease bedtime Lantus to 38 units if he continues to have suboptimal p.o. intake, he is currently on 50 units at bedtime.  ER precautions for worsening fatigue, chest pain, shortness of breath and progressive symptoms given.  Patient verbalized understanding.  He will do home COVID test and call back if it comes back positive.           Reason for visit is   Chief Complaint   Patient presents with    Cough    Dizziness        Encounter provider Oscar Denson MD    Provider located at [facility] INTERNAL MEDICINE  [address]  [place] [address] [phone]      Recent Visits  No visits were found meeting these conditions.  Showing recent visits within past 7 days and  meeting all other requirements  Today's Visits  Date Type Provider Dept   01/25/24 Telephone Rachel Mcnamara [facility] Internal Med   01/25/24 Telemedicine Oscar Denson MD [facility] Internal [facility]   Showing today's visits and meeting all other requirements  Future Appointments  No visits were found meeting these conditions.  Showing future appointments within next 150 days and meeting all other requirements       The patient was identified by name and date of birth. Jose Maria Bethea was informed that this is a telemedicine visit and that the visit is being conducted through the Epic Embedded platform. He agrees to proceed..  My office door was closed. No one else was in the room.  He acknowledged consent and understanding of privacy and security of the video platform. The patient has agreed to participate and understands they can discontinue the visit at any time.    Patient is aware this is a billable service.     Subjective  Jose Maria Bethea is a 73 y.o. male  .      HPI     Past Medical History:   Diagnosis Date    COVID-19 07/2022    Diabetes mellitus (HCC)     Type 2    Dyslipidemia     Hypertension     Obstructive sleep apnea on CPAP        Past Surgical History:   Procedure Laterality Date    COLONOSCOPY  2008       Current Outpatient Medications   Medication Sig Dispense Refill    amLODIPine (NORVASC) 10 mg tablet Take 1 tablet (10 mg total) by mouth daily Use as directed 90 tablet 1    atorvastatin (LIPITOR) 40 mg tablet TAKE 1 TABLET BY MOUTH DAILY AT BEDTIME 90 tablet 1    doxycycline hyclate (VIBRAMYCIN) 100 mg capsule Take 1 capsule (100 mg total) by mouth every 12 (twelve) hours for 5 days 10 capsule 0    Empagliflozin (Jardiance) 25 MG TABS Take 1 tablet (25 mg total) by mouth every morning 30 tablet 3    glimepiride (AMARYL) 2 mg tablet TAKE 2 TABLETS (4 MG TOTAL) BY MOUTH 2 (TWO) TIMES A DAY WITH MEALS 360 tablet 1    glucose blood (FreeStyle InsuLinx Test) test strip Use 1 each 2 (two) times a day  "200 each 1    insulin glargine (Lantus) 100 units/mL subcutaneous injection Inject 40 Units under the skin daily at bedtime 36 mL 1    Insulin Pen Needle (Comfort Touch Insulin Pen Need) 31G X 6 MM MISC Use 4 (four) times a day 360 each 3    Insulin Syringe-Needle U-100 (BD Insulin Syringe U/F) 31G X 5/16\" 1 ML MISC Use 4 (four) times a day 400 each 3    Lantus SoloStar 100 units/mL SOPN       metFORMIN (GLUCOPHAGE-XR) 500 mg 24 hr tablet Take 2 tablets (1,000 mg total) by mouth 2 (two) times a day 360 tablet 1    sildenafil (VIAGRA) 100 mg tablet Take 1 tablet (100 mg total) by mouth daily as needed for erectile dysfunction 10 tablet 0    valsartan (DIOVAN) 320 MG tablet Take 1 tablet (320 mg total) by mouth daily 90 tablet 1    aspirin 81 mg chewable tablet Chew 1 tablet (81 mg total) daily (Patient not taking: Reported on 8/1/2023) 90 tablet 0     No current facility-administered medications for this visit.        No Known Allergies    Review of Systems   Constitutional:  Positive for fatigue. Negative for activity change, appetite change, chills, diaphoresis, fever and unexpected weight change.   HENT:  Positive for congestion. Negative for drooling, ear discharge, ear pain, facial swelling, hearing loss, postnasal drip, rhinorrhea, sinus pressure, sinus pain, sneezing, sore throat, tinnitus, trouble swallowing and voice change.    Eyes:  Negative for discharge.   Respiratory:  Positive for cough. Negative for apnea, choking, chest tightness, shortness of breath, wheezing and stridor.    Cardiovascular:  Negative for chest pain, palpitations and leg swelling.   Gastrointestinal:  Negative for abdominal distention, abdominal pain, blood in stool, constipation, diarrhea, nausea, rectal pain and vomiting.   Genitourinary:  Negative for dysuria, flank pain, frequency and urgency.   Musculoskeletal:  Negative for arthralgias, back pain, gait problem, joint swelling and myalgias.   Skin:  Negative for color change, " pallor and rash.   Neurological:  Negative for dizziness and headaches.       Video Exam    There were no vitals filed for this visit.    Physical Exam  Constitutional:       General: He is not in acute distress.     Appearance: Normal appearance. He is not ill-appearing, toxic-appearing or diaphoretic.   Neurological:      Mental Status: He is alert and oriented to person, place, and time.          Visit Time  Total Visit Duration: 25

## 2024-09-16 DIAGNOSIS — E78.2 MIXED HYPERLIPIDEMIA: ICD-10-CM

## 2024-09-17 RX ORDER — ROSUVASTATIN CALCIUM 5 MG/1
5 TABLET, COATED ORAL DAILY
Qty: 30 TABLET | Refills: 1 | Status: SHIPPED | OUTPATIENT
Start: 2024-09-17

## 2024-09-21 DIAGNOSIS — I10 ESSENTIAL HYPERTENSION: ICD-10-CM

## 2024-09-21 DIAGNOSIS — E11.65 TYPE 2 DIABETES MELLITUS WITH HYPERGLYCEMIA, WITH LONG-TERM CURRENT USE OF INSULIN (HCC): ICD-10-CM

## 2024-09-21 DIAGNOSIS — Z79.4 TYPE 2 DIABETES MELLITUS WITH HYPERGLYCEMIA, WITH LONG-TERM CURRENT USE OF INSULIN (HCC): ICD-10-CM

## 2024-09-23 RX ORDER — METFORMIN HCL 500 MG
1000 TABLET, EXTENDED RELEASE 24 HR ORAL 2 TIMES DAILY
Qty: 360 TABLET | Refills: 1 | Status: SHIPPED | OUTPATIENT
Start: 2024-09-23

## 2024-09-23 RX ORDER — AMLODIPINE BESYLATE 10 MG/1
10 TABLET ORAL DAILY
Qty: 90 TABLET | Refills: 1 | Status: SHIPPED | OUTPATIENT
Start: 2024-09-23

## 2024-09-27 ENCOUNTER — VBI (OUTPATIENT)
Dept: ADMINISTRATIVE | Facility: OTHER | Age: 74
End: 2024-09-27

## 2024-09-27 NOTE — TELEPHONE ENCOUNTER
09/27/24 1:26 PM     Chart reviewed for Diabetic Eye Exam was/were not submitted to the patient's insurance.     Shelly Galvez MA   [facility]

## 2024-10-02 ENCOUNTER — VBI (OUTPATIENT)
Dept: ADMINISTRATIVE | Facility: OTHER | Age: 74
End: 2024-10-02

## 2024-10-02 NOTE — TELEPHONE ENCOUNTER
10/02/24 9:48 AM     Chart reviewed for Diabetic Eye Exam was/were not submitted to the patient's insurance.     Shelly Galvez MA   [facility]

## 2024-10-10 ENCOUNTER — TELEPHONE (OUTPATIENT)
Age: 74
End: 2024-10-10

## 2024-10-10 DIAGNOSIS — R42 DIZZINESS: Primary | ICD-10-CM

## 2024-10-10 RX ORDER — MECLIZINE HCL 12.5 MG 12.5 MG/1
12.5 TABLET ORAL 3 TIMES DAILY PRN
Qty: 30 TABLET | Refills: 0 | Status: SHIPPED | OUTPATIENT
Start: 2024-10-10

## 2024-10-10 NOTE — TELEPHONE ENCOUNTER
Pt is experiencing vertigo and is wondering if there is something he can take or be prescribed for his dizziness and nausea. Please advise

## 2024-10-21 ENCOUNTER — RA CDI HCC (OUTPATIENT)
Dept: OTHER | Facility: HOSPITAL | Age: 74
End: 2024-10-21

## 2024-10-30 ENCOUNTER — APPOINTMENT (OUTPATIENT)
Dept: LAB | Age: 74
End: 2024-10-30
Payer: COMMERCIAL

## 2024-10-30 ENCOUNTER — TELEPHONE (OUTPATIENT)
Dept: INTERNAL MEDICINE CLINIC | Facility: CLINIC | Age: 74
End: 2024-10-30

## 2024-10-30 DIAGNOSIS — E53.8 B12 DEFICIENCY: Primary | ICD-10-CM

## 2024-10-30 DIAGNOSIS — Z79.4 TYPE 2 DIABETES MELLITUS WITH HYPERGLYCEMIA, WITH LONG-TERM CURRENT USE OF INSULIN (HCC): ICD-10-CM

## 2024-10-30 DIAGNOSIS — I10 ESSENTIAL HYPERTENSION: ICD-10-CM

## 2024-10-30 DIAGNOSIS — K90.89 OTHER SPECIFIED INTESTINAL MALABSORPTION: ICD-10-CM

## 2024-10-30 DIAGNOSIS — E78.2 MIXED HYPERLIPIDEMIA: ICD-10-CM

## 2024-10-30 DIAGNOSIS — E11.65 TYPE 2 DIABETES MELLITUS WITH HYPERGLYCEMIA, WITH LONG-TERM CURRENT USE OF INSULIN (HCC): ICD-10-CM

## 2024-10-30 DIAGNOSIS — R35.1 BENIGN PROSTATIC HYPERPLASIA WITH NOCTURIA: ICD-10-CM

## 2024-10-30 DIAGNOSIS — N40.1 BENIGN PROSTATIC HYPERPLASIA WITH NOCTURIA: ICD-10-CM

## 2024-10-30 LAB
ALBUMIN SERPL BCG-MCNC: 4 G/DL (ref 3.5–5)
ALP SERPL-CCNC: 70 U/L (ref 34–104)
ALT SERPL W P-5'-P-CCNC: 12 U/L (ref 7–52)
ANION GAP SERPL CALCULATED.3IONS-SCNC: 8 MMOL/L (ref 4–13)
AST SERPL W P-5'-P-CCNC: 15 U/L (ref 13–39)
BACTERIA UR QL AUTO: ABNORMAL /HPF
BASOPHILS # BLD AUTO: 0.06 THOUSANDS/ΜL (ref 0–0.1)
BASOPHILS NFR BLD AUTO: 1 % (ref 0–1)
BILIRUB SERPL-MCNC: 0.37 MG/DL (ref 0.2–1)
BILIRUB UR QL STRIP: NEGATIVE
BUN SERPL-MCNC: 27 MG/DL (ref 5–25)
CALCIUM SERPL-MCNC: 9.4 MG/DL (ref 8.4–10.2)
CHLORIDE SERPL-SCNC: 107 MMOL/L (ref 96–108)
CHOLEST SERPL-MCNC: 137 MG/DL
CLARITY UR: CLEAR
CO2 SERPL-SCNC: 26 MMOL/L (ref 21–32)
COLOR UR: ABNORMAL
CREAT SERPL-MCNC: 1.28 MG/DL (ref 0.6–1.3)
EOSINOPHIL # BLD AUTO: 0.46 THOUSAND/ΜL (ref 0–0.61)
EOSINOPHIL NFR BLD AUTO: 6 % (ref 0–6)
ERYTHROCYTE [DISTWIDTH] IN BLOOD BY AUTOMATED COUNT: 12.8 % (ref 11.6–15.1)
EST. AVERAGE GLUCOSE BLD GHB EST-MCNC: 209 MG/DL
GFR SERPL CREATININE-BSD FRML MDRD: 55 ML/MIN/1.73SQ M
GLUCOSE P FAST SERPL-MCNC: 156 MG/DL (ref 65–99)
GLUCOSE UR STRIP-MCNC: ABNORMAL MG/DL
HBA1C MFR BLD: 8.9 %
HCT VFR BLD AUTO: 47.5 % (ref 36.5–49.3)
HDLC SERPL-MCNC: 41 MG/DL
HGB BLD-MCNC: 15.2 G/DL (ref 12–17)
HGB UR QL STRIP.AUTO: NEGATIVE
IMM GRANULOCYTES # BLD AUTO: 0.01 THOUSAND/UL (ref 0–0.2)
IMM GRANULOCYTES NFR BLD AUTO: 0 % (ref 0–2)
KETONES UR STRIP-MCNC: NEGATIVE MG/DL
LDLC SERPL CALC-MCNC: 65 MG/DL (ref 0–100)
LEUKOCYTE ESTERASE UR QL STRIP: ABNORMAL
LYMPHOCYTES # BLD AUTO: 2.3 THOUSANDS/ΜL (ref 0.6–4.47)
LYMPHOCYTES NFR BLD AUTO: 32 % (ref 14–44)
MCH RBC QN AUTO: 29.9 PG (ref 26.8–34.3)
MCHC RBC AUTO-ENTMCNC: 32 G/DL (ref 31.4–37.4)
MCV RBC AUTO: 93 FL (ref 82–98)
MONOCYTES # BLD AUTO: 0.62 THOUSAND/ΜL (ref 0.17–1.22)
MONOCYTES NFR BLD AUTO: 9 % (ref 4–12)
NEUTROPHILS # BLD AUTO: 3.85 THOUSANDS/ΜL (ref 1.85–7.62)
NEUTS SEG NFR BLD AUTO: 52 % (ref 43–75)
NITRITE UR QL STRIP: NEGATIVE
NON-SQ EPI CELLS URNS QL MICRO: ABNORMAL /HPF
NONHDLC SERPL-MCNC: 96 MG/DL
NRBC BLD AUTO-RTO: 0 /100 WBCS
PH UR STRIP.AUTO: 6 [PH]
PLATELET # BLD AUTO: 206 THOUSANDS/UL (ref 149–390)
PMV BLD AUTO: 11.5 FL (ref 8.9–12.7)
POTASSIUM SERPL-SCNC: 4.6 MMOL/L (ref 3.5–5.3)
PROT SERPL-MCNC: 7.1 G/DL (ref 6.4–8.4)
PROT UR STRIP-MCNC: ABNORMAL MG/DL
PSA SERPL-MCNC: 1.1 NG/ML (ref 0–4)
RBC # BLD AUTO: 5.09 MILLION/UL (ref 3.88–5.62)
RBC #/AREA URNS AUTO: ABNORMAL /HPF
SODIUM SERPL-SCNC: 141 MMOL/L (ref 135–147)
SP GR UR STRIP.AUTO: 1.03 (ref 1–1.03)
TRIGL SERPL-MCNC: 156 MG/DL
TSH SERPL DL<=0.05 MIU/L-ACNC: 1.16 UIU/ML (ref 0.45–4.5)
UROBILINOGEN UR STRIP-ACNC: <2 MG/DL
VIT B12 SERPL-MCNC: 237 PG/ML (ref 180–914)
WBC # BLD AUTO: 7.3 THOUSAND/UL (ref 4.31–10.16)
WBC #/AREA URNS AUTO: ABNORMAL /HPF

## 2024-10-30 PROCEDURE — 36415 COLL VENOUS BLD VENIPUNCTURE: CPT

## 2024-10-30 PROCEDURE — 80053 COMPREHEN METABOLIC PANEL: CPT

## 2024-10-30 PROCEDURE — 80061 LIPID PANEL: CPT

## 2024-10-30 PROCEDURE — 83036 HEMOGLOBIN GLYCOSYLATED A1C: CPT

## 2024-10-30 PROCEDURE — 84153 ASSAY OF PSA TOTAL: CPT

## 2024-10-30 PROCEDURE — 84443 ASSAY THYROID STIM HORMONE: CPT

## 2024-10-30 PROCEDURE — 85025 COMPLETE CBC W/AUTO DIFF WBC: CPT

## 2024-10-30 PROCEDURE — 82607 VITAMIN B-12: CPT

## 2024-10-30 PROCEDURE — 81001 URINALYSIS AUTO W/SCOPE: CPT

## 2024-10-30 RX ORDER — CYANOCOBALAMIN (VITAMIN B-12) 250 MCG
250 TABLET ORAL DAILY
Qty: 100 TABLET | Refills: 3 | Status: SHIPPED | OUTPATIENT
Start: 2024-10-30 | End: 2025-01-02 | Stop reason: SDUPTHER

## 2024-11-01 ENCOUNTER — OFFICE VISIT (OUTPATIENT)
Dept: INTERNAL MEDICINE CLINIC | Facility: CLINIC | Age: 74
End: 2024-11-01
Payer: COMMERCIAL

## 2024-11-01 VITALS
HEIGHT: 66 IN | HEART RATE: 78 BPM | WEIGHT: 153 LBS | DIASTOLIC BLOOD PRESSURE: 86 MMHG | TEMPERATURE: 98 F | OXYGEN SATURATION: 95 % | SYSTOLIC BLOOD PRESSURE: 144 MMHG | BODY MASS INDEX: 24.59 KG/M2

## 2024-11-01 DIAGNOSIS — Z79.4 TYPE 2 DIABETES MELLITUS WITH HYPERGLYCEMIA, WITH LONG-TERM CURRENT USE OF INSULIN (HCC): ICD-10-CM

## 2024-11-01 DIAGNOSIS — E11.65 TYPE 2 DIABETES MELLITUS WITH HYPERGLYCEMIA, WITH LONG-TERM CURRENT USE OF INSULIN (HCC): ICD-10-CM

## 2024-11-01 DIAGNOSIS — I10 ESSENTIAL HYPERTENSION: Primary | ICD-10-CM

## 2024-11-01 DIAGNOSIS — G47.33 OBSTRUCTIVE SLEEP APNEA ON CPAP: ICD-10-CM

## 2024-11-01 DIAGNOSIS — Z23 ENCOUNTER FOR IMMUNIZATION: ICD-10-CM

## 2024-11-01 DIAGNOSIS — E78.2 MIXED HYPERLIPIDEMIA: ICD-10-CM

## 2024-11-01 PROCEDURE — G0008 ADMIN INFLUENZA VIRUS VAC: HCPCS | Performed by: INTERNAL MEDICINE

## 2024-11-01 PROCEDURE — 99214 OFFICE O/P EST MOD 30 MIN: CPT | Performed by: INTERNAL MEDICINE

## 2024-11-01 PROCEDURE — 90662 IIV NO PRSV INCREASED AG IM: CPT | Performed by: INTERNAL MEDICINE

## 2024-11-01 NOTE — PROGRESS NOTES
"Ambulatory Visit  Name: Jose Maria Bethea      : 1950      MRN: [id number]  Encounter Provider: Puneet Charles MD  Encounter Date: 2024   Encounter department: [facility] INTERNAL MEDICINE    Assessment & Plan  Essential hypertension  Continue to monitor; continue home medications       Encounter for immunization  Instructed patient to obtain RSV vaccine at local pharmacy  Orders:    influenza vaccine, high-dose, PF 0.5 mL (Fluzone High Dose)    Type 2 diabetes mellitus with hyperglycemia, with long-term current use of insulin ([facility])  Continue to monitor; follow-up. Follows annually with eye doctor. Does not endorse any current neurologic complaints.  Lab Results   Component Value Date    HGBA1C 8.9 (H) 10/30/2024            Mixed hyperlipidemia  LDL 65; ; non-HDL 96. Well-controlled, continue to monitor       Obstructive sleep apnea on CPAP  Continue CPAP          History of Present Illness     Patient is here for follow-up visit. States he is doing well and needs to do better at being healthy. He denies fever, chills, chest pain, shortness of breath. He does complain of vertigo once per month he feels like is well-controlled and is self-induced.          Review of Systems   Constitutional:  Negative for chills and fever.   HENT:  Negative for ear pain and tinnitus.    Eyes: Negative.    Respiratory:  Negative for shortness of breath and wheezing.    Cardiovascular:  Negative for chest pain.   Gastrointestinal:  Negative for abdominal pain.   Endocrine: Negative.    Genitourinary: Negative.    Musculoskeletal: Negative.    Skin: Negative.    Allergic/Immunologic: Negative.    Neurological: Negative.    Hematological: Negative.    Psychiatric/Behavioral: Negative.             Objective     /86 (BP Location: Left arm, Patient Position: Sitting, Cuff Size: Standard)   Pulse 78   Temp 98 °F (36.7 °C) (Temporal)   Ht 5' 6\" (1.676 m)   Wt 69.4 kg (153 lb)   SpO2 95%   BMI 24.69 kg/m² " Physical Therapy Visit    Visit Type: Daily Treatment Note  Visit: 2  Referring Provider: Reema Burdick DO  Medical Diagnosis (from order): Diagnosis Information    Diagnosis  723.4 (ICD-9-CM) - M54.12 (ICD-10-CM) - Cervical radiculopathy         SUBJECTIVE                                                                                                               Doing home exercises and braiding a bit difficult.      OBJECTIVE                                                                                                                     Strength  (out of 5 unless noted, standard test position unless noted)   Comments / Details: LE strength hip 4-/5                       Treatment     Therapeutic Exercise  Thera ex for strengthening:   NuStep L4  8 minutes   Bike L5 6 minutes.  Wedge calf stretch 20 sec x 3.      Neuromuscular Re-Education  Braiding at counter 20' x 4   Rocker board 2 minute df/pf and 2 minutes side to side.  Skating 1-2 step and single leg stance with tap 20' x 3*  At counter; 3 inch step over 3 sets of 10  At counter: side stepping with light green thera-band 20' x 2*        ASSESSMENT                                                                                                            Tolerated balance exercises well.  Education:   - Results of above outlined education: Verbalizes understanding and Demonstrates understanding    PLAN                                                                                                                           Suggestions for next session as indicated: Progress per plan of care       Therapy procedure time and total treatment time can be found documented on the Time Entry flowsheet         Physical Exam  Constitutional:       General: He is not in acute distress.     Appearance: Normal appearance. He is normal weight. He is not toxic-appearing.   HENT:      Head: Normocephalic and atraumatic.      Right Ear: Tympanic membrane normal.      Left Ear: There is impacted cerumen.   Cardiovascular:      Rate and Rhythm: Normal rate.      Pulses: Normal pulses.      Heart sounds: Normal heart sounds. No murmur heard.     No gallop.   Pulmonary:      Effort: Pulmonary effort is normal. No respiratory distress.      Breath sounds: Normal breath sounds. No wheezing or rales.   Abdominal:      General: Abdomen is flat. Bowel sounds are normal. There is no distension.      Palpations: Abdomen is soft.      Tenderness: There is no abdominal tenderness.   Skin:     General: Skin is warm and dry.   Neurological:      General: No focal deficit present.      Mental Status: He is alert. Mental status is at baseline.   Psychiatric:         Mood and Affect: Mood normal.         Behavior: Behavior normal.         Thought Content: Thought content normal.

## 2024-11-01 NOTE — ASSESSMENT & PLAN NOTE
Continue to monitor; follow-up. Follows annually with eye doctor. Does not endorse any current neurologic complaints.  Lab Results   Component Value Date    HGBA1C 8.9 (H) 10/30/2024

## 2024-11-13 ENCOUNTER — VBI (OUTPATIENT)
Dept: ADMINISTRATIVE | Facility: OTHER | Age: 74
End: 2024-11-13

## 2024-11-13 NOTE — TELEPHONE ENCOUNTER
11/13/24 4:00 PM     Chart reviewed for Diabetic Eye Exam was/were not submitted to the patient's insurance.     Shelly Galvez MA   [facility]

## 2024-11-15 ENCOUNTER — VBI (OUTPATIENT)
Dept: ADMINISTRATIVE | Facility: OTHER | Age: 74
End: 2024-11-15

## 2024-11-15 NOTE — TELEPHONE ENCOUNTER
11/15/24 1:57 PM     Chart reviewed for Blood Pressure was/were not submitted to the patient's insurance.     Shelly Galvez MA   [facility]

## 2024-11-20 ENCOUNTER — OFFICE VISIT (OUTPATIENT)
Dept: SLEEP CENTER | Facility: CLINIC | Age: 74
End: 2024-11-20
Payer: COMMERCIAL

## 2024-11-20 VITALS — HEART RATE: 88 BPM | BODY MASS INDEX: 25.07 KG/M2 | HEIGHT: 66 IN | OXYGEN SATURATION: 95 % | WEIGHT: 156 LBS

## 2024-11-20 DIAGNOSIS — E11.65 TYPE 2 DIABETES MELLITUS WITH HYPERGLYCEMIA, WITH LONG-TERM CURRENT USE OF INSULIN (HCC): ICD-10-CM

## 2024-11-20 DIAGNOSIS — Z79.4 TYPE 2 DIABETES MELLITUS WITH HYPERGLYCEMIA, WITH LONG-TERM CURRENT USE OF INSULIN (HCC): ICD-10-CM

## 2024-11-20 DIAGNOSIS — K21.9 GASTROESOPHAGEAL REFLUX DISEASE, UNSPECIFIED WHETHER ESOPHAGITIS PRESENT: ICD-10-CM

## 2024-11-20 DIAGNOSIS — I10 ESSENTIAL HYPERTENSION: ICD-10-CM

## 2024-11-20 DIAGNOSIS — G47.33 OBSTRUCTIVE SLEEP APNEA ON CPAP: Primary | ICD-10-CM

## 2024-11-20 DIAGNOSIS — F45.8 BRUXISM: ICD-10-CM

## 2024-11-20 PROCEDURE — G2211 COMPLEX E/M VISIT ADD ON: HCPCS | Performed by: INTERNAL MEDICINE

## 2024-11-20 PROCEDURE — 99204 OFFICE O/P NEW MOD 45 MIN: CPT | Performed by: INTERNAL MEDICINE

## 2024-11-20 NOTE — PROGRESS NOTES
Consultation - Sleep Center   Jose Maria Bethea  74 y.o. male  :1950  MRN:[id number]  DOS:2024    Physician Requesting Consult: Puneet Charles MD             Reason for Consult : At your kind request I saw Jose Maria Bethea for initial sleep evaluation today.  He was diagnosed with obstructive sleep apnea in the past and prescribed CPAP.  His machine broke a few years ago and he is now using his wife's old machine that is apparently set at 8 cm water pressure.    A diagnostic study in  demonstrated RDI of 64.2/h.  Minimum oxygen saturation at 55% and there were 278 desaturations to below 85%.  During the subsequent therapeutic study, sleep disordered breathing was successfully treated with CPAP at 15 cm H2O.     PFSH, Problem List, Medications & Allergies were reviewed in EMR.    Jose Maria  has a past medical history of COVID-19 (2022), Diabetes mellitus (HCC), Dyslipidemia, Hypertension, and Obstructive sleep apnea on CPAP.      He has a current medication list which includes the following prescription(s): amlodipine, aspirin, empagliflozin, glimepiride, freestyle insulinx test, insulin glargine, lantus solostar, comfort touch insulin pen need, insulin syringe-needle u-100, meclizine, metformin, rosuvastatin, valsartan, and vitamin b-12.      HPI: He is using CPAP regularly and benefits from use.  He is reporting no adverse effects.  Jose Maria is un aware of snoring or breathing difficulties while using CPAP  Other complaints: None. Restless Leg Syndrome: Reports no suggestive symptoms.  .  Parasomnia: No features reported    Sleep Routine (averaged): Typical Bedtime: 10 PM.  Gets OOB: 7 AM. TIB:9 hrs.   Sleep latency:< 15 minutes; Sleep Interruptions: 1-2, not always sure of the cause and able to fall back asleep. Awakens: Spontaneously  Upon awakening: feels refreshed.  He estimates getting  hrs sleep.  Daytime Function:Jose Maria denies excessive daytime sleepiness or napping. He rated himself at Total score: 6 24  "on the [place] Sleepiness Scale.     Habits:   reports that he has quit smoking. His smoking use included cigarettes. He has never used smokeless tobacco.;  reports that he does not currently use alcohol.; Reports that he does not currently use drugs.;  E-Cigarette/Vaping  Never User; Caffeine use:limited until  ; Exercise routine: none but is physically active doing construction work.    Occupation:   [employer] License:    Family History: Negative for sleep disturbance.  ROS: Significant for weight has been stable.  He reported no nasal, respiratory or cardiac symptoms.  He has acid reflux that is controlled by sleeping with head of the bed elevated..     EXAM:  Pulse 88   Ht 5' 6\" (1.676 m)   Wt 70.8 kg (156 lb)   SpO2 95%   BMI 25.18 kg/m²    General: Well groomed male, well appearing, in no apparent distress.   Neurological: Alert and orientated; cooperative; Cranial nerves intact;    Psychiatric: Speech: Clear and coherent; normal mood, affect & thought   Skin: Warm and dry; Color& Hydration good; no facial rashes or lesions   HEENT:  Craniofacial anatomy: normal Sinuses: Non-tender. TMJ: Normal    Eyes: EOM's intact; conjunctiva/corneas clear   Ears: Appear normal     Nasal Airway: narrow nares Septum: Intact; Turbinates: Normal; Rhinorrhea: None  Mouth: Lips: Normal posture; Dentition: worn down and irregular. Mucosa: Moist; Hard Palate:normal    Oropharryx: crowded and AP narrowing Tongue: Mallampati:Class IV and MobileSoft Palate:  redundant  Tonsils: absent  Neck:; Neck Circumference: 15.5 \"; supple; no abnormal masses; Thyroid: Normal. Trachea: Central.    Lymph: No cervical Lymhadenopathy  Heart: S1,S2 normal; RRR; no gallop; no murmur   Lungs: Respiratory Effort: Normal. Air entry good bilaterally.  No wheezes.  No rales  Abdomen: Obese, soft & non-tender    Extremities: No pedal edema.  No clubbing or cyanosis.    Musculoskeletal:  Motor normal; Gait: Normal.       IMPRESSION: Primary/Secondary Sleep " "Diagnoses (to Medical or Psych conditions) & Comorbidities   1. Obstructive sleep apnea on CPAP  Split Study      2. Bruxism        3. Essential hypertension        4. Type 2 diabetes mellitus with hyperglycemia, with long-term current use of insulin ([facility])        5. Gastroesophageal reflux disease, unspecified whether esophagitis present        6. BMI 25.0-25.9,adult             PLAN:   1. I reviewed results of the Sleep study with the patient.   2. With respect to above conditions, I counseled on pathophysiology, diagnosis, treatment options, risks and benefits; inter-relationship and effects on symptoms and comorbidities; risks of no treatment; costs and insurance aspects.   3. Patient elected to continue positive airway pressure therapy and it is medically necessary.  He needs reevaluation because of severity of his prior study and to determine current optimal pressure settings.  A split study to be scheduled and he was instructed to discontinue using CPAP for at least 3 days prior to the study.  4. Follow-up to be scheduled after the study and initiation of therapy to review results, monitor progress and to adjust therapy.    Sincerely,      Authenticated electronically on 11/20/24   Board Certified Specialist     Portions of the record may have been created with voice recognition software. Occasional wrong word or \"sound a like\" substitutions may have occurred due to the inherent limitations of voice recognition software. There may also be notations and random deletions of words or characters from malfunctioning software. Read the chart carefully and recognize, using context, where substitutions/deletions have occurred.     "

## 2024-11-24 ENCOUNTER — HOSPITAL ENCOUNTER (OUTPATIENT)
Dept: SLEEP CENTER | Facility: CLINIC | Age: 74
Discharge: HOME | End: 2024-11-24
Payer: COMMERCIAL

## 2024-11-24 DIAGNOSIS — G47.33 OBSTRUCTIVE SLEEP APNEA ON CPAP: ICD-10-CM

## 2024-11-24 PROCEDURE — 95811 POLYSOM 6/>YRS CPAP 4/> PARM: CPT | Performed by: INTERNAL MEDICINE

## 2024-11-24 PROCEDURE — 95811 POLYSOM 6/>YRS CPAP 4/> PARM: CPT

## 2024-11-25 ENCOUNTER — TELEPHONE (OUTPATIENT)
Dept: SLEEP CENTER | Facility: CLINIC | Age: 74
End: 2024-11-25

## 2024-11-25 DIAGNOSIS — G47.33 OSA (OBSTRUCTIVE SLEEP APNEA): Primary | ICD-10-CM

## 2024-11-25 NOTE — TELEPHONE ENCOUNTER
Split sleep study resulted and shows severe sleep apnea. AHI 52.1.  Titration portion of study effective, resulting in AHI of 2.2. CPAP ordered.     Called patient and left message advising I will send a "[employer]"t message with the sleep study results and next steps.  Provided sleep center number([phone]) to call if any questions.

## 2024-11-25 NOTE — PROGRESS NOTES
Sleep Study Documentation    Pre-Sleep Study       Sleep testing procedure explained to patient:YES    Patient napped prior to study:NO    Caffeine:Dayshift worker after 12PM.  Caffeine use:NO    Alcohol:Dayshift workers after 5PM: Alcohol use:NO    Typical day for patient:YES       Study Documentation    Sleep Study Indications: G47.33    Sleep Study: Split Optimal PAP pressure: 9  Leak:Small  Snore:Eliminated  REM Obtained:yes  Supplemental O2: no    Minimum SaO2 82  Baseline SaO2 91  PAP mask tried (list all) Resmed Airtouch F20  PAP mask choice (final) Resmed Airtouch F20  PAP mask type:full face  PAP pressure at which snoring was eliminated   Minimum SaO2 at final PAP pressure 85  Mode of Therapy:CPAP  ETCO2:No  CPAP changed to BiPAP:No    Mode of Therapy:    EKG abnormalities: no     EEG abnormalities: no    Were abnormal behaviors in sleep observed:NO    Is Total Sleep Study Recording Time < 2 hours: N/A    Is Total Sleep Study Recording Time > 2 hours but study is incomplete: N/A    Is Total Sleep Study Recording Time 6 hours or more but sleep was not obtained: NO    Patient classification: retired       Post-Sleep Study    Medication used at bedtime or during sleep study:YES other prescription medications    Patient reports time it took to fall asleep:20 to 30 minutes    Patient reports waking up during study:1 to 2 times.  Patient reports returning to sleep in 10 to 30 minutes.    Patient reports sleeping 4 to 6 hours without dreaming.    Does the Patient feel this is a typical night of sleep:typical    Patient rated sleepiness: Not sleepy or tired    PAP treatment:yes: Post PAP treatment patient reports feeling better and  would wear PAP mask at home.

## 2024-11-27 ENCOUNTER — TELEPHONE (OUTPATIENT)
Dept: SLEEP CENTER | Facility: CLINIC | Age: 74
End: 2024-11-27

## 2024-11-27 DIAGNOSIS — E11.65 TYPE 2 DIABETES MELLITUS WITH HYPERGLYCEMIA, WITH LONG-TERM CURRENT USE OF INSULIN (HCC): ICD-10-CM

## 2024-11-27 DIAGNOSIS — Z79.4 TYPE 2 DIABETES MELLITUS WITH HYPERGLYCEMIA, WITH LONG-TERM CURRENT USE OF INSULIN (HCC): ICD-10-CM

## 2024-11-29 RX ORDER — EMPAGLIFLOZIN 25 MG/1
25 TABLET, FILM COATED ORAL EVERY MORNING
Qty: 30 TABLET | Refills: 5 | Status: SHIPPED | OUTPATIENT
Start: 2024-11-29

## 2024-12-04 ENCOUNTER — ESTABLISHED COMPREHENSIVE EXAM (OUTPATIENT)
Dept: URBAN - METROPOLITAN AREA CLINIC 6 | Facility: CLINIC | Age: 74
End: 2024-12-04

## 2024-12-04 DIAGNOSIS — E11.9: ICD-10-CM

## 2024-12-04 DIAGNOSIS — H25.813: ICD-10-CM

## 2024-12-04 LAB
DME PARACHUTE DELIVERY DATE REQUESTED: NORMAL
DME PARACHUTE ITEM DESCRIPTION: NORMAL
DME PARACHUTE ORDER STATUS: NORMAL
DME PARACHUTE SUPPLIER NAME: NORMAL
DME PARACHUTE SUPPLIER PHONE: NORMAL
LEFT EYE DIABETIC RETINOPATHY: NORMAL
RIGHT EYE DIABETIC RETINOPATHY: NORMAL

## 2024-12-04 PROCEDURE — 92014 COMPRE OPH EXAM EST PT 1/>: CPT

## 2024-12-04 PROCEDURE — 92250 FUNDUS PHOTOGRAPHY W/I&R: CPT

## 2024-12-04 ASSESSMENT — VISUAL ACUITY
OD_PH: 20/50
OS_SC: 20/25
OU_CC: J1+
OD_SC: 20/60

## 2024-12-04 ASSESSMENT — TONOMETRY
OS_IOP_MMHG: 10
OD_IOP_MMHG: 12

## 2024-12-05 DIAGNOSIS — E78.2 MIXED HYPERLIPIDEMIA: ICD-10-CM

## 2024-12-05 RX ORDER — ROSUVASTATIN CALCIUM 5 MG/1
5 TABLET, COATED ORAL DAILY
Qty: 90 TABLET | Refills: 1 | Status: SHIPPED | OUTPATIENT
Start: 2024-12-05

## 2024-12-06 ENCOUNTER — VBI (OUTPATIENT)
Dept: ADMINISTRATIVE | Facility: OTHER | Age: 74
End: 2024-12-06

## 2024-12-06 NOTE — TELEPHONE ENCOUNTER
12/06/24 12:00 PM     Chart reviewed for Diabetic Eye Exam was/were submitted to the patient's insurance.     Shelly Galvez MA   [facility]

## 2024-12-07 DIAGNOSIS — E11.65 TYPE 2 DIABETES MELLITUS WITH HYPERGLYCEMIA, WITH LONG-TERM CURRENT USE OF INSULIN (HCC): ICD-10-CM

## 2024-12-07 DIAGNOSIS — Z79.4 TYPE 2 DIABETES MELLITUS WITH HYPERGLYCEMIA, WITH LONG-TERM CURRENT USE OF INSULIN (HCC): ICD-10-CM

## 2024-12-09 RX ORDER — INSULIN GLARGINE 100 [IU]/ML
40 INJECTION, SOLUTION SUBCUTANEOUS
Qty: 40 ML | Refills: 1 | Status: SHIPPED | OUTPATIENT
Start: 2024-12-09

## 2025-01-02 DIAGNOSIS — E11.65 TYPE 2 DIABETES MELLITUS WITH HYPERGLYCEMIA, WITH LONG-TERM CURRENT USE OF INSULIN (HCC): ICD-10-CM

## 2025-01-02 DIAGNOSIS — I10 ESSENTIAL HYPERTENSION: ICD-10-CM

## 2025-01-02 DIAGNOSIS — E78.2 MIXED HYPERLIPIDEMIA: ICD-10-CM

## 2025-01-02 DIAGNOSIS — E53.8 B12 DEFICIENCY: ICD-10-CM

## 2025-01-02 DIAGNOSIS — Z79.4 TYPE 2 DIABETES MELLITUS WITH HYPERGLYCEMIA, WITH LONG-TERM CURRENT USE OF INSULIN (HCC): ICD-10-CM

## 2025-01-02 LAB

## 2025-01-02 RX ORDER — METFORMIN HYDROCHLORIDE 500 MG/1
1000 TABLET, EXTENDED RELEASE ORAL 2 TIMES DAILY
Qty: 360 TABLET | Refills: 0 | Status: CANCELLED | OUTPATIENT
Start: 2025-01-02

## 2025-01-02 RX ORDER — PEN NEEDLE, DIABETIC 31G X5/32"
NEEDLE, DISPOSABLE MISCELLANEOUS 4 TIMES DAILY
Qty: 360 EACH | Refills: 0 | Status: CANCELLED | OUTPATIENT
Start: 2025-01-02

## 2025-01-03 ENCOUNTER — OFFICE VISIT (OUTPATIENT)
Dept: ENDOCRINOLOGY | Facility: CLINIC | Age: 75
End: 2025-01-03
Payer: COMMERCIAL

## 2025-01-03 VITALS
OXYGEN SATURATION: 94 % | HEIGHT: 66 IN | WEIGHT: 154 LBS | SYSTOLIC BLOOD PRESSURE: 176 MMHG | DIASTOLIC BLOOD PRESSURE: 90 MMHG | TEMPERATURE: 97.6 F | HEART RATE: 83 BPM | BODY MASS INDEX: 24.75 KG/M2

## 2025-01-03 DIAGNOSIS — E55.9 VITAMIN D DEFICIENCY: ICD-10-CM

## 2025-01-03 DIAGNOSIS — E78.2 MIXED HYPERLIPIDEMIA: ICD-10-CM

## 2025-01-03 DIAGNOSIS — E11.65 TYPE 2 DIABETES MELLITUS WITH HYPERGLYCEMIA, WITH LONG-TERM CURRENT USE OF INSULIN (HCC): Primary | ICD-10-CM

## 2025-01-03 DIAGNOSIS — Z79.4 TYPE 2 DIABETES MELLITUS WITH HYPERGLYCEMIA, WITH LONG-TERM CURRENT USE OF INSULIN (HCC): Primary | ICD-10-CM

## 2025-01-03 LAB — SL AMB POCT HEMOGLOBIN AIC: 8.8 (ref ?–6.5)

## 2025-01-03 PROCEDURE — 83036 HEMOGLOBIN GLYCOSYLATED A1C: CPT | Performed by: INTERNAL MEDICINE

## 2025-01-03 PROCEDURE — 99213 OFFICE O/P EST LOW 20 MIN: CPT | Performed by: INTERNAL MEDICINE

## 2025-01-03 RX ORDER — GLIMEPIRIDE 2 MG/1
4 TABLET ORAL 2 TIMES DAILY WITH MEALS
Qty: 360 TABLET | Refills: 0 | Status: SHIPPED | OUTPATIENT
Start: 2025-01-03 | End: 2025-01-03 | Stop reason: SDUPTHER

## 2025-01-03 RX ORDER — PEN NEEDLE, DIABETIC 31G X5/32"
NEEDLE, DISPOSABLE MISCELLANEOUS 4 TIMES DAILY
Qty: 360 EACH | Refills: 3 | Status: SHIPPED | OUTPATIENT
Start: 2025-01-03 | End: 2025-01-09

## 2025-01-03 RX ORDER — VALSARTAN 320 MG/1
320 TABLET ORAL DAILY
Qty: 90 TABLET | Refills: 1 | Status: SHIPPED | OUTPATIENT
Start: 2025-01-03

## 2025-01-03 RX ORDER — GLIMEPIRIDE 2 MG/1
4 TABLET ORAL 2 TIMES DAILY WITH MEALS
Qty: 360 TABLET | Refills: 0 | Status: SHIPPED | OUTPATIENT
Start: 2025-01-03

## 2025-01-03 RX ORDER — AMLODIPINE BESYLATE 10 MG/1
10 TABLET ORAL DAILY
Qty: 90 TABLET | Refills: 1 | Status: SHIPPED | OUTPATIENT
Start: 2025-01-03

## 2025-01-03 RX ORDER — ROSUVASTATIN CALCIUM 5 MG/1
5 TABLET, COATED ORAL DAILY
Qty: 90 TABLET | Refills: 0 | Status: SHIPPED | OUTPATIENT
Start: 2025-01-03

## 2025-01-03 RX ORDER — METFORMIN HYDROCHLORIDE 500 MG/1
1000 TABLET, EXTENDED RELEASE ORAL 2 TIMES DAILY
Qty: 360 TABLET | Refills: 1 | Status: SHIPPED | OUTPATIENT
Start: 2025-01-03

## 2025-01-03 RX ORDER — INSULIN GLARGINE 100 [IU]/ML
40 INJECTION, SOLUTION SUBCUTANEOUS
Qty: 15 ML | Refills: 0 | Status: SHIPPED | OUTPATIENT
Start: 2025-01-03 | End: 2025-01-03 | Stop reason: SDUPTHER

## 2025-01-03 RX ORDER — INSULIN GLARGINE 100 [IU]/ML
40 INJECTION, SOLUTION SUBCUTANEOUS
Qty: 15 ML | Refills: 0 | Status: SHIPPED | OUTPATIENT
Start: 2025-01-03

## 2025-01-03 NOTE — ASSESSMENT & PLAN NOTE
He is stable with A1c 8.8%.    Today we agreed to continue Jardiance 25mg qdaily, Lantus 40u qhs, glimepiride 4mg po bid and metfomrin 1000mg po bid.    Follow up in 6 months.    Lab Results   Component Value Date    HGBA1C 8.8 (A) 01/03/2025

## 2025-01-03 NOTE — PROGRESS NOTES
"    Follow-up Patient Progress Note      CC: type 2 diabetes     History of Present Illness:   72 yr male with type 2 diabetes since 2002, HTN, HLD, DEWEY, Hx vertebral artery stenosis, retinopathy and microalbuminuria. Last visit was 3/5/24.      Since last visit, he gained 4 lbs. No polyuria, polydipsia or blurred vision.       Diet: regular.  Activity: active physically.     Home blood glucose monitoring: does not do glucose checks. Does not wish to use a CGM.  Hypoglycemia: no symptoms     Current meds:  Lantus 40u daily bedtime.  Jardiance 25mg PO daily  Metformin 1000mg PO BID  Amaryl 4mg po bid     Opthamology: yes, retinopathy+ve de 11/2023  Podiatry: no  Influenza: no; covid - yes  Dental:  Pancreatitis: No     Ace/ARB: valsartan  Statin: no- ? intolerance  Thyroid issues: none      Physical Exam:  Body mass index is 24.86 kg/m².  BP (!) 176/90 (BP Location: Left arm, Patient Position: Sitting, Cuff Size: Standard)   Pulse 83   Temp 97.6 °F (36.4 °C) (Temporal)   Ht 5' 6\" (1.676 m)   Wt 69.9 kg (154 lb)   SpO2 94%   BMI 24.86 kg/m²    Vitals:    01/03/25 1118   Weight: 69.9 kg (154 lb)        Physical Exam  Constitutional:       General: He is not in acute distress.     Appearance: He is well-developed.   HENT:      Head: Normocephalic and atraumatic.      Nose: Nose normal.   Eyes:      Conjunctiva/sclera: Conjunctivae normal.   Pulmonary:      Effort: Pulmonary effort is normal.   Abdominal:      General: There is no distension.   Musculoskeletal:      Cervical back: Normal range of motion and neck supple.   Skin:     Findings: No rash.      Comments: No icterus   Neurological:      Mental Status: He is alert and oriented to person, place, and time.         Labs:   Lab Results   Component Value Date    HGBA1C 8.9 (H) 10/30/2024       Lab Results   Component Value Date    KAY3LHILJOYR 1.156 10/30/2024       Lab Results   Component Value Date    CREATININE 1.28 10/30/2024    CREATININE 1.02 09/09/2023 "    CREATININE 1.15 04/11/2023    BUN 27 (H) 10/30/2024     04/23/2015    K 4.6 10/30/2024     10/30/2024    CO2 26 10/30/2024     eGFR   Date Value Ref Range Status   10/30/2024 55 ml/min/1.73sq m Final       Lab Results   Component Value Date    ALT 12 10/30/2024    AST 15 10/30/2024    ALKPHOS 70 10/30/2024    BILITOT 0.5 04/23/2015       Lab Results   Component Value Date    CHOLESTEROL 137 10/30/2024    CHOLESTEROL 142 04/27/2021    CHOLESTEROL 132 07/21/2019     Lab Results   Component Value Date    HDL 41 10/30/2024    HDL 47 04/27/2021    HDL 42 07/21/2019     Lab Results   Component Value Date    TRIG 156 (H) 10/30/2024    TRIG 99 04/27/2021    TRIG 112 07/21/2019     Lab Results   Component Value Date    NONHDLC 96 10/30/2024    NONHDLC 90 07/21/2019    NONHDLC 71 12/30/2018         Assessment/Plan:    1. Type 2 diabetes mellitus with hyperglycemia, with long-term current use of insulin ([facility])  Assessment & Plan:  He is stable with A1c 8.8%.    Today we agreed to continue Jardiance 25mg qdaily, Lantus 40u qhs, glimepiride 4mg po bid and metfomrin 1000mg po bid.    Follow up in 6 months.    Lab Results   Component Value Date    HGBA1C 8.8 (A) 01/03/2025     Orders:  -     metFORMIN (GLUCOPHAGE-XR) 500 mg 24 hr tablet; Take 2 tablets (1,000 mg total) by mouth 2 (two) times a day  -     Empagliflozin (Jardiance) 25 MG TABS; Take 1 tablet (25 mg total) by mouth every morning  -     Insulin Pen Needle (Comfort Touch Insulin Pen Need) 31G X 6 MM MISC; Use 4 (four) times a day  -     Insulin Glargine Solostar (Lantus SoloStar) 100 UNIT/ML SOPN; Inject 0.4 mL (40 Units total) as directed daily at bedtime  -     glimepiride (AMARYL) 2 mg tablet; Take 2 tablets (4 mg total) by mouth 2 (two) times a day with meals  -     Hemoglobin A1C; Future  -     Albumin / creatinine urine ratio; Future  -     POCT hemoglobin A1c  2. Vitamin D deficiency  Assessment & Plan:  Take vit D3 2000IU daily.  3. Mixed  hyperlipidemia  Assessment & Plan:  Not on statin. Sent Rx crestor.  Check lipid profile - should be on a statin per guideline. Wll review next visit.  Orders:  -     Lipid panel; Future        I have spent a total time of  minutes on 01/03/25 in caring for this patient including greater than 50% of this time was spent in counseling/coordination of care as listed above.       Discussed with the patient and all questioned fully answered. He will contact me with concerns.    Umm Saucedo

## 2025-01-06 DIAGNOSIS — Z79.4 TYPE 2 DIABETES MELLITUS WITH HYPERGLYCEMIA, WITH LONG-TERM CURRENT USE OF INSULIN (HCC): ICD-10-CM

## 2025-01-06 DIAGNOSIS — E11.65 TYPE 2 DIABETES MELLITUS WITH HYPERGLYCEMIA, WITH LONG-TERM CURRENT USE OF INSULIN (HCC): ICD-10-CM

## 2025-01-06 NOTE — TELEPHONE ENCOUNTER
Patient f/u was scheduled for 7/15/25 at 11:20 am. I looked at last office visit which was 1/3/26422 and Doctor wanted a 6 month follow up.

## 2025-01-07 RX ORDER — CYANOCOBALAMIN (VITAMIN B-12) 250 MCG
250 TABLET ORAL DAILY
Qty: 100 TABLET | Refills: 5 | Status: SHIPPED | OUTPATIENT
Start: 2025-01-07

## 2025-01-09 RX ORDER — FLURBIPROFEN SODIUM 0.3 MG/ML
SOLUTION/ DROPS OPHTHALMIC
Qty: 100 EACH | Refills: 3 | Status: SHIPPED | OUTPATIENT
Start: 2025-01-09

## 2025-02-03 LAB

## 2025-02-10 LAB

## 2025-02-24 ENCOUNTER — APPOINTMENT (OUTPATIENT)
Dept: RADIOLOGY | Age: 75
End: 2025-02-24
Payer: COMMERCIAL

## 2025-02-24 ENCOUNTER — RA CDI HCC (OUTPATIENT)
Dept: OTHER | Facility: HOSPITAL | Age: 75
End: 2025-02-24

## 2025-02-24 ENCOUNTER — OFFICE VISIT (OUTPATIENT)
Dept: URGENT CARE | Age: 75
End: 2025-02-24
Payer: COMMERCIAL

## 2025-02-24 VITALS
RESPIRATION RATE: 18 BRPM | HEART RATE: 91 BPM | OXYGEN SATURATION: 95 % | TEMPERATURE: 98 F | DIASTOLIC BLOOD PRESSURE: 79 MMHG | SYSTOLIC BLOOD PRESSURE: 149 MMHG

## 2025-02-24 DIAGNOSIS — R05.1 ACUTE COUGH: ICD-10-CM

## 2025-02-24 DIAGNOSIS — J06.9 ACUTE URI: Primary | ICD-10-CM

## 2025-02-24 PROCEDURE — 99214 OFFICE O/P EST MOD 30 MIN: CPT

## 2025-02-24 PROCEDURE — 71046 X-RAY EXAM CHEST 2 VIEWS: CPT

## 2025-02-24 RX ORDER — IPRATROPIUM BROMIDE AND ALBUTEROL SULFATE 2.5; .5 MG/3ML; MG/3ML
3 SOLUTION RESPIRATORY (INHALATION) ONCE
Status: COMPLETED | OUTPATIENT
Start: 2025-02-24 | End: 2025-02-24

## 2025-02-24 RX ORDER — ALBUTEROL SULFATE 90 UG/1
2 INHALANT RESPIRATORY (INHALATION) EVERY 6 HOURS PRN
Qty: 8.5 G | Refills: 0 | Status: SHIPPED | OUTPATIENT
Start: 2025-02-24

## 2025-02-24 RX ORDER — AZITHROMYCIN 250 MG/1
TABLET, FILM COATED ORAL
Qty: 6 TABLET | Refills: 0 | Status: SHIPPED | OUTPATIENT
Start: 2025-02-24 | End: 2025-02-28

## 2025-02-24 RX ADMIN — IPRATROPIUM BROMIDE AND ALBUTEROL SULFATE 3 ML: 2.5; .5 SOLUTION RESPIRATORY (INHALATION) at 16:29

## 2025-02-24 NOTE — PROGRESS NOTES
HCC coding opportunities          Chart Reviewed number of suggestions sent to Provider: 4   E11.22  E11.36  N18.31  Z79.4    This is a reminder to address (resolve/update/assess) ALL HCC (risk adjustment) codes as found on active problem list for 2025 as patient scores reset to zero SARINA.  Patients Insurance     Medicare Insurance: [employer]

## 2025-02-24 NOTE — PATIENT INSTRUCTIONS
Take antibiotic- Zithromax as directed.  Recommend daily probiotic while on antibiotic or eat yogurt with live cultures daily while on antibiotic.       You may take Tylenol or Motrin for pain and fever.  Albuterol inhaler for wheezing and shortness of breath.  Flonase- one spray each nostril daily for nasal congestions.    Rest, increase fluids, good hand washing.  Please follow up with your family doctor if no improvement in 7-10 days.

## 2025-02-24 NOTE — PROGRESS NOTES
[facility]        NAME: Jose Maria Bethea is a 74 y.o. male  : 1950    MRN: [id number]  DATE: 2025  TIME: 5:01 PM    Assessment and Plan   Acute URI [J06.9]  1. Acute URI  azithromycin (ZITHROMAX) 250 mg tablet      2. Acute cough  XR chest pa and lateral    ipratropium-albuterol (DUO-NEB) 0.5-2.5 mg/3 mL inhalation solution 3 mL    albuterol (ProAir HFA) 90 mcg/act inhaler      CXR: No acute cardiopulmonary findings identified by me; pending final read.   Duo neb given in office.  Pt O2 Sat improved to 98%. Pt reports feeling improved following treatment. Concern for typical vs atypical pneumonia.  Will initiate abx therapy.   Take antibiotic- Zithromax as directed.  Recommend daily probiotic while on antibiotic or eat yogurt with live cultures daily while on antibiotic.     You may take Tylenol or Motrin for pain and fever.  Albuterol inhaler for wheezing and shortness of breath.  Flonase- one spray each nostril daily for nasal congestions.  Rest, increase fluids, good hand washing.  Please follow up with your family doctor if no improvement in 7-10 days.   ER precautions.   Patient Instructions       Follow up with PCP in 3-5 days.  Proceed to  ER if symptoms worsen.    If tests have been performed at [facility], our office will contact you with results if changes need to be made to the care plan discussed with you at the visit.  [facility]    Chief Complaint     Chief Complaint   Patient presents with   • Cough   • Wheezing   • Cold Like Symptoms   • Fatigue     Symptoms started on Friday. Getting worse the last two days. Coughing very often. Audible wheezing with inhalation. Dry cough.          History of Present Illness       Pt is a 74 year old male presenting with 4 days of cough, fatigue, wheezing and shortness of breath.  He reports taking sudafed for his symptoms with minimal relief.  He denies CP, fever, chills, sore throat, runny  "nose, or ear pain.  He denies sick contacts.      Cough  Associated symptoms include wheezing. Pertinent negatives include no chest pain or shortness of breath.   Wheezing  Associated symptoms include coughing, fatigue and wheezing. Pertinent negatives include no chest pain.   Fatigue  Associated symptoms include coughing and fatigue. Pertinent negatives include no chest pain or congestion.       Review of Systems   Review of Systems   Constitutional:  Positive for fatigue.   HENT:  Negative for congestion.    Respiratory:  Positive for cough and wheezing. Negative for chest tightness and shortness of breath.    Cardiovascular:  Negative for chest pain.         Current Medications       Current Outpatient Medications:   •  albuterol (ProAir HFA) 90 mcg/act inhaler, Inhale 2 puffs every 6 (six) hours as needed for wheezing or shortness of breath, Disp: 8.5 g, Rfl: 0  •  amLODIPine (NORVASC) 10 mg tablet, Take 1 tablet (10 mg total) by mouth daily Use as directed, Disp: 90 tablet, Rfl: 1  •  azithromycin (ZITHROMAX) 250 mg tablet, Take 2 tablets today then 1 tablet daily x 4 days, Disp: 6 tablet, Rfl: 0  •  Empagliflozin (Jardiance) 25 MG TABS, Take 1 tablet (25 mg total) by mouth every morning, Disp: 30 tablet, Rfl: 5  •  glimepiride (AMARYL) 2 mg tablet, Take 2 tablets (4 mg total) by mouth 2 (two) times a day with meals, Disp: 360 tablet, Rfl: 0  •  glucose blood (FreeStyle InsuLinx Test) test strip, Use 1 each 2 (two) times a day, Disp: 200 each, Rfl: 1  •  Insulin Glargine Solostar (Lantus SoloStar) 100 UNIT/ML SOPN, Inject 0.4 mL (40 Units total) as directed daily at bedtime, Disp: 15 mL, Rfl: 0  •  Insulin Pen Needle (B-D UF III MINI PEN NEEDLES) 31G X 5 MM MISC, Use daily, Disp: 100 each, Rfl: 3  •  Insulin Syringe-Needle U-100 (BD Insulin Syringe U/F) 31G X 5/16\" 1 ML MISC, Use 4 (four) times a day, Disp: 400 each, Rfl: 3  •  meclizine (ANTIVERT) 12.5 MG tablet, Take 1 tablet (12.5 mg total) by mouth 3 " (three) times a day as needed for dizziness, Disp: 30 tablet, Rfl: 0  •  metFORMIN (GLUCOPHAGE-XR) 500 mg 24 hr tablet, Take 2 tablets (1,000 mg total) by mouth 2 (two) times a day, Disp: 360 tablet, Rfl: 1  •  rosuvastatin (CRESTOR) 5 mg tablet, Take 1 tablet (5 mg total) by mouth daily, Disp: 90 tablet, Rfl: 0  •  valsartan (DIOVAN) 320 MG tablet, Take 1 tablet (320 mg total) by mouth daily, Disp: 90 tablet, Rfl: 1  •  vitamin B-12 (CYANOCOBALAMIN) 250 MCG TABS, Take 1 tablet (250 mcg total) by mouth daily, Disp: 100 tablet, Rfl: 5  •  aspirin 81 mg chewable tablet, Chew 1 tablet (81 mg total) daily (Patient not taking: Reported on 1/3/2025), Disp: 90 tablet, Rfl: 0  No current facility-administered medications for this visit.    Current Allergies     Allergies as of 02/24/2025   • (No Known Allergies)            The following portions of the patient's history were reviewed and updated as appropriate: allergies, current medications, past family history, past medical history, past social history, past surgical history and problem list.     Past Medical History:   Diagnosis Date   • COVID-19 07/2022   • Diabetes mellitus (HCC)     Type 2   • Dyslipidemia    • Hypertension    • Obstructive sleep apnea on CPAP        Past Surgical History:   Procedure Laterality Date   • COLONOSCOPY  2008       Family History   Problem Relation Age of Onset   • Hypertension Mother          Medications have been verified.        Objective   /79   Pulse 91   Temp 98 °F (36.7 °C)   Resp 18   SpO2 95%   No LMP for male patient.       Physical Exam     Physical Exam  Vitals and nursing note reviewed.   Constitutional:       General: He is not in acute distress.     Appearance: Normal appearance. He is normal weight. He is not ill-appearing.   HENT:      Head: Normocephalic.      Right Ear: Tympanic membrane normal.      Left Ear: Tympanic membrane normal.      Nose: Nose normal.      Mouth/Throat:      Mouth: Mucous membranes are  moist.      Pharynx: No posterior oropharyngeal erythema.   Eyes:      Extraocular Movements: Extraocular movements intact.   Cardiovascular:      Rate and Rhythm: Normal rate and regular rhythm.      Pulses: Normal pulses.   Pulmonary:      Effort: Pulmonary effort is normal. No respiratory distress.      Breath sounds: No stridor. Wheezing and rhonchi present. No rales.   Chest:      Chest wall: No tenderness.   Abdominal:      General: Bowel sounds are normal.   Musculoskeletal:         General: Normal range of motion.   Lymphadenopathy:      Cervical: No cervical adenopathy.   Skin:     General: Skin is warm.      Capillary Refill: Capillary refill takes less than 2 seconds.   Neurological:      Mental Status: He is alert.

## 2025-02-25 ENCOUNTER — DOCUMENTATION (OUTPATIENT)
Dept: ADMINISTRATIVE | Facility: OTHER | Age: 75
End: 2025-02-25

## 2025-02-25 VITALS — DIASTOLIC BLOOD PRESSURE: 79 MMHG | SYSTOLIC BLOOD PRESSURE: 149 MMHG

## 2025-02-25 NOTE — PROGRESS NOTES
Blood pressure elevated  Appointment department: [facility]  Appointment provider: VICENTE Graves  Blood pressure   02/24/25 1613 149/79   02/24/25 1604 150/80

## 2025-02-25 NOTE — PROGRESS NOTES
02/25/25 9:04 AM    Patient was called after the Urgent Care visit Patient declined to schedule appointment.    Thank you.  Floridalma Perry MA  [facility]

## 2025-03-07 ENCOUNTER — OFFICE VISIT (OUTPATIENT)
Dept: INTERNAL MEDICINE CLINIC | Facility: CLINIC | Age: 75
End: 2025-03-07
Payer: COMMERCIAL

## 2025-03-07 VITALS
BODY MASS INDEX: 23.91 KG/M2 | SYSTOLIC BLOOD PRESSURE: 132 MMHG | DIASTOLIC BLOOD PRESSURE: 88 MMHG | HEIGHT: 66 IN | TEMPERATURE: 98.8 F | WEIGHT: 148.8 LBS

## 2025-03-07 DIAGNOSIS — E11.36 TYPE 2 DIABETES MELLITUS WITH DIABETIC CATARACT, WITH LONG-TERM CURRENT USE OF INSULIN (HCC): ICD-10-CM

## 2025-03-07 DIAGNOSIS — I10 ESSENTIAL HYPERTENSION: Primary | ICD-10-CM

## 2025-03-07 DIAGNOSIS — Z79.4 TYPE 2 DIABETES MELLITUS WITH DIABETIC CATARACT, WITH LONG-TERM CURRENT USE OF INSULIN (HCC): ICD-10-CM

## 2025-03-07 DIAGNOSIS — N18.31 CHRONIC KIDNEY DISEASE, STAGE 3A (HCC): ICD-10-CM

## 2025-03-07 DIAGNOSIS — M62.81 GENERALIZED MUSCLE WEAKNESS: ICD-10-CM

## 2025-03-07 DIAGNOSIS — E11.65 TYPE 2 DIABETES MELLITUS WITH HYPERGLYCEMIA, WITH LONG-TERM CURRENT USE OF INSULIN (HCC): ICD-10-CM

## 2025-03-07 DIAGNOSIS — Z79.4 TYPE 2 DIABETES MELLITUS WITH HYPERGLYCEMIA, WITH LONG-TERM CURRENT USE OF INSULIN (HCC): ICD-10-CM

## 2025-03-07 PROCEDURE — G2211 COMPLEX E/M VISIT ADD ON: HCPCS | Performed by: INTERNAL MEDICINE

## 2025-03-07 PROCEDURE — 99214 OFFICE O/P EST MOD 30 MIN: CPT | Performed by: INTERNAL MEDICINE

## 2025-03-07 RX ORDER — FLUOROURACIL 50 MG/G
CREAM TOPICAL
COMMUNITY
Start: 2025-03-04

## 2025-03-07 RX ORDER — ACYCLOVIR 800 MG/1
1 TABLET ORAL
Qty: 6 EACH | Refills: 3 | Status: SHIPPED | OUTPATIENT
Start: 2025-03-07

## 2025-03-07 RX ORDER — SYRINGE-NEEDLE,INSULIN,0.5 ML 27GX1/2"
SYRINGE, EMPTY DISPOSABLE MISCELLANEOUS 4 TIMES DAILY
Qty: 400 EACH | Refills: 3 | Status: SHIPPED | OUTPATIENT
Start: 2025-03-07

## 2025-03-07 NOTE — PROGRESS NOTES
"Assessment/Plan:          1. Essential hypertension  Comments:  Stable continue amlodipine and valsartan.  2. Chronic kidney disease, stage 3a ([facility])  3. Type 2 diabetes mellitus with hyperglycemia, with long-term current use of insulin ([facility])  Comments:  Seeing endocrinologist and sugars have somewhat improved  Orders:  -     Continuous Glucose Sensor (FreeStyle Eddie 3 Sensor) MISC; Use 1 each every 14 (fourteen) days  -     Insulin Syringe-Needle U-100 (BD Insulin Syringe U/F) 31G X 5/16\" 1 ML MISC; Use 4 (four) times a day  4. Generalized muscle weakness  Comments:  Discussed going to the gym and exercising 4-5 times weekly.  Orders:  -     Ambulatory Referral to Physical Therapy; Future  5. Type 2 diabetes mellitus with diabetic cataract, with long-term current use of insulin (HCC)         1. Type 2 diabetes mellitus with hyperglycemia, with long-term current use of insulin ([facility]) (Primary)      2. Essential hypertension      3. Generalized muscle weakness         No problem-specific Assessment & Plan notes found for this encounter.           Subjective:      Patient ID: Jose Maria Bethea is a 74 y.o. male.    HPI    The following portions of the patient's history were reviewed and updated as appropriate: He  has a past medical history of COVID-19 (07/2022), Diabetes mellitus ([facility]), Dyslipidemia, Hypertension, and Obstructive sleep apnea on CPAP.  He   Patient Active Problem List    Diagnosis Date Noted    Type 2 diabetes mellitus with diabetic cataract, with long-term current use of insulin ([facility]) 03/07/2025    Chronic kidney disease, stage 3a ([facility]) 03/07/2025    DEWEY (obstructive sleep apnea) 11/25/2024    Vitamin D deficiency 03/05/2024    Vertebral artery stenosis, bilateral 04/26/2021    Type 2 diabetes mellitus with hyperglycemia, with long-term current use of insulin ([facility]) 12/08/2020    Essential hypertension 12/08/2020    Hyperkalemia 12/08/2020    Mixed hyperlipidemia 12/08/2020     He  has a past surgical " "history that includes Colonoscopy (2008).  His family history includes Hypertension in his mother.  He  reports that he has quit smoking. His smoking use included cigarettes. He has never used smokeless tobacco. He reports that he does not currently use alcohol. He reports that he does not currently use drugs.  Current Outpatient Medications   Medication Sig Dispense Refill    albuterol (ProAir HFA) 90 mcg/act inhaler Inhale 2 puffs every 6 (six) hours as needed for wheezing or shortness of breath 8.5 g 0    amLODIPine (NORVASC) 10 mg tablet Take 1 tablet (10 mg total) by mouth daily Use as directed 90 tablet 1    Continuous Glucose Sensor (FreeStyle Eddie 3 Sensor) MISC Use 1 each every 14 (fourteen) days 6 each 3    Empagliflozin (Jardiance) 25 MG TABS Take 1 tablet (25 mg total) by mouth every morning 30 tablet 5    fluorouracil (EFUDEX) 5 % cream       glimepiride (AMARYL) 2 mg tablet Take 2 tablets (4 mg total) by mouth 2 (two) times a day with meals 360 tablet 0    glucose blood (FreeStyle InsuLinx Test) test strip Use 1 each 2 (two) times a day 200 each 1    Insulin Glargine Solostar (Lantus SoloStar) 100 UNIT/ML SOPN Inject 0.4 mL (40 Units total) as directed daily at bedtime 15 mL 0    Insulin Pen Needle (B-D UF III MINI PEN NEEDLES) 31G X 5 MM MISC Use daily 100 each 3    Insulin Syringe-Needle U-100 (BD Insulin Syringe U/F) 31G X 5/16\" 1 ML MISC Use 4 (four) times a day 400 each 3    meclizine (ANTIVERT) 12.5 MG tablet Take 1 tablet (12.5 mg total) by mouth 3 (three) times a day as needed for dizziness 30 tablet 0    metFORMIN (GLUCOPHAGE-XR) 500 mg 24 hr tablet Take 2 tablets (1,000 mg total) by mouth 2 (two) times a day 360 tablet 1    rosuvastatin (CRESTOR) 5 mg tablet Take 1 tablet (5 mg total) by mouth daily 90 tablet 0    valsartan (DIOVAN) 320 MG tablet Take 1 tablet (320 mg total) by mouth daily 90 tablet 1    vitamin B-12 (CYANOCOBALAMIN) 250 MCG TABS Take 1 tablet (250 mcg total) by mouth daily " "100 tablet 5    aspirin 81 mg chewable tablet Chew 1 tablet (81 mg total) daily (Patient not taking: Reported on 1/3/2025) 90 tablet 0     No current facility-administered medications for this visit.     Current Outpatient Medications on File Prior to Visit   Medication Sig    albuterol (ProAir HFA) 90 mcg/act inhaler Inhale 2 puffs every 6 (six) hours as needed for wheezing or shortness of breath    amLODIPine (NORVASC) 10 mg tablet Take 1 tablet (10 mg total) by mouth daily Use as directed    Empagliflozin (Jardiance) 25 MG TABS Take 1 tablet (25 mg total) by mouth every morning    fluorouracil (EFUDEX) 5 % cream     glimepiride (AMARYL) 2 mg tablet Take 2 tablets (4 mg total) by mouth 2 (two) times a day with meals    glucose blood (FreeStyle InsuLinx Test) test strip Use 1 each 2 (two) times a day    Insulin Glargine Solostar (Lantus SoloStar) 100 UNIT/ML SOPN Inject 0.4 mL (40 Units total) as directed daily at bedtime    Insulin Pen Needle (B-D UF III MINI PEN NEEDLES) 31G X 5 MM MISC Use daily    meclizine (ANTIVERT) 12.5 MG tablet Take 1 tablet (12.5 mg total) by mouth 3 (three) times a day as needed for dizziness    metFORMIN (GLUCOPHAGE-XR) 500 mg 24 hr tablet Take 2 tablets (1,000 mg total) by mouth 2 (two) times a day    rosuvastatin (CRESTOR) 5 mg tablet Take 1 tablet (5 mg total) by mouth daily    valsartan (DIOVAN) 320 MG tablet Take 1 tablet (320 mg total) by mouth daily    vitamin B-12 (CYANOCOBALAMIN) 250 MCG TABS Take 1 tablet (250 mcg total) by mouth daily    [DISCONTINUED] Insulin Syringe-Needle U-100 (BD Insulin Syringe U/F) 31G X 5/16\" 1 ML MISC Use 4 (four) times a day    aspirin 81 mg chewable tablet Chew 1 tablet (81 mg total) daily (Patient not taking: Reported on 1/3/2025)     No current facility-administered medications on file prior to visit.     Medications Discontinued During This Encounter   Medication Reason    Insulin Syringe-Needle U-100 (BD Insulin Syringe U/F) 31G X 5/16\" 1 ML " "MISC Reorder      He has no known allergies..    Review of Systems   Constitutional:  Negative for appetite change, chills, fatigue and fever.   HENT:  Negative for sore throat and trouble swallowing.    Eyes:  Negative for redness.   Respiratory:  Negative for shortness of breath.    Cardiovascular:  Negative for chest pain and palpitations.   Gastrointestinal:  Negative for abdominal pain, constipation and diarrhea.   Genitourinary:  Negative for dysuria and hematuria.   Musculoskeletal:  Negative for back pain and neck pain.   Skin:  Negative for rash.   Neurological:  Positive for weakness. Negative for seizures and headaches.   Hematological:  Negative for adenopathy.   Psychiatric/Behavioral:  Negative for confusion. The patient is not nervous/anxious.          Objective:      /88 (Patient Position: Sitting, Cuff Size: Adult)   Temp 98.8 °F (37.1 °C)   Ht 5' 6\" (1.676 m)   Wt 67.5 kg (148 lb 12.8 oz)   BMI 24.02 kg/m²     Results Reviewed       None            No results found for this or any previous visit (from the past 8 weeks).     Physical Exam  Constitutional:       General: He is not in acute distress.     Appearance: Normal appearance.   HENT:      Head: Normocephalic and atraumatic.      Nose: Nose normal.      Mouth/Throat:      Mouth: Mucous membranes are moist.   Eyes:      Extraocular Movements: Extraocular movements intact.      Pupils: Pupils are equal, round, and reactive to light.   Cardiovascular:      Rate and Rhythm: Normal rate and regular rhythm.      Pulses: Normal pulses.      Heart sounds: Normal heart sounds. No murmur heard.     No friction rub.   Pulmonary:      Effort: Pulmonary effort is normal. No respiratory distress.      Breath sounds: Normal breath sounds. No wheezing.   Abdominal:      General: Abdomen is flat. Bowel sounds are normal. There is no distension.      Palpations: Abdomen is soft. There is no mass.      Tenderness: There is no abdominal tenderness. " There is no guarding.   Musculoskeletal:         General: Normal range of motion.      Cervical back: Neck supple.   Neurological:      General: No focal deficit present.      Mental Status: He is alert and oriented to person, place, and time. Mental status is at baseline.      Cranial Nerves: No cranial nerve deficit.   Psychiatric:         Mood and Affect: Mood normal.         Behavior: Behavior normal.

## 2025-03-17 ENCOUNTER — TELEPHONE (OUTPATIENT)
Age: 75
End: 2025-03-17

## 2025-03-17 DIAGNOSIS — R53.83 OTHER FATIGUE: Primary | ICD-10-CM

## 2025-03-17 NOTE — TELEPHONE ENCOUNTER
Patient Wife called states spoke with  on day of her appt on Friday regarding her  experiencing symptoms of fatigue, loss of appetite , weakness. Requesting further recommendations.      Please review and advise.  Thank you

## 2025-03-18 DIAGNOSIS — Z79.4 TYPE 2 DIABETES MELLITUS WITH HYPERGLYCEMIA, WITH LONG-TERM CURRENT USE OF INSULIN (HCC): ICD-10-CM

## 2025-03-18 DIAGNOSIS — E11.65 TYPE 2 DIABETES MELLITUS WITH HYPERGLYCEMIA, WITH LONG-TERM CURRENT USE OF INSULIN (HCC): ICD-10-CM

## 2025-03-19 ENCOUNTER — OFFICE VISIT (OUTPATIENT)
Dept: SLEEP CENTER | Facility: CLINIC | Age: 75
End: 2025-03-19
Payer: COMMERCIAL

## 2025-03-19 VITALS
BODY MASS INDEX: 23.78 KG/M2 | DIASTOLIC BLOOD PRESSURE: 80 MMHG | HEIGHT: 66 IN | WEIGHT: 148 LBS | SYSTOLIC BLOOD PRESSURE: 120 MMHG

## 2025-03-19 DIAGNOSIS — E11.65 TYPE 2 DIABETES MELLITUS WITH HYPERGLYCEMIA, WITH LONG-TERM CURRENT USE OF INSULIN (HCC): ICD-10-CM

## 2025-03-19 DIAGNOSIS — F45.8 BRUXISM: ICD-10-CM

## 2025-03-19 DIAGNOSIS — Z79.4 TYPE 2 DIABETES MELLITUS WITH HYPERGLYCEMIA, WITH LONG-TERM CURRENT USE OF INSULIN (HCC): ICD-10-CM

## 2025-03-19 DIAGNOSIS — K21.9 GASTROESOPHAGEAL REFLUX DISEASE, UNSPECIFIED WHETHER ESOPHAGITIS PRESENT: ICD-10-CM

## 2025-03-19 DIAGNOSIS — G47.33 OSA (OBSTRUCTIVE SLEEP APNEA): Primary | ICD-10-CM

## 2025-03-19 DIAGNOSIS — I10 ESSENTIAL HYPERTENSION: ICD-10-CM

## 2025-03-19 PROCEDURE — G2211 COMPLEX E/M VISIT ADD ON: HCPCS | Performed by: INTERNAL MEDICINE

## 2025-03-19 PROCEDURE — 99214 OFFICE O/P EST MOD 30 MIN: CPT | Performed by: INTERNAL MEDICINE

## 2025-03-19 RX ORDER — GLIMEPIRIDE 2 MG/1
4 TABLET ORAL 2 TIMES DAILY WITH MEALS
Qty: 360 TABLET | Refills: 1 | Status: SHIPPED | OUTPATIENT
Start: 2025-03-19

## 2025-03-19 NOTE — PROGRESS NOTES
standardName: Jose Maria Bethea      : 1950      MRN: [id number]  Encounter Provider: Greyson Gibson MD  Encounter Date: 3/19/2025   Encounter department: [facility] [place]  :  Assessment & Plan  DEWEY (obstructive sleep apnea)    Orders:    PAP DME Resupply/Reorder    Bruxism         Essential hypertension         Type 2 diabetes mellitus with hyperglycemia, with long-term current use of insulin ([facility])    Lab Results   Component Value Date    HGBA1C 8.8 (A) 2025            Gastroesophageal reflux disease, unspecified whether esophagitis present              PLAN:   Problems & Comorbidities Addressed this Visit as listed.  Above conditions as reviewed in notes are improved/stable/controlled/resolved.  I reviewed results of prior studies and physiologic data with the patient.   I discussed treatment options with risks and benefits.  Treatment with  PAP is medically necessary and Jose Maria is agreable to continue use.   Care of equipment, methods to improve comfort using PAP and importance of compliance with therapy were discussed.  Pressure setting: continue 9 cmH2O. Mask type full face.    Rx provided to replace supplies and Care coordinated with DME provider.   Hemoglobin A1c has improved since he is under care of endocrinology.  Discussed strategies for weight maintenance.    Follow-up is advised in 1 year or sooner if needed to monitor progress, compliance and to adjust therapy.      History of Present Illness   HPI          Follow-Up Note - Sleep Center   Jose Maria Bethea  74 y.o. male  :1950  MRN:[id number]  DOS:3/19/2025    CC: I saw this patient for follow-up in clinic today for Sleep disordered breathing, Coexisting Sleep and Medical Problems.. Interval changes: He got a replacement ResMed machine.  Patient had a split sleep study and is here to review results and to initiate therapy.  The diagnostic portion demonstrated: AHI of 52 per hour, and was not observed during stage REM.   "Minimum oxygen saturation was 82% and he spent 27.4 minutes during this portion of the study with saturations <90%.  During the therapeutic portion of the study, his sleep disordered breathing was adequately remediated with nasal CPAP at 9 cm H2O.      PFSH, Problem List, Medications & Allergies were reviewed in EMR.   He  has a past medical history of COVID-19 (07/2022), Diabetes mellitus (HCC), Dyslipidemia, Hypertension, and Obstructive sleep apnea on CPAP.    He has a current medication list which includes the following prescription(s): albuterol, amlodipine, freestyle tamiko 3 sensor, empagliflozin, fluorouracil, glimepiride, freestyle insulinx test, insulin glargine solostar, b-d uf iii mini pen needles, insulin syringe-needle u-100, meclizine, metformin, rosuvastatin, valsartan, vitamin b-12, and aspirin.    PHYSIOLOGICAL DATA REVIEW : Using PAP > 4 hours/night 87%. Estimated DENA 3.3/hour with pressure of 9cm H2O@90th/95th percentile;.  INTERPRETATION: Compliance is Very good; Pressure setting is:within target range; ;     SUBJECTIVE: With respect to use of PAP, Jose Maria  is experiencing no adverse effects:.He derives benefit.. Is satisfied with sleep and daytime function.     Sleep Routine: Jose Maria reports getting 7-8 hrs sleep; he has no difficulty initiating or maintaining sleep . He arises spontaneously and feels refreshed.Jose Maria]denies daytime sleepiness,.  He rated himself at Total score: 6 /24 on the [place] Sleepiness Scale.   Other issues: He is not aware of teeth grinding during sleep..     Habits: Reports that he has quit smoking. His smoking use included cigarettes. He has never used smokeless tobacco.,  Reports that he does not currently use alcohol.,  Reports that he does not currently use drugs., Caffeine use: limited until  ; Exercise routine: \"not enough\".      ROS: Significant for weight fluctuates in the range of a few pounds.  Acid reflux is controlled through sleeping with head of bed elevated.  " "Review of systems was otherwise negative.    Objective   /80   Ht 5' 6\" (1.676 m)   Wt 67.1 kg (148 lb)   BMI 23.89 kg/m²        EXAM: /80   Ht 5' 6\" (1.676 m)   Wt 67.1 kg (148 lb)   BMI 23.89 kg/m²     Wt Readings from Last 3 Encounters:   03/19/25 67.1 kg (148 lb)   03/07/25 67.5 kg (148 lb 12.8 oz)   01/03/25 69.9 kg (154 lb)      Patient is well groomed; well appearing.   CNS: Alert, orientated, speech clear & coherent  Psych: cooperative and in no distress. Mental state: Appears normal.  H&N: EOMI; NC/AT: No facial pressure marks, no rashes.    Skin/Extrem: col & hydration normal; no edema  Resp: Respiratory effort is normal  Physical findings otherwise essentially unchanged from previous.    Sincerely,     Authenticated electronically on 03/19/25   Board Certified Specialist     Portions of the record may have been created with voice recognition software. Occasional wrong word or \"sound a like\" substitutions may have occurred due to the inherent limitations of voice recognition software. There may also be notations and random deletions of words or characters from malfunctioning software. Read the chart carefully and recognize, using context, where substitutions/deletions have occurred.       Review of Systems            "

## 2025-03-20 ENCOUNTER — TELEPHONE (OUTPATIENT)
Dept: SLEEP CENTER | Facility: CLINIC | Age: 75
End: 2025-03-20

## 2025-03-25 ENCOUNTER — APPOINTMENT (EMERGENCY)
Dept: RADIOLOGY | Facility: HOSPITAL | Age: 75
DRG: 065 | End: 2025-03-25
Payer: COMMERCIAL

## 2025-03-25 ENCOUNTER — HOSPITAL ENCOUNTER (INPATIENT)
Facility: HOSPITAL | Age: 75
LOS: 2 days | Discharge: HOME HEALTH | DRG: 065 | End: 2025-03-27
Attending: EMERGENCY MEDICINE | Admitting: INTERNAL MEDICINE
Payer: COMMERCIAL

## 2025-03-25 DIAGNOSIS — R29.90 STROKE-LIKE SYMPTOMS: Primary | ICD-10-CM

## 2025-03-25 DIAGNOSIS — R53.1 LEFT-SIDED WEAKNESS: ICD-10-CM

## 2025-03-25 DIAGNOSIS — I10 ESSENTIAL HYPERTENSION: ICD-10-CM

## 2025-03-25 LAB
ANION GAP SERPL CALCULATED.3IONS-SCNC: 8 MMOL/L (ref 4–13)
APTT PPP: 25 SECONDS (ref 23–34)
ATRIAL RATE: 83 BPM
ATRIAL RATE: 86 BPM
BASOPHILS # BLD AUTO: 0.06 THOUSANDS/ÂΜL (ref 0–0.1)
BASOPHILS NFR BLD AUTO: 1 % (ref 0–1)
BUN SERPL-MCNC: 29 MG/DL (ref 5–25)
CALCIUM SERPL-MCNC: 9.7 MG/DL (ref 8.4–10.2)
CHLORIDE SERPL-SCNC: 106 MMOL/L (ref 96–108)
CO2 SERPL-SCNC: 25 MMOL/L (ref 21–32)
CREAT SERPL-MCNC: 1.22 MG/DL (ref 0.6–1.3)
EOSINOPHIL # BLD AUTO: 0.32 THOUSAND/ÂΜL (ref 0–0.61)
EOSINOPHIL NFR BLD AUTO: 3 % (ref 0–6)
ERYTHROCYTE [DISTWIDTH] IN BLOOD BY AUTOMATED COUNT: 12.9 % (ref 11.6–15.1)
GFR SERPL CREATININE-BSD FRML MDRD: 58 ML/MIN/1.73SQ M
GLUCOSE SERPL-MCNC: 133 MG/DL (ref 65–140)
GLUCOSE SERPL-MCNC: 172 MG/DL (ref 65–140)
GLUCOSE SERPL-MCNC: 190 MG/DL (ref 65–140)
HCT VFR BLD AUTO: 47.2 % (ref 36.5–49.3)
HGB BLD-MCNC: 15.8 G/DL (ref 12–17)
IMM GRANULOCYTES # BLD AUTO: 0.05 THOUSAND/UL (ref 0–0.2)
IMM GRANULOCYTES NFR BLD AUTO: 0 % (ref 0–2)
INR PPP: 0.87 (ref 0.85–1.19)
LYMPHOCYTES # BLD AUTO: 1.69 THOUSANDS/ÂΜL (ref 0.6–4.47)
LYMPHOCYTES NFR BLD AUTO: 15 % (ref 14–44)
MCH RBC QN AUTO: 30.1 PG (ref 26.8–34.3)
MCHC RBC AUTO-ENTMCNC: 33.5 G/DL (ref 31.4–37.4)
MCV RBC AUTO: 90 FL (ref 82–98)
MONOCYTES # BLD AUTO: 0.79 THOUSAND/ÂΜL (ref 0.17–1.22)
MONOCYTES NFR BLD AUTO: 7 % (ref 4–12)
NEUTROPHILS # BLD AUTO: 8.73 THOUSANDS/ÂΜL (ref 1.85–7.62)
NEUTS SEG NFR BLD AUTO: 74 % (ref 43–75)
NRBC BLD AUTO-RTO: 0 /100 WBCS
P AXIS: 34 DEGREES
P AXIS: 44 DEGREES
PLATELET # BLD AUTO: 204 THOUSANDS/UL (ref 149–390)
PMV BLD AUTO: 11.1 FL (ref 8.9–12.7)
POTASSIUM SERPL-SCNC: 4.5 MMOL/L (ref 3.5–5.3)
PR INTERVAL: 172 MS
PR INTERVAL: 174 MS
PROTHROMBIN TIME: 12.1 SECONDS (ref 12.3–15)
QRS AXIS: 21 DEGREES
QRS AXIS: 9 DEGREES
QRSD INTERVAL: 100 MS
QRSD INTERVAL: 86 MS
QT INTERVAL: 346 MS
QT INTERVAL: 354 MS
QTC INTERVAL: 414 MS
QTC INTERVAL: 415 MS
RBC # BLD AUTO: 5.25 MILLION/UL (ref 3.88–5.62)
SODIUM SERPL-SCNC: 139 MMOL/L (ref 135–147)
T WAVE AXIS: 15 DEGREES
T WAVE AXIS: 19 DEGREES
VENTRICULAR RATE: 83 BPM
VENTRICULAR RATE: 86 BPM
WBC # BLD AUTO: 11.64 THOUSAND/UL (ref 4.31–10.16)

## 2025-03-25 PROCEDURE — 70498 CT ANGIOGRAPHY NECK: CPT

## 2025-03-25 PROCEDURE — 85730 THROMBOPLASTIN TIME PARTIAL: CPT

## 2025-03-25 PROCEDURE — 82948 REAGENT STRIP/BLOOD GLUCOSE: CPT

## 2025-03-25 PROCEDURE — 99285 EMERGENCY DEPT VISIT HI MDM: CPT | Performed by: EMERGENCY MEDICINE

## 2025-03-25 PROCEDURE — 36415 COLL VENOUS BLD VENIPUNCTURE: CPT

## 2025-03-25 PROCEDURE — 99285 EMERGENCY DEPT VISIT HI MDM: CPT

## 2025-03-25 PROCEDURE — 92610 EVALUATE SWALLOWING FUNCTION: CPT

## 2025-03-25 PROCEDURE — 93010 ELECTROCARDIOGRAM REPORT: CPT | Performed by: INTERNAL MEDICINE

## 2025-03-25 PROCEDURE — 99215 OFFICE O/P EST HI 40 MIN: CPT | Performed by: PSYCHIATRY & NEUROLOGY

## 2025-03-25 PROCEDURE — 93005 ELECTROCARDIOGRAM TRACING: CPT

## 2025-03-25 PROCEDURE — 85610 PROTHROMBIN TIME: CPT

## 2025-03-25 PROCEDURE — 70496 CT ANGIOGRAPHY HEAD: CPT

## 2025-03-25 PROCEDURE — 99223 1ST HOSP IP/OBS HIGH 75: CPT | Performed by: INTERNAL MEDICINE

## 2025-03-25 PROCEDURE — 80048 BASIC METABOLIC PNL TOTAL CA: CPT

## 2025-03-25 PROCEDURE — NC001 PR NO CHARGE: Performed by: INTERNAL MEDICINE

## 2025-03-25 PROCEDURE — 85025 COMPLETE CBC W/AUTO DIFF WBC: CPT

## 2025-03-25 RX ORDER — CLOPIDOGREL BISULFATE 75 MG/1
75 TABLET ORAL DAILY
Status: DISCONTINUED | OUTPATIENT
Start: 2025-03-25 | End: 2025-03-27 | Stop reason: HOSPADM

## 2025-03-25 RX ORDER — INSULIN LISPRO 100 [IU]/ML
1-5 INJECTION, SOLUTION INTRAVENOUS; SUBCUTANEOUS
Status: DISCONTINUED | OUTPATIENT
Start: 2025-03-25 | End: 2025-03-27 | Stop reason: HOSPADM

## 2025-03-25 RX ORDER — LOSARTAN POTASSIUM 50 MG/1
100 TABLET ORAL DAILY
Status: DISCONTINUED | OUTPATIENT
Start: 2025-03-25 | End: 2025-03-27 | Stop reason: HOSPADM

## 2025-03-25 RX ORDER — ASPIRIN 81 MG/1
81 TABLET, CHEWABLE ORAL DAILY
Status: DISCONTINUED | OUTPATIENT
Start: 2025-03-25 | End: 2025-03-27

## 2025-03-25 RX ORDER — ATORVASTATIN CALCIUM 80 MG/1
80 TABLET, FILM COATED ORAL EVERY EVENING
Status: DISCONTINUED | OUTPATIENT
Start: 2025-03-25 | End: 2025-03-27 | Stop reason: HOSPADM

## 2025-03-25 RX ORDER — AMLODIPINE BESYLATE 10 MG/1
10 TABLET ORAL DAILY
Status: DISCONTINUED | OUTPATIENT
Start: 2025-03-25 | End: 2025-03-27 | Stop reason: HOSPADM

## 2025-03-25 RX ORDER — ASPIRIN 81 MG/1
81 TABLET, CHEWABLE ORAL DAILY
Status: DISCONTINUED | OUTPATIENT
Start: 2025-03-25 | End: 2025-03-25

## 2025-03-25 RX ORDER — LABETALOL HYDROCHLORIDE 5 MG/ML
10 INJECTION, SOLUTION INTRAVENOUS EVERY 6 HOURS PRN
Status: DISCONTINUED | OUTPATIENT
Start: 2025-03-25 | End: 2025-03-27 | Stop reason: HOSPADM

## 2025-03-25 RX ORDER — ALBUTEROL SULFATE 90 UG/1
2 INHALANT RESPIRATORY (INHALATION) EVERY 6 HOURS PRN
Status: DISCONTINUED | OUTPATIENT
Start: 2025-03-25 | End: 2025-03-27 | Stop reason: HOSPADM

## 2025-03-25 RX ORDER — ENOXAPARIN SODIUM 100 MG/ML
40 INJECTION SUBCUTANEOUS
Status: DISCONTINUED | OUTPATIENT
Start: 2025-03-25 | End: 2025-03-27 | Stop reason: HOSPADM

## 2025-03-25 RX ORDER — INSULIN GLARGINE 100 [IU]/ML
16 INJECTION, SOLUTION SUBCUTANEOUS
Status: DISCONTINUED | OUTPATIENT
Start: 2025-03-25 | End: 2025-03-27 | Stop reason: HOSPADM

## 2025-03-25 RX ORDER — MECLIZINE HCL 12.5 MG 12.5 MG/1
12.5 TABLET ORAL 3 TIMES DAILY PRN
Status: DISCONTINUED | OUTPATIENT
Start: 2025-03-25 | End: 2025-03-27 | Stop reason: HOSPADM

## 2025-03-25 RX ORDER — HEPARIN SODIUM 5000 [USP'U]/ML
5000 INJECTION, SOLUTION INTRAVENOUS; SUBCUTANEOUS EVERY 8 HOURS SCHEDULED
Status: DISCONTINUED | OUTPATIENT
Start: 2025-03-25 | End: 2025-03-25

## 2025-03-25 RX ORDER — INSULIN LISPRO 100 [IU]/ML
4 INJECTION, SOLUTION INTRAVENOUS; SUBCUTANEOUS
Status: DISCONTINUED | OUTPATIENT
Start: 2025-03-25 | End: 2025-03-27 | Stop reason: HOSPADM

## 2025-03-25 RX ADMIN — ATORVASTATIN CALCIUM 80 MG: 80 TABLET, FILM COATED ORAL at 17:59

## 2025-03-25 RX ADMIN — HEPARIN SODIUM 5000 UNITS: 5000 INJECTION, SOLUTION INTRAVENOUS; SUBCUTANEOUS at 15:13

## 2025-03-25 RX ADMIN — CYANOCOBALAMIN TAB 500 MCG 250 MCG: 500 TAB at 15:21

## 2025-03-25 RX ADMIN — IOHEXOL 85 ML: 350 INJECTION, SOLUTION INTRAVENOUS at 12:07

## 2025-03-25 RX ADMIN — INSULIN GLARGINE 16 UNITS: 100 INJECTION, SOLUTION SUBCUTANEOUS at 21:05

## 2025-03-25 RX ADMIN — AMLODIPINE BESYLATE 10 MG: 10 TABLET ORAL at 15:09

## 2025-03-25 RX ADMIN — ENOXAPARIN SODIUM 40 MG: 40 INJECTION SUBCUTANEOUS at 21:04

## 2025-03-25 RX ADMIN — INSULIN LISPRO 4 UNITS: 100 INJECTION, SOLUTION INTRAVENOUS; SUBCUTANEOUS at 16:52

## 2025-03-25 RX ADMIN — CLOPIDOGREL BISULFATE 75 MG: 75 TABLET, FILM COATED ORAL at 16:50

## 2025-03-25 RX ADMIN — ASPIRIN 81 MG CHEWABLE TABLET 81 MG: 81 TABLET CHEWABLE at 15:09

## 2025-03-25 RX ADMIN — LOSARTAN POTASSIUM 100 MG: 50 TABLET, FILM COATED ORAL at 15:09

## 2025-03-25 RX ADMIN — LABETALOL HYDROCHLORIDE 10 MG: 5 INJECTION, SOLUTION INTRAVENOUS at 21:04

## 2025-03-25 NOTE — ED ATTENDING ATTESTATION
3/25/2025  I, Gerson Lacey MD, saw and evaluated the patient. I have discussed the patient with the resident/non-physician practitioner and agree with the resident's/non-physician practitioner's findings, Plan of Care, and MDM as documented in the resident's/non-physician practitioner's note, except where noted. All available labs and Radiology studies were reviewed.  I was present for key portions of any procedure(s) performed by the resident/non-physician practitioner and I was immediately available to provide assistance.       At this point I agree with the current assessment done in the Emergency Department.  I have conducted an independent evaluation of this patient a history and physical is as follows:  Htn  dm   hld    dizziness  left weakness and off balance   lightheaded difficulty walking  starting on Sunday  before 5 pm   about 44 hours ago   pt did not want to come to hospital  yesterday   Speech seemed slurred   No fever no ha no fall  No n/v/d/f/c  no prior stroke history  Exam  NAd  neck no jvd  lungs clear heart rrr no m abd soft nt  ext nt   Neruo  gcs 15   face asymetric but wife states it appears to be her husbands baseline     speech slight dysarthria     dec sensation left face and leg     le mild weakness  left leg  subtle weakness left upper arm    Fn hs intact    unable to walk straight   Imp  stroke      Stroke workup CTA head and neck patient will require admission  Out of window for TNK   ED Course         Critical Care Time  Procedures

## 2025-03-25 NOTE — ASSESSMENT & PLAN NOTE
Lab Results   Component Value Date    EGFR 58 03/25/2025    EGFR 55 10/30/2024    EGFR 73 09/09/2023    CREATININE 1.22 03/25/2025    CREATININE 1.28 10/30/2024    CREATININE 1.02 09/09/2023   CKD stage IIIa with unknown etiology, though can consider in the setting of chronic hypertension versus insulin-dependent diabetes  Slight creatinine between 1.1-1.2  At this time is stable without evidence of any acute urinary symptoms    Plan  Continue to monitor trend with daily BMP  Avoid nephrotoxic medications  Avoid relative hypotension

## 2025-03-25 NOTE — ASSESSMENT & PLAN NOTE
Lab Results   Component Value Date    EGFR 58 03/25/2025    EGFR 55 10/30/2024    EGFR 73 09/09/2023    CREATININE 1.22 03/25/2025    CREATININE 1.28 10/30/2024    CREATININE 1.02 09/09/2023

## 2025-03-25 NOTE — ASSESSMENT & PLAN NOTE
Currently using amlodipine 10 mg daily and valsartan 320 mg once daily at home  Will continue current regimen with amlodipine 10 mg in hospital formulary equivalent of losartan 100 mg once daily

## 2025-03-25 NOTE — ASSESSMENT & PLAN NOTE
Jose Maria Bethea is a 74-year-old male with a past medical history of diabetes mellitus (DM), hyperlipidemia (HLD), and hypertension (HTN), CKD 3a, DEWEY presenting to the emergency department for evaluation of lightheadedness, dizziness, ataxia, and left-sided numbness and weakness for the past two days.  Initial BP Blood Pressure: (!) 181/92, FSBG POC Glucose: (!) 172. NC CTH unremarkable and CTA H/N with no LVO or aneurysm, continues to show severe stenosis with focal occlusion at R V4 segment with reconstitution. As a result of > 4.5 hours from time of symptom onset patient was not determined to be a candidate for thrombolysis (TNK). Given absence of IR target pt was deemed not a candidate for mechanical thrombectomy.   - Home Antiplatelet /Anticoagulants PTA: Aspirin 81 mg QD  - Stroke risk factors: HTN, DM, HLD, and DEWEY  - Prior Stroke Hx: yes: lacunar left centrum semiovale  - Past Neurological Hx: stroke - incidentally found    Workup:  - CTH: No acute intracranial abnormality. Mild chronic microangiopathic change  - CTA: Atherosclerotic disease involving the V4 segments of both vertebral arteries. The left demonstrates mild narrowing while the right demonstrates a severe stenosis with short segment occlusion and reconstitution, possibly via retrograde   flow.  Other findings: Cervical spondylitic degenerative change with mild canal stenosis and moderate foraminal narrowing  - MRI: pending  - PALAK: pending  - Labs: Hemoglobin A1c 8.8 (1/3/2025), LDL 65 (10/30/2024)    Impression: 74 y.o. male with history of  diabetes mellitus (DM), hyperlipidemia (HLD), and hypertension (HTN), CKD 3a, DEWEY presenting to the emergency department for evaluation of lightheadedness, dizziness, ataxia, and left-sided numbness and weakness for the past two days.  Admit for stroke w/u.     Plan: Discussed plan with neurology attending, Dr. Cruz  Admit along the stroke pathway with telemetry monitoring  Frequent neuro checks per  protocol. If there is any acute changes in exam, please obtain stat CT head and notify neurology team  BP Goals: Normotension of <130/80  Antiplatelet agents: ASA 81 mg and Plavix 75 mg qd  Anticoagulation: None  Statin: Start Atorvastatin 40 mg daily  PTA Crestor 5 mg  Labs: Hgb A1c, LDL   Brain Imaging: Obtain MRI brain without contrast  TTE, pend  Euthermic/Euglycemic  DVT PPx: per primary, SCDs  PT/OT/ST/PMR evaluations when able  Stroke education and counseling  Rest of other care per primary team appreciated    Disposition: PT Recommendations -

## 2025-03-25 NOTE — QUICK NOTE
Alerted by patient's nurse that BP has been in the 170s for the past couple hours.  Per review of neuro resident note  BP goal is normotension with BP less than 130/80.  Patient received amlodipine 10 mg and losartan 100 mg at 1500.  Will add as needed labetalol 10 mg for better BP control in the setting of suspected CVA.

## 2025-03-25 NOTE — ED PROVIDER NOTES
"Time reflects when diagnosis was documented in both MDM as applicable and the Disposition within this note       Time User Action Codes Description Comment    3/25/2025 12:22 PM Erin Hussein Add [R29.90] Stroke-like symptoms     3/25/2025  2:13 PM Erin Hussein Add [R53.1] Left-sided weakness           ED Disposition       ED Disposition   Admit    Condition   Stable    Date/Time   Tue Mar 25, 2025  1:18 PM    Comment                  Assessment & Plan       Medical Decision Making  Amount and/or Complexity of Data Reviewed  Labs: ordered.  Radiology: ordered.    Risk  Prescription drug management.  Decision regarding hospitalization.      Patient is a 74 y.o. male with PMH of hypertension, hyperlipidemia, diabetes who presents to the ED with lightheaded nests, ataxia, left-sided numbness and weakness for the past few days..    Vital signs stable, afebrile. On exam 4/5 motor strength LUE and LLE, decreased sensations to light touch left face, LUE, and LLE. Unable to perform tandem gait d/t dizziness and ataxia.     History and physical exam most consistent with stroke, electrolyte abnormality, hypo versus hyperglycemia, intracranial mass, intracranial hemorrhage, infection.     Plan: CBC, BMP, coags, EKG, CT head and neck, neurology consult.     View ED course for further discussion on patient workup.     All labs reviewed and utilized in the medical decision making process  All radiology studies independently viewed by me and interpreted by the radiologist.  I reviewed all testing with the patient.     Disposition: Patient admitted to medicine for stroke pathway.    Portions of the record may have been created with voice recognition software. Occasional wrong word or \"sound a like\" substitutions may have occurred due to the inherent limitations of voice recognition software. Read the chart carefully and recognize, using context, where substitutions have occurred.    ED Course as of 03/25/25 1809   Tue Mar 25, 2025 "   1222 Spoke to neurology. Consult placed    1332 Neuro at bedside    1413 SC message sent to SOD    1414 Admitted to SOD for stroke pathway        Medications   albuterol (PROVENTIL HFA,VENTOLIN HFA) inhaler 2 puff (has no administration in time range)   amLODIPine (NORVASC) tablet 10 mg (10 mg Oral Given 3/25/25 1509)   aspirin chewable tablet 81 mg (81 mg Oral Given 3/25/25 1509)   meclizine (ANTIVERT) tablet 12.5 mg (has no administration in time range)   losartan (COZAAR) tablet 100 mg (100 mg Oral Given 3/25/25 1509)   cyanocobalamin (VITAMIN B-12) tablet 250 mcg (250 mcg Oral Given 3/25/25 1521)   atorvastatin (LIPITOR) tablet 80 mg (80 mg Oral Given 3/25/25 1759)   heparin (porcine) subcutaneous injection 5,000 Units (5,000 Units Subcutaneous Given 3/25/25 1513)   insulin glargine (LANTUS) subcutaneous injection 16 Units 0.16 mL (has no administration in time range)   insulin lispro (HumALOG/ADMELOG) 100 units/mL subcutaneous injection 4 Units (4 Units Subcutaneous Given 3/25/25 1652)   insulin lispro (HumALOG/ADMELOG) 100 units/mL subcutaneous injection 1-5 Units ( Subcutaneous Not Given 3/25/25 1614)   clopidogrel (PLAVIX) tablet 75 mg (75 mg Oral Given 3/25/25 1650)   iohexol (OMNIPAQUE) 350 MG/ML injection (MULTI-DOSE) 85 mL (85 mL Intravenous Given 3/25/25 1207)       ED Risk Strat Scores                            SBIRT 20yo+      Flowsheet Row Most Recent Value   Initial Alcohol Screen: US AUDIT-C     1. How often do you have a drink containing alcohol? 0 Filed at: 03/25/2025 1247   2. How many drinks containing alcohol do you have on a typical day you are drinking?  0 Filed at: 03/25/2025 1247   3b. FEMALE Any Age, or MALE 65+: How often do you have 4 or more drinks on one occassion? 0 Filed at: 03/25/2025 1247   Audit-C Score 0 Filed at: 03/25/2025 1247   DURAN: How many times in the past year have you...    Used an illegal drug or used a prescription medication for non-medical reasons? Never Filed  at: 03/25/2025 1247                            History of Present Illness       Chief Complaint   Patient presents with    Numbness     Pt c/o slurred speech, L arm numbness, ataxia, and L sided weakness for past few days, - dizziness or headache, - vision changes       Past Medical History:   Diagnosis Date    COVID-19 07/2022    Diabetes mellitus (HCC)     Type 2    Dyslipidemia     Hypertension     Obstructive sleep apnea on CPAP       Past Surgical History:   Procedure Laterality Date    COLONOSCOPY  2008      Family History   Problem Relation Age of Onset    Hypertension Mother       Social History     Tobacco Use    Smoking status: Former     Types: Cigarettes    Smokeless tobacco: Never    Tobacco comments:     1 year as a teenager   Vaping Use    Vaping status: Never Used   Substance Use Topics    Alcohol use: Not Currently    Drug use: Not Currently      E-Cigarette/Vaping    E-Cigarette Use Never User       E-Cigarette/Vaping Substances      I have reviewed and agree with the history as documented.     HPI  Patient is a 74 y.o. male with PMHx DM, HLD, HTN who presents to the ED for evaluation of lightheaded type dizziness, ataxia, left-sided numbness and weakness x 2 days.  Wife at bedside also noticed some slurred speech as well.  Patient states that he almost fell a couple times earlier today due to the dizziness and ataxia.  Patient states that he has a history of vertigo but this feels different.  Reports viral bronchitis several weeks ago but his symptoms have since resolved.  Denies any new fever, chills, cough, congestion.  Denies any chest pain, shortness of breath, abdominal pain, nausea, vomiting, diarrhea, headache, vision changes.  Denies any history of stroke.  Denies any recent head trauma.  Denies any new medications.    Review of Systems        Objective       ED Triage Vitals   Temperature Pulse Blood Pressure Respirations SpO2 Patient Position - Orthostatic VS   03/25/25 0949 03/25/25  0949 03/25/25 0949 03/25/25 0949 03/25/25 0949 03/25/25 0949   (!) 97.4 °F (36.3 °C) 85 (!) 181/92 18 93 % Sitting      Temp Source Heart Rate Source BP Location FiO2 (%) Pain Score    03/25/25 0949 03/25/25 0949 03/25/25 0949 -- 03/25/25 1252    Tympanic Monitor Left arm  No Pain      Vitals      Date and Time Temp Pulse SpO2 Resp BP Pain Score FACES Pain Rating User   03/25/25 1800 98.6 °F (37 °C) 92 92 % 18 173/84 No Pain -- PS   03/25/25 1654 -- 85 90 % 18 175/84 No Pain -- PS   03/25/25 1607 -- 83 -- 18 179/86 No Pain -- PS   03/25/25 1509 -- -- -- -- 176/90 -- -- PS   03/25/25 1500 -- 82 93 % 18 176/90 No Pain -- PS   03/25/25 1400 -- 80 93 % 18 167/87 -- -- JK   03/25/25 1330 -- 87  87 % 20 190/91 -- -- JK   03/25/25 1252 -- 79 91 % 17 178/84 No Pain -- PS   03/25/25 1000 -- 83 92 % 18 176/89 -- -- PS   03/25/25 0949 97.4 °F (36.3 °C) 85 93 % 18 181/92 -- -- MV            Physical Exam    Results Reviewed       Procedure Component Value Units Date/Time    Fingerstick Glucose (POCT) [[id number]]  (Normal) Collected: 03/25/25 1607    Lab Status: Final result Specimen: Blood Updated: 03/25/25 1608     POC Glucose 133 mg/dl     Protime-INR [[id number]]  (Abnormal) Collected: 03/25/25 1041    Lab Status: Final result Specimen: Blood from Arm, Right Updated: 03/25/25 1117     Protime 12.1 seconds      INR 0.87    Narrative:      INR Therapeutic Range    Indication                                             INR Range      Atrial Fibrillation                                               2.0-3.0  Hypercoagulable State                                    2.0.2.3  Left Ventricular Asist Device                            2.0-3.0  Mechanical Heart Valve                                  -    Aortic(with afib, MI, embolism, HF, LA enlargement,    and/or coagulopathy)                                     2.0-3.0 (2.5-3.5)     Mitral                                                             2.5-3.5  Prosthetic/Bioprosthetic  Heart Valve               2.0-3.0  Venous thromboembolism (VTE: VT, PE        2.0-3.0    APTT [[id number]]  (Normal) Collected: 03/25/25 1041    Lab Status: Final result Specimen: Blood from Arm, Right Updated: 03/25/25 1117     PTT 25 seconds     Basic metabolic panel [[id number]]  (Abnormal) Collected: 03/25/25 1041    Lab Status: Final result Specimen: Blood from Arm, Right Updated: 03/25/25 1108     Sodium 139 mmol/L      Potassium 4.5 mmol/L      Chloride 106 mmol/L      CO2 25 mmol/L      ANION GAP 8 mmol/L      BUN 29 mg/dL      Creatinine 1.22 mg/dL      Glucose 190 mg/dL      Calcium 9.7 mg/dL      eGFR 58 ml/min/1.73sq m     Narrative:      [employer] guidelines for Chronic Kidney Disease (CKD):     Stage 1 with normal or high GFR (GFR > 90 mL/min/1.73 square meters)    Stage 2 Mild CKD (GFR = 60-89 mL/min/1.73 square meters)    Stage 3A Moderate CKD (GFR = 45-59 mL/min/1.73 square meters)    Stage 3B Moderate CKD (GFR = 30-44 mL/min/1.73 square meters)    Stage 4 Severe CKD (GFR = 15-29 mL/min/1.73 square meters)    Stage 5 End Stage CKD (GFR <15 mL/min/1.73 square meters)  Note: GFR calculation is accurate only with a steady state creatinine    CBC and differential [[id number]]  (Abnormal) Collected: 03/25/25 1041    Lab Status: Final result Specimen: Blood from Arm, Right Updated: 03/25/25 1051     WBC 11.64 Thousand/uL      RBC 5.25 Million/uL      Hemoglobin 15.8 g/dL      Hematocrit 47.2 %      MCV 90 fL      MCH 30.1 pg      MCHC 33.5 g/dL      RDW 12.9 %      MPV 11.1 fL      Platelets 204 Thousands/uL      nRBC 0 /100 WBCs      Segmented % 74 %      Immature Grans % 0 %      Lymphocytes % 15 %      Monocytes % 7 %      Eosinophils Relative 3 %      Basophils Relative 1 %      Absolute Neutrophils 8.73 Thousands/µL      Absolute Immature Grans 0.05 Thousand/uL      Absolute Lymphocytes 1.69 Thousands/µL      Absolute Monocytes 0.79 Thousand/µL      Eosinophils Absolute 0.32  "Thousand/µL      Basophils Absolute 0.06 Thousands/µL     Fingerstick Glucose (POCT) [[id number]]  (Abnormal) Collected: 03/25/25 1040    Lab Status: Final result Specimen: Blood Updated: 03/25/25 1040     POC Glucose 172 mg/dl             CTA head and neck with and without contrast   Final Interpretation by Jose Miguel Noel DO (03/25 1257)      CT Brain:  No acute intracranial abnormality. Mild chronic microangiopathic change      CT Angiography: Atherosclerotic disease involving the V4 segments of both vertebral arteries. The left demonstrates mild narrowing while the right demonstrates a severe stenosis with short segment occlusion and reconstitution, possibly via retrograde    flow.      Other findings: Cervical spondylitic degenerative change with mild canal stenosis and moderate foraminal narrowing.                  Workstation performed: [id number]         MRI brain wo contrast    (Results Pending)       Procedures    ED Medication and Procedure Management   Prior to Admission Medications   Prescriptions Last Dose Informant Patient Reported? Taking?   Continuous Glucose Sensor (FreeStyle Eddie 3 Sensor) MISC   No No   Sig: Use 1 each every 14 (fourteen) days   Empagliflozin (Jardiance) 25 MG TABS   No No   Sig: Take 1 tablet (25 mg total) by mouth every morning   Insulin Glargine Solostar (Lantus SoloStar) 100 UNIT/ML SOPN   No No   Sig: Inject 0.4 mL (40 Units total) as directed daily at bedtime   Insulin Pen Needle (B-D UF III MINI PEN NEEDLES) 31G X 5 MM MISC   No No   Sig: Use daily   Insulin Syringe-Needle U-100 (BD Insulin Syringe U/F) 31G X 5/16\" 1 ML MISC   No No   Sig: Use 4 (four) times a day   albuterol (ProAir HFA) 90 mcg/act inhaler   No No   Sig: Inhale 2 puffs every 6 (six) hours as needed for wheezing or shortness of breath   amLODIPine (NORVASC) 10 mg tablet   No No   Sig: Take 1 tablet (10 mg total) by mouth daily Use as directed   aspirin 81 mg chewable tablet  Self No No   Sig: " Chew 1 tablet (81 mg total) daily   Patient not taking: Reported on 1/3/2025   fluorouracil (EFUDEX) 5 % cream   Yes No   glimepiride (AMARYL) 2 mg tablet   No No   Sig: TAKE 2 TABLETS (4 MG TOTAL) BY MOUTH 2 (TWO) TIMES A DAY WITH MEALS   glucose blood (FreeStyle [employer] Test) test strip  Self No No   Sig: Use 1 each 2 (two) times a day   meclizine (ANTIVERT) 12.5 MG tablet  Self No No   Sig: Take 1 tablet (12.5 mg total) by mouth 3 (three) times a day as needed for dizziness   metFORMIN (GLUCOPHAGE-XR) 500 mg 24 hr tablet   No No   Sig: Take 2 tablets (1,000 mg total) by mouth 2 (two) times a day   rosuvastatin (CRESTOR) 5 mg tablet   No No   Sig: Take 1 tablet (5 mg total) by mouth daily   valsartan (DIOVAN) 320 MG tablet   No No   Sig: Take 1 tablet (320 mg total) by mouth daily   vitamin B-12 (CYANOCOBALAMIN) 250 MCG TABS   No No   Sig: Take 1 tablet (250 mcg total) by mouth daily      Facility-Administered Medications: None     Patient's Medications   Discharge Prescriptions    No medications on file     No discharge procedures on file.  ED SEPSIS DOCUMENTATION   Time reflects when diagnosis was documented in both MDM as applicable and the Disposition within this note       Time User Action Codes Description Comment    3/25/2025 12:22 PM Erin Hussein Add [R29.90] Stroke-like symptoms     3/25/2025  2:13 PM Erin Hussein Add [R53.1] Left-sided weakness                  Erin Hussein MD  03/25/25 7980

## 2025-03-25 NOTE — PROGRESS NOTES
"    INTERNAL MEDICINE RESIDENCY SENIOR ADMISSION NOTE     Name: Jose Maria Bethea   Age & Sex: 74 y.o. male   MRN: [id number]  Unit/Bed#: ED 03   Encounter: [id number]  Primary Care Provider: Puneet Charles MD    Admit to team: SOD Team B    Patient seen and examined. Reviewed H&P per Dr. Mulligan . Agree with the assessment and plan with any exception/addition as noted below:    74-year-old male w PMHx HTN, HLD, vertigo and IDDM, presenting to [facility] ED on 3/25 endorsing lightheadedness, dizziness, ataxia, and left-sided numbness and weakness for the past two days. He had a similar episode four years ago and previously experienced vertigo about once a month. The most recent episode began Sunday night and usually takes a few days to resolve. He remained in bed through Monday. On Monday night, his dizziness worsened, and he experienced staggering and an inability to walk properly, which prompted a visit to the ED. He also noticed numbness in his left arm and leg Monday night. In ED, VS s/f elevated BP, -190, and mild hypoxia of 91-93% on RA. Labs s/f leukocytosis WBC 11.64, BUN 29. On physical exam, pt is AAOx3, alert, talkative and has mild L sided facial droop. LLE 4/5 @ hip, 3/5 at and beneath knee, R side and LUE 5/5. Denies nausea, vomiting, blurry vision, palpitations, chest pain, SOB, numbness and tingling. Notably, patient stopped taking his aspirin roughly one year ago because, he \"did not need it anymore.\"     CTA Head and neck w wo contrast demonstrated atherosclerotic disease involving V4 segment of both vertebral arteries mild on the right and severe stenosis on the left, but no acute intracranial abnormalities.     Patient will be admitted to internal medicine service with neurology following along as a consult.     Plan:  Neurology following, appreciate recommendations  F/U MRI Brain  F/U TTE  Restart Aspirin 81 mg daily  Start Atorvastatin 80 mg daily  Monitor on Telemetry  PT/OT  BP management and HTN " med compliance discussed  Fall precautions  Rest of care per H&P    Code Status: Level 1 - Full Code  Admission Status: INPATIENT   Disposition: Patient requires Med/Surg with Telemetry

## 2025-03-25 NOTE — ASSESSMENT & PLAN NOTE
Lab Results   Component Value Date    HGBA1C 8.8 (A) 01/03/2025       Recent Labs     03/25/25  1040 03/25/25  1607   POCGLU 172* 133       Blood Sugar Average: Last 72 hrs:  (P) 152.5

## 2025-03-25 NOTE — ASSESSMENT & PLAN NOTE
>>ASSESSMENT AND PLAN FOR TYPE 2 DIABETES MELLITUS WITH HYPERGLYCEMIA, WITH LONG-TERM CURRENT USE OF INSULIN (HCC) WRITTEN ON 3/25/2025  4:49 PM BY LEXX MIGUEL DO    Lab Results   Component Value Date    HGBA1C 8.8 (A) 01/03/2025       Recent Labs     03/25/25  1040 03/25/25  1607   POCGLU 172* 133       Blood Sugar Average: Last 72 hrs:  (P) 152.5

## 2025-03-25 NOTE — ED NOTES
Per Dr. Casper, retake BP in another 30 minutes to determine if labetalol is needed.      Dina Caal RN  03/25/25 1925

## 2025-03-25 NOTE — SPEECH THERAPY NOTE
Speech-Language Pathology Bedside Swallow Evaluation      Patient Name: Jose Maria Bethea    Today's Date: 3/25/2025     Problem List  Principal Problem:    Stroke-like symptoms  Active Problems:    Type 2 diabetes mellitus with hyperglycemia, with long-term current use of insulin (HCC)    Essential hypertension    Mixed hyperlipidemia    DEWEY (obstructive sleep apnea)    Chronic kidney disease, stage 3a (HCC)      Past Medical History  Past Medical History:   Diagnosis Date    COVID-19 07/2022    Diabetes mellitus (HCC)     Type 2    Dyslipidemia     Hypertension     Obstructive sleep apnea on CPAP        Past Surgical History  Past Surgical History:   Procedure Laterality Date    COLONOSCOPY  2008       Summary   Pt presented with functional appearing oral and pharyngeal stage swallowing skills with materials administered today. The patient is assessed with puree and regular solids with thin liquids. Bite strength is adequate, with timely mastication. Intermittent cough observed with thin liquids. Wife is at bedside who reports some coughing at home while eating and drinking. Patient also experiences vomiting at home. Recommend GI evaluation as OP to further assess.     Patient admitted on stroke pathway. He had episode of slurred speech, but this has resolved. Language and cognitive skills appear adequate.    Risk/s for Aspiration: low     Recommended Diet: regular diet and thin liquids   Recommended Form of Meds: whole with liquid   Aspiration precautions and swallowing strategies: upright posture and small bites/sips  Other Recommendations: Continue frequent oral care    Current Medical Status  Patient is a 74-year-old male with a past medical history of type 2 diabetes insulin-dependent, hypertension, hyperlipidemia, CKD stage IIIa, asthma, peripheral vertigo who presents to the ED for 2-day history of stroke-like symptoms.  Patient reports that starting on Sunday night, he had acute onset of lightheadedness and  dizziness described as feeling unsteady on his feet and on ambulation, initially relieved on supine position.  He reports this was associated with prodrome of increased anxiety and subjective palpitations. He reports the sensation of dizziness is separate from his pre-existing vertigo and says with this dizziness he had acute onset of left-sided weakness of the left upper and left lower extremity that affected his ambulation.  He reports symptoms have not improved or changed since Sunday night but starting yesterday, was noted to have increasingly dysarthric speech with paresthesia of the left upper and left lower extremity noticed by his partner prompting him to come to the ED for further evaluation today.  During this timeframe, he otherwise denies any syncope, traumatic falls, fevers, chills, chest pain, shortness of breath, abdominal pain, urinary changes or bowel movement changes.  He denies any longstanding tobacco use or alcohol use, he reports compliance with his medications except for aspirin for which he self discontinued feeling he did not need to take it anymore.   On arrival to the emergency department, patient was noted to be febrile and hemodynamically stable and hypertensive to systolic range 180-190 diastolic range 80-90 mm Hg. Initial lab work revealed mild BUN elevation of 29, mild hyperglycemia on 190 and mild leukocytosis 11.64.  CTA head and neck on arrival shows mild chronic microangiopathic changes of the head with atherosclerotic disease of the bilateral vertebral arteries.  Patient will be admitted to [facility] medicine service for further evaluation of possible CVA versus TIA.  Patient is level 1 full code.    Current Precautions:  Fall  Aspiration    Allergies:  No known food allergies  Past medical history:  Please see H&P for details    Social/Education/Vocational Hx:  Pt lives with family    Swallow Information   Current Risks for Dysphagia & Aspiration:  r/o CVA  Current  Symptoms/Concerns: coughing with po  Current Diet: NPO   Baseline Diet: regular diet and thin liquids    Baseline Assessment   Behavior/Cognition: alert  Speech/Language Status: able to participate in conversation and able to follow commands  Patient Positioning: upright in bed  Pain Status/Interventions/Response to Interventions: No report of or nonverbal indications of pain.     Swallow Mechanism Exam  Facial: left facial droop  Labial: WFL  Lingual: WFL  Velum: unable to visualize  Mandible: adequate ROM  Dentition:  has dentures (not in for evaluation)  Vocal quality:clear/adequate   Respiratory Status: on 2 L O2      Consistencies Assessed and Performance   Consistencies Administered: thin liquids, puree, and hard solids  Materials administered included applesauce and sandwich with water    Oral Stage: WFL  Mastication was adequate with the materials administered today.  Bolus formation and transfer were functional with no significant oral residue noted.  No overt s/s reduced oral control.    Pharyngeal Stage: WFL  Swallow Mechanics:  Swallowing initiation appeared prompt.  Laryngeal rise was palpated and judged to be within functional limits.  Intermittent coughing observed during evaluation.    Esophageal Concerns: none reported    Summary and Recommendations (see above)    Results Reviewed with: patient, RN, and family     Treatment Recommended: dysphagia therapy      Frequency of treatment: 1-2 f/u sessions     Patient Stated Goal: none stated     Dysphagia LTG  -Patient will demonstrate safe and effective oral intake (without overt s/s significant oral/pharyngeal dysphagia including s/s penetration or aspiration) for the highest appropriate diet level.     Short Term Goals:  -Pt will tolerate regular solids and thin liquid with no significant s/s oral or pharyngeal dysphagia across 1-3 diagnostic session/s    Speech Therapy Prognosis   Prognosis: good    Prognosis Considerations: medical status

## 2025-03-25 NOTE — ASSESSMENT & PLAN NOTE
Patient reporting 2-day history of possible strokelike symptoms, most predominantly to be consistent with left lower extremity weakness and paresthesia previously noted right-sided facial droop, though paresthesia has since resolved since admission  Noted to have prior remote CVA history of unknown chronicity with chronic left centrum lacunar ischemic injury seen on MRI brain 2021 which may have contributory residual deficits  Initial imaging with CTA head and neck is unrevealing for acute etiology, though patient will require further imaging with MRI brain  Etiologies at this time include ischemic infarct versus hemorrhagic injury along the right frontal cerebrum versus lateral medulla  Other differential to include this time is patient endorsing separate lightheadedness and dizziness from past vertigo, which can be contributing to overall lower extremity weakness in the setting of presyncopal features consistent with orthostatic hypotension    Plan  Resume aspirin 81 mg  Continue Lipitor 80 mg  Restratification with A1c and lipid panel  TTE for further evaluation with bubble study  Monitor on telemetry during admission  Would recommend ZIO on discharge given patient endorses subjective palpitations prior to lightheadedness and dizziness  Would recommend orthostatic vitals during admission  Neurology following appreciate recommendations

## 2025-03-25 NOTE — CONSULTS
Consultation - Neurology   Name: Jose Maria Bethea 74 y.o. male I MRN: [id number]  Unit/Bed#: ED 03 I Date of Admission: 3/25/2025   Date of Service: 3/25/2025 I Hospital Day: 0   Inpatient consult to Neurology  Consult performed by: Forrest Samayoa DO  Consult ordered by: Gerson Lacey MD        Physician Requesting Evaluation: Puneet Charles MD   Reason for Evaluation / Principal Problem: Stroke-Like Symptoms     Assessment & Plan  Stroke-like symptoms  Jose Maria Bethea is a 74-year-old male with a past medical history of diabetes mellitus (DM), hyperlipidemia (HLD), and hypertension (HTN), CKD 3a, DEWEY presenting to the emergency department for evaluation of lightheadedness, dizziness, ataxia, and left-sided numbness and weakness for the past two days.  Initial BP Blood Pressure: (!) 181/92, FSBG POC Glucose: (!) 172. NC CTH unremarkable and CTA H/N with no LVO or aneurysm, continues to show severe stenosis with focal occlusion at R V4 segment with reconstitution. As a result of > 4.5 hours from time of symptom onset patient was not determined to be a candidate for thrombolysis (TNK). Given absence of IR target pt was deemed not a candidate for mechanical thrombectomy.   - Home Antiplatelet /Anticoagulants PTA: Aspirin 81 mg QD  - Stroke risk factors: HTN, DM, HLD, and DEWEY  - Prior Stroke Hx: yes: lacunar left centrum semiovale  - Past Neurological Hx: stroke - incidentally found    Workup:  - CTH: No acute intracranial abnormality. Mild chronic microangiopathic change  - CTA: Atherosclerotic disease involving the V4 segments of both vertebral arteries. The left demonstrates mild narrowing while the right demonstrates a severe stenosis with short segment occlusion and reconstitution, possibly via retrograde   flow.  Other findings: Cervical spondylitic degenerative change with mild canal stenosis and moderate foraminal narrowing  - MRI: pending  - PALAK: pending  - Labs: Hemoglobin A1c 8.8 (1/3/2025), LDL 65  (10/30/2024)    Impression: 74 y.o. male with history of  diabetes mellitus (DM), hyperlipidemia (HLD), and hypertension (HTN), CKD 3a, DEWEY presenting to the emergency department for evaluation of lightheadedness, dizziness, ataxia, and left-sided numbness and weakness for the past two days.  Admit for stroke w/u.     Plan: Discussed plan with neurology attending, Dr. Cruz  Admit along the stroke pathway with telemetry monitoring  Frequent neuro checks per protocol. If there is any acute changes in exam, please obtain stat CT head and notify neurology team  BP Goals: Normotension of <130/80  Antiplatelet agents: ASA 81 mg and Plavix 75 mg qd  Anticoagulation: None  Statin: Start Atorvastatin 40 mg daily  PTA Crestor 5 mg  Labs: Hgb A1c, LDL   Brain Imaging: Obtain MRI brain without contrast  TTE, pend  Euthermic/Euglycemic  DVT PPx:  per primary , SCDs  PT/OT/ST/PMR evaluations when able  Stroke education and counseling  Rest of other care per primary team appreciated    Disposition: PT Recommendations -        Type 2 diabetes mellitus with hyperglycemia, with long-term current use of insulin (HCC)  Lab Results   Component Value Date    HGBA1C 8.8 (A) 01/03/2025       Recent Labs     03/25/25  1040 03/25/25  1607   POCGLU 172* 133       Blood Sugar Average: Last 72 hrs:  (P) 152.5    Essential hypertension    Mixed hyperlipidemia    DEWEY (obstructive sleep apnea)    Chronic kidney disease, stage 3a (HCC)  Lab Results   Component Value Date    EGFR 58 03/25/2025    EGFR 55 10/30/2024    EGFR 73 09/09/2023    CREATININE 1.22 03/25/2025    CREATININE 1.28 10/30/2024    CREATININE 1.02 09/09/2023     I have discussed with Dr. Cruz the above plan to treat pt. He agrees with the plan.    Recommendations for outpatient neurological follow up have yet to be determined.    History of Present Illness   Jose Maria Bethea is a 74-year-old male with a past medical history of diabetes mellitus (DM), hyperlipidemia (HLD), and  hypertension (HTN), presenting to the emergency department for evaluation of lightheadedness, dizziness, ataxia, and left-sided numbness and weakness for the past two days. He reports that on Sunday evening, he experienced vertigo, which he had also experienced two weeks prior in the context of an upper respiratory illness. At that time, he underwent a chest X-ray which did not show pneumonia, though he reported feeling weak and wheezing for about a week. On Sunday, he again felt weak, dizzy, unwell, and spent much of the day sleeping. He notes that when he feels tired, weak, and dizzy, he tends to stagger while walking. He had a similar episode four years ago and previously experienced vertigo about once a month. The most recent episode began Sunday night and usually takes a few days to resolve. He remained in bed through Monday. On Monday night, his dizziness worsened, and he experienced staggering and an inability to walk properly, which prompted a visit to the ED. He also noticed numbness in his left arm and leg Monday night. His wife observed slurred speech during the episode, though it has since returned to normal.    He denies vision problems, severe headaches, chest pain, shortness of breath, abdominal pain, nausea, vomiting, or recent surgeries. He has not had any alcohol or tobacco use. He has experienced vomiting up to six times a day in the past and was previously told he has calcification in the inner ear. He reports no changes in his medication regimen and takes all medications regularly. On the night of symptom worsening, his blood pressure was 188/95, but there were no medication changes made. He denies any recent changes in memory or concentration, though he has noticed some mild short-term memory loss. Family history is significant for hypertension.    Review of Systems   Constitutional:  Negative for chills and fever.   Respiratory:  Negative for chest tightness and shortness of breath.     Cardiovascular:  Negative for chest pain and palpitations.   Gastrointestinal:  Negative for abdominal pain, constipation, diarrhea, nausea and vomiting.      negative  Medical History Review: I have reviewed the patient's PMH, PSH, Social History, Family History, Meds, and Allergies     Objective :  Temp:  [97.4 °F (36.3 °C)] 97.4 °F (36.3 °C)  HR:  [79-87] 83  BP: (167-190)/(84-92) 179/86  Resp:  [17-20] 18  SpO2:  [87 %-93 %] 93 %  O2 Device: None (Room air)  Nasal Cannula O2 Flow Rate (L/min):  [2 L/min] 2 L/min    Physical Exam  Vitals reviewed.   Eyes:      General: Lids are normal.      Extraocular Movements: Extraocular movements intact.      Pupils: Pupils are equal, round, and reactive to light.   Neurological:      Coordination: Coordination is intact.      Deep Tendon Reflexes: Reflexes are normal and symmetric.   Psychiatric:         Speech: Speech normal.     Neurological Exam  Mental Status  Awake, alert and oriented to person, place and time. Oriented to person, place and time. Recent and remote memory are intact. Speech is normal. Language is fluent with no aphasia. Attention and concentration are normal. Fund of knowledge is appropriate for level of education. Apraxia absent.    Cranial Nerves  CN II: Visual acuity is normal. Visual fields full to confrontation.  CN III, IV, VI: Extraocular movements intact bilaterally. Normal lids and orbits bilaterally. Pupils equal round and reactive to light bilaterally.  CN V: Facial sensation is normal.  CN VII: Full and symmetric facial movement.  CN VIII: Hearing is normal.  CN IX, X: Palate elevates symmetrically  CN XI: Shoulder shrug strength is normal.  CN XII: Tongue midline without atrophy or fasciculations.    Motor  Normal muscle bulk throughout. No fasciculations present. Normal muscle tone. No abnormal involuntary movements. Strength is 5/5 in all four extremities except as noted.  Slight left side weakness in the wrist and ankle extension,  compared to the right side. .    Sensory  Light touch abnormality: Pinprick abnormality: Vibration abnormality: Proprioception is normal in upper and lower extremities.   Decreased sensation left side by 50% from roses to a few inches above the ankles   Decreased sensation by 25% on the left fingers to wrist  .    Reflexes  Deep tendon reflexes are 2+ and symmetric in all four extremities.    Right pathological reflexes: Demetri's absent. Ankle clonus absent.  Left pathological reflexes: Demetri's absent. Ankle clonus absent.    Coordination    Finger-to-nose, rapid alternating movements and heel-to-shin normal bilaterally without dysmetria.    Gait    Gait was not assessed the patient was in the bed. .        Lab Results: I have reviewed the following results:I have personally reviewed pertinent reports.    Recent Labs     03/25/25  1041   WBC 11.64*   HGB 15.8   HCT 47.2      SODIUM 139   K 4.5      CO2 25   BUN 29*   CREATININE 1.22   GLUC 190*     Imaging Results Review: I personally reviewed the following image studies/reports in PACS and discussed pertinent findings with Radiology: CT head. My interpretation of the radiology images/reports is: as above.  Other Study Results Review: No additional pertinent studies reviewed.    VTE Prophylaxis: VTE covered by:  heparin (porcine), Subcutaneous, 5,000 Units at 03/25/25 [id number]

## 2025-03-25 NOTE — ASSESSMENT & PLAN NOTE
Lab Results   Component Value Date    HGBA1C 8.8 (A) 01/03/2025       Recent Labs     03/25/25  1040   POCGLU 172*       Blood Sugar Average: Last 72 hrs:  (P) 172  Patient reports compliance with his home diabetic regimen  Currently maintained on metformin 1000 mg twice daily, Jardiance 25 mg once daily, Amaryl 4 mg twice daily  Currently on insulin regimen with Lantus 40 units nightly    Plan  Hold oral glycemic agents during this admission  Will initiate basal bolus regimen with Lantus 32 units nightly, lispro 4 units 3 times daily with insulin sliding scale

## 2025-03-25 NOTE — ASSESSMENT & PLAN NOTE
74 y.o. male with pertinent PMH of ***. Patient presented on 3/25/25 at 0944 for sxs concerning for stroke consisting of ***. LKW ***. Initial NIHSS *** (***). Initial BP Blood Pressure: (!) 181/92, FSBG POC Glucose: (!) 172. NC CTH *** and CTA H/N ***. As a result of  patient was not determined to be a candidate for thrombolysis (TNK). Given absence of IR target pt was deemed not a candidate for mechanical thrombectomy.   - Home Antiplatelet /Anticoagulants PTA:   - Stroke risk factors:   - Prior Stroke Hx:   - Past Neurological Hx:     Workup:  - CTH: No acute intracranial abnormality. Mild chronic microangiopathic change   - CTA: Atherosclerotic disease involving the V4 segments of both vertebral arteries. The left demonstrates mild narrowing while the right demonstrates a severe stenosis with short segment occlusion and reconstitution, possibly via retrograde   flow.  - MRI: ***  - PALAK: ***  - Labs: Hemoglobin A1c 8.8 (1/3/2025), LDL 65 (10/30/2024)    Impression: 74 y.o. male with history of  *** currently on  who presented with .     Plan: Discussed plan with neurology attending,  ***  Admit along the stroke pathway with telemetry monitoring  Strict NPO prior to dysphagia screen   Frequent neuro checks per protocol. If there is any acute changes in exam, please obtain stat CT head and notify neurology team  BP Goals:   Antiplatelet agents:  {Load  mg, then 81 mg. Load Plavix 300  mg, then 75 mg. DAPT for 21 days- continue ASA 81mg indefinitely. }  Anticoagulation: *** due to ***  Statin:   Labs: Hgb A1c, LDL   Brain Imaging:   , pending  Euthermic/Euglycemic  DVT PPx: , SCDs  PT/OT/ST/PMR evaluations when able  Stroke education and counseling  Rest of other care per primary team appreciated    Disposition: PT Recommendations -

## 2025-03-25 NOTE — ASSESSMENT & PLAN NOTE
>>ASSESSMENT AND PLAN FOR TYPE 2 DIABETES MELLITUS WITH HYPERGLYCEMIA, WITH LONG-TERM CURRENT USE OF INSULIN (HCC) WRITTEN ON 3/25/2025  3:12 PM BY CAMILA MOSES MD    Lab Results   Component Value Date    HGBA1C 8.8 (A) 01/03/2025       Recent Labs     03/25/25  1040   POCGLU 172*       Blood Sugar Average: Last 72 hrs:  (P) 172  Patient reports compliance with his home diabetic regimen  Currently maintained on metformin 1000 mg twice daily, Jardiance 25 mg once daily, Amaryl 4 mg twice daily  Currently on insulin regimen with Lantus 40 units nightly    Plan  Hold oral glycemic agents during this admission  Will initiate basal bolus regimen with Lantus 32 units nightly, lispro 4 units 3 times daily with insulin sliding scale

## 2025-03-25 NOTE — H&P
H&P - Internal Medicine   Name: Jose Maria Bethea 74 y.o. male I MRN: [id number]  Unit/Bed#: ED 03 I Date of Admission: 3/25/2025   Date of Service: 3/25/2025 I Hospital Day: 0     Assessment & Plan  Stroke-like symptoms  Patient reporting 2-day history of possible strokelike symptoms, most predominantly to be consistent with left lower extremity weakness and paresthesia previously noted right-sided facial droop, though paresthesia has since resolved since admission  Noted to have prior remote CVA history of unknown chronicity with chronic left centrum lacunar ischemic injury seen on MRI brain 2021 which may have contributory residual deficits  Initial imaging with CTA head and neck is unrevealing for acute etiology, though patient will require further imaging with MRI brain  Etiologies at this time include ischemic infarct versus hemorrhagic injury along the right frontal cerebrum versus lateral medulla  Other differential to include this time is patient endorsing separate lightheadedness and dizziness from past vertigo, which can be contributing to overall lower extremity weakness in the setting of presyncopal features consistent with orthostatic hypotension    Plan  Resume aspirin 81 mg  Continue Lipitor 80 mg  Restratification with A1c and lipid panel  TTE for further evaluation with bubble study  Monitor on telemetry during admission  Would recommend ZIO on discharge given patient endorses subjective palpitations prior to lightheadedness and dizziness  Would recommend orthostatic vitals during admission  Neurology following appreciate recommendations  Type 2 diabetes mellitus with hyperglycemia, with long-term current use of insulin (HCC)  Lab Results   Component Value Date    HGBA1C 8.8 (A) 01/03/2025       Recent Labs     03/25/25  1040   POCGLU 172*       Blood Sugar Average: Last 72 hrs:  (P) 172  Patient reports compliance with his home diabetic regimen  Currently maintained on metformin 1000 mg twice daily,  Jardiance 25 mg once daily, Amaryl 4 mg twice daily  Currently on insulin regimen with Lantus 40 units nightly    Plan  Hold oral glycemic agents during this admission  Will initiate basal bolus regimen with Lantus 32 units nightly, lispro 4 units 3 times daily with insulin sliding scale  Essential hypertension  Currently using amlodipine 10 mg daily and valsartan 320 mg once daily at home  Will continue current regimen with amlodipine 10 mg in hospital formulary equivalent of losartan 100 mg once daily  Mixed hyperlipidemia  Maintained on Lipitor 80 mg once daily  Chronic kidney disease, stage 3a (HCC)  Lab Results   Component Value Date    EGFR 58 03/25/2025    EGFR 55 10/30/2024    EGFR 73 09/09/2023    CREATININE 1.22 03/25/2025    CREATININE 1.28 10/30/2024    CREATININE 1.02 09/09/2023   CKD stage IIIa with unknown etiology, though can consider in the setting of chronic hypertension versus insulin-dependent diabetes  Slight creatinine between 1.1-1.2  At this time is stable without evidence of any acute urinary symptoms    Plan  Continue to monitor trend with daily BMP  Avoid nephrotoxic medications  Avoid relative hypotension  DEWEY (obstructive sleep apnea)  Prior history of DEWEY on CPAP  Will utilize CPAP at night    Code Status: Level 1 - Full Code  Admission Status: INPATIENT   Disposition: Patient requires Med Surg with Telemetry    Admit to team: SOD TEAM B    History of Present Illness   Patient is a 74-year-old male with a past medical history of type 2 diabetes insulin-dependent, hypertension, hyperlipidemia, CKD stage IIIa, asthma, peripheral vertigo who presents to the ED for 2-day history of stroke-like symptoms.  Patient reports that starting on Sunday night, he had acute onset of lightheadedness and dizziness described as feeling unsteady on his feet and on ambulation, initially relieved on supine position.  He reports this was associated with prodrome of increased anxiety and subjective  palpitations. He reports the sensation of dizziness is separate from his pre-existing vertigo and says with this dizziness he had acute onset of left-sided weakness of the left upper and left lower extremity that affected his ambulation.  He reports symptoms have not improved or changed since Sunday night but starting yesterday, was noted to have increasingly dysarthric speech with paresthesia of the left upper and left lower extremity noticed by his partner prompting him to come to the ED for further evaluation today.  During this timeframe, he otherwise denies any syncope, traumatic falls, fevers, chills, chest pain, shortness of breath, abdominal pain, urinary changes or bowel movement changes.  He denies any longstanding tobacco use or alcohol use, he reports compliance with his medications except for aspirin for which he self discontinued feeling he did not need to take it anymore.    On arrival to the emergency department, patient was noted to be febrile and hemodynamically stable and hypertensive to systolic range 180-190 diastolic range 80-90 mm Hg. Initial lab work revealed mild BUN elevation of 29, mild hyperglycemia on 190 and mild leukocytosis 11.64.  CTA head and neck on arrival shows mild chronic microangiopathic changes of the head with atherosclerotic disease of the bilateral vertebral arteries.  Patient will be admitted to [facility] medicine service for further evaluation of possible CVA versus TIA.  Patient is level 1 full code.    Review of Systems   Constitutional:  Negative for chills and fever.   HENT:  Negative for ear pain and sore throat.    Eyes:  Negative for pain and visual disturbance.   Respiratory:  Negative for cough and shortness of breath.    Cardiovascular:  Negative for chest pain and palpitations.   Gastrointestinal:  Negative for abdominal pain and vomiting.   Genitourinary:  Negative for dysuria and hematuria.   Musculoskeletal:  Negative for arthralgias and back pain.   Skin:   Negative for color change and rash.   Neurological:  Positive for dizziness, facial asymmetry, weakness, light-headedness and numbness. Negative for seizures and syncope.   All other systems reviewed and are negative.    Medical History Review: I have reviewed the patient's PMH, PSH, Social History, Family History, Meds, and Allergies   Historical Information   Past Medical History:   Diagnosis Date    COVID-19 07/2022    Diabetes mellitus (HCC)     Type 2    Dyslipidemia     Hypertension     Obstructive sleep apnea on CPAP      Past Surgical History:   Procedure Laterality Date    COLONOSCOPY  2008     Social History     Tobacco Use    Smoking status: Former     Types: Cigarettes    Smokeless tobacco: Never    Tobacco comments:     1 year as a teenager   Vaping Use    Vaping status: Never Used   Substance and Sexual Activity    Alcohol use: Not Currently    Drug use: Not Currently    Sexual activity: Not Currently     Partners: Female     E-Cigarette/Vaping    E-Cigarette Use Never User      E-Cigarette/Vaping Substances       Social History     Tobacco Use    Smoking status: Former     Types: Cigarettes    Smokeless tobacco: Never    Tobacco comments:     1 year as a teenager   Vaping Use    Vaping status: Never Used   Substance and Sexual Activity    Alcohol use: Not Currently    Drug use: Not Currently    Sexual activity: Not Currently     Partners: Female       Current Facility-Administered Medications:     albuterol (PROVENTIL HFA,VENTOLIN HFA) inhaler 2 puff, Q6H PRN    amLODIPine (NORVASC) tablet 10 mg, Daily    aspirin chewable tablet 81 mg, Daily    atorvastatin (LIPITOR) tablet 80 mg, QPM    cyanocobalamin (VITAMIN B-12) tablet 250 mcg, Daily    heparin (porcine) subcutaneous injection 5,000 Units, Q8H JERRY **AND** Platelet count, Once    losartan (COZAAR) tablet 100 mg, Daily    meclizine (ANTIVERT) tablet 12.5 mg, TID PRN  Prior to Admission Medications   Prescriptions Last Dose Informant Patient  "Reported? Taking?   Continuous Glucose Sensor (FreeStyle Eddie 3 Sensor) MISC   No No   Sig: Use 1 each every 14 (fourteen) days   Empagliflozin (Jardiance) 25 MG TABS   No No   Sig: Take 1 tablet (25 mg total) by mouth every morning   Insulin Glargine Solostar (Lantus SoloStar) 100 UNIT/ML SOPN   No No   Sig: Inject 0.4 mL (40 Units total) as directed daily at bedtime   Insulin Pen Needle (B-D UF III MINI PEN NEEDLES) 31G X 5 MM MISC   No No   Sig: Use daily   Insulin Syringe-Needle U-100 (BD Insulin Syringe U/F) 31G X 5/16\" 1 ML MISC   No No   Sig: Use 4 (four) times a day   albuterol (ProAir HFA) 90 mcg/act inhaler   No No   Sig: Inhale 2 puffs every 6 (six) hours as needed for wheezing or shortness of breath   amLODIPine (NORVASC) 10 mg tablet   No No   Sig: Take 1 tablet (10 mg total) by mouth daily Use as directed   aspirin 81 mg chewable tablet  Self No No   Sig: Chew 1 tablet (81 mg total) daily   Patient not taking: Reported on 1/3/2025   fluorouracil (EFUDEX) 5 % cream   Yes No   glimepiride (AMARYL) 2 mg tablet   No No   Sig: TAKE 2 TABLETS (4 MG TOTAL) BY MOUTH 2 (TWO) TIMES A DAY WITH MEALS   glucose blood (FreeStyle [employer] Test) test strip  Self No No   Sig: Use 1 each 2 (two) times a day   meclizine (ANTIVERT) 12.5 MG tablet  Self No No   Sig: Take 1 tablet (12.5 mg total) by mouth 3 (three) times a day as needed for dizziness   metFORMIN (GLUCOPHAGE-XR) 500 mg 24 hr tablet   No No   Sig: Take 2 tablets (1,000 mg total) by mouth 2 (two) times a day   rosuvastatin (CRESTOR) 5 mg tablet   No No   Sig: Take 1 tablet (5 mg total) by mouth daily   valsartan (DIOVAN) 320 MG tablet   No No   Sig: Take 1 tablet (320 mg total) by mouth daily   vitamin B-12 (CYANOCOBALAMIN) 250 MCG TABS   No No   Sig: Take 1 tablet (250 mcg total) by mouth daily      Facility-Administered Medications: None     Patient has no known allergies.    Objective :  Temp:  [97.4 °F (36.3 °C)] 97.4 °F (36.3 °C)  HR:  [79-87] 80  BP: " (167-190)/(84-92) 167/87  Resp:  [17-20] 18  SpO2:  [87 %-93 %] 93 %  O2 Device: Nasal cannula  Nasal Cannula O2 Flow Rate (L/min):  [2 L/min] 2 L/min    Intake & Output:  I/O       None          Weights:        There is no height or weight on file to calculate BMI.  Weight (last 2 days)       None          Physical Exam  Vitals and nursing note reviewed.   Constitutional:       General: He is not in acute distress.     Appearance: He is well-developed.   HENT:      Head: Normocephalic and atraumatic.   Eyes:      Conjunctiva/sclera: Conjunctivae normal.   Cardiovascular:      Rate and Rhythm: Normal rate and regular rhythm.      Heart sounds: No murmur heard.  Pulmonary:      Effort: Pulmonary effort is normal. No respiratory distress.      Breath sounds: Normal breath sounds.   Abdominal:      Palpations: Abdomen is soft.      Tenderness: There is no abdominal tenderness.   Musculoskeletal:         General: No swelling.      Cervical back: Neck supple.   Skin:     General: Skin is warm and dry.      Capillary Refill: Capillary refill takes less than 2 seconds.   Neurological:      Mental Status: He is alert and oriented to person, place, and time.      GCS: GCS eye subscore is 4. GCS verbal subscore is 5. GCS motor subscore is 6.      Cranial Nerves: Facial asymmetry (Mild right-sided upper and lower facial weakness noted at baseline) present.      Sensory: Sensation is intact.      Motor: Weakness present.      Coordination: Coordination is intact. Heel to Ceja Test normal.      Comments: 3 out of 5 left lower extremity weakness on hip extension, flexion  3 out of 5 left lower extremity weakness on knee extension and flexion     Psychiatric:         Mood and Affect: Mood normal.           Lab Results: I have reviewed the following results:  Recent Labs     03/25/25  1041   WBC 11.64*   HGB 15.8   HCT 47.2      SODIUM 139   K 4.5      CO2 25   BUN 29*   CREATININE 1.22   GLUC 190*   PTT 25   INR 0.87  "    Micro:  No results found for: \"BLOODCX\", \"URINECX\", \"WOUNDCULT\", \"SPUTUMCULTUR\"          Currently Ordered Meds:   Current Facility-Administered Medications:     albuterol (PROVENTIL HFA,VENTOLIN HFA) inhaler 2 puff, Q6H PRN    amLODIPine (NORVASC) tablet 10 mg, Daily    aspirin chewable tablet 81 mg, Daily    atorvastatin (LIPITOR) tablet 80 mg, QPM    cyanocobalamin (VITAMIN B-12) tablet 250 mcg, Daily    heparin (porcine) subcutaneous injection 5,000 Units, Q8H JERRY **AND** Platelet count, Once    losartan (COZAAR) tablet 100 mg, Daily    meclizine (ANTIVERT) tablet 12.5 mg, TID PRN  VTE Pharmacologic Prophylaxis: VTE covered by:  heparin (porcine), Subcutaneous     VTE Mechanical Prophylaxis: sequential compression device    Administrative Statements   Portions of the record may have been created with voice recognition software.  Occasional wrong word or \"sound a like\" substitutions may have occurred due to the inherent limitations of voice recognition software.  Read the chart carefully and recognize, using context, where substitutions have occurred.  ==  Jacinto Mulligan MD  [facility]  Internal Medicine Residency PGY-1  "

## 2025-03-26 ENCOUNTER — APPOINTMENT (INPATIENT)
Dept: NON INVASIVE DIAGNOSTICS | Facility: HOSPITAL | Age: 75
DRG: 065 | End: 2025-03-26
Payer: COMMERCIAL

## 2025-03-26 ENCOUNTER — APPOINTMENT (INPATIENT)
Dept: RADIOLOGY | Facility: HOSPITAL | Age: 75
DRG: 065 | End: 2025-03-26
Payer: COMMERCIAL

## 2025-03-26 PROBLEM — I63.9 ACUTE ISCHEMIC STROKE (HCC): Status: ACTIVE | Noted: 2025-03-25

## 2025-03-26 LAB
ALBUMIN SERPL BCG-MCNC: 4.1 G/DL (ref 3.5–5)
ALP SERPL-CCNC: 68 U/L (ref 34–104)
ALT SERPL W P-5'-P-CCNC: 13 U/L (ref 7–52)
ANION GAP SERPL CALCULATED.3IONS-SCNC: 8 MMOL/L (ref 4–13)
AORTIC ROOT: 3.4 CM
ASCENDING AORTA: 3.4 CM
AST SERPL W P-5'-P-CCNC: 15 U/L (ref 13–39)
BASOPHILS # BLD AUTO: 0.05 THOUSANDS/ÂΜL (ref 0–0.1)
BASOPHILS NFR BLD AUTO: 1 % (ref 0–1)
BILIRUB SERPL-MCNC: 0.55 MG/DL (ref 0.2–1)
BSA FOR ECHO PROCEDURE: 1.8 M2
BUN SERPL-MCNC: 29 MG/DL (ref 5–25)
CALCIUM SERPL-MCNC: 9.4 MG/DL (ref 8.4–10.2)
CHLORIDE SERPL-SCNC: 108 MMOL/L (ref 96–108)
CHOLEST SERPL-MCNC: 113 MG/DL (ref ?–200)
CO2 SERPL-SCNC: 25 MMOL/L (ref 21–32)
CREAT SERPL-MCNC: 1.13 MG/DL (ref 0.6–1.3)
DME PARACHUTE DELIVERY DATE REQUESTED: NORMAL
DME PARACHUTE ITEM DESCRIPTION: NORMAL
DME PARACHUTE ORDER STATUS: NORMAL
DME PARACHUTE SUPPLIER NAME: NORMAL
DME PARACHUTE SUPPLIER PHONE: NORMAL
E WAVE DECELERATION TIME: 243 MS
E/A RATIO: 0.8
EOSINOPHIL # BLD AUTO: 0.6 THOUSAND/ÂΜL (ref 0–0.61)
EOSINOPHIL NFR BLD AUTO: 8 % (ref 0–6)
ERYTHROCYTE [DISTWIDTH] IN BLOOD BY AUTOMATED COUNT: 13.1 % (ref 11.6–15.1)
EST. AVERAGE GLUCOSE BLD GHB EST-MCNC: 212 MG/DL
FRACTIONAL SHORTENING: 30 (ref 28–44)
GFR SERPL CREATININE-BSD FRML MDRD: 64 ML/MIN/1.73SQ M
GLUCOSE SERPL-MCNC: 134 MG/DL (ref 65–140)
GLUCOSE SERPL-MCNC: 178 MG/DL (ref 65–140)
GLUCOSE SERPL-MCNC: 179 MG/DL (ref 65–140)
GLUCOSE SERPL-MCNC: 186 MG/DL (ref 65–140)
HBA1C MFR BLD: 9 %
HCT VFR BLD AUTO: 44.5 % (ref 36.5–49.3)
HDLC SERPL-MCNC: 44 MG/DL
HGB BLD-MCNC: 14.7 G/DL (ref 12–17)
IMM GRANULOCYTES # BLD AUTO: 0.02 THOUSAND/UL (ref 0–0.2)
IMM GRANULOCYTES NFR BLD AUTO: 0 % (ref 0–2)
INTERVENTRICULAR SEPTUM IN DIASTOLE (PARASTERNAL SHORT AXIS VIEW): 1.5 CM
INTERVENTRICULAR SEPTUM: 1.5 CM (ref 0.6–1.1)
LAAS-AP2: 16.1 CM2
LAAS-AP4: 15 CM2
LDLC SERPL CALC-MCNC: 44 MG/DL (ref 0–100)
LEFT ATRIUM SIZE: 3.5 CM
LEFT ATRIUM VOLUME (MOD BIPLANE): 42 ML
LEFT ATRIUM VOLUME INDEX (MOD BIPLANE): 23.3 ML/M2
LEFT INTERNAL DIMENSION IN SYSTOLE: 2.6 CM (ref 2.1–4)
LEFT VENTRICULAR INTERNAL DIMENSION IN DIASTOLE: 3.7 CM (ref 3.5–6)
LEFT VENTRICULAR POSTERIOR WALL IN END DIASTOLE: 1.2 CM
LEFT VENTRICULAR STROKE VOLUME: 33 ML
LV EF US.2D.A4C+ESTIMATED: 62 %
LVSV (TEICH): 33 ML
LYMPHOCYTES # BLD AUTO: 2.35 THOUSANDS/ÂΜL (ref 0.6–4.47)
LYMPHOCYTES NFR BLD AUTO: 30 % (ref 14–44)
MCH RBC QN AUTO: 29.7 PG (ref 26.8–34.3)
MCHC RBC AUTO-ENTMCNC: 33 G/DL (ref 31.4–37.4)
MCV RBC AUTO: 90 FL (ref 82–98)
MONOCYTES # BLD AUTO: 0.7 THOUSAND/ÂΜL (ref 0.17–1.22)
MONOCYTES NFR BLD AUTO: 9 % (ref 4–12)
MV E'TISSUE VEL-LAT: 10 CM/S
MV E'TISSUE VEL-SEP: 8 CM/S
MV PEAK A VEL: 0.84 M/S
MV PEAK E VEL: 67 CM/S
MV STENOSIS PRESSURE HALF TIME: 70 MS
MV VALVE AREA P 1/2 METHOD: 3.14
NEUTROPHILS # BLD AUTO: 4.1 THOUSANDS/ÂΜL (ref 1.85–7.62)
NEUTS SEG NFR BLD AUTO: 52 % (ref 43–75)
NRBC BLD AUTO-RTO: 0 /100 WBCS
PLATELET # BLD AUTO: 196 THOUSANDS/UL (ref 149–390)
PMV BLD AUTO: 11 FL (ref 8.9–12.7)
POTASSIUM SERPL-SCNC: 4.1 MMOL/L (ref 3.5–5.3)
PROT SERPL-MCNC: 7 G/DL (ref 6.4–8.4)
RA PRESSURE ESTIMATED: 3 MMHG
RBC # BLD AUTO: 4.95 MILLION/UL (ref 3.88–5.62)
RIGHT ATRIUM AREA SYSTOLE A4C: 13.2 CM2
RIGHT VENTRICLE ID DIMENSION: 3.9 CM
RV PSP: 18 MMHG
SL CV LEFT ATRIUM LENGTH A2C: 5.1 CM
SL CV LV EF: 65
SL CV PED ECHO LEFT VENTRICLE DIASTOLIC VOLUME (MOD BIPLANE) 2D: 57 ML
SL CV PED ECHO LEFT VENTRICLE SYSTOLIC VOLUME (MOD BIPLANE) 2D: 24 ML
SODIUM SERPL-SCNC: 141 MMOL/L (ref 135–147)
TR MAX PG: 15 MMHG
TR PEAK VELOCITY: 2 M/S
TRICUSPID ANNULAR PLANE SYSTOLIC EXCURSION: 2.4 CM
TRICUSPID VALVE PEAK REGURGITATION VELOCITY: 1.96 M/S
TRIGL SERPL-MCNC: 125 MG/DL (ref ?–150)
URATE SERPL-MCNC: 6.4 MG/DL (ref 3.5–8.5)
WBC # BLD AUTO: 7.82 THOUSAND/UL (ref 4.31–10.16)

## 2025-03-26 PROCEDURE — 97163 PT EVAL HIGH COMPLEX 45 MIN: CPT

## 2025-03-26 PROCEDURE — 82948 REAGENT STRIP/BLOOD GLUCOSE: CPT

## 2025-03-26 PROCEDURE — 83036 HEMOGLOBIN GLYCOSYLATED A1C: CPT

## 2025-03-26 PROCEDURE — 93306 TTE W/DOPPLER COMPLETE: CPT | Performed by: INTERNAL MEDICINE

## 2025-03-26 PROCEDURE — 99232 SBSQ HOSP IP/OBS MODERATE 35: CPT | Performed by: INTERNAL MEDICINE

## 2025-03-26 PROCEDURE — 85025 COMPLETE CBC W/AUTO DIFF WBC: CPT

## 2025-03-26 PROCEDURE — 99222 1ST HOSP IP/OBS MODERATE 55: CPT | Performed by: NURSE PRACTITIONER

## 2025-03-26 PROCEDURE — 97167 OT EVAL HIGH COMPLEX 60 MIN: CPT

## 2025-03-26 PROCEDURE — 70551 MRI BRAIN STEM W/O DYE: CPT

## 2025-03-26 PROCEDURE — 80061 LIPID PANEL: CPT

## 2025-03-26 PROCEDURE — 93306 TTE W/DOPPLER COMPLETE: CPT

## 2025-03-26 PROCEDURE — 84550 ASSAY OF BLOOD/URIC ACID: CPT

## 2025-03-26 PROCEDURE — 80053 COMPREHEN METABOLIC PANEL: CPT

## 2025-03-26 RX ORDER — HYDROCHLOROTHIAZIDE 12.5 MG/1
12.5 TABLET ORAL DAILY
Status: DISCONTINUED | OUTPATIENT
Start: 2025-03-26 | End: 2025-03-27 | Stop reason: HOSPADM

## 2025-03-26 RX ADMIN — INSULIN LISPRO 1 UNITS: 100 INJECTION, SOLUTION INTRAVENOUS; SUBCUTANEOUS at 09:15

## 2025-03-26 RX ADMIN — ATORVASTATIN CALCIUM 80 MG: 80 TABLET, FILM COATED ORAL at 17:58

## 2025-03-26 RX ADMIN — INSULIN LISPRO 1 UNITS: 100 INJECTION, SOLUTION INTRAVENOUS; SUBCUTANEOUS at 11:51

## 2025-03-26 RX ADMIN — AMLODIPINE BESYLATE 10 MG: 10 TABLET ORAL at 09:14

## 2025-03-26 RX ADMIN — INSULIN LISPRO 4 UNITS: 100 INJECTION, SOLUTION INTRAVENOUS; SUBCUTANEOUS at 17:58

## 2025-03-26 RX ADMIN — INSULIN LISPRO 1 UNITS: 100 INJECTION, SOLUTION INTRAVENOUS; SUBCUTANEOUS at 17:58

## 2025-03-26 RX ADMIN — INSULIN GLARGINE 16 UNITS: 100 INJECTION, SOLUTION SUBCUTANEOUS at 21:07

## 2025-03-26 RX ADMIN — HYDROCHLOROTHIAZIDE 12.5 MG: 12.5 TABLET ORAL at 11:51

## 2025-03-26 RX ADMIN — LABETALOL HYDROCHLORIDE 10 MG: 5 INJECTION, SOLUTION INTRAVENOUS at 05:37

## 2025-03-26 RX ADMIN — LOSARTAN POTASSIUM 100 MG: 50 TABLET, FILM COATED ORAL at 09:14

## 2025-03-26 RX ADMIN — CYANOCOBALAMIN TAB 500 MCG 250 MCG: 500 TAB at 09:14

## 2025-03-26 RX ADMIN — INSULIN LISPRO 4 UNITS: 100 INJECTION, SOLUTION INTRAVENOUS; SUBCUTANEOUS at 11:51

## 2025-03-26 RX ADMIN — CLOPIDOGREL BISULFATE 75 MG: 75 TABLET, FILM COATED ORAL at 09:14

## 2025-03-26 RX ADMIN — ASPIRIN 81 MG CHEWABLE TABLET 81 MG: 81 TABLET CHEWABLE at 09:14

## 2025-03-26 RX ADMIN — ENOXAPARIN SODIUM 40 MG: 40 INJECTION SUBCUTANEOUS at 21:07

## 2025-03-26 RX ADMIN — INSULIN LISPRO 4 UNITS: 100 INJECTION, SOLUTION INTRAVENOUS; SUBCUTANEOUS at 09:16

## 2025-03-26 NOTE — ASSESSMENT & PLAN NOTE
Patient reporting 2-day history of possible strokelike symptoms, most predominantly to be consistent with left lower extremity weakness and paresthesia previously noted right-sided facial droop, though paresthesia has since resolved since admission  Noted to have prior remote CVA history of unknown chronicity with chronic left centrum lacunar ischemic injury seen on MRI brain 2021 which may have contributory residual deficits  Initial imaging with CTA head and neck is unrevealing for acute etiology, though patient will require further imaging with MRI brain  Etiologies at this time include ischemic infarct versus hemorrhagic injury along the right frontal cerebrum versus lateral medulla  Other differential to include this time is patient endorsing separate lightheadedness and dizziness from past vertigo, which can be contributing to overall lower extremity weakness in the setting of presyncopal features consistent with orthostatic hypotension  MRI brain: new acute infarct in R ventral upper medulla. Unchanged chronic multifocal lacunar infarcts in L centrum ovale w/ mild/moderate chronic microangiopathy.   Patient not candidate for tPA or thrombectomy due to time of presentation, continue with medical management.   Lipids and Hgba1c: total cholesterol 113, TGs 125, HDL 44, LDL 44. Hgba1c 9.0.  Uric acid level pending to start thiazide diuretic for increased bp control    Plan  Neuro consulted  ASA, Plavix, Atorvastatin  Goal normotension <130/80  Continue amlodipine, losartan, add HCTZ and continue PRN labetolol  Echo pending  PT/OT consult  Diabetes control  Monitor on telemetry during admission

## 2025-03-26 NOTE — ASSESSMENT & PLAN NOTE
>>ASSESSMENT AND PLAN FOR TYPE 2 DIABETES MELLITUS WITH HYPERGLYCEMIA, WITH LONG-TERM CURRENT USE OF INSULIN (HCC) WRITTEN ON 3/26/2025 12:52 PM BY GAVIN PERAZA MD    Lab Results   Component Value Date    HGBA1C 9.0 (H) 03/26/2025       Recent Labs     03/25/25  1040 03/25/25  1607 03/26/25  0723 03/26/25  1124   POCGLU 172* 133 186* 179*       Blood Sugar Average: Last 72 hrs:  (P) 167.5  Patient reports compliance with his home diabetic regimen  Currently maintained on metformin 1000 mg twice daily, Jardiance 25 mg once daily, Amaryl 4 mg twice daily  Currently on insulin regimen with Lantus 40 units nightly    Plan  Lantus 16 units with SSI and lispro 4 units with meals

## 2025-03-26 NOTE — PLAN OF CARE
Problem: PHYSICAL THERAPY ADULT  Goal: Performs mobility at highest level of function for planned discharge setting.  See evaluation for individualized goals.  Description: Treatment/Interventions: Functional transfer training, LE strengthening/ROM, Elevations, Therapeutic exercise, Endurance training, Patient/family training, Equipment eval/education, Bed mobility, Gait training, Spoke to nursing, Spoke to case management, OT  Equipment Recommended: Walker       See flowsheet documentation for full assessment, interventions and recommendations.  Note: Prognosis: Good  Problem List: Decreased strength, Decreased endurance, Impaired balance, Decreased mobility, Impaired judgement, Decreased safety awareness  Assessment: Pt is a 74 y.o. male seen for PT evaluation on 3/26/2025 after being admitted to [facility] on 3/25/2025 for stroke-like symptoms including lightheadedness, dizziness, feeling unsteady on feet, left-sided weakness, dysarthria, paresthesias of L UE and L LE. Pt presenting s/p acute right ventral upper medulla infarct. Pt's current/active diagnoses include essential hypertension, mixed hyperlipidemia, DEWEY, chronic kidney disease, stage 3a. Pt  has a past medical history of COVID-19, Diabetes mellitus (HCC), Dyslipidemia, Hypertension, and Obstructive sleep apnea on CPAP. PT evaluation was ordered to assess the pt's functional mobility and discharge recommendations. Prior to admission, pt was independent with functional mobility without the use of an assistive device. Pt lives in a house with 1 THEODORE with significant other, who is able to assist, if needed but was not assisting pt PTA. Pt is of high complexity due to ongoing medical management, increased reliance on caregivers for support, functioning below baseline secondary to current medical presentation, telemetry monitoring, fall risk. At evaluation, pt performed bed mobility with supervision. Pt performed sit to stand/stand to sit  transfers with supervision. Pt ambulated with minAx1 with rolling walker for 50'x2.  Pt presents with the following PT impairments gait deviations, decreased endurance, decreased mobility, L LE weakness, fall risk, bed/chair alarm. The pt was left sitting out of bed in bedside chair with chair alarm engaged and all needs within reach including the call bell and the room phone. Based on the pt's functional performance during the PT evaluation, the discharge recommendation is level 3 resource intensity. Pt will continue to benefit from skilled IP PT services in order to address the functional impairments noted above in preparation for a safer discharge.        Rehab Resource Intensity Level, PT: III (Minimum Resource Intensity)    See flowsheet documentation for full assessment.

## 2025-03-26 NOTE — RESTORATIVE TECHNICIAN NOTE
Restorative Technician Note      Patient Name: Jose Maria Bethea     Restorative Tech Visit Date: 03/26/25  Note Type: Mobility  Patient Position Upon Consult: Standing  Activity Performed: Ambulated (To & from bathroom)  Assistive Device: Other (Comment) (no AD)  Patient Position at End of Consult: Supine; All needs within reach; Bed/Chair alarm activated    CHAVO Jean

## 2025-03-26 NOTE — DISCHARGE SUMMARY
Discharge Summary - Internal Medicine   Name: Jose Maria Bethea 74 y.o. male I MRN: [id number]  [facility] I Date of Admission: 3/25/2025   Date of Service: 3/26/2025 I Hospital Day: 1    Assessment & Plan  Acute ischemic stroke (HCC)  Patient reporting 2-day history of possible strokelike symptoms, most predominantly to be consistent with left lower extremity weakness and paresthesia previously noted right-sided facial droop, though paresthesia has since resolved since admission  Noted to have prior remote CVA history of unknown chronicity with chronic left centrum lacunar ischemic injury seen on MRI brain 2021 which may have contributory residual deficits  Initial imaging with CTA head and neck is unrevealing for acute etiology, though patient will require further imaging with MRI brain  Etiologies at this time include ischemic infarct versus hemorrhagic injury along the right frontal cerebrum versus lateral medulla  Other differential to include this time is patient endorsing separate lightheadedness and dizziness from past vertigo, which can be contributing to overall lower extremity weakness in the setting of presyncopal features consistent with orthostatic hypotension  MRI brain: new acute infarct in R ventral upper medulla. Unchanged chronic multifocal lacunar infarcts in L centrum ovale w/ mild/moderate chronic microangiopathy.   Patient not candidate for tPA or thrombectomy due to time of presentation, continue with medical management.   Lipids and Hgba1c: total cholesterol 113, TGs 125, HDL 44, LDL 44. Hgba1c 9.0.  Uric acid level pending to start thiazide diuretic for increased bp control    Plan  Neuro consulted  ASA, Plavix, for 21 days followed by Plavix monotherapy  Goal normotension <130/80  Continue amlodipine, losartan, HCTZ  Follow up with PCP for continued titration  PT/OT-Level 3 Resources  Monitor on telemetry during admission  Type 2 diabetes mellitus with hyperglycemia, with long-term  "current use of insulin ([facility])  Lab Results   Component Value Date    HGBA1C 9.0 (H) 03/26/2025       Recent Labs     03/25/25  1607 03/26/25  0723 03/26/25  1124 03/26/25  1641   POCGLU 133 186* 179* 178*       Blood Sugar Average: Last 72 hrs:  (P) 169.6  Patient reports compliance with his home diabetic regimen  Currently maintained on metformin 1000 mg twice daily, Jardiance 25 mg once daily, Amaryl 4 mg twice daily  Currently on insulin regimen with Lantus 40 units nightly    Plan  Will discharge patient back on home regimen  Essential hypertension  -Home regimen of amlodipine 10 mg daily and valsartan 320 mg once daily at home  Current regimen with amlodipine 10 mg in hospital formulary equivalent of losartan 100 mg once daily  Adjust BP medications as above for goal of normotension  Mixed hyperlipidemia  Maintained on Lipitor 80 mg once daily    Plan:  -Start atorvastatin 40mg daily  Chronic kidney disease, stage 3a ([facility])  Lab Results   Component Value Date    EGFR 64 03/26/2025    EGFR 58 03/25/2025    EGFR 55 10/30/2024    CREATININE 1.13 03/26/2025    CREATININE 1.22 03/25/2025    CREATININE 1.28 10/30/2024   CKD stage IIIa with unknown etiology, though can consider in the setting of chronic hypertension versus insulin-dependent diabetes  Creatine at baseline  DEWEY (obstructive sleep apnea)  Prior history of DEWEY on CPAP  Will utilize CPAP at night    Admission Date: 3/25/2025 0956  Discharge Date: 03/26/25  Admitting Diagnosis: Numbness [R20.0]  Left-sided weakness [R53.1]  Stroke-like symptoms [R29.90]  Discharge Diagnosis:   Medical Problems       Resolved Problems  Date Reviewed: 2/24/2025   None         HPI: Per admitting Dr. Mulligan \"74-year-old male with a past medical history of type 2 diabetes insulin-dependent, hypertension, hyperlipidemia, CKD stage IIIa, asthma, peripheral vertigo who presents to the ED for 2-day history of stroke-like symptoms.  Patient reports that starting on Sunday night, he had " "acute onset of lightheadedness and dizziness described as feeling unsteady on his feet and on ambulation, initially relieved on supine position.  He reports this was associated with prodrome of increased anxiety and subjective palpitations. He reports the sensation of dizziness is separate from his pre-existing vertigo and says with this dizziness he had acute onset of left-sided weakness of the left upper and left lower extremity that affected his ambulation.  He reports symptoms have not improved or changed since Sunday night but starting yesterday, was noted to have increasingly dysarthric speech with paresthesia of the left upper and left lower extremity noticed by his partner prompting him to come to the ED for further evaluation today.  During this timeframe, he otherwise denies any syncope, traumatic falls, fevers, chills, chest pain, shortness of breath, abdominal pain, urinary changes or bowel movement changes.  He denies any longstanding tobacco use or alcohol use, he reports compliance with his medications except for aspirin for which he self discontinued feeling he did not need to take it anymore.     On arrival to the emergency department, patient was noted to be febrile and hemodynamically stable and hypertensive to systolic range 180-190 diastolic range 80-90 mm Hg. Initial lab work revealed mild BUN elevation of 29, mild hyperglycemia on 190 and mild leukocytosis 11.64.  CTA head and neck on arrival shows mild chronic microangiopathic changes of the head with atherosclerotic disease of the bilateral vertebral arteries.  Patient will be admitted to [place] [facility] medicine service for further evaluation of possible CVA versus TIA.  Patient is level 1 full code.\"    Procedures Performed: No orders of the defined types were placed in this encounter.      Summary of Hospital Course:   Patient was admitted to the medicine service under the stroke pathway with neurology consulted. Patient underwent MRI which did " "show \"new acute infarct in right ventral upper medulla.\" Additionally patient had Echo performed which showed an EF of 65%. Patient was initated on dual antiplatelet therapy with the plan for 21 days of treatment followed by Plavix monotherapy. Patient will continue home statin and follow up with neurology on an outpatient basis. Additionally patient was initated on hydrochlorathiazide due to elevated blood pressure during admission. This will be sent to his pharmacy on discharge and his PCP can continue to titrate the medication as appropriate. On day of discharge, patient had no complaints. PT/OT recommended Level 3 Resources.     Significant Findings, Care, Treatment and Services Provided: Findings noted above    Complications: N/A    Discharge Day Visit / Exam:   /87   Pulse 74   Temp 97.7 °F (36.5 °C)   Resp 16   Ht 5' 6\" (1.676 m)   Wt 70.4 kg (155 lb 3.3 oz)   SpO2 (!) 89%   BMI 25.05 kg/m²   Physical Exam  Vitals and nursing note reviewed.   Constitutional:       General: He is not in acute distress.     Appearance: He is well-developed.   HENT:      Head: Normocephalic and atraumatic.   Eyes:      Conjunctiva/sclera: Conjunctivae normal.   Cardiovascular:      Rate and Rhythm: Normal rate and regular rhythm.      Heart sounds: No murmur heard.  Pulmonary:      Effort: Pulmonary effort is normal. No respiratory distress.      Breath sounds: Normal breath sounds.   Abdominal:      Palpations: Abdomen is soft.      Tenderness: There is no abdominal tenderness.   Musculoskeletal:         General: No swelling.      Cervical back: Neck supple.   Skin:     General: Skin is warm and dry.      Capillary Refill: Capillary refill takes less than 2 seconds.   Neurological:      Mental Status: He is alert.      Comments: Facial asymmetry on R noted which is baseline   Psychiatric:         Mood and Affect: Mood normal.          Discussion with Family: Updated  (wife) at bedside.    Condition at " Discharge: fair       Discharge instructions/Information to patient and family:   See After Visit Summary (AVS) for information provided to patient and family.      Provisions for Follow-Up Care:  See after visit summary for information related to follow-up care and any pertinent home health orders.      PCP: Puneet Charles MD    Disposition: See After Visit Summary for discharge disposition information.    Planned Readmission: No     Discharge Medications:  See after visit summary for reconciled discharge medications provided to patient and family.      Discharge Statement:

## 2025-03-26 NOTE — PROGRESS NOTES
Progress Note - Internal Medicine   Name: Jose Maria Bethea 74 y.o. male I MRN: [id number]  [facility] Date of Admission: 3/25/2025   Date of Service: 3/26/2025 I Hospital Day: 1     Assessment & Plan  Acute ischemic stroke (HCC)  Patient reporting 2-day history of possible strokelike symptoms, most predominantly to be consistent with left lower extremity weakness and paresthesia previously noted right-sided facial droop, though paresthesia has since resolved since admission  Noted to have prior remote CVA history of unknown chronicity with chronic left centrum lacunar ischemic injury seen on MRI brain 2021 which may have contributory residual deficits  Initial imaging with CTA head and neck is unrevealing for acute etiology, though patient will require further imaging with MRI brain  Etiologies at this time include ischemic infarct versus hemorrhagic injury along the right frontal cerebrum versus lateral medulla  Other differential to include this time is patient endorsing separate lightheadedness and dizziness from past vertigo, which can be contributing to overall lower extremity weakness in the setting of presyncopal features consistent with orthostatic hypotension  MRI brain: new acute infarct in R ventral upper medulla. Unchanged chronic multifocal lacunar infarcts in L centrum ovale w/ mild/moderate chronic microangiopathy.   Patient not candidate for tPA or thrombectomy due to time of presentation, continue with medical management.   Lipids and Hgba1c: total cholesterol 113, TGs 125, HDL 44, LDL 44. Hgba1c 9.0.  Uric acid level pending to start thiazide diuretic for increased bp control    Plan  Neuro consulted  ASA, Plavix, Atorvastatin  Goal normotension <130/80  Continue amlodipine, losartan, add HCTZ and continue PRN labetolol  Echo pending  PT/OT consult  Diabetes control  Monitor on telemetry during admission  Type 2 diabetes mellitus with hyperglycemia, with long-term current use of insulin  ([facility])  Lab Results   Component Value Date    HGBA1C 9.0 (H) 03/26/2025       Recent Labs     03/25/25  1040 03/25/25  1607 03/26/25  0723 03/26/25  1124   POCGLU 172* 133 186* 179*       Blood Sugar Average: Last 72 hrs:  (P) 167.5  Patient reports compliance with his home diabetic regimen  Currently maintained on metformin 1000 mg twice daily, Jardiance 25 mg once daily, Amaryl 4 mg twice daily  Currently on insulin regimen with Lantus 40 units nightly    Plan  Lantus 16 units with SSI and lispro 4 units with meals  Essential hypertension  -Home regimen of amlodipine 10 mg daily and valsartan 320 mg once daily at home  Current regimen with amlodipine 10 mg in hospital formulary equivalent of losartan 100 mg once daily  Adjust BP medications as above for goal of normotension  Mixed hyperlipidemia  Maintained on Lipitor 80 mg once daily    Plan:  -Start atorvastatin 40mg daily  Chronic kidney disease, stage 3a ([facility])  Lab Results   Component Value Date    EGFR 64 03/26/2025    EGFR 58 03/25/2025    EGFR 55 10/30/2024    CREATININE 1.13 03/26/2025    CREATININE 1.22 03/25/2025    CREATININE 1.28 10/30/2024   CKD stage IIIa with unknown etiology, though can consider in the setting of chronic hypertension versus insulin-dependent diabetes  Slight creatinine between 1.1-1.2  At this time is stable without evidence of any acute urinary symptoms    Plan  Continue to monitor trend with daily BMP  Avoid nephrotoxic medications  Avoid relative hypotension  DEWEY (obstructive sleep apnea)  Prior history of DEWEY on CPAP  Will utilize CPAP at night    Disposition: Med/surg    Team: SOD TEAM B    Subjective   Patient seen and examined. This morning, patient describes feeling well. He denies ros including cp, SOB, abdominal pain, lightheadedness, dizziness,HA, nausea/vomiting, and describes acute neurological deficits including facial hemiparesis, and sensory deficits as resolved. Patient endorses gait instability and R sided weakness  that is not acute or new, but described these symptoms as deficits from acute stroke-like symptoms that started on Sunday. On questioning of history of gout, patient denies any previous history but does say his mother had a history of gout. No other concerns at this time.     Objective :  Temp:  [97.3 °F (36.3 °C)-98.6 °F (37 °C)] 97.5 °F (36.4 °C)  HR:  [69-92] 79  BP: (133-190)/(67-91) 157/88  Resp:  [15-20] 18  SpO2:  [87 %-95 %] 90 %  O2 Device: Nasal cannula  Nasal Cannula O2 Flow Rate (L/min):  [2 L/min] 2 L/min    I/O         03/24 0701 03/25 0700 03/25 0701 03/26 0700 03/26 0701 03/27 0700           Unmeasured Urine Occurrence  2 x           Weights:   IBW (Ideal Body Weight): 63.8 kg    Body mass index is 25.07 kg/m².  Weight (last 2 days)       Date/Time Weight    03/25/25 2016 70.4 (155.3)    03/25/25 1500 68 (150)            Physical Exam  Constitutional:       General: He is not in acute distress.     Appearance: Normal appearance.   HENT:      Head: Normocephalic and atraumatic.   Eyes:      Extraocular Movements: Extraocular movements intact.   Cardiovascular:      Rate and Rhythm: Normal rate and regular rhythm.      Heart sounds: No murmur heard.  Pulmonary:      Effort: Pulmonary effort is normal.      Breath sounds: Normal breath sounds.   Abdominal:      General: Abdomen is flat. There is no distension.      Palpations: Abdomen is soft. There is no mass.      Tenderness: There is no abdominal tenderness.   Musculoskeletal:         General: Deformity: CTA head/neck, MRI brain.      Right lower leg: No edema.      Left lower leg: No edema.   Skin:     General: Skin is warm.   Neurological:      Mental Status: He is alert and oriented to person, place, and time.      Sensory: No sensory deficit.      Motor: Weakness present.      Gait: Gait abnormal.      Comments: No facial droop appreciated              Lab Results: I have reviewed the following results:  Recent Labs     03/25/25  1041  03/26/25  0538   WBC 11.64* 7.82   HGB 15.8 14.7   HCT 47.2 44.5    196   SODIUM 139 141   K 4.5 4.1    108   CO2 25 25   BUN 29* 29*   CREATININE 1.22 1.13   GLUC 190* 134   AST  --  15   ALT  --  13   ALB  --  4.1   TBILI  --  0.55   ALKPHOS  --  68   PTT 25  --    INR 0.87  --          Other Study Results Review: EKG was reviewed.     Currently Ordered Meds:   Current Facility-Administered Medications:     albuterol (PROVENTIL HFA,VENTOLIN HFA) inhaler 2 puff, Q6H PRN    amLODIPine (NORVASC) tablet 10 mg, Daily    aspirin chewable tablet 81 mg, Daily    atorvastatin (LIPITOR) tablet 80 mg, QPM    clopidogrel (PLAVIX) tablet 75 mg, Daily    cyanocobalamin (VITAMIN B-12) tablet 250 mcg, Daily    enoxaparin (LOVENOX) subcutaneous injection 40 mg, Q24H JERRY    hydroCHLOROthiazide tablet 12.5 mg, Daily    insulin glargine (LANTUS) subcutaneous injection 16 Units 0.16 mL, HS    insulin lispro (HumALOG/ADMELOG) 100 units/mL subcutaneous injection 1-5 Units, TID AC **AND** Fingerstick Glucose (POCT), TID AC    insulin lispro (HumALOG/ADMELOG) 100 units/mL subcutaneous injection 4 Units, TID With Meals    labetalol (NORMODYNE) injection 10 mg, Q6H PRN    losartan (COZAAR) tablet 100 mg, Daily    meclizine (ANTIVERT) tablet 12.5 mg, TID PRN  VTE Pharmacologic Prophylaxis: Enoxaparin (Lovenox)  VTE Mechanical Prophylaxis: sequential compression device    Administrative Statements     Portions of the record may have been created with voice recognition software.

## 2025-03-26 NOTE — ASSESSMENT & PLAN NOTE
Jose Maria Bethea is a 74-year-old male with a past medical history of diabetes mellitus (DM), hyperlipidemia (HLD), and hypertension (HTN), CKD 3a, DEWEY presenting to the emergency department for evaluation of lightheadedness, dizziness, ataxia, and left-sided numbness and weakness for the past two days.  Initial BP Blood Pressure: (!) 181/92, FSBG POC Glucose: (!) 172. NC CTH unremarkable and CTA H/N with no LVO or aneurysm, continues to show severe stenosis with focal occlusion at R V4 segment with reconstitution. As a result of > 4.5 hours from time of symptom onset patient was not determined to be a candidate for thrombolysis (TNK). Given absence of IR target pt was deemed not a candidate for mechanical thrombectomy.   - Home Antiplatelet /Anticoagulants PTA: Aspirin 81 mg QD  - Stroke risk factors: HTN, DM, HLD, and DEWEY  - Prior Stroke Hx: yes: lacunar left centrum semiovale  - Past Neurological Hx: stroke - incidentally found    Workup:  - CTH: No acute intracranial abnormality. Mild chronic microangiopathic change  - CTA: Atherosclerotic disease involving the V4 segments of both vertebral arteries. The left demonstrates mild narrowing while the right demonstrates a severe stenosis with short segment occlusion and reconstitution, possibly via retrograde   flow.  Other findings: Cervical spondylitic degenerative change with mild canal stenosis and moderate foraminal narrowing  - MRI: pending  - PALAK: pending  - Labs: Hemoglobin A1c 9.0 (3/26/2025), LDL 44 (3/26/2025)    Impression: 74 y.o. male with history of  diabetes mellitus (DM), hyperlipidemia (HLD), and hypertension (HTN), CKD 3a, DEWEY presenting to the emergency department for evaluation of lightheadedness, dizziness, ataxia, and left-sided numbness and weakness for the past two days.  Admit for stroke w/u.     Plan: Discussed plan with neurology attending, Dr. Cruz  Admit along the stroke pathway with telemetry monitoring  Frequent neuro checks per  protocol. If there is any acute changes in exam, please obtain stat CT head and notify neurology team  BP Goals: Normotension of <130/80, hold on uptitrating until MRI results  Antiplatelet agents: ASA 81 mg and Plavix 75 mg qd  Anticoagulation: None  Statin: Start Atorvastatin 40 mg daily  PTA Crestor 5 mg  Brain Imaging: Obtain MRI brain without contrast  TTE, pend  Euthermic/Euglycemic  DVT PPx: per primary, SCDs  PT/OT/ST/PMR evaluations when able  Stroke education and counseling  Rest of other care per primary team appreciated    Disposition: PT Recommendations -

## 2025-03-26 NOTE — ASSESSMENT & PLAN NOTE
Lab Results   Component Value Date    EGFR 64 03/26/2025    EGFR 58 03/25/2025    EGFR 55 10/30/2024    CREATININE 1.13 03/26/2025    CREATININE 1.22 03/25/2025    CREATININE 1.28 10/30/2024

## 2025-03-26 NOTE — ASSESSMENT & PLAN NOTE
-Home regimen of amlodipine 10 mg daily and valsartan 320 mg once daily at home  Current regimen with amlodipine 10 mg in hospital formulary equivalent of losartan 100 mg once daily  Adjust BP medications as above for goal of normotension

## 2025-03-26 NOTE — RESPIRATORY THERAPY NOTE
03/25/25 2521   Respiratory Assessment   Resp Comments Patient refused cpap; Does not want to use ours cpap.

## 2025-03-26 NOTE — ASSESSMENT & PLAN NOTE
Patient reporting 2-day history of possible strokelike symptoms, most predominantly to be consistent with left lower extremity weakness and paresthesia previously noted right-sided facial droop, though paresthesia has since resolved since admission  Noted to have prior remote CVA history of unknown chronicity with chronic left centrum lacunar ischemic injury seen on MRI brain 2021 which may have contributory residual deficits  Initial imaging with CTA head and neck is unrevealing for acute etiology, though patient will require further imaging with MRI brain  Etiologies at this time include ischemic infarct versus hemorrhagic injury along the right frontal cerebrum versus lateral medulla  Other differential to include this time is patient endorsing separate lightheadedness and dizziness from past vertigo, which can be contributing to overall lower extremity weakness in the setting of presyncopal features consistent with orthostatic hypotension  MRI brain: new acute infarct in R ventral upper medulla. Unchanged chronic multifocal lacunar infarcts in L centrum ovale w/ mild/moderate chronic microangiopathy.   Patient not candidate for tPA or thrombectomy due to time of presentation, continue with medical management.   Lipids and Hgba1c: total cholesterol 113, TGs 125, HDL 44, LDL 44. Hgba1c 9.0.  Uric acid level pending to start thiazide diuretic for increased bp control    Plan  Neuro consulted  ASA, Plavix, for 21 days followed by Plavix monotherapy  Goal normotension <130/80  Continue amlodipine, losartan, HCTZ  Follow up with PCP for continued titration  PT/OT-Level 3 Resources  Monitor on telemetry during admission

## 2025-03-26 NOTE — UTILIZATION REVIEW
Initial Clinical Review    Admission: Date/Time/Statement:   Admission Orders (From admission, onward)       Ordered        03/25/25 1414  INPATIENT ADMISSION  Once                          Orders Placed This Encounter   Procedures    INPATIENT ADMISSION     Standing Status:   Standing     Number of Occurrences:   1     Level of Care:   Med Surg [16]     Estimated length of stay:   More than 2 Midnights     Certification:   I certify that inpatient services are medically necessary for this patient for a duration of greater than two midnights. See H&P and MD Progress Notes for additional information about the patient's course of treatment.     ED Arrival Information       Expected   -    Arrival   3/25/2025 09:44    Acuity   Emergent              Means of arrival   Walk-In    Escorted by   Self    Service   [facility]    Admission type   Emergency              Arrival complaint   arm numbness             Chief Complaint   Patient presents with    Numbness     Pt c/o slurred speech, L arm numbness, ataxia, and L sided weakness for past few days, - dizziness or headache, - vision changes       Initial Presentation: 74 y.o. male   to ER From home on 3/25.    PMH  IDDM, vertigo,  HTN, HLD, CKD3  (baseline crt 1.1-1.2 ) , DEWEY maintained on aspirin presents with dizziness and unsteady gait, intermittent over past 3 days. Also reports that yesterday, brief episode of  left-sided sensorimotor symptoms.    Accelerated HTN on arrival w/SBP  180s to 190s.  CTA head and neck demonstrated right V4 occlusion which was noted to be chronic.   CTA head was unremarkable for any clear acute pathology.   EXAM:  AAOx3, alert, talkative and has mild L sided facial droop.  Speech clear.   LLE 4/5 @ hip, 3/5 at and beneath knee, R side and LUE 5/5. Patient and wife agree mild dysarthria at baseline            IN ER,  CT head revealed  Prior lacunar left centrum semiovale          3/25   ADMIT  IP  Status,  MS  Level of care for acute  STROKE workup:      Initiate  ASA, statin, plavix.  MRI Brain to clarify.  Initiate stroke pathway =  Telemetry, consult Neuro, PT/OT/Speech   evals & treat.   VS/Neuro cks q 1 hr x4;  q 2 hr x4;  q 4 hrs   Speech bedside swallow eval  (okay for reg diet)  Baseline NIH scale.  I/O q shift .  SCD's b/l LE.  TTE for further evaluation with bubble study.   Up w/assist. orthostatic VS.   Trend daily bmp.  Avoid hypotension.  CPAP @ Noc.     3/25   @ 1854    's  - given norvasc and losartan/ prn labetalol     Date: 3/26   Day 2:   Today he reports that he is doing better than yesterday:  continues to feel off-balance with walking, and takes short steps when walking to the rest room. Denies having vertigo. Denies having left-sided numbness or weakness.   GCS consistently 15.  Tele =  SR     Date: 3/27  Day 3: Has surpassed a 2nd midnight with active treatments and services.  Assessment stable.  Facial asymmetry on R at baseline.      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  Recommending patient stay on aspirin and Plavix for 21 days followed by Plavix monotherapy indefinitely (4/14/25               follow-up in in 6 weeks with neurovascular team  ======================================================================================================================================================      ED Treatment-Medication Administration from 03/25/2025 0944 to 03/25/2025 2002         Date/Time Order Dose Route Action     03/25/2025 1509 amLODIPine (NORVASC) tablet 10 mg 10 mg Oral Given     03/25/2025 1509 aspirin chewable tablet 81 mg 81 mg Oral Given     03/25/2025 1509 losartan (COZAAR) tablet 100 mg 100 mg Oral Given     03/25/2025 1521 cyanocobalamin (VITAMIN B-12) tablet 250 mcg 250 mcg Oral Given     03/25/2025 1759 atorvastatin (LIPITOR) tablet 80 mg 80 mg Oral Given     03/25/2025 1513 heparin (porcine) subcutaneous injection  5,000 Units 5,000 Units Subcutaneous Given     03/25/2025 1652 insulin lispro (HumALOG/ADMELOG) 100 units/mL subcutaneous injection 4 Units 4 Units Subcutaneous Given     03/25/2025 1650 clopidogrel (PLAVIX) tablet 75 mg 75 mg Oral Given            Scheduled Medications:  amLODIPine, 10 mg, Oral, Daily  aspirin, 81 mg, Oral, Daily  atorvastatin, 80 mg, Oral, QPM  clopidogrel, 75 mg, Oral, Daily  vitamin B-12, 250 mcg, Oral, Daily  enoxaparin, 40 mg, Subcutaneous, Q24H JERRY  insulin glargine, 16 Units, Subcutaneous, HS  insulin lispro, 1-5 Units, Subcutaneous, TID AC  insulin lispro, 4 Units, Subcutaneous, TID With Meals  losartan, 100 mg, Oral, Daily      Continuous IV Infusions:     PRN Meds:  albuterol, 2 puff, Inhalation, Q6H PRN  labetalol, 10 mg, Intravenous, Q6H PRN  meclizine, 12.5 mg, Oral, TID PRN      ED Triage Vitals   Temperature Pulse Respirations Blood Pressure SpO2 Pain Score   03/25/25 0949 03/25/25 0949 03/25/25 0949 03/25/25 0949 03/25/25 0949 03/25/25 1252   (!) 97.4 °F (36.3 °C) 85 18 (!) 181/92 93 % No Pain     Weight (last 2 days)       Date/Time Weight    03/25/25 2016 70.4 (155.3)    03/25/25 1500 68 (150)            Vital Signs (last 3 days)       Date/Time Temp Pulse Resp BP MAP (mmHg) SpO2 Nasal Cannula O2 Flow Rate (L/min) O2 Device Patient Position - Orthostatic VS Estela Coma Scale Score Pain    03/26/25 0845 -- -- -- -- -- -- -- -- -- -- No Pain    03/26/25 0825 -- -- -- -- -- -- -- -- -- -- No Pain    03/26/25 07:23:23 97.4 °F (36.3 °C) 70 18 162/86 111 90 % -- -- -- -- --    03/26/25 07:21:16 97.4 °F (36.3 °C) 69 18 162/86 111 91 % -- -- -- -- --    03/26/25 0200 -- -- -- -- -- -- -- -- -- 15 --    03/26/25 01:47:13 97.4 °F (36.3 °C) 75 16 141/83 102 93 % -- -- -- -- --    03/26/25 0000 -- -- -- -- -- -- -- -- -- 15 --    03/25/25 22:14:59 97.3 °F (36.3 °C) 78 18 143/86 -- 95 % -- -- -- -- --    03/25/25 2200 -- -- -- -- -- -- -- -- -- 15 --    03/25/25 2016 97.8 °F (36.6 °C) 84 18  155/86 -- -- -- -- -- -- --    03/25/25 2015 -- -- -- -- -- -- 2 L/min Nasal cannula -- 15 No Pain    03/25/25 20:09:24 97.8 °F (36.6 °C) 84 18 155/86 109 91 % -- -- -- -- --    03/25/25 1930 -- 84 15 133/67 94 93 % 2 L/min Nasal cannula Lying -- --    03/25/25 1928 -- -- -- -- -- -- -- -- -- -- No Pain    03/25/25 1900 -- 80 18 144/78 -- 93 % -- Nasal cannula -- 15 No Pain    03/25/25 1800 98.6 °F (37 °C) 92 18 173/84 -- 92 % -- Nasal cannula Lying 15 No Pain    03/25/25 1654 -- 85 18 175/84 -- 90 % -- Nasal cannula Lying 15 No Pain    03/25/25 1607 -- 83 18 179/86 -- -- -- None (Room air) Sitting 15 No Pain    03/25/25 1509 -- -- -- 176/90 -- -- -- -- -- -- --    03/25/25 1500 -- 82 18 176/90 -- 93 % -- Nasal cannula Lying 15 No Pain    03/25/25 1400 -- 80 18 167/87 120 93 % 2 L/min Nasal cannula -- -- --    03/25/25 1330 -- 87 20 190/91 131 87 %  -- None (Room air) -- -- --    03/25/25 1252 -- 79 17 178/84 121 91 % -- None (Room air) Lying -- No Pain    03/25/25 1000 -- 83 18 176/89 -- 92 % -- None (Room air) Lying 15 --    03/25/25 0949 97.4 °F (36.3 °C) 85 18 181/92 -- 93 % -- None (Room air) Sitting -- --              Pertinent Labs/Diagnostic Test Results:   Radiology:  CTA head and neck with and without contrast   Final Interpretation by Jose Miguel Noel DO (03/25 1257)   CT Brain:  No acute intracranial abnormality. Mild chronic microangiopathic change   CT Angiography: Atherosclerotic disease involving the V4 segments of both vertebral arteries. The left demonstrates mild narrowing while the right demonstrates a severe stenosis with short segment occlusion and reconstitution, possibly via retrograde    flow.   Other findings: Cervical spondylitic degenerative change with mild canal stenosis and moderate foraminal narrowing.         MRI brain wo contrast    (Results Pending)     Cardiology:  ECG 12 lead   Final Result by Larry Nation MD (03/25 1721)   Normal sinus rhythm   Normal ECG   When  compared with ECG of 25-Mar-2025 09:49, (unconfirmed)   No significant change was found   Confirmed by Larry Nation ([id number]) on 3/25/2025 12:56:30 PM      ECG 12 lead   Final Result by Larry Nation MD (03/25 1301)   Normal sinus rhythm   Normal ECG   When compared with ECG of 26-Apr-2021 09:26,   QRS axis Shifted right   Confirmed by Larry Nation ([id number]) on 3/25/2025 1:01:18 PM        GI:  No orders to display           Results from last 7 days   Lab Units 03/26/25  0538 03/25/25  1041   WBC Thousand/uL 7.82 11.64*   HEMOGLOBIN g/dL 14.7 15.8   HEMATOCRIT % 44.5 47.2   PLATELETS Thousands/uL 196 204   TOTAL NEUT ABS Thousands/µL 4.10 8.73*         Results from last 7 days   Lab Units 03/26/25  0538 03/25/25  1041   SODIUM mmol/L 141 139   POTASSIUM mmol/L 4.1 4.5   CHLORIDE mmol/L 108 106   CO2 mmol/L 25 25   ANION GAP mmol/L 8 8   BUN mg/dL 29* 29*   CREATININE mg/dL 1.13 1.22   EGFR ml/min/1.73sq m 64 58   CALCIUM mg/dL 9.4 9.7     Results from last 7 days   Lab Units 03/26/25  0538   AST U/L 15   ALT U/L 13   ALK PHOS U/L 68   TOTAL PROTEIN g/dL 7.0   ALBUMIN g/dL 4.1   TOTAL BILIRUBIN mg/dL 0.55     Results from last 7 days   Lab Units 03/26/25  0723 03/25/25  1607 03/25/25  1040   POC GLUCOSE mg/dl 186* 133 172*     Results from last 7 days   Lab Units 03/26/25  0538 03/25/25  1041   GLUCOSE RANDOM mg/dL 134 190*         Results from last 7 days   Lab Units 03/26/25  0538   HEMOGLOBIN A1C % 9.0*   EAG mg/dl 212       Results from last 7 days   Lab Units 03/25/25  1041   PROTIME seconds 12.1*   INR  0.87   PTT seconds 25       Past Medical History:   Diagnosis Date    COVID-19 07/2022    Diabetes mellitus (HCC)     Type 2    Dyslipidemia     Hypertension     Obstructive sleep apnea on CPAP      Present on Admission:   Stroke-like symptoms   Essential hypertension   Mixed hyperlipidemia   Chronic kidney disease, stage 3a (HCC)   DEWEY (obstructive sleep apnea)      Admitting Diagnosis: Numbness  [R20.0]  Left-sided weakness [R53.1]  Stroke-like symptoms [R29.90]  Age/Sex: 74 y.o. male    Network Utilization Review Department  ATTENTION: Please call with any questions or concerns to [phone] and carefully listen to the prompts so that you are directed to the right person. All voicemails are confidential.   For Discharge needs, contact Care Management DC Support Team at [phone] opt. 2  Send all requests for admission clinical reviews, approved or denied determinations and any other requests to dedicated fax number below belonging to the campus where the patient is receiving treatment. List of dedicated fax numbers for the Facilities:  FACILITY NAME UR FAX NUMBER   ADMISSION DENIALS (Administrative/Medical Necessity) [phone]   DISCHARGE SUPPORT TEAM (NETWORK) [phone]   PARENT CHILD HEALTH (Maternity/NICU/Pediatrics) [phone]   General acute hospital [phone]   [facility] [phone]   [facility] [phone]   [facility] [phone]   [facility] [phone]   [facility] [phone]   [facility] [phone]   [facility] [phone]   [facility] [phone]   [facility] [phone]   [facility] [phone]   [facility] [phone]

## 2025-03-26 NOTE — PHYSICAL THERAPY NOTE
PHYSICAL THERAPY EVALUATION          Patient Name: Jose Maria Bethea  Today's Date: 3/26/2025       03/26/25 0845   Note Type   Note type Evaluation   Pain Assessment   Pain Assessment Tool 0-10   Pain Score No Pain   Patient's Stated Pain Goal No pain   Hospital Pain Intervention(s) Repositioned;Ambulation/increased activity   Restrictions/Precautions   Weight Bearing Precautions Per Order No   Other Precautions Chair Alarm;Bed Alarm;Fall Risk;Telemetry   Home Living   Type of Home House   Home Layout Two level;Stairs to enter with rails;1/2 bath on main level;Able to live on main level with bedroom/bathroom  (1 THEODORE)   Additional Comments Pt reports living with spouse who was not assisting pt PTA however, can assist, if needed.   Prior Function   Level of [place] Independent with functional mobility   Lives With Spouse   Receives Help From Family   Falls in the last 6 months 0   Comments Pt declines assistive device usage PTA.   General   Family/Caregiver Present No   Cognition   Overall Cognitive Status WFL   Arousal/Participation Alert   Attention Within functional limits   Orientation Level Oriented X4   Memory Within functional limits   Following Commands Follows all commands and directions without difficulty   RLE Assessment   RLE Assessment WFL   Strength RLE   R Hip Flexion 4/5   R Knee Flexion 4+/5   R Knee Extension 4+/5   R Ankle Dorsiflexion 4+/5   R Ankle Plantar Flexion 4+/5   LLE Assessment   LLE Assessment WFL   Strength LLE   L Hip Flexion 3+/5   L Knee Flexion 4-/5   L Knee Extension 4+/5   L Ankle Dorsiflexion 4/5   L Ankle Plantar Flexion 4/5   Sharp/Dull   RLE Sharp/Dull Grossly intact   LLE Sharp/Dull Grossly intact   Bed Mobility   Supine to Sit 5  Supervision   Additional items Increased time required   Sit to Supine Unable to assess   Additional Comments Pt left OOB at end of session in bedside chair with all  needs within reach.   Transfers   Sit to Stand 5  Supervision   Additional items Increased time required;Verbal cues   Stand to Sit 5  Supervision   Additional items Increased time required;Verbal cues   Ambulation/Elevation   Gait pattern Improper Weight shift;L Knee Edy;Decreased foot clearance;Short stride;Inconsistent jose miguel;Decreased heel strike   Gait Assistance 4  Minimal assist   Additional items Assist x 1;Verbal cues   Assistive Device Rolling walker   Distance 50'x2   Balance   Static Sitting Good   Dynamic Sitting Fair +   Static Standing Fair +   Dynamic Standing Fair   Ambulatory Fair -   Activity Tolerance   Activity Tolerance Patient tolerated treatment well   Medical Staff Made Aware MARIO ALBERTO Fisher, and Tavo PT were present for co-evaluation due to the pt's current medical presentation.   Nurse Made Aware Pt is appropriate to be seen and mobilize per nsg.   Assessment   Prognosis Good   Problem List Decreased strength;Decreased endurance;Impaired balance;Decreased mobility;Impaired judgement;Decreased safety awareness   Assessment Pt is a 74 y.o. male seen for PT evaluation on 3/26/2025 after being [facility] to [facility]25/2025 for stroke-like symptoms including lightheadedness, dizziness, feeling unsteady on feet, left-sided weakness, dysarthria, paresthesias of L UE and L LE. Pt presenting s/p acute right ventral upper medulla infarct. Pt's current/active diagnoses include essential hypertension, mixed hyperlipidemia, DEWEY, chronic kidney disease, stage 3a. Pt  has a past medical history of COVID-19, Diabetes mellitus (HCC), Dyslipidemia, Hypertension, and Obstructive sleep apnea on CPAP. PT evaluation was ordered to assess the pt's functional mobility and discharge recommendations. Prior to admission, pt was independent with functional mobility without the use of an assistive device. Pt lives in a house with 1 THEODORE with significant other, who is able to assist, if needed but was  not assisting pt PTA. Pt is of high complexity due to ongoing medical management, increased reliance on caregivers for support, functioning below baseline secondary to current medical presentation, telemetry monitoring, fall risk. At evaluation, pt performed bed mobility with supervision. Pt performed sit to stand/stand to sit transfers with supervision. Pt ambulated with minAx1 with rolling walker for 50'x2.  Pt presents with the following PT impairments gait deviations, decreased endurance, decreased mobility, L LE weakness, fall risk, bed/chair alarm. The pt was left sitting out of bed in bedside chair with chair alarm engaged and all needs within reach including the call bell and the room phone. Based on the pt's functional performance during the PT evaluation, the discharge recommendation is level 3 resource intensity. Pt will continue to benefit from skilled IP PT services in order to address the functional impairments noted above in preparation for a safer discharge.   Goals   Patient Goals To get better.   STG Expiration Date 04/09/25   Short Term Goal #1 In 14 days the pt will be able to perform: 1) bed mobility skills with mod I to increase safety and independence to reduce reliance on caregiver. 2) functional transfers with mod I for increased independence and safety. 3) ambulate 300' with supervision using the most appropriate and least restrictive assistive device without LOB for increased independence and improve safety for household and community ambulation. 4) stair training up/down 13 steps with supervision with the most appropriate technique so that the pt can safely enter/exit home environment and navigate the community. 5) improve sitting and standing balance by 1/2 grade to improve safety and decrease risk of falls and improve functional mobility. 6) improve BLE strength by 1/2 grade to reduce risk of falls and improve functional mobility.   Plan   Treatment/Interventions Functional transfer  training;LE strengthening/ROM;Elevations;Therapeutic exercise;Endurance training;Patient/family training;Equipment eval/education;Bed mobility;Gait training;Spoke to nursing;Spoke to case management;OT   PT Frequency 3-5x/wk   Discharge Recommendation   Rehab Resource Intensity Level, PT III (Minimum Resource Intensity)   Equipment Recommended Walker   Walker Package Recommended Wheeled walker   AM-PAC Basic Mobility Inpatient   Turning in Flat Bed Without Bedrails 4   Lying on Back to Sitting on Edge of Flat Bed Without Bedrails 4   Moving Bed to Chair 4   Standing Up From Chair Using Arms 4   Walk in Room 4   Climb 3-5 Stairs With Railing 3   Basic Mobility Inpatient Raw Score 23   Basic Mobility Standardized Score 50.88   [facility] Highest Level Of Mobility   -HLM Goal 7: Walk 25 feet or more   -HLM Achieved 7: Walk 25 feet or more   Modified Benezett Scale   Modified Rena Scale 3   Barthel Index   Feeding 10   Bathing 5   Grooming Score 5   Dressing Score 5   Bladder Score 10   Bowels Score 10   Toilet Use Score 5   Transfers (Bed/Chair) Score 10   Mobility (Level Surface) Score 10   Stairs Score 0   Barthel Index Score 70   End of Consult   Patient Position at End of Consult Bedside chair;Bed/Chair alarm activated;All needs within reach       Vilma Lopez, PARKER

## 2025-03-26 NOTE — PROGRESS NOTES
Progress Note - Neurology   Name: Jose Maria Bethea 74 y.o. male I MRN: [id number]  Unit/Bed#: [facility] 623-01 I Date of Admission: 3/25/2025   Date of Service: 3/26/2025 I Hospital Day: 1    Assessment & Plan  Stroke-like symptoms  Jose Maria Bethea is a 74-year-old male with a past medical history of diabetes mellitus (DM), hyperlipidemia (HLD), and hypertension (HTN), CKD 3a, DEWEY presenting to the emergency department for evaluation of lightheadedness, dizziness, ataxia, and left-sided numbness and weakness for the past two days.  Initial BP Blood Pressure: (!) 181/92, FSBG POC Glucose: (!) 172. NC CTH unremarkable and CTA H/N with no LVO or aneurysm, continues to show severe stenosis with focal occlusion at R V4 segment with reconstitution. As a result of > 4.5 hours from time of symptom onset patient was not determined to be a candidate for thrombolysis (TNK). Given absence of IR target pt was deemed not a candidate for mechanical thrombectomy.   - Home Antiplatelet /Anticoagulants PTA: Aspirin 81 mg QD  - Stroke risk factors: HTN, DM, HLD, and DEWEY  - Prior Stroke Hx: yes: lacunar left centrum semiovale  - Past Neurological Hx: stroke - incidentally found    Workup:  - CTH: No acute intracranial abnormality. Mild chronic microangiopathic change  - CTA: Atherosclerotic disease involving the V4 segments of both vertebral arteries. The left demonstrates mild narrowing while the right demonstrates a severe stenosis with short segment occlusion and reconstitution, possibly via retrograde   flow.  Other findings: Cervical spondylitic degenerative change with mild canal stenosis and moderate foraminal narrowing  - MRI: pending  - PALAK: pending  - Labs: Hemoglobin A1c 9.0 (3/26/2025), LDL 44 (3/26/2025)    Impression: 74 y.o. male with history of  diabetes mellitus (DM), hyperlipidemia (HLD), and hypertension (HTN), CKD 3a, DEWEY presenting to the emergency department for evaluation of lightheadedness, dizziness, ataxia, and left-sided  numbness and weakness for the past two days.  Admit for stroke w/u.     Plan: Discussed plan with neurology attending, Dr. Cruz  Admit along the stroke pathway with telemetry monitoring  Frequent neuro checks per protocol. If there is any acute changes in exam, please obtain stat CT head and notify neurology team  BP Goals: Normotension of <130/80, hold on uptitrating until MRI results  Antiplatelet agents: ASA 81 mg and Plavix 75 mg qd  Anticoagulation: None  Statin: Start Atorvastatin 40 mg daily  PTA Crestor 5 mg  Brain Imaging: Obtain MRI brain without contrast  TTE, pend  Euthermic/Euglycemic  DVT PPx:  per primary , SCDs  PT/OT/ST/PMR evaluations when able  Stroke education and counseling  Rest of other care per primary team appreciated    Disposition: PT Recommendations -        Type 2 diabetes mellitus with hyperglycemia, with long-term current use of insulin (HCC)  Lab Results   Component Value Date    HGBA1C 9.0 (H) 03/26/2025       Recent Labs     03/25/25  1040 03/25/25  1607   POCGLU 172* 133       Blood Sugar Average: Last 72 hrs:  (P) 152.5    Essential hypertension    Mixed hyperlipidemia    DEWEY (obstructive sleep apnea)    Chronic kidney disease, stage 3a ([facility])  Lab Results   Component Value Date    EGFR 64 03/26/2025    EGFR 58 03/25/2025    EGFR 55 10/30/2024    CREATININE 1.13 03/26/2025    CREATININE 1.22 03/25/2025    CREATININE 1.28 10/30/2024       Recommendations for outpatient neurological follow up have yet to be determined.  I have discussed with Dr. Cruz the above plan to treat pt. He agrees with the plan.    Subjective   623: Jose Maria Bethea is a 74-year-old male with a past medical history of diabetes mellitus (DM), hyperlipidemia (HLD), and hypertension (HTN), CKD 3a, DEWEY presenting to the emergency department for evaluation of lightheadedness, dizziness, ataxia, and left-sided numbness and weakness.    Today he reports that he is doing better than yesterday, but endorses he got  little sleep last night. He denies vision problems, severe headaches, chest pain, shortness of breath, abdominal pain, nausea, vomiting. Denies feeling lightheaded or dizzy. He continues to feel off-balance with walking, and takes short steps when walking to the rest room. Denies having vertigo. Denies having left-sided numbness or weakness. Denies having any memory changes or confusion. Denies having slurred speech today. Denies having any new symptoms or concerns.       Review of Systems   Constitutional:  Positive for fatigue. Negative for appetite change and fever.   HENT:  Negative for hearing loss and sinus pressure.    Eyes:  Negative for visual disturbance.   Respiratory:  Negative for chest tightness and shortness of breath.    Cardiovascular:  Negative for chest pain and palpitations.   Gastrointestinal:  Negative for abdominal pain, constipation, diarrhea, nausea and vomiting.     negative    Objective :  Temp:  [97.3 °F (36.3 °C)-98.6 °F (37 °C)] 97.4 °F (36.3 °C)  HR:  [75-92] 75  BP: (133-190)/(67-92) 141/83  Resp:  [15-20] 16  SpO2:  [87 %-95 %] 93 %  O2 Device: Nasal cannula  Nasal Cannula O2 Flow Rate (L/min):  [2 L/min] 2 L/min    Physical Exam  Vitals reviewed.   HENT:      Right Ear: Hearing normal.      Left Ear: Hearing normal.   Eyes:      General: Lids are normal.      Extraocular Movements: Extraocular movements intact.      Pupils: Pupils are equal, round, and reactive to light.   Neurological:      Deep Tendon Reflexes:      Reflex Scores:       Tricep reflexes are 2+ on the right side and 2+ on the left side.       Bicep reflexes are 2+ on the right side and 2+ on the left side.       Brachioradialis reflexes are 2+ on the right side and 2+ on the left side.       Patellar reflexes are 1+ on the right side and 1+ on the left side.       Achilles reflexes are 2+ on the right side and 2+ on the left side.  Psychiatric:         Speech: Speech normal.     Neurological Exam  Mental  Status  Awake, alert and oriented to person, place and time. Oriented to person, place and time. Recent and remote memory are intact. Speech is normal. Language is fluent with no aphasia. Attention and concentration are normal. Fund of knowledge is appropriate for level of education. Apraxia absent.    Cranial Nerves  CN II: Visual acuity is normal. Visual fields full to confrontation.  CN III, IV, VI: Extraocular movements intact bilaterally. Normal lids and orbits bilaterally. Pupils equal round and reactive to light bilaterally.  CN V: Facial sensation is normal.  CN VII: Facial asymmetry with smiling. Has a PMHx of a operation on the left side, that he said has contributed to this for years. .  CN VIII:  Right: Hearing is normal.  Left: Hearing is normal.  CN IX, X: Palate elevates symmetrically  CN XI: Shoulder shrug strength is normal.  CN XII: Tongue midline without atrophy or fasciculations.    Motor  Normal muscle bulk throughout. No fasciculations present. Normal muscle tone. No abnormal involuntary movements. Strength is 5/5 in all four extremities except as noted.                                             Right                     Left   Shoulder abduction               5                          5  Elbow flexion                         5                          5-  Elbow extension                    5                          4  Wrist flexion                           5                          5  Wrist extension                      5                          5  Finger abduction                    5                          5  Knee extension                      5                          4-  Plantarflexion                         5                          5  Dorsiflexion                            5                          5                                             Right                     Left   Biceps                                   5                          5  Brachioradialis                       5                          5   Triceps                                  5                          5  Decreased strength on the left leg.    Sensory  Light touch abnormality: Pinprick abnormality:   Reports decreased 50% sensation from left toes to ankles, and up from ankles it is 25% reduced (improvement from yesterday). Continues to endorse decreased sensation by 25% in the left fingers to wrist, when doing the pinprick examination.    Reflexes                                            Right                      Left  Brachioradialis                    2+                         2+  Biceps                                 2+                         2+  Triceps                                2+                         2+  Finger flex                           2+                         2+  Patellar                                1+                         1+  Achilles                                2+                         2+    Right pathological reflexes: Demetri's absent. Ankle clonus absent.  Left pathological reflexes: Demetri's absent. Ankle clonus absent.    Coordination  Right: Finger-to-nose normal. Rapid alternating movement normal.Left: Finger-to-nose normal. Rapid alternating movement normal.    Gait    Gait was not assessed, pt was in bed.        Lab Results: I have reviewed the following results:  Imaging Results Review: No pertinent imaging studies reviewed.  Other Study Results Review: No additional pertinent studies reviewed.    VTE Pharmacologic Prophylaxis: Enoxaparin (Lovenox)

## 2025-03-26 NOTE — CASE MANAGEMENT
Case Management Assessment & Discharge Planning Note    Patient name Jose Maria Bethea  Location [facility] 623/[facility] 623-01 MRN [id number]  : 1950 Date 3/26/2025       Current Admission Date: 3/25/2025  Current Admission Diagnosis:Acute ischemic stroke (HCC)   Patient Active Problem List    Diagnosis Date Noted Date Diagnosed    Acute ischemic stroke (HCC) 2025     Type 2 diabetes mellitus with diabetic cataract, with long-term current use of insulin (HCC) 2025     Chronic kidney disease, stage 3a (HCC) 2025     DEWEY (obstructive sleep apnea) 2024     Vitamin D deficiency 2024     Vertebral artery stenosis, bilateral 2021     Type 2 diabetes mellitus with hyperglycemia, with long-term current use of insulin (HCC) 2020     Essential hypertension 2020     Hyperkalemia 2020     Mixed hyperlipidemia 2020       LOS (days): 1  Geometric Mean LOS (GMLOS) (days): 2  Days to GMLOS:1     OBJECTIVE:    Risk of Unplanned Readmission Score: 12.12   Current admission status: Inpatient       Preferred Pharmacy:   [facility]/pharmacy #[id number] - [place], [place] - [address]  [address]  [place] [address]  Phone: [phone] Fax: [phone]    Primary Care Provider: Puneet Charles MD    Primary Insurance: [employer] [facility] REP  Secondary Insurance:     ASSESSMENT:  Active Health Care Proxies    There are no active Health Care Proxies on file.     Readmission Root Cause  30 Day Readmission: No    Patient Information  Admitted from:: Home  Mental Status: Alert  During Assessment patient was accompanied by: Spouse  Assessment information provided by:: Patient  Primary Caregiver: Self  Support Systems: Self, Spouse/significant other  County of Residence: [place]  What city do you live in?: [place]  Home entry access options. Select all that apply.: Stairs  Number of steps to enter home.: 1  Do the steps have railings?: No  Type of Current Residence: [address] [place] home  Upon  entering residence, is there a bedroom on the main floor (no further steps)?: No  A bedroom is located on the following floor levels of residence (select all that apply):: 2nd Floor  Upon entering residence, is there a bathroom on the main floor (no further steps)?: Yes (1/2 on first, full on second)  Living Arrangements: Lives w/ Spouse/significant other  Is patient a ?: No    Activities of Daily Living Prior to Admission  Functional Status: Independent  Completes ADLs independently?: Yes  Ambulates independently?: Yes  Does patient use assisted devices?: No  Does patient currently own DME?: No  Does patient have a history of Outpatient Therapy (PT/OT)?: Yes  Does the patient have a history of Short-Term Rehab?: No  Does patient have a history of HHC?: No  Does patient currently have HHC?: No    Patient Information Continued  Income Source: Pension/FCI  Does patient have prescription coverage?: No  Can the patient afford their medications and any related supplies (such as glucometers or test strips)?: No  Does patient receive dialysis treatments?: No  Does patient have a history of substance abuse?: No  Does patient have a history of Mental Health Diagnosis?: No    Means of Transportation  Means of Transport to Appts:: Family transport  Social Determinants of Health (SDOH)      Flowsheet Row Most Recent Value   Housing Stability    In the last 12 months, was there a time when you were not able to pay the mortgage or rent on time? N   At any time in the past 12 months, were you homeless or living in a shelter (including now)? N   Transportation Needs    In the past 12 months, has lack of transportation kept you from medical appointments or from getting medications? no   In the past 12 months, has lack of transportation kept you from meetings, work, or from getting things needed for daily living? No   Food Insecurity    Within the past 12 months, you worried that your food would run out before you got  the money to buy more. Never true   Within the past 12 months, the food you bought just didn't last and you didn't have money to get more. Never true   Utilities    In the past 12 months has the electric, gas, oil, or water company threatened to shut off services in your home? No        DISCHARGE DETAILS:    Discharge planning discussed with:: patient and spouse at bedside  Freedom of Choice: Yes  Comments - Freedom of Choice: Discussed FOC  CM contacted family/caregiver?: Yes (at bedside)  Were Treatment Team discharge recommendations reviewed with patient/caregiver?: Yes  Did patient/caregiver verbalize understanding of patient care needs?: Yes  Were patient/caregiver advised of the risks associated with not following Treatment Team discharge recommendations?: Yes    DME Referral Provided  Referral made for DME?: Yes  DME referral completed for the following items:: Elias  DME Supplier Name:: [employer]    Other Referral/Resources/Interventions Provided:  Interventions: Outpatient PT, Outpatient OT, DME  Referral Comments: This CM discussed therapy recs with pt. Therapy recs OP therapy. Therapy rec RW and Shower chair. pt is agreeable with RW but declined shower chair at this time. order placed in parachute via [employer] to be delivered to bedside.    Treatment Team Recommendation: Home  Discharge Destination Plan:: Home  CM reviewed d/c planning process including the following: identifying help at home, patient preference for d/c planning needs, Discharge Lounge, [facility] Meds to Bed program, availability of treatment team to discuss questions or concerns patient and/or family may have regarding understanding medications and recognizing signs and symptoms once discharged.  CM also encouraged patient to follow up with all recommended appointments after discharge. Patient advised of importance for patient and family to participate in managing patient’s medical well being.    This CM introduced self and role to  pt and spouse at bedside. Pt lives in a 3 story home with spouse. Pt reports bed and full bath on second level, half bath on first. Prior to admission pt reports functional status as independent with ADLs/IADLs. Pt owns no DME, retired, transported by spouse and uber/lyft.

## 2025-03-26 NOTE — OCCUPATIONAL THERAPY NOTE
Occupational Therapy Evaluation     Patient Name: Jose Maria Bethea  Today's Date: 3/26/2025  Problem List  Principal Problem:    Stroke-like symptoms  Active Problems:    Type 2 diabetes mellitus with hyperglycemia, with long-term current use of insulin (HCC)    Essential hypertension    Mixed hyperlipidemia    DEWEY (obstructive sleep apnea)    Chronic kidney disease, stage 3a (HCC)    Past Medical History  Past Medical History:   Diagnosis Date    COVID-19 07/2022    Diabetes mellitus (HCC)     Type 2    Dyslipidemia     Hypertension     Obstructive sleep apnea on CPAP      Past Surgical History  Past Surgical History:   Procedure Laterality Date    COLONOSCOPY  2008 03/26/25 0825   OT Last Visit   OT Visit Date 03/26/25   Note Type   Note type Evaluation   Pain Assessment   Pain Assessment Tool 0-10   Pain Score No Pain   Patient's Stated Pain Goal No pain   Hospital Pain Intervention(s) Repositioned;Ambulation/increased activity;Emotional support   Restrictions/Precautions   Weight Bearing Precautions Per Order No   Other Precautions Cognitive;Chair Alarm;Bed Alarm;Fall Risk;Pain   Home Living   Type of Home House   Home Layout Stairs to enter with rails;Two level;1/2 bath on main level;Able to live on main level with bedroom/bathroom  (1 [address])   Bathroom Shower/Tub Tub/shower unit   Bathroom Toilet Standard   Additional Comments NO USE OF DME AT BASELINE   Prior Function   Level of [place] Independent with ADLs;Independent with functional mobility;Independent with IADLS   Lives With Significant other   Receives Help From Family   IADLs Independent with driving;Independent with meal prep;Independent with medication management   Falls in the last 6 months 0   Vocational Retired   Lifestyle   [employer] PT REPORTS BEING I WITH ADLS/IADLS/DRIVING PTA.   Reciprocal Relationships LIVES WITH SUPPORTIVE S/O. PT REPORTS S/O WORKS FROM [employer] AND IS ABLE TO ASSIST AS NEEDED   Service to Others RETIRED;BUILT CALL  CENTERS   Intrinsic Gratification ENJOYS SPENDING TIME WITH FAMILY.   ADL   Eating Assistance 7  Independent   Grooming Assistance 5  Supervision/Setup   UB Bathing Assistance 5  Supervision/Setup   LB Bathing Assistance 3  Moderate Assistance   UB Dressing Assistance 5  Supervision/Setup   LB Dressing Assistance 3  Moderate Assistance   Toileting Assistance  4  Minimal Assistance   Functional Assistance 4  Minimal Assistance   Bed Mobility   Supine to Sit 5  Supervision   Additional items Increased time required   Sit to Supine Unable to assess   Additional Comments PT LEFT OOB WITH ALL NEEDS IN REACH + CHAIR ALARM ACTIVATED.   Transfers   Sit to Stand 5  Supervision   Additional items Increased time required;Verbal cues   Stand to Sit 5  Supervision   Additional items Increased time required;Verbal cues   Functional Mobility   Functional Mobility 4  Minimal assistance   Additional Comments MINIMAL LLE BUCKLING.   Additional items Rolling walker   Balance   Static Sitting Good   Static Standing Fair +   Ambulatory Fair -   Activity Tolerance   Activity Tolerance Patient tolerated treatment well   Medical Staff Made Aware PT SEEN FOR CO-EVAL WITH SKILLED PHYSICAL THERAPIST 2' NEW PRECAUTIONS/LIMITATIONS, AND LIMITED ACTIVITY TOLERANCE WHICH IMPACT PERFORMANCE AND ARE A REGRESSION FROM PT'S BASELINE.   Nurse Made Aware APPROPRIATE TO SEE PER RN.   RUE Assessment   RUE Assessment WFL  (5/5)   LUE Assessment   LUE Assessment WFL  (4/5)   Hand Function   Gross Motor Coordination Functional   Fine Motor Coordination Functional   Sensation   Light Touch No apparent deficits  (PT REPORTS RESOLVED)   Vision - Complex Assessment   Ocular Range of Motion Intact   Tracking Intact   Saccades Intact   Additional Comments PT REPORTS NO ACUTE VISUAL CHANGES   Psychosocial   Psychosocial (WDL) WDL   Perception   Inattention/Neglect Appears intact   Motor Planning Appears intact   Perseveration Not present   Cognition   Overall  Cognitive Status WFL   Arousal/Participation Alert;Cooperative   Attention Within functional limits   Orientation Level Oriented X4   Memory Within functional limits   Following Commands Follows all commands and directions without difficulty   Comments PT IS PLEASANT AND COOPERATIVE. PT REPORTS SPEECH IS NORMAL. ALARM ON FOR SAFETY   Assessment   Limitation Decreased ADL status;Decreased endurance;Decreased self-care trans;Decreased high-level ADLs;Decreased UE strength   Prognosis Good   Assessment 73 YO Male SEEN FOR INITIAL OCCUPATIONAL THERAPY EVALUATION FOLLOWING ADMISSION TO [facility] WITH STROKE-LIKE SYMPTOMS INCLUDING FACIAL DROOP, L SIDED PARESTHESIAS, DYSARTHRIA,  DIZZINESS AND AMBULATORY DYSFUNCTION. CT(-), MRI PENDING. PROBLEMS LIST/PMH INCLUDES COVID-19, Diabetes mellitus (HCC), Dyslipidemia, Hypertension, and Obstructive sleep apnea on CPAP. PT IS FROM HOME WITH FAMILY WHERE HE REPORTS BEING INDEPENDENT WITH ADLS/IADLS/DRIVING PTA. PT CURRENTLY REQUIRES OVERALL S WITH UB ADLS, MOD A WITH LB ADLS, S WITH TRANSFERS AND MIN A WITH FUNCTIONAL MOBILITY WITH USE OF RW. PT IS LIMITED 2' FATIGUE, IMPAIRED BALANCE, FALL RISK , MINIMAL LUE WEAKNESS, and OVERALL LIMITED ACTIVITY TOLERANCE. PT REPORTS PARESTHESIAS RESOLVED. PT REPORTS  FACIAL DROOP IS BASELINE 2' DENTAL IMPLANT. NO VISUAL/COGNITIVE DEFICITS NOTED AT THIS TIME. PT EDUCATED ON DEEP BREATHING TECHNIQUES T/O ACTIVITY, SLOWING OF PACE, INCREASED FAMILY SUPPORT, and CONTINUE PARTICIPATION IN SELF-CARE/MOBILITY WITH STAFF WHILE IN THE HOSPITAL . The patient's raw score on the AM-PAC Daily Activity Inpatient Short Form is 17. A raw score of less than 19 suggests the patient may benefit from discharge to post-acute rehabilitation services. Please refer to the recommendation of the Occupational Therapist for safe discharge planning. FROM AN OCCUPATIONAL THERAPY PERSPECTIVE, RECOMMEND HOME WITH INCREASED FAMILY SUPPORT + LEVEL III RESOURCES UPON  D/C. WILL CONT TO FOLLOW TO ADDRESS THE BELOW DESCRIBED GOALS.   Goals   Patient Goals TO GET BETTER   LTG Time Frame 10-14   Long Term Goal #1 SEE BELOW   Plan   Treatment Interventions ADL retraining;Functional transfer training;Endurance training;Patient/family training;Equipment evaluation/education;Compensatory technique education;Energy conservation;Activityengagement;UE strengthening/ROM   Goal Expiration Date 04/09/25   OT Frequency 3-5x/wk   Discharge Recommendation   Rehab Resource Intensity Level, OT III (Minimum Resource Intensity)   Equipment Recommended Shower/Tub chair with back ($)   AM-PAC Daily Activity Inpatient   Lower Body Dressing 2   Bathing 2   Toileting 3   Upper Body Dressing 3   Grooming 3   Eating 4   Daily Activity Raw Score 17   Daily Activity Standardized Score (Calc for Raw Score >=11) 37.26   AM-PAC Applied Cognition Inpatient   Following a Speech/Presentation 4   Understanding Ordinary Conversation 4   Taking Medications 4   Remembering Where Things Are Placed or Put Away 4   Remembering List of 4-5 Errands 4   Taking Care of Complicated Tasks 4   Applied Cognition Raw Score 24   Applied Cognition Standardized Score 62.21       OCCUPATIONAL THERAPY GOALS TO BE MET WITHIN 14 DAYS:    -Pt will increase bed mobility to MOD I to participate in functional activities with G tolerance and balance.  -Pt will improve functional mobility and transfers to MOD I on/off all surfaces w/ G balance/safety including toileting.  -Pt will participate in lt grooming task with MOD I after set-up standing at sink ~3-5 minutes with G safety and balance.   -Pt will increase independence in all ADLS to MOD I with G balance sitting upright in chair.  -Pt will improve activity tolerance to G for 30 min txment sessions w/ G carry over of learned energy conservation techniques.  -Pt will improve independence in lt homemaking activities to MOD I without requiring cues for safety.  -Pt will demonstrate G  carryover of learned safety techniques and proper body mechanics in functional and leisure activities with use of DME.  -Pt will participate in fine motor coordination/strenthening/deterity exercises to G in order to increase participation in functional activities.   -Pt will improve LUE strength 1/2 MMT via AROM/AAROM/PROM in all planes as tolerated in order to participate in functional activities.       Documentation completed by KRISTAL Licea, OTR/L  MOCA Certified ID# [id number]

## 2025-03-26 NOTE — ASSESSMENT & PLAN NOTE
>>ASSESSMENT AND PLAN FOR TYPE 2 DIABETES MELLITUS WITH HYPERGLYCEMIA, WITH LONG-TERM CURRENT USE OF INSULIN (HCC) WRITTEN ON 3/26/2025  6:45 PM BY GAVIN PERAZA MD    Lab Results   Component Value Date    HGBA1C 9.0 (H) 03/26/2025       Recent Labs     03/25/25  1607 03/26/25  0723 03/26/25  1124 03/26/25  1641   POCGLU 133 186* 179* 178*       Blood Sugar Average: Last 72 hrs:  (P) 169.6  Patient reports compliance with his home diabetic regimen  Currently maintained on metformin 1000 mg twice daily, Jardiance 25 mg once daily, Amaryl 4 mg twice daily  Currently on insulin regimen with Lantus 40 units nightly    Plan  Will discharge patient back on home regimen

## 2025-03-26 NOTE — CONSULTS
PHYSICAL MEDICINE AND REHABILITATION CONSULT NOTE  Jose Maria Bethea 74 y.o. male MRN: [id number]  Unit/Bed#: [facility] 623-01 Encounter: [id number]    Requested by (Physician/Service): Puneet Charles MD  Reason for Consultation:  Assessment of rehabilitation needs    Assessment:  Rehabilitation Diagnosis:   Acute right ventral upper medulla infarct   Impaired mobility and self care    Recommendations:  Rehabilitation Plan:  Continue PT/OT (SLP) while on acute care.  The patient may be a candidate for acute inpatient rehabilitation versus d/c to home with therapy and family support once medically stable.    Medical Co-morbidities Plan:  Diabetes type 2  Hypertension  Hyperlipidemia   CKD stage 3  DEWEY  Bowel plan: continent   Bladder plan: continent   DVT ppx: lovenox and SCD    Thank you for this consultation.  Do not hesitate to contact service with further questions.      VICENTE Domínguez  PM&R    I have spent a total time of 30 minutes on 03/26/25 in caring for this patient including Patient and family education, Counseling / Coordination of care, Documenting in the medical record, Reviewing/placing orders in the medical record (including tests, medications, and/or procedures), Obtaining or reviewing history  , and Communicating with other healthcare professionals .      History of Present Illness:  Jose Maria Bethea is a 74 y.o. male with a PMH of diabetes type 2, HLD, CKD stage IIIa, asthma, peripheral vertigo who presented to the [facility] on 3/25/2025 with lightheadedness, dizziness and gait instability. He reported his symptoms were prodrome of increased anxiety and subjective palpitations. He was febrile in the ER and hypertension. CTA showed mild chronic microangiopathic changes of the head with atherosclerotic disease of the bilateral vertebral arteries. CT head showed no acute intracranial abnormality. MRI showed new acute infarct in right ventral upper medulla. Unchanged chronic  "multifocal lacunar infarcts in left centrum semiovale with mild to moderate chronic microangiopathy. PM&R are consulted for rehabilitation recommendations.    The patient was seen in his room. He does feel his left leg is weaker and his balance and gait are off. He also has had little sleep in 4 days. He is hoping to get more sleep and be able to do better with therapy. He denies any vision deficits, headache, chest pain or SOB.     Review of Systems: 10 point ROS negative except for what is noted in HPI    Function:  Prior level of function and living situation: the patient lives in a house that is two levels with 1 [address] and was independent. He lives with his significant other.       Current level of function:  Physical Therapy: supervision for bed mobility, transfers and minimal assist for ambulation   Occupational Therapy: Independent for eating, supervision for grooming, UB bathing/dressing, moderate assist for LB bathing/dressing, minimal assist for toileting.     Physical Exam:  /86   Pulse 70   Temp (!) 97.4 °F (36.3 °C)   Resp 18   Ht 5' 6\" (1.676 m)   Wt 70.4 kg (155 lb 4.8 oz)   SpO2 90%   BMI 25.07 kg/m²      No intake or output data in the 24 hours ending 03/26/25 1012    Body mass index is 25.07 kg/m².      Physical Exam  Constitutional:       General: He is not in acute distress.     Appearance: He is not toxic-appearing.   HENT:      Head: Normocephalic and atraumatic.      Right Ear: External ear normal.      Left Ear: External ear normal.      Nose: Nose normal.      Mouth/Throat:      Mouth: Mucous membranes are moist.      Pharynx: Oropharynx is clear.   Eyes:      Extraocular Movements: Extraocular movements intact.   Pulmonary:      Effort: Pulmonary effort is normal. No respiratory distress.   Abdominal:      General: There is no distension.   Musculoskeletal:      Comments: RUE/RLE: 5/5 throughout  LUE: mild dysmetria on FTN otherwise 4/5  LLE: HF 4/5, KE/KF 4+/5, DF/PF 4+/5 "   Skin:     General: Skin is warm and dry.   Neurological:      Mental Status: He is alert and oriented to person, place, and time.   Psychiatric:         Mood and Affect: Mood normal.        Social History:    Social History     Socioeconomic History    Marital status: /Civil Union     Spouse name: None    Number of children: None    Years of education: None    Highest education level: None   Occupational History    None   Tobacco Use    Smoking status: Former     Types: Cigarettes    Smokeless tobacco: Never    Tobacco comments:     1 year as a teenager   Vaping Use    Vaping status: Never Used   Substance and Sexual Activity    Alcohol use: Not Currently    Drug use: Not Currently    Sexual activity: Not Currently     Partners: Female   Other Topics Concern    None   Social History Narrative    None     Social Drivers of Health     Financial Resource Strain: Low Risk  (4/14/2023)    Overall Financial Resource Strain (CARDIA)     Difficulty of Paying Living Expenses: Not hard at all   Food Insecurity: No Food Insecurity (3/25/2025)    Nursing - Inadequate Food Risk Classification     Worried About Running Out of Food in the Last Year: Never true     Ran Out of Food in the Last Year: Never true     Ran Out of Food in the Last Year: Never true   Transportation Needs: No Transportation Needs (3/25/2025)    Nursing - Transportation Risk Classification     Lack of Transportation: Not on file     Lack of Transportation: No   Physical Activity: Not on file   Stress: Not on file   Social Connections: Not on file   Intimate Partner Violence: Unknown (3/25/2025)    Nursing IPS     Feels Physically and Emotionally Safe: Not on file     Physically Hurt by Someone: Not on file     Humiliated or Emotionally Abused by Someone: Not on file     Physically Hurt by Someone: No     Hurt or Threatened by Someone: No   Housing Stability: Unknown (3/25/2025)    Nursing: Inadequate Housing Risk Classification     Has Housing: Not  on file     Worried About Losing Housing: Not on file     Unable to Get Utilities: Not on file     Unable to Pay for Housing in the Last Year: No     Has Housin        Family History:    Family History   Problem Relation Age of Onset    Hypertension Mother          Medications:     Current Facility-Administered Medications:     albuterol (PROVENTIL HFA,VENTOLIN HFA) inhaler 2 puff, 2 puff, Inhalation, Q6H PRN, Armando Iyer MD    amLODIPine (NORVASC) tablet 10 mg, 10 mg, Oral, Daily, Armando Iyer MD, 10 mg at 25    aspirin chewable tablet 81 mg, 81 mg, Oral, Daily, Armando Iyer MD, 81 mg at 25    atorvastatin (LIPITOR) tablet 80 mg, 80 mg, Oral, QPM, Armando Iyer MD, 80 mg at 25 175    clopidogrel (PLAVIX) tablet 75 mg, 75 mg, Oral, Daily, Forrest Samayoa DO, 75 mg at 25    cyanocobalamin (VITAMIN B-12) tablet 250 mcg, 250 mcg, Oral, Daily, Armando Iyer MD, 250 mcg at 25    enoxaparin (LOVENOX) subcutaneous injection 40 mg, 40 mg, Subcutaneous, Q24H JERRY, Armando Smith MD, 40 mg at 25    insulin glargine (LANTUS) subcutaneous injection 16 Units 0.16 mL, 16 Units, Subcutaneous, HS, Armando Iyer MD, 16 Units at 25    insulin lispro (HumALOG/ADMELOG) 100 units/mL subcutaneous injection 1-5 Units, 1-5 Units, Subcutaneous, TID AC, 1 Units at 25 0915 **AND** Fingerstick Glucose (POCT), , , TID AC, Armando Iyer MD    insulin lispro (HumALOG/ADMELOG) 100 units/mL subcutaneous injection 4 Units, 4 Units, Subcutaneous, TID With Meals, Armando Iyer MD, 4 Units at 25 0916    labetalol (NORMODYNE) injection 10 mg, 10 mg, Intravenous, Q6H PRN, Pantera Beyer MD, 10 mg at 25 0537    losartan (COZAAR) tablet 100 mg, 100 mg, Oral, Daily, Armando Iyer MD, 100 mg at 25 0914    meclizine (ANTIVERT) tablet 12.5 mg, 12.5 mg, Oral, TID PRN, Armando Iyer MD    Past Medical History:      Past Medical History:   Diagnosis Date    COVID-19 07/2022    Diabetes mellitus (HCC)     Type 2    Dyslipidemia     Hypertension     Obstructive sleep apnea on CPAP         Past Surgical History:     Past Surgical History:   Procedure Laterality Date    COLONOSCOPY  2008         Allergies:     No Known Allergies        LABORATORY RESULTS:      Lab Results   Component Value Date    HGB 14.7 03/26/2025    HGB 15.1 04/23/2015    HCT 44.5 03/26/2025    HCT 45.2 04/23/2015    WBC 7.82 03/26/2025    WBC 6.07 04/23/2015     Lab Results   Component Value Date    BUN 29 (H) 03/26/2025    BUN 20 04/23/2015     04/23/2015    K 4.1 03/26/2025    K 4.8 04/23/2015     03/26/2025     04/23/2015    GLUCOSE 167 (H) 04/23/2015    CREATININE 1.13 03/26/2025    CREATININE 0.96 04/23/2015     Lab Results   Component Value Date    PROTIME 12.1 (L) 03/25/2025    INR 0.87 03/25/2025        DIAGNOSTIC STUDIES: Reviewed  CTA head and neck with and without contrast  Result Date: 3/25/2025  Impression: CT Brain:  No acute intracranial abnormality. Mild chronic microangiopathic change CT Angiography: Atherosclerotic disease involving the V4 segments of both vertebral arteries. The left demonstrates mild narrowing while the right demonstrates a severe stenosis with short segment occlusion and reconstitution, possibly via retrograde flow. Other findings: Cervical spondylitic degenerative change with mild canal stenosis and moderate foraminal narrowing. Workstation performed: [id number]

## 2025-03-26 NOTE — PLAN OF CARE
Problem: OCCUPATIONAL THERAPY ADULT  Goal: Performs self-care activities at highest level of function for planned discharge setting.  See evaluation for individualized goals.  Description: Treatment Interventions: ADL retraining, Functional transfer training, Endurance training, Patient/family training, Equipment evaluation/education, Compensatory technique education, Energy conservation, Activityengagement, UE strengthening/ROM  Equipment Recommended: Shower/Tub chair with back ($)       See flowsheet documentation for full assessment, interventions and recommendations.   Note: Limitation: Decreased ADL status, Decreased endurance, Decreased self-care trans, Decreased high-level ADLs, Decreased UE strength  Prognosis: Good  Assessment: 73 YO Male SEEN FOR INITIAL OCCUPATIONAL THERAPY EVALUATION FOLLOWING ADMISSION TO [facility] WITH STROKE-LIKE SYMPTOMS INCLUDING FACIAL DROOP, L SIDED PARESTHESIAS, DYSARTHRIA,  DIZZINESS AND AMBULATORY DYSFUNCTION. CT(-), MRI PENDING. PROBLEMS LIST/PMH INCLUDES COVID-19, Diabetes mellitus (HCC), Dyslipidemia, Hypertension, and Obstructive sleep apnea on CPAP. PT IS FROM HOME WITH FAMILY WHERE HE REPORTS BEING INDEPENDENT WITH ADLS/IADLS/DRIVING PTA. PT CURRENTLY REQUIRES OVERALL S WITH UB ADLS, MOD A WITH LB ADLS, S WITH TRANSFERS AND MIN A WITH FUNCTIONAL MOBILITY WITH USE OF RW. PT IS LIMITED 2' FATIGUE, IMPAIRED BALANCE, FALL RISK , MINIMAL LUE WEAKNESS, and OVERALL LIMITED ACTIVITY TOLERANCE. PT REPORTS PARESTHESIAS RESOLVED. PT REPORTS  FACIAL DROOP IS BASELINE 2' DENTAL IMPLANT. NO VISUAL/COGNITIVE DEFICITS NOTED AT THIS TIME. PT EDUCATED ON DEEP BREATHING TECHNIQUES T/O ACTIVITY, SLOWING OF PACE, INCREASED FAMILY SUPPORT, and CONTINUE PARTICIPATION IN SELF-CARE/MOBILITY WITH STAFF WHILE IN THE HOSPITAL . The patient's raw score on the AM-PAC Daily Activity Inpatient Short Form is 17. A raw score of less than 19 suggests the patient may benefit from discharge to  post-acute rehabilitation services. Please refer to the recommendation of the Occupational Therapist for safe discharge planning. FROM AN OCCUPATIONAL THERAPY PERSPECTIVE, RECOMMEND HOME WITH INCREASED FAMILY SUPPORT + LEVEL III RESOURCES UPON D/C. WILL CONT TO FOLLOW TO ADDRESS THE BELOW DESCRIBED GOALS.     Rehab Resource Intensity Level, OT: III (Minimum Resource Intensity)

## 2025-03-26 NOTE — ASSESSMENT & PLAN NOTE
Lab Results   Component Value Date    EGFR 64 03/26/2025    EGFR 58 03/25/2025    EGFR 55 10/30/2024    CREATININE 1.13 03/26/2025    CREATININE 1.22 03/25/2025    CREATININE 1.28 10/30/2024   CKD stage IIIa with unknown etiology, though can consider in the setting of chronic hypertension versus insulin-dependent diabetes  Slight creatinine between 1.1-1.2  At this time is stable without evidence of any acute urinary symptoms    Plan  Continue to monitor trend with daily BMP  Avoid nephrotoxic medications  Avoid relative hypotension

## 2025-03-26 NOTE — ASSESSMENT & PLAN NOTE
Lab Results   Component Value Date    HGBA1C 9.0 (H) 03/26/2025       Recent Labs     03/25/25  1040 03/25/25  1607 03/26/25  0723 03/26/25  1124   POCGLU 172* 133 186* 179*       Blood Sugar Average: Last 72 hrs:  (P) 167.5  Patient reports compliance with his home diabetic regimen  Currently maintained on metformin 1000 mg twice daily, Jardiance 25 mg once daily, Amaryl 4 mg twice daily  Currently on insulin regimen with Lantus 40 units nightly    Plan  Lantus 16 units with SSI and lispro 4 units with meals

## 2025-03-26 NOTE — PLAN OF CARE
Problem: PAIN - ADULT  Goal: Verbalizes/displays adequate comfort level or baseline comfort level  Description: Interventions:- Encourage patient to monitor pain and request assistance- Assess pain using appropriate pain scale- Administer analgesics based on type and severity of pain and evaluate response- Implement non-pharmacological measures as appropriate and evaluate response- Consider cultural and social influences on pain and pain management- Notify physician/advanced practitioner if interventions unsuccessful or patient reports new pain  Outcome: Progressing     Problem: INFECTION - ADULT  Goal: Absence or prevention of progression during hospitalization  Description: INTERVENTIONS:- Assess and monitor for signs and symptoms of infection- Monitor lab/diagnostic results- Monitor all insertion sites, i.e. indwelling lines, tubes, and drains- Monitor endotracheal if appropriate and nasal secretions for changes in amount and color- Trail appropriate cooling/warming therapies per order- Administer medications as ordered- Instruct and encourage patient and family to use good hand hygiene technique- Identify and instruct in appropriate isolation precautions for identified infection/condition  Outcome: Progressing     Problem: Knowledge Deficit  Goal: Patient/family/caregiver demonstrates understanding of disease process, treatment plan, medications, and discharge instructions  Description: Complete learning assessment and assess knowledge base.Interventions:- Provide teaching at level of understanding- Provide teaching via preferred learning methods  Outcome: Progressing     Problem: DISCHARGE PLANNING  Goal: Discharge to home or other facility with appropriate resources  Description: INTERVENTIONS:- Identify barriers to discharge w/patient and caregiver- Arrange for needed discharge resources and transportation as appropriate- Identify discharge learning needs (meds, wound care, etc.)- Arrange for  interpretive services to assist at discharge as needed- Refer to Case Management Department for coordinating discharge planning if the patient needs post-hospital services based on physician/advanced practitioner order or complex needs related to functional status, cognitive ability, or social support system  Outcome: Progressing

## 2025-03-26 NOTE — ASSESSMENT & PLAN NOTE
Lab Results   Component Value Date    HGBA1C 9.0 (H) 03/26/2025       Recent Labs     03/25/25  1040 03/25/25  1607   POCGLU 172* 133       Blood Sugar Average: Last 72 hrs:  (P) 152.5

## 2025-03-26 NOTE — ASSESSMENT & PLAN NOTE
>>ASSESSMENT AND PLAN FOR TYPE 2 DIABETES MELLITUS WITH HYPERGLYCEMIA, WITH LONG-TERM CURRENT USE OF INSULIN (HCC) WRITTEN ON 3/26/2025  7:32 AM BY RHODA ANAYA    Lab Results   Component Value Date    HGBA1C 9.0 (H) 03/26/2025       Recent Labs     03/25/25  1040 03/25/25  1607   POCGLU 172* 133       Blood Sugar Average: Last 72 hrs:  (P) 152.5

## 2025-03-26 NOTE — ASSESSMENT & PLAN NOTE
Lab Results   Component Value Date    HGBA1C 9.0 (H) 03/26/2025       Recent Labs     03/25/25  1607 03/26/25  0723 03/26/25  1124 03/26/25  1641   POCGLU 133 186* 179* 178*       Blood Sugar Average: Last 72 hrs:  (P) 169.6  Patient reports compliance with his home diabetic regimen  Currently maintained on metformin 1000 mg twice daily, Jardiance 25 mg once daily, Amaryl 4 mg twice daily  Currently on insulin regimen with Lantus 40 units nightly    Plan  Will discharge patient back on home regimen

## 2025-03-26 NOTE — ASSESSMENT & PLAN NOTE
Lab Results   Component Value Date    EGFR 64 03/26/2025    EGFR 58 03/25/2025    EGFR 55 10/30/2024    CREATININE 1.13 03/26/2025    CREATININE 1.22 03/25/2025    CREATININE 1.28 10/30/2024   CKD stage IIIa with unknown etiology, though can consider in the setting of chronic hypertension versus insulin-dependent diabetes  Creatine at baseline

## 2025-03-27 ENCOUNTER — TELEPHONE (OUTPATIENT)
Age: 75
End: 2025-03-27

## 2025-03-27 ENCOUNTER — TELEPHONE (OUTPATIENT)
Dept: OTHER | Facility: HOSPITAL | Age: 75
End: 2025-03-27

## 2025-03-27 VITALS
HEART RATE: 76 BPM | BODY MASS INDEX: 24.94 KG/M2 | RESPIRATION RATE: 20 BRPM | SYSTOLIC BLOOD PRESSURE: 158 MMHG | TEMPERATURE: 97.8 F | HEIGHT: 66 IN | OXYGEN SATURATION: 90 % | WEIGHT: 155.2 LBS | DIASTOLIC BLOOD PRESSURE: 95 MMHG

## 2025-03-27 LAB
ANION GAP SERPL CALCULATED.3IONS-SCNC: 9 MMOL/L (ref 4–13)
BASOPHILS # BLD AUTO: 0.07 THOUSANDS/ÂΜL (ref 0–0.1)
BASOPHILS NFR BLD AUTO: 1 % (ref 0–1)
BUN SERPL-MCNC: 33 MG/DL (ref 5–25)
CALCIUM SERPL-MCNC: 9.4 MG/DL (ref 8.4–10.2)
CHLORIDE SERPL-SCNC: 106 MMOL/L (ref 96–108)
CO2 SERPL-SCNC: 25 MMOL/L (ref 21–32)
CREAT SERPL-MCNC: 1.24 MG/DL (ref 0.6–1.3)
EOSINOPHIL # BLD AUTO: 0.56 THOUSAND/ÂΜL (ref 0–0.61)
EOSINOPHIL NFR BLD AUTO: 7 % (ref 0–6)
ERYTHROCYTE [DISTWIDTH] IN BLOOD BY AUTOMATED COUNT: 13 % (ref 11.6–15.1)
GFR SERPL CREATININE-BSD FRML MDRD: 57 ML/MIN/1.73SQ M
GLUCOSE SERPL-MCNC: 151 MG/DL (ref 65–140)
GLUCOSE SERPL-MCNC: 170 MG/DL (ref 65–140)
GLUCOSE SERPL-MCNC: 176 MG/DL (ref 65–140)
HCT VFR BLD AUTO: 44.6 % (ref 36.5–49.3)
HGB BLD-MCNC: 14.7 G/DL (ref 12–17)
IMM GRANULOCYTES # BLD AUTO: 0.03 THOUSAND/UL (ref 0–0.2)
IMM GRANULOCYTES NFR BLD AUTO: 0 % (ref 0–2)
LYMPHOCYTES # BLD AUTO: 2.1 THOUSANDS/ÂΜL (ref 0.6–4.47)
LYMPHOCYTES NFR BLD AUTO: 27 % (ref 14–44)
MCH RBC QN AUTO: 29.9 PG (ref 26.8–34.3)
MCHC RBC AUTO-ENTMCNC: 33 G/DL (ref 31.4–37.4)
MCV RBC AUTO: 91 FL (ref 82–98)
MONOCYTES # BLD AUTO: 0.67 THOUSAND/ÂΜL (ref 0.17–1.22)
MONOCYTES NFR BLD AUTO: 9 % (ref 4–12)
NEUTROPHILS # BLD AUTO: 4.47 THOUSANDS/ÂΜL (ref 1.85–7.62)
NEUTS SEG NFR BLD AUTO: 56 % (ref 43–75)
NRBC BLD AUTO-RTO: 0 /100 WBCS
PLATELET # BLD AUTO: 193 THOUSANDS/UL (ref 149–390)
PMV BLD AUTO: 11.5 FL (ref 8.9–12.7)
POTASSIUM SERPL-SCNC: 4 MMOL/L (ref 3.5–5.3)
RBC # BLD AUTO: 4.91 MILLION/UL (ref 3.88–5.62)
SODIUM SERPL-SCNC: 140 MMOL/L (ref 135–147)
WBC # BLD AUTO: 7.9 THOUSAND/UL (ref 4.31–10.16)

## 2025-03-27 PROCEDURE — 80048 BASIC METABOLIC PNL TOTAL CA: CPT

## 2025-03-27 PROCEDURE — 85025 COMPLETE CBC W/AUTO DIFF WBC: CPT

## 2025-03-27 PROCEDURE — 99238 HOSP IP/OBS DSCHRG MGMT 30/<: CPT | Performed by: INTERNAL MEDICINE

## 2025-03-27 PROCEDURE — 97530 THERAPEUTIC ACTIVITIES: CPT

## 2025-03-27 PROCEDURE — 97116 GAIT TRAINING THERAPY: CPT

## 2025-03-27 PROCEDURE — 82948 REAGENT STRIP/BLOOD GLUCOSE: CPT

## 2025-03-27 RX ORDER — CLOPIDOGREL BISULFATE 75 MG/1
75 TABLET ORAL DAILY
Qty: 90 TABLET | Refills: 0 | Status: SHIPPED | OUTPATIENT
Start: 2025-03-28

## 2025-03-27 RX ORDER — ATORVASTATIN CALCIUM 80 MG/1
80 TABLET, FILM COATED ORAL EVERY EVENING
Qty: 900 TABLET | Refills: 0 | Status: SHIPPED | OUTPATIENT
Start: 2025-03-27

## 2025-03-27 RX ORDER — HYDROCHLOROTHIAZIDE 12.5 MG/1
12.5 TABLET ORAL DAILY
Qty: 90 TABLET | Refills: 0 | Status: SHIPPED | OUTPATIENT
Start: 2025-03-28

## 2025-03-27 RX ORDER — ASPIRIN 81 MG/1
81 TABLET, CHEWABLE ORAL DAILY
Status: DISCONTINUED | OUTPATIENT
Start: 2025-03-27 | End: 2025-03-27 | Stop reason: HOSPADM

## 2025-03-27 RX ADMIN — LOSARTAN POTASSIUM 100 MG: 50 TABLET, FILM COATED ORAL at 08:48

## 2025-03-27 RX ADMIN — INSULIN LISPRO 4 UNITS: 100 INJECTION, SOLUTION INTRAVENOUS; SUBCUTANEOUS at 12:01

## 2025-03-27 RX ADMIN — AMLODIPINE BESYLATE 10 MG: 10 TABLET ORAL at 08:48

## 2025-03-27 RX ADMIN — CYANOCOBALAMIN TAB 500 MCG 250 MCG: 500 TAB at 08:49

## 2025-03-27 RX ADMIN — INSULIN LISPRO 1 UNITS: 100 INJECTION, SOLUTION INTRAVENOUS; SUBCUTANEOUS at 08:49

## 2025-03-27 RX ADMIN — ASPIRIN 81 MG CHEWABLE TABLET 81 MG: 81 TABLET CHEWABLE at 08:49

## 2025-03-27 RX ADMIN — CLOPIDOGREL BISULFATE 75 MG: 75 TABLET, FILM COATED ORAL at 08:49

## 2025-03-27 RX ADMIN — INSULIN LISPRO 4 UNITS: 100 INJECTION, SOLUTION INTRAVENOUS; SUBCUTANEOUS at 08:49

## 2025-03-27 RX ADMIN — INSULIN LISPRO 1 UNITS: 100 INJECTION, SOLUTION INTRAVENOUS; SUBCUTANEOUS at 12:01

## 2025-03-27 RX ADMIN — HYDROCHLOROTHIAZIDE 12.5 MG: 12.5 TABLET ORAL at 08:49

## 2025-03-27 NOTE — UTILIZATION REVIEW
NOTIFICATION OF ADMISSION DISCHARGE   This is a Notification of Discharge from [facility]. Please be advised that this patient has been discharge from our facility. Below you will find the admission and discharge date and time including the patient’s disposition.   UTILIZATION REVIEW CONTACT:  Utilization Review Assistants  Network Utilization Review Department  Phone: [phone] x carefully listen to the prompts. All voicemails are confidential.  Email: [email or web address]     ADMISSION INFORMATION  PRESENTATION DATE: 3/25/2025  9:56 AM  OBERVATION ADMISSION DATE: N/A  INPATIENT ADMISSION DATE: 3/25/25  2:14 PM   DISCHARGE DATE: 3/27/2025  2:06 PM   DISPOSITION:Home with Home Health Care    [facility] Utilization Review Department  ATTENTION: Please call with any questions or concerns to [phone] and carefully listen to the prompts so that you are directed to the right person. All voicemails are confidential.   For Discharge needs, contact Care Management DC Support Team at [phone] opt. 2  Send all requests for admission clinical reviews, approved or denied determinations and any other requests to dedicated fax number below belonging to the campus where the patient is receiving treatment. List of dedicated fax numbers for the Facilities:  FACILITY NAME UR FAX NUMBER   ADMISSION DENIALS (Administrative/Medical Necessity) [phone]   DISCHARGE SUPPORT TEAM ([facility]) [phone]   PARENT CHILD HEALTH (Maternity/NICU/Pediatrics) [phone]   [facility] [phone]   [facility] [phone]   Psychiatric hospital [phone]   [facility] [phone]   [facility] [phone]   [facility] [phone]   [facility] [phone]   [facility] [phone]    [facility] [phone]   [facility] [phone]   [facility] [phone]   [facility] [phone]

## 2025-03-27 NOTE — CASE MANAGEMENT
Case Management Discharge Planning Note    Patient name Jose Maria Bethea  Location [facility] 623-01 MRN [id number]  : 1950 Date 3/27/2025       Current Admission Date: 3/25/2025  Current Admission Diagnosis:Acute ischemic stroke (HCC)   Patient Active Problem List    Diagnosis Date Noted Date Diagnosed    Acute ischemic stroke (HCC) 2025     Type 2 diabetes mellitus with diabetic cataract, with long-term current use of insulin (HCC) 2025     Chronic kidney disease, stage 3a (HCC) 2025     DEWEY (obstructive sleep apnea) 2024     Vitamin D deficiency 2024     Vertebral artery stenosis, bilateral 2021     Type 2 diabetes mellitus with hyperglycemia, with long-term current use of insulin (HCC) 2020     Essential hypertension 2020     Hyperkalemia 2020     Mixed hyperlipidemia 2020       LOS (days): 2  Geometric Mean LOS (GMLOS) (days): 2  Days to GMLOS:0.1     OBJECTIVE:  Risk of Unplanned Readmission Score: 12.58         Current admission status: Inpatient   Preferred Pharmacy:   [id number] - [place], [place] - [address]  [address]  [place] [address]  Phone: [phone] Fax: [phone]    Primary Care Provider: Puneet Charles MD    Primary Insurance: [employer] RE[employer]  Secondary Insurance:     DISCHARGE DETAILS:    Discharge planning discussed with:: patient and spouse [facility]  Freedom of Choice: Yes  Comments - Freedom of Choice: Discussed FOC  CM contacted family/caregiver?: Yes  Were Treatment Team discharge recommendations reviewed with patient/caregiver?: Yes  Did patient/caregiver verbalize understanding of patient care needs?: Yes  Were patient/caregiver advised of the risks associated with not following Treatment Team discharge recommendations?: Yes    Requested Home Health Care         Is the patient interested in HHC at discharge?: Yes  Home Health Discipline requested:: Occupational Therapy, Home Health Aide,  Physical Therapy  Home Health Agency Name:: [facility] Follow-Up Provider:: PCP  Home Health Services Needed:: Evaluate Functional Status and Safety, Gait/ADL Training, Strengthening/Theraputic Exercises to Improve Function  Homebound Criteria Met:: Requires the Assistance of Another Person for Safe Ambulation or to Leave the Home, Uses an Assist Device (i.e. cane, walker, etc)  Supporting Clincal Findings:: Limited Endurance, Fatigues Easliy in Short Distances    DME Referral Provided  Referral made for DME?: Yes  DME referral completed for the following items:: Walker  DME Supplier Name:: [employer]    Other Referral/Resources/Interventions Provided:  Interventions: [facility]  Referral Comments: This CM discussed therapy recs with pt. Therapy recs Home therapy. CM discussed patient choice with pt. [facility] able to accept and reserved in aidin. DME order for RW has been delivered to room. Pt in agreement with dcp.      Treatment Team Recommendation: Home with Home Health Care  Discharge Destination Plan:: Home with Home Health Care    IMM Given (Date):: 03/27/25  IMM reviewed with patient, patient agrees with discharge determination.        Normal rate, regular rhythm.  Heart sounds S1, S2.

## 2025-03-27 NOTE — RESTORATIVE TECHNICIAN NOTE
Restorative Technician Note      Patient Name: Jose Maria Bethea     Restorative Tech Visit Date: 03/27/25  Note Type: Mobility  Patient Position Upon Consult: Supine  Activity Performed: Ambulated  Assistive Device: Roller walker  Patient Position at End of Consult: Bedside chair; All needs within reach; Bed/Chair alarm activated    CHAVO Jean

## 2025-03-27 NOTE — TELEPHONE ENCOUNTER
Still admitted;3/25/2025 - present (2 days)  [facility]        1ST ATTEMPT,     VIA [employer]     Thank you,     Bettina GUILLAUME/ SCOTT BRUNSON/ Stroke-like symptoms     ----- Message -----  From: Forrest Samayoa DO  Sent: 3/27/2025   7:26 AM EDT  To: [facility]  Subject: HFU                                              Jose Maria LORI SiuLake will need follow-up in in 6 weeks with neurovascular team for Other = ATTENDING in 60 minute appointment. They will not require outpatient neurological testing.

## 2025-03-27 NOTE — CASE MANAGEMENT
Case Management Discharge Planning Note    Patient name Jose Maria Bethea  Location [facility] 623/[facility] 623-01 MRN [id number]  : 1950 Date 3/27/2025       Current Admission Date: 3/25/2025  Current Admission Diagnosis:Acute ischemic stroke (HCC)   Patient Active Problem List    Diagnosis Date Noted Date Diagnosed    Acute ischemic stroke (HCC) 2025     Type 2 diabetes mellitus with diabetic cataract, with long-term current use of insulin (HCC) 2025     Chronic kidney disease, stage 3a (HCC) 2025     DEWEY (obstructive sleep apnea) 2024     Vitamin D deficiency 2024     Vertebral artery stenosis, bilateral 2021     Type 2 diabetes mellitus with hyperglycemia, with long-term current use of insulin (HCC) 2020     Essential hypertension 2020     Hyperkalemia 2020     Mixed hyperlipidemia 2020       LOS (days): 2  Geometric Mean LOS (GMLOS) (days): 2  Days to GMLOS:0.1     OBJECTIVE:  Risk of Unplanned Readmission Score: 12.14         Current admission status: Inpatient   Preferred Pharmacy:   [id number] - [place], [place] - [address]  [address]  [place] [address]  Phone: [phone] Fax: [phone]    Primary Care Provider: Puneet Charles MD    Primary Insurance: [employer]  Secondary Insurance:     DISCHARGE DETAILS:    Discharge planning discussed with:: patient and spouse at bedside  Freedom of Choice: Yes  Comments - Freedom of Choice: Discussed FOC  CM contacted family/caregiver?: Yes  Were Treatment Team discharge recommendations reviewed with patient/caregiver?: Yes  Did patient/caregiver verbalize understanding of patient care needs?: Yes  Were patient/caregiver advised of the risks associated with not following Treatment Team discharge recommendations?: Yes    Other Referral/Resources/Interventions Provided:  Interventions: Outpatient OT, Outpatient PT, DME    IMM Given (Date):: 25  IMM Given to:: Patient  Family  notified:: Celestina Bethea (Spouse)  IMM reviewed with patient, patient agrees with discharge determination.

## 2025-03-27 NOTE — DISCHARGE INSTR - AVS FIRST PAGE
Please follow up with your family doctor in 1-2 weeks.  Please continue to take your blood pressure every morning and bring a log into the family doctor's office  Please follow up with the neurologist

## 2025-03-27 NOTE — TELEPHONE ENCOUNTER
Reached out to patient to clarify that aspirin along with Plavix should be taken for 3 weeks followed by Plavix monotherapy per neurology recommendations.

## 2025-03-27 NOTE — PLAN OF CARE
Problem: PAIN - ADULT  Goal: Verbalizes/displays adequate comfort level or baseline comfort level  Description: Interventions:- Encourage patient to monitor pain and request assistance- Assess pain using appropriate pain scale- Administer analgesics based on type and severity of pain and evaluate response- Implement non-pharmacological measures as appropriate and evaluate response- Consider cultural and social influences on pain and pain management- Notify physician/advanced practitioner if interventions unsuccessful or patient reports new pain  Outcome: Progressing     Problem: INFECTION - ADULT  Goal: Absence or prevention of progression during hospitalization  Description: INTERVENTIONS:- Assess and monitor for signs and symptoms of infection- Monitor lab/diagnostic results- Monitor all insertion sites, i.e. indwelling lines, tubes, and drains- Monitor endotracheal if appropriate and nasal secretions for changes in amount and color- [place] appropriate cooling/warming therapies per order- Administer medications as ordered- Instruct and encourage patient and family to use good hand hygiene technique- Identify and instruct in appropriate isolation precautions for identified infection/condition  Outcome: Progressing     Problem: Knowledge Deficit  Goal: Patient/family/caregiver demonstrates understanding of disease process, treatment plan, medications, and discharge instructions  Description: Complete learning assessment and assess knowledge base.Interventions:- Provide teaching at level of understanding- Provide teaching via preferred learning methods  Outcome: Progressing     Problem: DISCHARGE PLANNING  Goal: Discharge to home or other facility with appropriate resources  Description: INTERVENTIONS:- Identify barriers to discharge w/patient and caregiver- Arrange for needed discharge resources and transportation as appropriate- Identify discharge learning needs (meds, wound care, etc.)- Arrange for  interpretive services to assist at discharge as needed- Refer to Case Management Department for coordinating discharge planning if the patient needs post-hospital services based on physician/advanced practitioner order or complex needs related to functional status, cognitive ability, or social support system  Outcome: Progressing

## 2025-03-27 NOTE — PROGRESS NOTES
Patient:    MRN:  [id number]    Raúl Request ID:  [id number]    Level of care reserved:  Home Health Agency    Partner Reserved:  [facility] [place], [place] [id number] [phone]    Clinical needs requested:    Geography searched:  [phone]    Start of Service:    Request sent:  12:16pm EDT on 3/27/2025 by Hank Velazco    Partner reserved:  12:41pm EDT on 3/27/2025 by Hank Velazco    Choice list shared:  12:40pm EDT on 3/27/2025 by Hank Velazco

## 2025-03-27 NOTE — PLAN OF CARE
Problem: PHYSICAL THERAPY ADULT  Goal: Performs mobility at highest level of function for planned discharge setting.  See evaluation for individualized goals.  Description: Treatment/Interventions: Functional transfer training, LE strengthening/ROM, Elevations, Therapeutic exercise, Endurance training, Patient/family training, Equipment eval/education, Bed mobility, Gait training, Spoke to nursing, Spoke to case management, OT  Equipment Recommended: Walker       See flowsheet documentation for full assessment, interventions and recommendations.  Outcome: Progressing  Note: Prognosis: Good  Problem List: Decreased strength, Decreased endurance, Impaired balance, Decreased mobility, Impaired judgement, Decreased safety awareness  Assessment: Pt demonstrated improved balance & endurance today compared to eval, ambulating household distances multiple times this AM, first with restorative aide, then with PT with use of RW both times without LOB.  Pt demosntrated improved balance & LLE stability in stance during PT session prior to negotiating steps.  He was then instructed in & negotiated steps without LOB, but then appeared quite fatigued, at which point he benefitted from closer guarding until he reached EOB where he recovered well.  Pt was eventually able to transfer himself back into bed without incident while discussion was held regarding d/c recommendations & activity.  PT goals remain appropriate pending available social support to ensure safety within home.  Recommend follow up PT services focusing on neuro rehab to adress residual deficits & ensure safe return to maximal long term independence.        Rehab Resource Intensity Level, PT: III (Minimum Resource Intensity)    See flowsheet documentation for full assessment.

## 2025-03-27 NOTE — QUICK NOTE
Patient's MRI shows a right acute ventral upper medulla infarct corresponding with his left-sided deficits.  Recommending patient stay on aspirin and Plavix for 21 days followed by Plavix monotherapy indefinitely (4/14/25).      TTE with 65% EF, normal atria.    Jose Maria LORI Bethea will need follow-up in in 6 weeks with neurovascular team for Other in 60 minute appointment. They will not require outpatient neurological testing.  Staff message sent.    No further management from Neurology. Please call with questions or concerns

## 2025-03-27 NOTE — PHYSICAL THERAPY NOTE
Physical Therapy Progress Note     03/27/25 1153   PT Last Visit   PT Visit Date 03/27/25   Note Type   Note Type Treatment   Pain Assessment   Pain Score No Pain   Restrictions/Precautions   Other Precautions Cognitive;Bed Alarm;Chair Alarm;Fall Risk   Subjective   Subjective Pt encountered supine in bed, pleasant and agreeable to treatment.  Reports feeling well overall despite poor sleep during hospital admit.  He denies complaints at rest, but appeared visually fatigued by conclusion of session, especially after negotiating steps.  Discussion held with pt & spouse regarding role of PT, expected recovery, importance of mobiilty as well as cognitive rest prn to maximize short & long term independence.   Bed Mobility   Supine to Sit 6  Modified independent   Sit to Supine 6  Modified independent   Transfers   Sit to Stand 5  Supervision   Additional items Assist x 1   Stand to Sit 5  Supervision   Additional items Assist x 1   Ambulation/Elevation   Gait pattern Short stride;Foward flexed;Inconsistent jose miguel;Shuffling;Decreased L stance;Decreased foot clearance;L Hemiparesis;Ataxia   Gait Assistance 5  Supervision   Additional items Assist x 1   Assistive Device Rolling walker   Distance 50' x 2, 20'   Stair Management Assistance 4  Minimal assist   Additional items Assist x 1;Verbal cues   Stair Management Technique One rail L;Step to pattern;Foreward   Number of Stairs 2   Balance   Static Sitting Good   Static Standing Fair +   Ambulatory Fair -   Activity Tolerance   Activity Tolerance Patient tolerated treatment well;Patient limited by fatigue   Nurse Made Aware KAREN Bird   Assessment   Prognosis Good   Problem List Decreased strength;Decreased endurance;Impaired balance;Decreased mobility;Impaired judgement;Decreased safety awareness   Assessment Pt demonstrated improved balance & endurance today compared to eval, ambulating household distances multiple times this AM, first with restorative aide, then with  PT with use of RW both times without LOB.  Pt demosntrated improved balance & LLE stability in stance during PT session prior to negotiating steps.  He was then instructed in & negotiated steps without LOB, but then appeared quite fatigued, at which point he benefitted from closer guarding until he reached EOB where he recovered well.  Pt was eventually able to transfer himself back into bed without incident while discussion was held regarding d/c recommendations & activity.  PT goals remain appropriate pending available social support to ensure safety within home.  Recommend follow up PT services focusing on neuro rehab to adress residual deficits & ensure safe return to maximal long term independence.   Goals   Patient Goals to go home   STG Expiration Date 04/09/25   PT Treatment Day 1   Plan   Treatment/Interventions Functional transfer training;LE strengthening/ROM;Elevations;Endurance training;Therapeutic exercise;Patient/family training;Equipment eval/education;Bed mobility;Gait training   Progress Progressing toward goals   PT Frequency 3-5x/wk   Discharge Recommendation   Rehab Resource Intensity Level, PT III (Minimum Resource Intensity)   Equipment Recommended Walker   Walker Package Recommended Wheeled walker   AM-PAC Basic Mobility Inpatient   Turning in Flat Bed Without Bedrails 4   Lying on Back to Sitting on Edge of Flat Bed Without Bedrails 4   Moving Bed to Chair 3   Standing Up From Chair Using Arms 3   Walk in Room 3   Climb 3-5 Stairs With Railing 3   Basic Mobility Inpatient Raw Score 20   Basic Mobility Standardized Score 43.99   [facility] Highest Level Of Mobility   -HLM Goal 6: Walk 10 steps or more   JH-HLM Achieved 7: Walk 25 feet or more       Devon Aguilar PTA    An [facility] Basic Mobility Raw Score less than 17 suggests pt would benefit from post acute rehab.  Please also refer to the recommendation of the Physical Therapist for safe discharge planning.

## 2025-03-29 DIAGNOSIS — I10 ESSENTIAL HYPERTENSION: ICD-10-CM

## 2025-03-31 ENCOUNTER — APPOINTMENT (EMERGENCY)
Dept: RADIOLOGY | Facility: HOSPITAL | Age: 75
DRG: 057 | End: 2025-03-31
Attending: EMERGENCY MEDICINE
Payer: COMMERCIAL

## 2025-03-31 ENCOUNTER — HOSPITAL ENCOUNTER (INPATIENT)
Facility: HOSPITAL | Age: 75
LOS: 3 days | DRG: 057 | End: 2025-04-03
Attending: EMERGENCY MEDICINE | Admitting: STUDENT IN AN ORGANIZED HEALTH CARE EDUCATION/TRAINING PROGRAM
Payer: COMMERCIAL

## 2025-03-31 ENCOUNTER — APPOINTMENT (EMERGENCY)
Dept: RADIOLOGY | Facility: HOSPITAL | Age: 75
DRG: 057 | End: 2025-03-31
Payer: COMMERCIAL

## 2025-03-31 ENCOUNTER — TELEPHONE (OUTPATIENT)
Dept: INTERNAL MEDICINE CLINIC | Facility: CLINIC | Age: 75
End: 2025-03-31

## 2025-03-31 ENCOUNTER — APPOINTMENT (INPATIENT)
Dept: RADIOLOGY | Facility: HOSPITAL | Age: 75
DRG: 057 | End: 2025-03-31
Payer: COMMERCIAL

## 2025-03-31 ENCOUNTER — RA CDI HCC (OUTPATIENT)
Dept: OTHER | Facility: HOSPITAL | Age: 75
End: 2025-03-31

## 2025-03-31 DIAGNOSIS — I63.9 ACUTE ISCHEMIC STROKE (HCC): ICD-10-CM

## 2025-03-31 DIAGNOSIS — R29.90 STROKE-LIKE SYMPTOMS: Primary | ICD-10-CM

## 2025-03-31 DIAGNOSIS — I65.03 VERTEBRAL ARTERY STENOSIS, BILATERAL: ICD-10-CM

## 2025-03-31 LAB
2HR DELTA HS TROPONIN: -1 NG/L
4HR DELTA HS TROPONIN: 0 NG/L
ANION GAP SERPL CALCULATED.3IONS-SCNC: 10 MMOL/L (ref 4–13)
APTT PPP: 23 SECONDS (ref 23–34)
ATRIAL RATE: 97 BPM
BUN SERPL-MCNC: 38 MG/DL (ref 5–25)
CALCIUM SERPL-MCNC: 9.3 MG/DL (ref 8.4–10.2)
CARDIAC TROPONIN I PNL SERPL HS: 5 NG/L (ref ?–50)
CARDIAC TROPONIN I PNL SERPL HS: 6 NG/L (ref ?–50)
CARDIAC TROPONIN I PNL SERPL HS: 6 NG/L (ref ?–50)
CHLORIDE SERPL-SCNC: 106 MMOL/L (ref 96–108)
CO2 SERPL-SCNC: 23 MMOL/L (ref 21–32)
CREAT SERPL-MCNC: 1.29 MG/DL (ref 0.6–1.3)
DME PARACHUTE DELIVERY DATE ACTUAL: NORMAL
DME PARACHUTE DELIVERY DATE REQUESTED: NORMAL
DME PARACHUTE ITEM DESCRIPTION: NORMAL
DME PARACHUTE ORDER STATUS: NORMAL
DME PARACHUTE SUPPLIER NAME: NORMAL
DME PARACHUTE SUPPLIER PHONE: NORMAL
ERYTHROCYTE [DISTWIDTH] IN BLOOD BY AUTOMATED COUNT: 13.2 % (ref 11.6–15.1)
GFR SERPL CREATININE-BSD FRML MDRD: 54 ML/MIN/1.73SQ M
GLUCOSE SERPL-MCNC: 138 MG/DL (ref 65–140)
GLUCOSE SERPL-MCNC: 173 MG/DL (ref 65–140)
GLUCOSE SERPL-MCNC: 254 MG/DL (ref 65–140)
GLUCOSE SERPL-MCNC: 266 MG/DL (ref 65–140)
HCT VFR BLD AUTO: 48.8 % (ref 36.5–49.3)
HGB BLD-MCNC: 15.7 G/DL (ref 12–17)
INR PPP: 0.9 (ref 0.85–1.19)
MCH RBC QN AUTO: 30.2 PG (ref 26.8–34.3)
MCHC RBC AUTO-ENTMCNC: 32.2 G/DL (ref 31.4–37.4)
MCV RBC AUTO: 94 FL (ref 82–98)
P AXIS: 44 DEGREES
PA ADP BLD-ACNC: 75 PRU (ref 194–418)
PLATELET # BLD AUTO: 201 THOUSANDS/UL (ref 149–390)
PMV BLD AUTO: 11.2 FL (ref 8.9–12.7)
POTASSIUM SERPL-SCNC: 4.3 MMOL/L (ref 3.5–5.3)
PR INTERVAL: 168 MS
PROTHROMBIN TIME: 12.5 SECONDS (ref 12.3–15)
QRS AXIS: -13 DEGREES
QRSD INTERVAL: 98 MS
QT INTERVAL: 340 MS
QTC INTERVAL: 431 MS
RBC # BLD AUTO: 5.2 MILLION/UL (ref 3.88–5.62)
SODIUM SERPL-SCNC: 139 MMOL/L (ref 135–147)
T WAVE AXIS: 35 DEGREES
VENTRICULAR RATE: 97 BPM
WBC # BLD AUTO: 9.65 THOUSAND/UL (ref 4.31–10.16)

## 2025-03-31 PROCEDURE — 85027 COMPLETE CBC AUTOMATED: CPT

## 2025-03-31 PROCEDURE — 85576 BLOOD PLATELET AGGREGATION: CPT

## 2025-03-31 PROCEDURE — 70498 CT ANGIOGRAPHY NECK: CPT

## 2025-03-31 PROCEDURE — 99285 EMERGENCY DEPT VISIT HI MDM: CPT

## 2025-03-31 PROCEDURE — 84484 ASSAY OF TROPONIN QUANT: CPT

## 2025-03-31 PROCEDURE — 70551 MRI BRAIN STEM W/O DYE: CPT

## 2025-03-31 PROCEDURE — 92610 EVALUATE SWALLOWING FUNCTION: CPT

## 2025-03-31 PROCEDURE — 85730 THROMBOPLASTIN TIME PARTIAL: CPT

## 2025-03-31 PROCEDURE — 36415 COLL VENOUS BLD VENIPUNCTURE: CPT

## 2025-03-31 PROCEDURE — 99223 1ST HOSP IP/OBS HIGH 75: CPT | Performed by: STUDENT IN AN ORGANIZED HEALTH CARE EDUCATION/TRAINING PROGRAM

## 2025-03-31 PROCEDURE — 82948 REAGENT STRIP/BLOOD GLUCOSE: CPT

## 2025-03-31 PROCEDURE — 99285 EMERGENCY DEPT VISIT HI MDM: CPT | Performed by: EMERGENCY MEDICINE

## 2025-03-31 PROCEDURE — 93005 ELECTROCARDIOGRAM TRACING: CPT

## 2025-03-31 PROCEDURE — 70496 CT ANGIOGRAPHY HEAD: CPT

## 2025-03-31 PROCEDURE — 85610 PROTHROMBIN TIME: CPT

## 2025-03-31 PROCEDURE — 80048 BASIC METABOLIC PNL TOTAL CA: CPT

## 2025-03-31 PROCEDURE — 93010 ELECTROCARDIOGRAM REPORT: CPT | Performed by: INTERNAL MEDICINE

## 2025-03-31 RX ORDER — HYDROCHLOROTHIAZIDE 12.5 MG/1
12.5 TABLET ORAL DAILY
Status: DISCONTINUED | OUTPATIENT
Start: 2025-03-31 | End: 2025-04-01

## 2025-03-31 RX ORDER — INSULIN GLARGINE 100 [IU]/ML
40 INJECTION, SOLUTION SUBCUTANEOUS
Status: DISCONTINUED | OUTPATIENT
Start: 2025-03-31 | End: 2025-04-03 | Stop reason: HOSPADM

## 2025-03-31 RX ORDER — AMLODIPINE BESYLATE 10 MG/1
10 TABLET ORAL DAILY
Status: DISCONTINUED | OUTPATIENT
Start: 2025-04-01 | End: 2025-04-03 | Stop reason: HOSPADM

## 2025-03-31 RX ORDER — VALSARTAN 320 MG/1
320 TABLET ORAL DAILY
Qty: 90 TABLET | Refills: 1 | Status: SHIPPED | OUTPATIENT
Start: 2025-03-31

## 2025-03-31 RX ORDER — ENOXAPARIN SODIUM 100 MG/ML
40 INJECTION SUBCUTANEOUS DAILY
Status: DISCONTINUED | OUTPATIENT
Start: 2025-03-31 | End: 2025-04-03 | Stop reason: HOSPADM

## 2025-03-31 RX ORDER — ASPIRIN 81 MG/1
81 TABLET, CHEWABLE ORAL DAILY
Status: DISCONTINUED | OUTPATIENT
Start: 2025-04-01 | End: 2025-04-01

## 2025-03-31 RX ORDER — ONDANSETRON 2 MG/ML
4 INJECTION INTRAMUSCULAR; INTRAVENOUS EVERY 8 HOURS PRN
Status: DISCONTINUED | OUTPATIENT
Start: 2025-03-31 | End: 2025-04-03 | Stop reason: HOSPADM

## 2025-03-31 RX ORDER — ATORVASTATIN CALCIUM 40 MG/1
40 TABLET, FILM COATED ORAL EVERY EVENING
Status: DISCONTINUED | OUTPATIENT
Start: 2025-03-31 | End: 2025-04-03 | Stop reason: HOSPADM

## 2025-03-31 RX ORDER — INSULIN LISPRO 100 [IU]/ML
1-5 INJECTION, SOLUTION INTRAVENOUS; SUBCUTANEOUS
Status: DISCONTINUED | OUTPATIENT
Start: 2025-03-31 | End: 2025-04-03 | Stop reason: HOSPADM

## 2025-03-31 RX ORDER — LOSARTAN POTASSIUM 50 MG/1
100 TABLET ORAL DAILY
Status: DISCONTINUED | OUTPATIENT
Start: 2025-04-01 | End: 2025-04-03 | Stop reason: HOSPADM

## 2025-03-31 RX ORDER — MECLIZINE HCL 12.5 MG 12.5 MG/1
12.5 TABLET ORAL 3 TIMES DAILY PRN
Status: DISCONTINUED | OUTPATIENT
Start: 2025-03-31 | End: 2025-04-03 | Stop reason: HOSPADM

## 2025-03-31 RX ORDER — CLOPIDOGREL BISULFATE 75 MG/1
75 TABLET ORAL DAILY
Status: DISCONTINUED | OUTPATIENT
Start: 2025-04-01 | End: 2025-04-03 | Stop reason: HOSPADM

## 2025-03-31 RX ADMIN — CYANOCOBALAMIN TAB 500 MCG 250 MCG: 500 TAB at 17:12

## 2025-03-31 RX ADMIN — IOHEXOL 85 ML: 350 INJECTION, SOLUTION INTRAVENOUS at 13:03

## 2025-03-31 RX ADMIN — INSULIN GLARGINE 40 UNITS: 100 INJECTION, SOLUTION SUBCUTANEOUS at 21:35

## 2025-03-31 RX ADMIN — ATORVASTATIN CALCIUM 40 MG: 40 TABLET, FILM COATED ORAL at 17:12

## 2025-03-31 RX ADMIN — ENOXAPARIN SODIUM 40 MG: 40 INJECTION SUBCUTANEOUS at 17:12

## 2025-03-31 NOTE — ED PROVIDER NOTES
Time reflects when diagnosis was documented in both MDM as applicable and the Disposition within this note       Time User Action Codes Description Comment    3/31/2025 11:42 AM Gulshan Daniele Add [R29.90] Stroke-like symptoms           ED Disposition       ED Disposition   Admit    Condition   Stable    Date/Time   Mon Mar 31, 2025 12:03 PM    Comment   --             Assessment & Plan       Medical Decision Making  Amount and/or Complexity of Data Reviewed  Labs: ordered. Decision-making details documented in ED Course.  Radiology: ordered. Decision-making details documented in ED Course.  ECG/medicine tests:  Decision-making details documented in ED Course.    Risk  Prescription drug management.  Decision regarding hospitalization.      Patient is a 74 y.o. male with a past medical history of diabetes, hypertension, hyperlipidemia, and recent admission for acute ischemic stroke last week presenting via EMS for worsening left-sided strokelike symptoms since Friday.      Vital signs hypertensive. On exam patient unable to move left arm and leg left leg.    History and physical exam most consistent with strokelike symptoms. However, differential diagnosis included but not limited to acute stroke, acute coronary syndrome, anemia, electrolyte abnormality, acute kidney injury, hypoglycemia, acute intracranial abnormality.     Plan: BMP, CBC, PT/INR, PTT, POC glucose, troponin, CTA head/neck, P2Y12 platelet inhibitor level, neurology consult    View ED course for further discussion on patient workup.     On review of previous records patient was seen last week for similar complaints and was admitted for stroke pathway.    All labs reviewed and utilized in the medical decision making process  All radiology studies independently viewed by me and interpreted by the radiologist.  I reviewed all testing with the patient.     Upon re-evaluation patient is resting comfortably in no acute distress.    Disposition: Admitted to  "neurology for stroke pathway.    Portions of the record may have been created with voice recognition software. Occasional wrong word or \"sound a like\" substitutions may have occurred due to the inherent limitations of voice recognition software. Read the chart carefully and recognize, using context, where substitutions have occurred.    ED Course as of 04/02/25 1938   Mon Mar 31, 2025   1202 POC Glucose(!): 266   1208 CBC and Platelet  Unremarkable   1255 BUN(!): 38   1256 GLUCOSE(!): 254   1256 hs TnI 0hr: 6   1256 PROTIME: 12.5   1256 POCT INR: 0.90   1256 PTT: 23   1320 P2Y12(!): 75   1341 ECG 12 lead  Normal rate, regular rhythm, left axis deviation.  P waves present.  No obvious ST segment abnormalities.  Low voltage QRS complex noted in leads III and aVF, low voltage in aVF was present prior.  EKG interpreted as normal sinus rhythm with low voltage QRS.   1356 CTA head and neck with and without contrast  IMPRESSION:     CT Brain:  No acute intracranial abnormality. The small infarct seen on recent MRI is not identified on CT.     CT Angiography:  Stable exam with high-grade stenosis and possible short segment occlusion of the intracranial right vertebral artery     1403 hs TnI 2hr: 5       Medications - No data to display    ED Risk Strat Scores             Stroke Assessment       Row Name 03/31/25 1658             NIH Stroke Scale    Interval --      Level of Consciousness (1a.) 0      LOC Questions (1b.) 0      LOC Commands (1c.) 0      Best Gaze (2.) 0      Visual (3.) 0      Facial Palsy (4.) 1      Motor Arm, Left (5a.) 4      Motor Arm, Right (5b.) 0      Motor Leg, Left (6a.) 4      Motor Leg, Right (6b.) 0      Limb Ataxia (7.) 1      Sensory (8.) 1      Best Language (9.) 0      Dysarthria (10.) 0      Extinction and Inattention (11.) (Formerly Neglect) 0      Total 11                    Flowsheet Row Most Recent Value   Thrombolytic Decision Options    Thrombolytic Decision Patient not a candidate. "   Patient is not a candidate options Recent significant trauma/stroke., Bleeding risk.                 HEART Risk Score      Flowsheet Row Most Recent Value   Heart Score Risk Calculator    History 1 Filed at: 04/02/2025 1938   ECG 1 Filed at: 04/02/2025 1938   Age 2 Filed at: 04/02/2025 1938   Risk Factors 2 Filed at: 04/02/2025 1938   Troponin 0 Filed at: 04/02/2025 1938   HEART Score 6 Filed at: 04/02/2025 1938                                          History of Present Illness       Chief Complaint   Patient presents with    CVA/TIA-like Symptoms       Past Medical History:   Diagnosis Date    COVID-19 07/2022    Diabetes mellitus (HCC)     Type 2    Dyslipidemia     Hypertension     Obstructive sleep apnea on CPAP       Past Surgical History:   Procedure Laterality Date    COLONOSCOPY  2008      Family History   Problem Relation Age of Onset    Hypertension Mother       Social History     Tobacco Use    Smoking status: Former     Types: Cigarettes    Smokeless tobacco: Never    Tobacco comments:     1 year as a teenager   Vaping Use    Vaping status: Never Used   Substance Use Topics    Alcohol use: Not Currently    Drug use: Not Currently      E-Cigarette/Vaping    E-Cigarette Use Never User       E-Cigarette/Vaping Substances      I have reviewed and agree with the history as documented.       CVA/TIA-like Symptoms  Presenting symptoms: weakness      Patient is a 74-year-old male with a past medical history of diabetes, hypertension, hyperlipidemia, and recent admission for acute ischemic stroke last week presenting via EMS for worsening left-sided strokelike symptoms since Friday.  Patient takes aspirin and Plavix and states he takes these as prescribed.  EMS reports dysarthria, a left-sided facial droop, and patient is unable to sense or move anything on his left side.  Patient denies chest pain, shortness of breath, and any other complaints.    Review of Systems   Neurological:  Positive for speech  difficulty, weakness and numbness.   All other systems reviewed and are negative.          Objective       ED Triage Vitals   Temperature Pulse Blood Pressure Respirations SpO2 Patient Position - Orthostatic VS   03/31/25 1138 03/31/25 1135 03/31/25 1135 03/31/25 1135 03/31/25 1135 03/31/25 1135   98 °F (36.7 °C) 92 (!) 178/91 18 91 % Lying      Temp Source Heart Rate Source BP Location FiO2 (%) Pain Score    03/31/25 1138 03/31/25 1135 03/31/25 1135 -- 03/31/25 1135    Oral Monitor Left arm  No Pain      Vitals      Date and Time Temp Pulse SpO2 Resp BP Pain Score FACES Pain Rating User   03/31/25 1138 98 °F (36.7 °C) -- -- -- -- -- -- ST   03/31/25 1135 -- 92 91 % 18 178/91 No Pain -- ST            Physical Exam  Vitals and nursing note reviewed.   Constitutional:       General: He is not in acute distress.     Appearance: Normal appearance. He is well-developed. He is not ill-appearing or toxic-appearing.   HENT:      Head: Normocephalic and atraumatic.   Eyes:      General: No scleral icterus.        Right eye: No discharge.         Left eye: No discharge.      Extraocular Movements: Extraocular movements intact.      Conjunctiva/sclera: Conjunctivae normal.      Pupils: Pupils are equal, round, and reactive to light.   Cardiovascular:      Rate and Rhythm: Normal rate and regular rhythm.      Pulses: Normal pulses.      Heart sounds: Normal heart sounds. No murmur heard.  Pulmonary:      Effort: Pulmonary effort is normal. No respiratory distress.      Breath sounds: Normal breath sounds. No stridor. No wheezing, rhonchi or rales.   Abdominal:      General: Bowel sounds are normal. There is no distension.      Palpations: Abdomen is soft.      Tenderness: There is no abdominal tenderness. There is no right CVA tenderness, left CVA tenderness, guarding or rebound. Negative signs include Rocha's sign and McBurney's sign.   Musculoskeletal:         General: No swelling.      Cervical back: Normal range of motion  and neck supple.      Right lower leg: No edema.      Left lower leg: No edema.   Skin:     General: Skin is warm and dry.      Capillary Refill: Capillary refill takes less than 2 seconds.      Coloration: Skin is not jaundiced.      Findings: No erythema.   Neurological:      General: No focal deficit present.      Mental Status: He is alert and oriented to person, place, and time.      GCS: GCS eye subscore is 4. GCS verbal subscore is 5. GCS motor subscore is 6.      Cranial Nerves: Facial asymmetry (Left-sided) present. No cranial nerve deficit.      Sensory: Sensory deficit (Diffuse left-sided) present.      Motor: Weakness (Diffuse left-sided) present. No pronator drift (Unable to assess pronator drift on the left, right normal).      Coordination: Heel to Shin Test abnormal (Left).   Psychiatric:         Mood and Affect: Mood normal.         Behavior: Behavior normal.         Thought Content: Thought content normal.         Judgment: Judgment normal.         Results Reviewed       Procedure Component Value Units Date/Time    P2Y12 Platelet inhibitor [[id number]] Collected: 03/31/25 1215    Lab Status: In process Specimen: Blood from Arm, Right Updated: 03/31/25 1221    HS Troponin I 2hr [[id number]]     Lab Status: No result Specimen: Blood     HS Troponin I 4hr [[id number]]     Lab Status: No result Specimen: Blood     HS Troponin 0hr (reflex protocol) [[id number]]  (Normal) Collected: 03/31/25 1146    Lab Status: Final result Specimen: Blood from Arm, Right Updated: 03/31/25 1220     hs TnI 0hr 6 ng/L     Protime-INR [[id number]]  (Normal) Collected: 03/31/25 1146    Lab Status: Final result Specimen: Blood from Arm, Right Updated: 03/31/25 1217     Protime 12.5 seconds      INR 0.90    Narrative:      INR Therapeutic Range    Indication                                             INR Range      Atrial Fibrillation                                               2.0-3.0  Hypercoagulable State                                     2.0.2.3  Left Ventricular Asist Device                            2.0-3.0  Mechanical Heart Valve                                  -    Aortic(with afib, MI, embolism, HF, LA enlargement,    and/or coagulopathy)                                     2.0-3.0 (2.5-3.5)     Mitral                                                             2.5-3.5  Prosthetic/Bioprosthetic Heart Valve               2.0-3.0  Venous thromboembolism (VTE: VT, PE        2.0-3.0    APTT [[id number]]  (Normal) Collected: 03/31/25 1146    Lab Status: Final result Specimen: Blood from Arm, Right Updated: 03/31/25 1217     PTT 23 seconds     Basic metabolic panel [[id number]]  (Abnormal) Collected: 03/31/25 1146    Lab Status: Final result Specimen: Blood from Arm, Right Updated: 03/31/25 1213     Sodium 139 mmol/L      Potassium 4.3 mmol/L      Chloride 106 mmol/L      CO2 23 mmol/L      ANION GAP 10 mmol/L      BUN 38 mg/dL      Creatinine 1.29 mg/dL      Glucose 254 mg/dL      Calcium 9.3 mg/dL      eGFR 54 ml/min/1.73sq m     Narrative:      National Kid[employer] guidelines for Chronic Kidney Disease (CKD):     Stage 1 with normal or high GFR (GFR > 90 mL/min/1.73 square meters)    Stage 2 Mild CKD (GFR = 60-89 mL/min/1.73 square meters)    Stage 3A Moderate CKD (GFR = 45-59 mL/min/1.73 square meters)    Stage 3B Moderate CKD (GFR = 30-44 mL/min/1.73 square meters)    Stage 4 Severe CKD (GFR = 15-29 mL/min/1.73 square meters)    Stage 5 End Stage CKD (GFR <15 mL/min/1.73 square meters)  Note: GFR calculation is accurate only with a steady state creatinine    CBC and Platelet [[id number]]  (Normal) Collected: 03/31/25 1146    Lab Status: Final result Specimen: Blood from Arm, Right Updated: 03/31/25 1205     WBC 9.65 Thousand/uL      RBC 5.20 Million/uL      Hemoglobin 15.7 g/dL      Hematocrit 48.8 %      MCV 94 fL      MCH 30.2 pg      MCHC 32.2 g/dL      RDW 13.2 %      Platelets 201 Thousands/uL      MPV 11.2 fL      "Fingerstick Glucose (POCT) [[id number]]  (Abnormal) Collected: 03/31/25 1145    Lab Status: Final result Specimen: Blood Updated: 03/31/25 1146     POC Glucose 266 mg/dl             CTA head and neck with and without contrast    (Results Pending)       Procedures    ED Medication and Procedure Management   Prior to Admission Medications   Prescriptions Last Dose Informant Patient Reported? Taking?   Continuous Glucose Sensor (FreeStyle Eddie 3 Sensor) MISC   No No   Sig: Use 1 each every 14 (fourteen) days   Empagliflozin (Jardiance) 25 MG TABS   No No   Sig: Take 1 tablet (25 mg total) by mouth every morning   Insulin Glargine Solostar (Lantus SoloStar) 100 UNIT/ML SOPN   No No   Sig: Inject 0.4 mL (40 Units total) as directed daily at bedtime   Insulin Pen Needle (B-D UF III MINI PEN NEEDLES) 31G X 5 MM MISC   No No   Sig: Use daily   Insulin Syringe-Needle U-100 (BD Insulin Syringe U/F) 31G X 5/16\" 1 ML MISC   No No   Sig: Use 4 (four) times a day   albuterol (ProAir HFA) 90 mcg/act inhaler   No No   Sig: Inhale 2 puffs every 6 (six) hours as needed for wheezing or shortness of breath   amLODIPine (NORVASC) 10 mg tablet   No No   Sig: Take 1 tablet (10 mg total) by mouth daily Use as directed   aspirin 81 mg chewable tablet  Self No No   Sig: Chew 1 tablet (81 mg total) daily   atorvastatin (LIPITOR) 80 mg tablet   No No   Sig: Take 1 tablet (80 mg total) by mouth every evening   clopidogrel (PLAVIX) 75 mg tablet   No No   Sig: Take 1 tablet (75 mg total) by mouth daily   fluorouracil (EFUDEX) 5 % cream   Yes No   glimepiride (AMARYL) 2 mg tablet   No No   Sig: TAKE 2 TABLETS (4 MG TOTAL) BY MOUTH 2 (TWO) TIMES A DAY WITH MEALS   glucose blood (FreeStyle [employer] Test) test strip  Self No No   Sig: Use 1 each 2 (two) times a day   hydroCHLOROthiazide 12.5 mg tablet   No No   Sig: Take 1 tablet (12.5 mg total) by mouth daily   meclizine (ANTIVERT) 12.5 MG tablet  Self No No   Sig: Take 1 tablet (12.5 mg total) by " mouth 3 (three) times a day as needed for dizziness   metFORMIN (GLUCOPHAGE-XR) 500 mg 24 hr tablet   No No   Sig: Take 2 tablets (1,000 mg total) by mouth 2 (two) times a day   valsartan (DIOVAN) 320 MG tablet   No No   Sig: Take 1 tablet (320 mg total) by mouth daily   vitamin B-12 (CYANOCOBALAMIN) 250 MCG TABS   No No   Sig: Take 1 tablet (250 mcg total) by mouth daily      Facility-Administered Medications: None     Patient's Medications   Discharge Prescriptions    No medications on file     No discharge procedures on file.  ED SEPSIS DOCUMENTATION   Time reflects when diagnosis was documented in both MDM as applicable and the Disposition within this note       Time User Action Codes Description Comment    3/31/2025 11:42 AM Daniele Gaffney Add [R29.90] Stroke-like symptoms                  Daniele Gaffney DO  04/02/25 1939

## 2025-03-31 NOTE — RESPIRATORY THERAPY NOTE
Resp note   03/31/25 1920   Respiratory Assessment   Resp Comments Pt refusing HS CPAP will change work task.   Oxygen Therapy/Pulse Ox   O2 Device None (Room air)   SpO2 96 %   SpO2 Activity At Rest

## 2025-03-31 NOTE — SPEECH THERAPY NOTE
Speech-Language Pathology Bedside Swallow Evaluation      Patient Name: Jose Maria Bethea    Today's Date: 3/31/2025     Problem List  Active Problems:    Type 2 diabetes mellitus with hyperglycemia, with long-term current use of insulin (HCC)    Essential hypertension    Mixed hyperlipidemia    DEWEY (obstructive sleep apnea)    Stroke-like symptoms      Past Medical History  Past Medical History:   Diagnosis Date    COVID-19 07/2022    Diabetes mellitus (HCC)     Type 2    Dyslipidemia     Hypertension     Obstructive sleep apnea on CPAP        Past Surgical History  Past Surgical History:   Procedure Laterality Date    COLONOSCOPY  2008       Summary   Pt presented with functional appearing oral and pharyngeal stage swallowing skills with materials administered today. The patient is assessed with regular solids and thin liquids. Mastication is timely and efficient with no oral residue. No overt s/s aspiration observed today.     Patient known to this SLP from previous admission (3/25/25). He is admitted on stroke pathway. He now has new left side facial droop and left extremity weakness. Despite this, ROM appears adequate and no pocketing observed. Patient has clear speech and adequate language skills.     Risk/s for Aspiration: low     Recommended Diet: regular diet and thin liquids   Recommended Form of Meds: whole with liquid   Aspiration precautions and swallowing strategies: upright posture and small bites/sips  Other Recommendations: Continue frequent oral care    Current Medical Status  HPI: Jose Maria Bethea is a 74 y.o. male who presents with past medical history of  diabetes mellitus (DM), hyperlipidemia (HLD), and hypertension (HTN), CKD 3a, DEWEY with worsening of left-sided weakness for the past 2 days.  He is accompanied by his wife and son who notes that he has had significant weakening, he was last evaluated with the neurology service last week with approximately 4 out of 5 strength grossly in the upper and  lower extremities on the left side.  Today he presents with no more than 3 out of 5 strength to the left upper and lower extremities.  Last week when he initially presented he had never had weakness to this degree.  He continues to have some dizziness, noting that he did have vomiting yesterday morning as well as this morning.  He notes that he has been remaining compliant with his aspirin and Plavix.  He was discharged on DAPT therapy for 21 days, planned for eventual monotherapy on Plavix.  P2 Y12 level checked in the ED, therapeutic at 75.     Current Precautions:  Fall  Aspiration    Allergies:  No known food allergies  Past medical history:  Please see H&P for details    Social/Education/Vocational Hx:  Pt lives with family    Swallow Information   Current Risks for Dysphagia & Aspiration:  r/o CVA  Current Symptoms/Concerns:  none  Current Diet: regular diet and thin liquids   Baseline Diet: regular diet and thin liquids    Baseline Assessment   Behavior/Cognition: alert  Speech/Language Status: able to participate in conversation and able to follow commands  Patient Positioning: upright in bed  Pain Status/Interventions/Response to Interventions: No report of or nonverbal indications of pain.    Swallow Mechanism Exam  Facial: left facial droop  Labial: WFL  Lingual: WFL  Velum: unable to visualize  Mandible: adequate ROM  Dentition: adequate  Vocal quality:clear/adequate   Respiratory Status: on RA       Consistencies Assessed and Performance   Consistencies Administered: thin liquids and hard solids  Materials administered included carrots and milk    Oral Stage: WFL  Mastication was adequate with the materials administered today.  Bolus formation and transfer were functional with no significant oral residue noted.  No overt s/s reduced oral control.    Pharyngeal Stage: WFL  Swallow Mechanics:  Swallowing initiation appeared prompt.  Laryngeal rise was observed and judged to be within functional limits.   No coughing, throat clearing, change in vocal quality or respiratory status noted today.     Esophageal Concerns: none reported    Summary and Recommendations (see above)    Results Reviewed with: patient and RN     Treatment Recommended: evaluation only. Please re-consult with concerns

## 2025-03-31 NOTE — H&P
H&P - Neurology   Name: Jose Maria Bethea 74 y.o. male I MRN: [id number]  Unit/Bed#: ED 22 I Date of Admission: 3/31/2025   Date of Service: 3/31/2025 I Hospital Day: 0     Assessment & Plan  Stroke-like symptoms  Jose Maria Bethea is a 74-year-old male with a past medical history of diabetes mellitus (DM), hyperlipidemia (HLD), and hypertension (HTN), CKD 3a, DEWEY presenting to the emergency department for evaluation of worsening left-sided weakness for the past two days.  Initial BP Blood Pressure: (!) 178/91, FSBG POC Glucose: (!) 266 NC CTH unremarkable and CTA H/N with no LVO or aneurysm, continues to show severe stenosis with focal occlusion at R V4 segment with reconstitution. As a result of > 4.5 hours from time of symptom onset patient was not determined to be a candidate for thrombolysis (TNK). Given absence of IR target pt was deemed not a candidate for mechanical thrombectomy.   - Home Antiplatelet /Anticoagulants PTA: Aspirin 81 mg QD and Plavix 75 mg qd  - Stroke risk factors: HTN, DM, HLD, and DEWEY  - Prior Stroke Hx: yes: lacunar left centrum semiovale, and right ventral upper medulla   - Past Neurological Hx: stroke      Workup:  - CTA head and neck: No acute intracranial abnormality. The small infarct seen on recent MRI is not identified on CT. Stable exam with high-grade stenosis and possible short segment occlusion of the intracranial right vertebral artery.  - MRI: pending  - TTE 3/26:  TTE with 65% EF, normal atria.   - Labs: Hemoglobin A1c 9.0 (3/26/2025), LDL 44 (3/26/2025), P2y12 75     Impression: 74 y.o. male with history of  diabetes mellitus (DM), hyperlipidemia (HLD), and hypertension (HTN), CKD 3a, DEWEY presenting to the emergency department for evaluation of worsening left-sided weakness for the past two days.  Admit for stroke w/u, less likely recrudescence as patient does not have any new signs of infection or significant metabolic abnormalities.  In addition his current symptoms appear to be  worse than his presenting symptoms last week.     Plan: Discussed plan with neurology attending, Dr. Crocker  Admit along the stroke pathway with telemetry monitoring  Frequent neuro checks per protocol. If there is any acute changes in exam, please obtain stat CT head and notify neurology team  BP Goals: Normotensive goal  Antiplatelet agents: Continue DAPT for now, with ASA 81 mg and Plavix 75 mg qd  Anticoagulation: None  Statin: Continue Lipitor 80 mg qd  Brain Imaging: Obtain MRI brain without contrast  Euthermic/Euglycemic  DVT PPx:  Lovenox , SCDs  PT/OT/ST/PMR evaluations when able  Stroke education and counseling  Disposition: PT Recommendations -      Type 2 diabetes mellitus with hyperglycemia, with long-term current use of insulin (HCC)  Lab Results   Component Value Date    HGBA1C 9.0 (H) 03/26/2025       Recent Labs     03/31/25  1145   POCGLU 266*       Blood Sugar Average: Last 72 hrs:  (P) 266    Plan: SSI while impatient, hold PO antihyperglycemics  Essential hypertension  Holding PTA: Antihypertensive losartan 100 mg daily, continuing amlodipine 10 mg daily  Mixed hyperlipidemia  Continue PTA Lipitor  DEWEY (obstructive sleep apnea)  CPAP qhs      Jose Maria Bethea will need follow-up in in 6 weeks with neurovascular team for Other in 60 minute appointment. They will not require outpatient neurological testing.  Staff message sent.     History of Present Illness   HPI: Jose Maria Bethea is a 74 y.o. male who presents with past medical history of  diabetes mellitus (DM), hyperlipidemia (HLD), and hypertension (HTN), CKD 3a, DEWEY with worsening of left-sided weakness for the past 2 days.  He is accompanied by his wife and son who notes that he has had significant weakening, he was last evaluated with the neurology service last week with approximately 4 out of 5 strength grossly in the upper and lower extremities on the left side.  Today he presents with no more than 3 out of 5 strength to the left upper and lower  extremities.  Last week when he initially presented he had never had weakness to this degree.  He continues to have some dizziness, noting that he did have vomiting yesterday morning as well as this morning.  He notes that he has been remaining compliant with his aspirin and Plavix.  He was discharged on DAPT therapy for 21 days, planned for eventual monotherapy on Plavix.  P2 Y12 level checked in the ED, therapeutic at 75.    CTA head and neck was obtained, no signs of hemorrhage or new LVO.  Appears to be stable from prior.    Review of Systems  Medical History Review: I have reviewed the patient's PMH, PSH, Social History, Family History, Meds, and Allergies     Objective :  Temp:  [98 °F (36.7 °C)] 98 °F (36.7 °C)  HR:  [92] 92  BP: (178)/(91) 178/91  Resp:  [18] 18  SpO2:  [91 %] 91 %  O2 Device: None (Room air)    Physical Exam  Vitals reviewed.   Constitutional:       General: He is not in acute distress.     Appearance: Normal appearance. He is ill-appearing.   HENT:      Head: Normocephalic.      Mouth/Throat:      Mouth: Mucous membranes are dry.   Eyes:      General: Lids are normal.      Extraocular Movements: Extraocular movements intact.      Conjunctiva/sclera: Conjunctivae normal.      Pupils: Pupils are equal, round, and reactive to light.   Neurological:      Cranial Nerves: Dysarthria present.      Deep Tendon Reflexes: Reflexes are normal and symmetric.     Neurological Exam  Mental Status  Awake, alert and oriented to person, place and time. Mild dysarthria present. Language is fluent with no aphasia.    Cranial Nerves  CN II: Visual fields full to confrontation.  CN III, IV, VI: Extraocular movements intact bilaterally. Normal lids and orbits bilaterally. Pupils equal round and reactive to light bilaterally.  CN V: Facial sensation is normal.  CN VII:  Right: There is no facial weakness.  Left: There is central facial weakness.  CN VIII: Hearing is normal.  CN IX, X: Palate elevates  symmetrically  CN XI:  Right: Trapezius strength is normal.  Left: Trapezius strength is weak.  CN XII: Tongue midline without atrophy or fasciculations.    Motor   Left hemiparesis.                                             Right                     Left   Shoulder abduction               5                          3  Elbow flexion                         5                          3  Elbow extension                    5                          3  Pronation                               5                          2  Hip flexion                              5                          3  Plantarflexion                         5                          3  Dorsiflexion                            5                          3    Sensory  Light touch is normal in upper and lower extremities.     Reflexes  Deep tendon reflexes are 2+ and symmetric in all four extremities.    Coordination  Right: Finger-to-nose normal. Heel-to-shin normal.Left: Finger-to-nose abnormality: Heel-to-shin abnormality:  Left side with limited by weakness.        Lab Results: I have reviewed the following results:CBC/BMP:   .     03/31/25  1146   WBC 9.65   HGB 15.7   HCT 48.8      SODIUM 139   K 4.3      CO2 23   BUN 38*   CREATININE 1.29   GLUC 254*        Imaging Results Review: I personally reviewed the following image studies in PACS and associated radiology reports: CT head. My interpretation of the radiology images/reports is: as above.  Other Study Results Review: No additional pertinent studies reviewed.    VTE Prophylaxis: VTE covered by:  enoxaparin, Subcutaneous       Code Status: Prior  Advance Directive and Living Will:      Power of :    POLST:

## 2025-03-31 NOTE — ASSESSMENT & PLAN NOTE
Holding PTA: Antihypertensive losartan 100 mg daily, continuing amlodipine 10 mg daily   Patient had complete bath and linen change

## 2025-03-31 NOTE — ASSESSMENT & PLAN NOTE
Lab Results   Component Value Date    HGBA1C 9.0 (H) 03/26/2025       Recent Labs     03/31/25  1145   POCGLU 266*       Blood Sugar Average: Last 72 hrs:  (P) 266    Plan: SSI while impatient, hold PO antihyperglycemics

## 2025-03-31 NOTE — PROGRESS NOTES
HCC coding opportunities          Chart Reviewed number of suggestions sent to Provider: 1   E11.22      Patients Insurance     Medicare Insurance: [employer]

## 2025-03-31 NOTE — ASSESSMENT & PLAN NOTE
Jose Maria Bethea is a 74-year-old male with a past medical history of diabetes mellitus (DM), hyperlipidemia (HLD), and hypertension (HTN), CKD 3a, DEWEY presenting to the emergency department for evaluation of worsening left-sided weakness for the past two days.  Initial BP Blood Pressure: (!) 178/91, FSBG POC Glucose: (!) 266 NC CTH unremarkable and CTA H/N with no LVO or aneurysm, continues to show severe stenosis with focal occlusion at R V4 segment with reconstitution. As a result of > 4.5 hours from time of symptom onset patient was not determined to be a candidate for thrombolysis (TNK). Given absence of IR target pt was deemed not a candidate for mechanical thrombectomy.   - Home Antiplatelet /Anticoagulants PTA: Aspirin 81 mg QD and Plavix 75 mg qd  - Stroke risk factors: HTN, DM, HLD, and DEWEY  - Prior Stroke Hx: yes: lacunar left centrum semiovale, and right ventral upper medulla   - Past Neurological Hx: stroke      Workup:  - CTA head and neck: No acute intracranial abnormality. The small infarct seen on recent MRI is not identified on CT. Stable exam with high-grade stenosis and possible short segment occlusion of the intracranial right vertebral artery.  - MRI: pending  - TTE 3/26:  TTE with 65% EF, normal atria.   - Labs: Hemoglobin A1c 9.0 (3/26/2025), LDL 44 (3/26/2025), P2y12 75     Impression: 74 y.o. male with history of  diabetes mellitus (DM), hyperlipidemia (HLD), and hypertension (HTN), CKD 3a, DEWEY presenting to the emergency department for evaluation of worsening left-sided weakness for the past two days.  Admit for stroke w/u, less likely recrudescence as patient does not have any new signs of infection or significant metabolic abnormalities.  In addition his current symptoms appear to be worse than his presenting symptoms last week.     Plan: Discussed plan with neurology attending, Dr. Crocker  Admit along the stroke pathway with telemetry monitoring  Frequent neuro checks per protocol. If there  is any acute changes in exam, please obtain stat CT head and notify neurology team  BP Goals: Normotensive goal  Antiplatelet agents: Continue DAPT for now, with ASA 81 mg and Plavix 75 mg qd  Anticoagulation: None  Statin: Continue Lipitor 80 mg qd  Brain Imaging: Obtain MRI brain without contrast  Euthermic/Euglycemic  DVT PPx:  Lovenox , SCDs  PT/OT/ST/PMR evaluations when able  Stroke education and counseling  Disposition: PT Recommendations -

## 2025-03-31 NOTE — ASSESSMENT & PLAN NOTE
>>ASSESSMENT AND PLAN FOR TYPE 2 DIABETES MELLITUS WITH HYPERGLYCEMIA, WITH LONG-TERM CURRENT USE OF INSULIN (HCC) WRITTEN ON 3/31/2025  2:35 PM BY LEXX MIGUEL DO    Lab Results   Component Value Date    HGBA1C 9.0 (H) 03/26/2025       Recent Labs     03/31/25  1145   POCGLU 266*       Blood Sugar Average: Last 72 hrs:  (P) 266    Plan: SSI while impatient, hold PO antihyperglycemics

## 2025-03-31 NOTE — PLAN OF CARE
Problem: PAIN - ADULT  Goal: Verbalizes/displays adequate comfort level or baseline comfort level  Description: Interventions:- Encourage patient to monitor pain and request assistance- Assess pain using appropriate pain scale- Administer analgesics based on type and severity of pain and evaluate response- Implement non-pharmacological measures as appropriate and evaluate response- Consider cultural and social influences on pain and pain management- Notify physician/advanced practitioner if interventions unsuccessful or patient reports new pain  Outcome: Progressing     Problem: INFECTION - ADULT  Goal: Absence or prevention of progression during hospitalization  Description: INTERVENTIONS:- Assess and monitor for signs and symptoms of infection- Monitor lab/diagnostic results- Monitor all insertion sites, i.e. indwelling lines, tubes, and drains- Monitor endotracheal if appropriate and nasal secretions for changes in amount and color- [place] appropriate cooling/warming therapies per order- Administer medications as ordered- Instruct and encourage patient and family to use good hand hygiene technique- Identify and instruct in appropriate isolation precautions for identified infection/condition  Outcome: Progressing     Problem: SAFETY ADULT  Goal: Patient will remain free of falls  Description: INTERVENTIONS:- Educate patient/family on patient safety including physical limitations- Instruct patient to call for assistance with activity - Consult OT/PT to assist with strengthening/mobility - Keep Call bell within reach- Keep bed low and locked with side rails adjusted as appropriate- Keep care items and personal belongings within reach- Initiate and maintain comfort rounds-  Outcome: Progressing  Goal: Maintain or return to baseline ADL function  Description: INTERVENTIONS:-  Assess patient's ability to carry out ADLs; assess patient's baseline for ADL function and identify physical deficits which impact ability  to perform ADLs (bathing, care of mouth/teeth, toileting, grooming, dressing, etc.)- Assess/evaluate cause of self-care deficits - Assess range of motion- Assess patient's mobility; develop plan if impaired- Assess patient's need for assistive devices and provide as appropriate- Encourage maximum independence but intervene and supervise when necessary- Involve family in performance of ADLs- Assess for home care needs following discharge - Consider OT consult to assist with ADL evaluation and planning for discharge- Provide patient education as appropriate  Outcome: Progressing  Goal: Maintains/Returns to pre admission functional level  Description: INTERVENTIONS:- Perform AM-PAC 6 Click Basic Mobility/ Daily Activity assessment daily.- Set and communicate daily mobility goal to care team and patient/family/caregiver. - Collaborate with rehabilitation services on mobility goals if consulted  Outcome: Progressing     Problem: DISCHARGE PLANNING  Goal: Discharge to home or other facility with appropriate resources  Description: INTERVENTIONS:- Identify barriers to discharge w/patient and caregiver- Arrange for needed discharge resources and transportation as appropriate- Identify discharge learning needs (meds, wound care, etc.)- Arrange for interpretive services to assist at discharge as needed- Refer to Case Management Department for coordinating discharge planning if the patient needs post-hospital services based on physician/advanced practitioner order or complex needs related to functional status, cognitive ability, or social support system  Outcome: Progressing     Problem: Knowledge Deficit  Goal: Patient/family/caregiver demonstrates understanding of disease process, treatment plan, medications, and discharge instructions  Description: Complete learning assessment and assess knowledge base.Interventions:- Provide teaching at level of understanding- Provide teaching via preferred learning methods  Outcome:  Progressing     Problem: NEUROSENSORY - ADULT  Goal: Achieves stable or improved neurological status  Description: INTERVENTIONS- Monitor and report changes in neurological status- Monitor vital signs such as temperature, blood pressure, glucose, and any other labs ordered - Initiate measures to prevent increased intracranial pressure- Monitor for seizure activity and implement precautions if appropriate    Outcome: Progressing  Goal: Achieves maximal functionality and self care  Description: INTERVENTIONS- Monitor swallowing and airway patency with patient fatigue and changes in neurological status- Encourage and assist patient to increase activity and self care. - Encourage visually impaired, hearing impaired and aphasic patients to use assistive/communication devices  Outcome: Progressing

## 2025-03-31 NOTE — TELEPHONE ENCOUNTER
Occupational therapist called stating patient may of had another stroke, difficulty walking, left side weakness, face drooped.    Spoke to Dr. Bolden and he advised patient to to to Emergency Room immediately.    Occupational therapist understood instructions and will have patient report to emergency room.

## 2025-04-01 LAB
ALBUMIN SERPL BCG-MCNC: 4.1 G/DL (ref 3.5–5)
ALP SERPL-CCNC: 71 U/L (ref 34–104)
ALT SERPL W P-5'-P-CCNC: 15 U/L (ref 7–52)
ANION GAP SERPL CALCULATED.3IONS-SCNC: 10 MMOL/L (ref 4–13)
AST SERPL W P-5'-P-CCNC: 19 U/L (ref 13–39)
BASOPHILS # BLD AUTO: 0.07 THOUSANDS/ÂΜL (ref 0–0.1)
BASOPHILS NFR BLD AUTO: 1 % (ref 0–1)
BILIRUB SERPL-MCNC: 0.7 MG/DL (ref 0.2–1)
BUN SERPL-MCNC: 36 MG/DL (ref 5–25)
CALCIUM SERPL-MCNC: 9.3 MG/DL (ref 8.4–10.2)
CHLORIDE SERPL-SCNC: 105 MMOL/L (ref 96–108)
CO2 SERPL-SCNC: 25 MMOL/L (ref 21–32)
CREAT SERPL-MCNC: 1.29 MG/DL (ref 0.6–1.3)
EOSINOPHIL # BLD AUTO: 0.38 THOUSAND/ÂΜL (ref 0–0.61)
EOSINOPHIL NFR BLD AUTO: 5 % (ref 0–6)
ERYTHROCYTE [DISTWIDTH] IN BLOOD BY AUTOMATED COUNT: 13.1 % (ref 11.6–15.1)
GFR SERPL CREATININE-BSD FRML MDRD: 54 ML/MIN/1.73SQ M
GLUCOSE SERPL-MCNC: 120 MG/DL (ref 65–140)
GLUCOSE SERPL-MCNC: 179 MG/DL (ref 65–140)
GLUCOSE SERPL-MCNC: 201 MG/DL (ref 65–140)
GLUCOSE SERPL-MCNC: 78 MG/DL (ref 65–140)
GLUCOSE SERPL-MCNC: 80 MG/DL (ref 65–140)
HCT VFR BLD AUTO: 47.2 % (ref 36.5–49.3)
HGB BLD-MCNC: 15.8 G/DL (ref 12–17)
IMM GRANULOCYTES # BLD AUTO: 0.02 THOUSAND/UL (ref 0–0.2)
IMM GRANULOCYTES NFR BLD AUTO: 0 % (ref 0–2)
LYMPHOCYTES # BLD AUTO: 2.58 THOUSANDS/ÂΜL (ref 0.6–4.47)
LYMPHOCYTES NFR BLD AUTO: 32 % (ref 14–44)
MCH RBC QN AUTO: 29.8 PG (ref 26.8–34.3)
MCHC RBC AUTO-ENTMCNC: 33.5 G/DL (ref 31.4–37.4)
MCV RBC AUTO: 89 FL (ref 82–98)
MONOCYTES # BLD AUTO: 0.67 THOUSAND/ÂΜL (ref 0.17–1.22)
MONOCYTES NFR BLD AUTO: 8 % (ref 4–12)
NEUTROPHILS # BLD AUTO: 4.32 THOUSANDS/ÂΜL (ref 1.85–7.62)
NEUTS SEG NFR BLD AUTO: 54 % (ref 43–75)
NRBC BLD AUTO-RTO: 0 /100 WBCS
PLATELET # BLD AUTO: 222 THOUSANDS/UL (ref 149–390)
PMV BLD AUTO: 11.3 FL (ref 8.9–12.7)
POTASSIUM SERPL-SCNC: 3.8 MMOL/L (ref 3.5–5.3)
PROT SERPL-MCNC: 6.7 G/DL (ref 6.4–8.4)
RBC # BLD AUTO: 5.3 MILLION/UL (ref 3.88–5.62)
SODIUM SERPL-SCNC: 140 MMOL/L (ref 135–147)
WBC # BLD AUTO: 8.04 THOUSAND/UL (ref 4.31–10.16)

## 2025-04-01 PROCEDURE — 97167 OT EVAL HIGH COMPLEX 60 MIN: CPT

## 2025-04-01 PROCEDURE — 99223 1ST HOSP IP/OBS HIGH 75: CPT

## 2025-04-01 PROCEDURE — 97163 PT EVAL HIGH COMPLEX 45 MIN: CPT

## 2025-04-01 PROCEDURE — 82948 REAGENT STRIP/BLOOD GLUCOSE: CPT

## 2025-04-01 PROCEDURE — 99233 SBSQ HOSP IP/OBS HIGH 50: CPT | Performed by: STUDENT IN AN ORGANIZED HEALTH CARE EDUCATION/TRAINING PROGRAM

## 2025-04-01 PROCEDURE — 80053 COMPREHEN METABOLIC PANEL: CPT

## 2025-04-01 PROCEDURE — 85025 COMPLETE CBC W/AUTO DIFF WBC: CPT

## 2025-04-01 RX ORDER — ASPIRIN 81 MG/1
81 TABLET, CHEWABLE ORAL DAILY
Status: DISCONTINUED | OUTPATIENT
Start: 2025-04-02 | End: 2025-04-03 | Stop reason: HOSPADM

## 2025-04-01 RX ADMIN — LOSARTAN POTASSIUM 100 MG: 50 TABLET, FILM COATED ORAL at 12:26

## 2025-04-01 RX ADMIN — INSULIN LISPRO 2 UNITS: 100 INJECTION, SOLUTION INTRAVENOUS; SUBCUTANEOUS at 17:43

## 2025-04-01 RX ADMIN — ASPIRIN 81 MG CHEWABLE TABLET 81 MG: 81 TABLET CHEWABLE at 09:20

## 2025-04-01 RX ADMIN — CLOPIDOGREL BISULFATE 75 MG: 75 TABLET, FILM COATED ORAL at 09:20

## 2025-04-01 RX ADMIN — INSULIN GLARGINE 40 UNITS: 100 INJECTION, SOLUTION SUBCUTANEOUS at 21:58

## 2025-04-01 RX ADMIN — ENOXAPARIN SODIUM 40 MG: 40 INJECTION SUBCUTANEOUS at 09:20

## 2025-04-01 RX ADMIN — AMLODIPINE BESYLATE 10 MG: 10 TABLET ORAL at 12:26

## 2025-04-01 RX ADMIN — ATORVASTATIN CALCIUM 40 MG: 40 TABLET, FILM COATED ORAL at 17:43

## 2025-04-01 RX ADMIN — CYANOCOBALAMIN TAB 500 MCG 250 MCG: 500 TAB at 09:20

## 2025-04-01 NOTE — PHYSICAL THERAPY NOTE
Physical Therapy Evaluation    Patient Name: Jose Maria Bethea    Today's Date: 4/1/2025     Problem List  Active Problems:    Type 2 diabetes mellitus with hyperglycemia, with long-term current use of insulin (HCC)    Essential hypertension    Mixed hyperlipidemia    DEWEY (obstructive sleep apnea)    Stroke-like symptoms       Past Medical History  Past Medical History:   Diagnosis Date    COVID-19 07/2022    Diabetes mellitus (HCC)     Type 2    Dyslipidemia     Hypertension     Obstructive sleep apnea on CPAP         Past Surgical History  Past Surgical History:   Procedure Laterality Date    COLONOSCOPY  2008 04/01/25 0844   PT Last Visit   PT Visit Date 04/01/25   Note Type   Note type Evaluation   Pain Assessment   Pain Assessment Tool 0-10   Pain Score No Pain   Restrictions/Precautions   Weight Bearing Precautions Per Order No   Other Precautions Cognitive;Chair Alarm;Bed Alarm;Multiple lines;Telemetry;Fall Risk  (L hemiparesis distal > proximal)   Home Living   Type of Home House   Home Layout Two level;Able to live on main level with bedroom/bathroom;1/2 bath on main level;Stairs to enter with rails  (1 THEODORE)   Bathroom Shower/Tub Tub/shower unit   Bathroom Toilet Standard   Bathroom Equipment   (no DME at baseline)   Bathroom Accessibility Accessible   Home Equipment Walker  (unsure if pt was using all the time prior to current admission. before previous admission pt was independent without AD.)   Additional Comments pt lives with wife in 2 SH with 1 THEODORE and is able to stay on first floor   Prior Function   Level of [place] Needs assistance with ADLs;Needs assistance with functional mobility;Needs assistance with IADLS   Lives With Spouse   Receives Help From Family  (local son)   IADLs Family/Friend/Other provides transportation;Family/Friend/Other provides medication management;Family/Friend/Other provides meals   Falls in the last 6 months 1 to  4  (1)   Vocational Retired   Comments prior to current admission pt required assistance from spouse however prior to last admission pt was fully independent   General   Family/Caregiver Present Yes  (wife)   Cognition   Arousal/Participation Cooperative   Attention Within functional limits   Orientation Level Oriented X4   Memory Within functional limits   Following Commands Follows one step commands without difficulty   Comments pt pleasant and cooperative   Subjective   Subjective pt agreeable to mobilize   RLE Assessment   RLE Assessment X   Strength RLE   R Knee Extension 4-/5   R Ankle Dorsiflexion 4/5   R Ankle Plantar Flexion 4/5   LLE Assessment   LLE Assessment X   Strength LLE   L Hip Extension 3-/5   L Hip ABduction 3-/5   L Hip ADduction 3-/5   L Knee Extension 1/5   L Ankle Dorsiflexion 0/5   L Ankle Plantar Flexion 0/5   Light Touch   RLE Light Touch Grossly intact   LLE Light Touch Impaired   LLE Light Touch Comments decreased sensation in calf and posterior thigh   Proprioception   RLE Proprioception Grossly intact   LLE Proprioception Grossly Intact   Bed Mobility   Supine to Sit 4  Minimal assistance   Additional items Assist x 1;HOB elevated;Increased time required;Verbal cues;LE management   Sit to Supine 4  Minimal assistance   Additional items Assist x 1;Increased time required;Verbal cues   Transfers   Sit to Stand 4  Minimal assistance   Additional items Assist x 2;Increased time required;Verbal cues   Stand to Sit 4  Minimal assistance   Additional items Assist x 2;Increased time required;Verbal cues   Additional Comments R HHA, LUE supported, L knee block   Ambulation/Elevation   Gait pattern Improper Weight shift;Decreased foot clearance;Excessively slow;L Hemiparesis;L Knee Edy   Gait Assistance 3  Moderate assist   Additional items Assist x 2;Verbal cues;Tactile cues   Assistive Device Other (Comment)  (b/l HHA)   Distance 2 side steps EOB   Stair Management Assistance Not tested    Ambulation/Elevation Additional Comments assist for weight shifting and moving LLE   Balance   Static Sitting Fair   Dynamic Sitting Fair -   Static Standing Poor   Dynamic Standing Poor -   Ambulatory Poor -   Endurance Deficit   Endurance Deficit Yes   Endurance Deficit Description pt limited by hemiparesis, fatigue and decreased activity tolerance   Activity Tolerance   Activity Tolerance Patient limited by fatigue   Medical Staff Made Aware OT Katharine   Nurse Made Aware yes-RN cleared   Assessment   Prognosis Fair   Problem List Decreased strength;Decreased range of motion;Decreased endurance;Impaired balance;Decreased mobility;Decreased coordination;Impaired tone;Impaired sensation   Assessment Pt is an 74 y.o. male presenting to \[facility]\"" on 3/31/25 for L sided weakness. Pt recently hospitalized for acute infarct however discharged home 3/27. At home pt required increased assistance and developed increasing weakness. Per imaging there is no evidence of new infarct.  Pt  has a past medical history of COVID-19, Diabetes mellitus (HCC), Dyslipidemia, Hypertension, and Obstructive sleep apnea on CPAP. Pt presents as a high complexity evaluation due to Ongoing medical management for primary dx, Increased reliance on more restrictive AD compared to baseline, Decreased activity tolerance compared to baseline, Fall risk, Increased assistance needed from caregiver at current time, Ongoing telemetry monitoring, Trending lab values, Diagnostic imaging pending, Continuous pulse oximetry monitoring . Pt lives with wife in 2  with 1 THEODORE and FF if needed. Pt was independent prior to last hospital admission however was recently requiring assistance at home. Pt currently requires min A for bed mobility, min Ax2 for transfers with b/l HHA and mod Ax2 for side steps EOB with HHA. Pt is limited by deficits in strength, balance, mobility, endurance and activity tolerance limiting their ability to safely access their home or community.  Pt would benefit from continued skilled acute care PT services to address impairments and promote functional independence. Recommend level 1 resources to improve mobility and promote PLOF. The patient's AM-PAC Basic Mobility Inpatient Short Form Raw Score is 11. A Raw score of less than 16 suggests the patient may benefit from discharge to post-acute rehabilitation services. Please also refer to the recommendation of the Physical Therapist for safe discharge planning. Pt left supine in bed with bed alarm donned, call bell and personal items within reach and all needs met.   Barriers to Discharge Inaccessible home environment;Decreased caregiver support   Goals   Patient Goals to find out results if he had a stroke   STG Expiration Date 04/15/25   Short Term Goal #1 In 14 days pt will complete bed mobility at mod I to decrease caregiver burden. Pt will complete transfers at S to promote independence and decrease caregiver burden. Pt will ambulate 150' with LRAD at S to promote safe access around home. Pt will improve LE strength by 1 grade to increase efficiency of transfers and ambulation. Pt will improve dynamic balance by 1/2 grade to decrease falls risk and increase safety.   PT Treatment Day 0   Plan   Treatment/Interventions Functional transfer training;LE strengthening/ROM;ADL retraining;Elevations;Therapeutic exercise;Endurance training;Patient/family training;Equipment eval/education;Gait training;Bed mobility;Compensatory technique education;Spoke to nursing;OT   PT Frequency 3-5x/wk   Discharge Recommendation   Rehab Resource Intensity Level, PT I (Maximum Resource Intensity)   Equipment Recommended Other (Comment)  (TBD)   AM-PAC Basic Mobility Inpatient   Turning in Flat Bed Without Bedrails 3   Lying on Back to Sitting on Edge of Flat Bed Without Bedrails 3   Moving Bed to Chair 1   Standing Up From Chair Using Arms 2   Walk in Room 1   Climb 3-5 Stairs With Railing 1   Basic Mobility Inpatient Raw  Score 11   Basic Mobility Standardized Score 30.25   [facility] Highest Level Of Mobility   [facility] Goal 4: Move to chair/commode   -[facility] Achieved 5: Stand (1 or more minutes)   Modified Rena Scale   Modified Rena Scale 4   Barthel Index   Feeding 5   Bathing 0   Grooming Score 0   Dressing Score 5   Bladder Score 5   Bowels Score 5   Toilet Use Score 5   Transfers (Bed/Chair) Score 5   Mobility (Level Surface) Score 0   Stairs Score 0   Barthel Index Score 30   JORGE LUIS CarranzaT

## 2025-04-01 NOTE — CONSULTS
CONSULTATION - PMR   Name: Jose Maria Bethea 74 y.o. male I MRN: [id number]  Unit/Bed#: [facility]PHP 707-01 I Date of Admission: 3/31/2025   Date of Service: 4/1/2025 I Hospital Day: 1     Referred by:  Forrest Samayoa DO  For:  Stroke  Assessment & Plan  Brainstem stroke (HCC)  - P/w:  significant worsening of left-sided weakness over the prior 2 days after acute CVA 3/25   - Imaging:   CTA showed stable exam with high-grade stenosis and possible short segment occlusion of the intracranial right vertebral artery and MRI showed stable subacute infarct in the right ventral medulla.  Neurology consulted and felt this was completion of initial stroke with recommendation to continue prior plan with normotensive blood pressure goals.    - Status and residual impairments:    Dense L-HP   Imbalance, incoordination    Potential for dysphagia  - Med/surgical management:   DAPT 21 d from CVA   Statin   Optimal BP/DM mgmt   - Neuropsych Med review:    NA    - Continue PT, OT in acute setting to improve ADLs, mobility, strength, conditioning, and decrease risks of immobility as well as to assist with dispo recommendations   - Decrease risks of complications related to decreased mobility status and monitor for them during course  - MSK - atrophy, contractures, bone demineralization, CV - DVT/PE, orthostasis, decreased CO, Resp - atelectasis, PNA, GI - constipation, reduced appetite,  - retention, incontinence, Skin - pressure injuries  - Stroke complication risks in mod-severe stroke: HP 50-85%, aphasia 20-40%, cog impairment 20-50%, dysphagia 30-65%, DVT 20-40%, PNA 10-30%, UTI 15-25%, Shoulder pain/sublx 20-50%, depression 30-50%, spasticity 20-30%, pressure ulcers 10-15%, seizures 5-10%, incontinence 15-20%, fatigue 30-70%, falls & fractures 30-50%, shoulder-hand syndrome (CPRS) 12-25%  - In spite of SLP eval recently recommending reg, thins - patient still reporting some difficulty with liquids and would monitor closely with  follow-up by SLP   - Optimal bowel/bladder mgmt/hygiene - monitor for retention, incontinence, infection     - Turn patient Q2H, skin checks minimum Qshift  - ROM of major joints 2-3 times per day (if unable to do it on own)  - Supportive counseling and updates to family (as appropriate) - counseling and education provided   - Fall precautions  - Overall mgmt per primary team currently, recommend optimal mgmt during rehab process as well  - Remains at risk for increased confusion/delirium, restlessness, agitation, and fall - continue to monitor for concerns/changes  - Monitor neuro-exam, wakefulness, mood, cognition, insight into deficits and safety awareness   - Monitor and ensure optimal management electrolytes, nutrition, and hydration  - Monitor for signs or symptoms of infection, medication intolerances, other systemic etiologies  - Additional labs, imaging, specialist follow-up as needed per primary team currently   - Overstimulation precautions, frequent re-orientation, re-direction, re-assurance  - Optimal mood, pain, and sleep management  - If impaired sleep or behavior recommend sleep log and agitation monitoring    - Limit sedating medications when possible  - For routine restlessness, anxiety, irritability focus on non-pharmacologic management      Jose Maria Bethea was evaluated by PT, OT and now by PMR physician and found to have new and significant deficits in ADLs and mobility.      Prior Level of Function and Social history:    Patient lives in a two-level home with spouse with one-step to enter and is able to live on main level with bedroom and half bathroom.  Independent with ADLs  Independent with ambulation    Current Level of Function:    Transfers min assist x2  Ambulation mod assist x2 2 steps  Max assist bathing, dressing, toileting; mod assist UBB    Disposition recommendation:  [facility]/IRF  Patient is good candidate for transfer to [facility]/IRF pending:    - Medical clearance/stability  - Facility  acceptance, insurance authorization, and bed availability    Hemiparesis due to recent stroke  - Monitor for post-stroke pain both central and peripheral   - Monitor for post-stroke shoulder pain/subluxation   - Initiate shoulder subluxation precautions   - OT/PT gentle ROM, strengthening, NMES, consider K-tape  - Consider sling during transfers and mobilizing unless with skilled therapies  - Pharmacologic and non-pharmacologic management as indicated   - Monitor tone and evidence of spasticity in affected limb   - Hand hygiene/towel rolls/splinting (ie resting hand splint) if needed  - Multipodus boot to decrease risk of pressure injuries/skin breakdown, loss of ROM/contracture  - Rehab nursing, nursing assistant staff, and skilled therapies with optimal hemiparetic positioning in bed and OOB with appropriate turning every 2 hours       Type 2 diabetes mellitus with hyperglycemia, with long-term current use of insulin ([facility])  Lab Results   Component Value Date    HGBA1C 9.0 (H) 03/26/2025       Recent Labs     03/31/25  1618 03/31/25  2105 04/01/25  0628 04/01/25  1105   POCGLU 138 173* 78 120       Blood Sugar Average: Last 72 hrs:  (P) 155  - Current management by internal medicine  - Monitor for signs and symptoms of hypoglycemia   - Current meds: per primary  - Consistent carb diabetic diet  - Recommend close monitoring and optimal management during recovery/rehab phase as well     Essential hypertension  - Current management at discretion of primary team   - Ensure optimal management during rehab process  - Monitor vitals with and without activity; monitor for orthostasis  - Monitor hemoglobin, electrolytes, kidney function, hydration status   - Current meds: per other providers    Mixed hyperlipidemia    DEWEY (obstructive sleep apnea)      Encounter for skin care  - Increased risk of skin wounds/breakdown/rashes due to recent immobility and co-morbidities   - Turn patient Q2H in bed (use pillows or wedges),  encourage wt shifts every 10-15 minutes in chair  - Patient and if available caregiver education   - Hydragaurd (or other appropriate barrier cream) to buttocks and sacrum BID & PRN   - Allevyn foam to heels and/or float heels when in bed   - Optimal bowel/bladder hygiene; keep skin clean and dry   - EHOB waffle cushion to chair/WC when OOB  - Rec nursing to document in chart and Notify MD if buttock, sacrum, heel, or other skin site develops erythema, skin breakdown, or rashes as soon as possible.  If patient is soiling themselves with urine or stool notify MD.  If you are unable to maintain skin integrity and prevent erythema due to frequency of soiling notify MD as soon as possible as well  - Consult would care for any wounds or significant concerns for skin integrity     VTE prophylaxis   As outlined by primary team      Other medical issues:     As per primary service (and relevant consultants if applicable)    ==================================================================    Chief Complaint:  Stroke     History of Present Illness:   Jose Maria Bethea is a 74 y.o. male with past medical history of diabetes, hypertension, hyperlipidemia, chronic kidney disease stage IIIa, obstructive sleep apnea, evidence of older lacunar infarct on new imaging, who developed left lower extremity weakness, paresthesias, and right facial droop and presented to the hospital on 3/25.  CT head did not show significant acute findings.  CTA head and neck showed high-grade stenosis and possible short segment occlusion of the intracranial right vertebral artery.  MRI brain showed acute infarct in the right ventral upper medulla with unchanged chronic multifocal lacunar infarcts in the left centrum ovale with mild to moderate chronic microangiopathy.  He was recommend dual antiplatelet therapy for 21 days, statin, optimal blood pressure control and was discharged home on 327.  Patient presented to the hospital on 3/31 with significant  worsening left-sided weakness over the prior 2 days.  CTA showed stable exam with high-grade stenosis and possible short segment occlusion of the intracranial right vertebral artery and MRI showed stable subacute infarct in the right ventral medulla.  Neurology consulted and felt this was completion of initial stroke with recommendation to continue prior plan with normotensive blood pressure goals.    On eval, patient reports significant L arm and leg weakness with slight altered sensation as well as increased facial weakness and slight difficulty swallowing liquids.  He denies pain, SOB, headache, nausea, vertigo, or other new complaints.      Review of Systems:     Complete review of systems obtained.    Please see HPI for details with other significant symptoms or history listed here:    Otherwise, 14 point review of systems completed and was otherwise unremarkable.    No Known Allergies    Current Facility-Administered Medications:     amLODIPine (NORVASC) tablet 10 mg, 10 mg, Oral, Daily, Forrest Samayoa DO    [START ON 4/2/2025] aspirin chewable tablet 81 mg, 81 mg, Oral, Daily, Forrest Samayoa DO    atorvastatin (LIPITOR) tablet 40 mg, 40 mg, Oral, QPM, Forrest Samayoa, DO, 40 mg at 03/31/25 1712    clopidogrel (PLAVIX) tablet 75 mg, 75 mg, Oral, Daily, Forrest Samayoa, , 75 mg at 04/01/25 0920    cyanocobalamin (VITAMIN B-12) tablet 250 mcg, 250 mcg, Oral, Daily, Forrest Samayoa, DO, 250 mcg at 04/01/25 0920    enoxaparin (LOVENOX) subcutaneous injection 40 mg, 40 mg, Subcutaneous, Daily, Forrest Samayoa DO, 40 mg at 04/01/25 0920    insulin glargine (LANTUS) subcutaneous injection 40 Units 0.4 mL, 40 Units, Subcutaneous, HS, Forrest Samayoa, DO, 40 Units at 03/31/25 2135    insulin lispro (HumALOG/ADMELOG) 100 units/mL subcutaneous injection 1-5 Units, 1-5 Units, Subcutaneous, TID AC **AND** Fingerstick Glucose (POCT), , , TID AC, Forrest Samayoa DO    losartan  (COZAAR) tablet 100 mg, 100 mg, Oral, Daily, Forrest Samayoa,     meclizine (ANTIVERT) tablet 12.5 mg, 12.5 mg, Oral, TID PRN, Forrest Samayoa, DO    ondansetron (ZOFRAN) injection 4 mg, 4 mg, Intravenous, Q8H PRN, Forrest Samayoa, DO    Past Medical History:   Diagnosis Date    COVID-19 07/2022    Diabetes mellitus (HCC)     Type 2    Dyslipidemia     Hypertension     Obstructive sleep apnea on CPAP        Past Surgical History:   Procedure Laterality Date    COLONOSCOPY  2008      Family History   Problem Relation Age of Onset    Hypertension Mother        Social History available currently in EMR:  (See additional SH as outlined above)   Social History     Socioeconomic History    Marital status: /Civil Union     Spouse name: None    Number of children: None    Years of education: None    Highest education level: None   Occupational History    None   Tobacco Use    Smoking status: Former     Types: Cigarettes    Smokeless tobacco: Never    Tobacco comments:     1 year as a teenager   Vaping Use    Vaping status: Never Used   Substance and Sexual Activity    Alcohol use: Not Currently    Drug use: Not Currently    Sexual activity: Not Currently     Partners: Female   Other Topics Concern    None   Social History Narrative    None     Social Drivers of Health     Financial Resource Strain: Low Risk  (4/14/2023)    Overall Financial Resource Strain (CARDIA)     Difficulty of Paying Living Expenses: Not hard at all   Food Insecurity: No Food Insecurity (3/31/2025)    Nursing - Inadequate Food Risk Classification     Worried About Running Out of Food in the Last Year: Never true     Ran Out of Food in the Last Year: Never true     Ran Out of Food in the Last Year: Never true   Transportation Needs: No Transportation Needs (3/31/2025)    Nursing - Transportation Risk Classification     Lack of Transportation: Not on file     Lack of Transportation: No   Physical Activity: Not on file   Stress:  Not on file   Social Connections: Not on file   Intimate Partner Violence: Unknown (3/31/2025)    Nursing IPS     Feels Physically and Emotionally Safe: Not on file     Physically Hurt by Someone: Not on file     Humiliated or Emotionally Abused by Someone: Not on file     Physically Hurt by Someone: No     Hurt or Threatened by Someone: No   Housing Stability: Unknown (3/31/2025)    Nursing: Inadequate Housing Risk Classification     Has Housing: Not on file     Worried About Losing Housing: Not on file     Unable to Get Utilities: Not on file     Unable to Pay for Housing in the Last Year: No     Has Housin       Physical Examination:   Temp:  [97.4 °F (36.3 °C)-98.5 °F (36.9 °C)] 98 °F (36.7 °C)  HR:  [68-95] 85  BP: (126-178)/(63-95) 157/95  Resp:  [16-18] 18  SpO2:  [90 %-96 %] 90 %  O2 Device: None (Room air)  General: Awake, alert in NAD  HENT: NCAT, MMM  Respiratory: Unlabored breathing  Cardiovascular: Regular rate   Gastrointestinal: Soft, non-distended  Genitourinary: No alvarez  SkiN/MSK/Extremities:  No calf edema, no calf tenderness to palpation  Neurologic/Psych:   MENTAL STATUS: awake, oriented to person, place, time, and situation  Affect: Euthymic   CN II-XII: L facial weakness  Strength/MMT:  R side 5/5; LUE prox 1-2 distal 0, LLE prox 2 distal 0     Data:  Lab Results   Component Value Date    HGB 15.8 2025    HGB 15.1 2015    HCT 47.2 2025    HCT 45.2 2015    WBC 8.04 2025    WBC 6.07 2015     2015    K 3.8 2025    K 4.8 2015     2025     2015    GLUCOSE 167 (H) 2015    CREATININE 1.29 2025    CREATININE 0.96 2015    BUN 36 (H) 2025    BUN 20 2015       MRI brain wo contrast  Result Date: 2025  Impression: Stable subacute infarct in the right ventral medulla. No evidence of new infarct, hemorrhage or mass effect. Workstation performed: [id number]     CTA head and neck with and  without contrast  Result Date: 3/31/2025  Impression: CT Brain:  No acute intracranial abnormality. The small infarct seen on recent MRI is not identified on CT. CT Angiography: Stable exam with high-grade stenosis and possible short segment occlusion of the intracranial right vertebral artery. Workstation performed: [id number]       Pertinent labs and imaging reviewed.      Patient seen on date of service listed.      Thank you for allowing the PM&R service to participate in the care of this patient. We will continue to follow Jose Maria Bethea's progress with you. Please do not hesitate to call with questions or concerns.    Willian Mtz MD, MS  [facility]  Physical Medicine and Rehabilitation  Brain Injury Medicine

## 2025-04-01 NOTE — PLAN OF CARE
Problem: PHYSICAL THERAPY ADULT  Goal: Performs mobility at highest level of function for planned discharge setting.  See evaluation for individualized goals.  Description: Treatment/Interventions: Functional transfer training, LE strengthening/ROM, ADL retraining, Elevations, Therapeutic exercise, Endurance training, Patient/family training, Equipment eval/education, Gait training, Bed mobility, Compensatory technique education, Spoke to nursing, OT  Equipment Recommended: Other (Comment) (TBD)       See flowsheet documentation for full assessment, interventions and recommendations.  Note: Prognosis: Fair  Problem List: Decreased strength, Decreased range of motion, Decreased endurance, Impaired balance, Decreased mobility, Decreased coordination, Impaired tone, Impaired sensation  Assessment: Pt is an 74 y.o. male presenting to [facility] on 3/31/25 for L sided weakness. Pt recently hospitalized for acute infarct however discharged home 3/27. At home pt required increased assistance and developed increasing weakness. Per imaging there is no evidence of new infarct.  Pt  has a past medical history of COVID-19, Diabetes mellitus (HCC), Dyslipidemia, Hypertension, and Obstructive sleep apnea on CPAP. Pt presents as a high complexity evaluation due to Ongoing medical management for primary dx, Increased reliance on more restrictive AD compared to baseline, Decreased activity tolerance compared to baseline, Fall risk, Increased assistance needed from caregiver at current time, Ongoing telemetry monitoring, Trending lab values, Diagnostic imaging pending, Continuous pulse oximetry monitoring . Pt lives with wife in 2  with 1 [facility] and [facility] if needed. Pt was independent prior to last hospital admission however was recently requiring assistance at home. Pt currently requires min A for bed mobility, min Ax2 for transfers with b/l HHA and mod Ax2 for side steps EOB with HHA. Pt is limited by deficits in strength, balance,  mobility, endurance and activity tolerance limiting their ability to safely access their home or community. Pt would benefit from continued skilled acute care PT services to address impairments and promote functional independence. Recommend level 1 resources to improve mobility and promote PLOF. The patient's AM-PAC Basic Mobility Inpatient Short Form Raw Score is 11. A Raw score of less than 16 suggests the patient may benefit from discharge to post-acute rehabilitation services. Please also refer to the recommendation of the Physical Therapist for safe discharge planning. Pt left supine in bed with bed alarm donned, call bell and personal items within reach and all needs met.  Barriers to Discharge: Inaccessible home environment, Decreased caregiver support     Rehab Resource Intensity Level, PT: I (Maximum Resource Intensity)    See flowsheet documentation for full assessment.

## 2025-04-01 NOTE — PLAN OF CARE
Problem: OCCUPATIONAL THERAPY ADULT  Goal: Performs self-care activities at highest level of function for planned discharge setting.  See evaluation for individualized goals.  Description: Treatment Interventions: ADL retraining, Functional transfer training, UE strengthening/ROM, Endurance training, Cognitive reorientation, Patient/family training, Equipment evaluation/education, Compensatory technique education, Continued evaluation, Energy conservation, Activityengagement          See flowsheet documentation for full assessment, interventions and recommendations.   Note: Limitation: Decreased ADL status, Decreased endurance, Decreased self-care trans, Decreased high-level ADLs, Decreased UE ROM, Decreased UE strength, Decreased fine motor control, Decreased sensation  Prognosis: Fair  Assessment: Pt is a 74 y.o. male who was admitted to [facility] on 3/31/2025 with worsening left UE weakness for past 2 days, recent admissin 3/25-3/27 with acute infarct and now here with additional stroke like symptoms, MRI. 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 No evidence of new infarct, hemorrhage or mass effect.  Patient   has a past medical history of COVID-19 (07/2022), Diabetes mellitus (HCC), Dyslipidemia, Hypertension, and Obstructive sleep apnea on CPAP. At baseline pt was completing . Pt lives with spouse in a 2SH with 1STE and 1/2 bath on first. Currently pt requires mod/max a  for overall ADLS and min/mod a x 2 with B/L UE -left hemiplegia for functional mobility/transfers. Pt currently presents with impairments in the following categories -difficulty performing ADLS and difficulty performing IADLS  activity tolerance and endurance. These impairments, as well as pt's fatigue  limit pt's ability to safely engage in all baseline areas of occupation, includinggrooming, bathing, dressing, toileting, functional mobility/transfers, community mobility, driving, house maintenance, medication  management, meal prep, and cleaning From OT standpoint, recommend max level I upon D/C. The patient's raw score on the AM-PAC Daily Activity Inpatient Short Form is 14. A raw score of less than 19 suggests the patient may benefit from discharge to post-acute rehabilitation services. Please refer to the recommendation of the Occupational Therapist for safe discharge planning. OT will continue to follow to address the below stated goals.     Rehab Resource Intensity Level, OT: I (Maximum Resource Intensity)

## 2025-04-01 NOTE — ASSESSMENT & PLAN NOTE
>>ASSESSMENT AND PLAN FOR TYPE 2 DIABETES MELLITUS WITH HYPERGLYCEMIA, WITH LONG-TERM CURRENT USE OF INSULIN (HCC) WRITTEN ON 4/2/2025  6:58 AM BY DOLORES CASTAÑEDA MD    Lab Results   Component Value Date    HGBA1C 9.0 (H) 03/26/2025       Recent Labs     03/31/25  1618 03/31/25  2105 04/01/25  0628 04/01/25  1105   POCGLU 138 173* 78 120       Blood Sugar Average: Last 72 hrs:  (P) 155  - Current management by internal medicine  - Monitor for signs and symptoms of hypoglycemia   - Current meds: per primary  - Consistent carb diabetic diet  - Recommend close monitoring and optimal management during recovery/rehab phase as well

## 2025-04-01 NOTE — ASSESSMENT & PLAN NOTE
Jose Maria Bethea is a 74-year-old male with a past medical history of diabetes mellitus (DM), hyperlipidemia (HLD), and hypertension (HTN), CKD 3a, DEWEY presenting to the emergency department for evaluation of worsening left-sided weakness for the past two days.  Initial BP Blood Pressure: (!) 178/91, FSBG POC Glucose: (!) 266 NC CTH unremarkable and CTA H/N with no LVO or aneurysm, continues to show severe stenosis with focal occlusion at R V4 segment with reconstitution. As a result of > 4.5 hours from time of symptom onset patient was not determined to be a candidate for thrombolysis (TNK). Given absence of IR target pt was deemed not a candidate for mechanical thrombectomy.   - Home Antiplatelet /Anticoagulants PTA: Aspirin 81 mg QD and Plavix 75 mg qd  - Stroke risk factors: HTN, DM, HLD, and DEWEY  - Prior Stroke Hx: yes: lacunar left centrum semiovale, and right ventral upper medulla   - Past Neurological Hx: stroke      Workup:  - CTA head and neck: No acute intracranial abnormality. The small infarct seen on recent MRI is not identified on CT. Stable exam with high-grade stenosis and possible short segment occlusion of the intracranial right vertebral artery.  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 No evidence of new infarct, hemorrhage or mass effect   - TTE 3/26:  TTE with 65% EF, normal atria.   - Labs: Hemoglobin A1c 9.0 (3/26/2025), LDL 44 (3/26/2025), P2y12 75     Impression: 74 y.o. male with history of  diabetes mellitus (DM), hyperlipidemia (HLD), and hypertension (HTN), CKD 3a, DEWEY presenting to the emergency department for evaluation of worsening left-sided weakness for the past two days.  Patient appears to have completion of recent stroke as no new findings noted on MRI b.       Plan: Discussed plan with neurology attending, Dr. Crocker  Admit along the stroke pathway with telemetry monitoring  Frequent neuro checks per protocol. If there is any acute changes in exam, please obtain  stat CT head and notify neurology team  BP Goals: Normotensive goal  Antiplatelet agents: Continue DAPT, with ASA 81 mg and Plavix 75 mg qd for 21 days then plavix monotherapy on 4/14  Anticoagulation: None  Statin: Continue Lipitor 80 mg qd  Euthermic/Euglycemic  DVT PPx:  Lovenox , SCDs  PT/OT/ST/PMR evaluations when able  Stroke education and counseling  Disposition: PT Recommendations -

## 2025-04-01 NOTE — ASSESSMENT & PLAN NOTE
- P/w:  significant worsening of left-sided weakness over the prior 2 days after acute CVA 3/25   - Imaging:   CTA showed stable exam with high-grade stenosis and possible short segment occlusion of the intracranial right vertebral artery and MRI showed stable subacute infarct in the right ventral medulla.  Neurology consulted and felt this was completion of initial stroke with recommendation to continue prior plan with normotensive blood pressure goals.    - Status and residual impairments:    Dense L-HP   Imbalance, incoordination    Potential for dysphagia  - Med/surgical management:   DAPT 21 d from CVA   Statin   Optimal BP/DM mgmt   - Neuropsych Med review:    NA    - Continue PT, OT in acute setting to improve ADLs, mobility, strength, conditioning, and decrease risks of immobility as well as to assist with dispo recommendations   - Decrease risks of complications related to decreased mobility status and monitor for them during course  - MSK - atrophy, contractures, bone demineralization, CV - DVT/PE, orthostasis, decreased CO, Resp - atelectasis, PNA, GI - constipation, reduced appetite,  - retention, incontinence, Skin - pressure injuries  - Stroke complication risks in mod-severe stroke: HP 50-85%, aphasia 20-40%, cog impairment 20-50%, dysphagia 30-65%, DVT 20-40%, PNA 10-30%, UTI 15-25%, Shoulder pain/sublx 20-50%, depression 30-50%, spasticity 20-30%, pressure ulcers 10-15%, seizures 5-10%, incontinence 15-20%, fatigue 30-70%, falls & fractures 30-50%, shoulder-hand syndrome (CPRS) 12-25%  - In spite of SLP eval recently recommending reg, thins - patient still reporting some difficulty with liquids and would monitor closely with follow-up by SLP   - Optimal bowel/bladder mgmt/hygiene - monitor for retention, incontinence, infection     - Turn patient Q2H, skin checks minimum Qshift  - ROM of major joints 2-3 times per day (if unable to do it on own)  - Supportive counseling and updates to family (as  appropriate) - counseling and education provided   - Fall precautions  - Overall mgmt per primary team currently, recommend optimal mgmt during rehab process as well  - Remains at risk for increased confusion/delirium, restlessness, agitation, and fall - continue to monitor for concerns/changes  - Monitor neuro-exam, wakefulness, mood, cognition, insight into deficits and safety awareness   - Monitor and ensure optimal management electrolytes, nutrition, and hydration  - Monitor for signs or symptoms of infection, medication intolerances, other systemic etiologies  - Additional labs, imaging, specialist follow-up as needed per primary team currently   - Overstimulation precautions, frequent re-orientation, re-direction, re-assurance  - Optimal mood, pain, and sleep management  - If impaired sleep or behavior recommend sleep log and agitation monitoring    - Limit sedating medications when possible  - For routine restlessness, anxiety, irritability focus on non-pharmacologic management      Jose Maria Bethea was evaluated by PT, OT and now by PMR physician and found to have new and significant deficits in ADLs and mobility.      Prior Level of Function and Social history:    Patient lives in a two-level home with spouse with one-step to enter and is able to live on main level with bedroom and half bathroom.  Independent with ADLs  Independent with ambulation    Current Level of Function:    Transfers min assist x2  Ambulation mod assist x2 2 steps  Max assist bathing, dressing, toileting; mod assist UBB    Disposition recommendation:  ARC/IRF  Patient is good candidate for transfer to ARC/IRF pending:    - Medical clearance/stability  - Facility acceptance, insurance authorization, and bed availability    Hemiparesis due to recent stroke  - Monitor for post-stroke pain both central and peripheral   - Monitor for post-stroke shoulder pain/subluxation   - Initiate shoulder subluxation precautions   - OT/PT gentle ROM,  strengthening, NMES, consider K-tape  - Consider sling during transfers and mobilizing unless with skilled therapies  - Pharmacologic and non-pharmacologic management as indicated   - Monitor tone and evidence of spasticity in affected limb   - Hand hygiene/towel rolls/splinting (ie resting hand splint) if needed  - Multipodus boot to decrease risk of pressure injuries/skin breakdown, loss of ROM/contracture  - Rehab nursing, nursing assistant staff, and skilled therapies with optimal hemiparetic positioning in bed and OOB with appropriate turning every 2 hours

## 2025-04-01 NOTE — CASE MANAGEMENT
Case Management Assessment & Discharge Planning Note    Patient name Jose Maria Bethea  Location [facility]-01 MRN [id number]  : 1950 Date 2025       Current Admission Date: 3/31/2025  Current Admission Diagnosis:Type 2 diabetes mellitus with hyperglycemia, with long-term current use of insulin (HCC)   Patient Active Problem List    Diagnosis Date Noted Date Diagnosed    Stroke-like symptoms 2025     Acute ischemic stroke (HCC) 2025     Type 2 diabetes mellitus with diabetic cataract, with long-term current use of insulin (HCC) 2025     Chronic kidney disease, stage 3a (HCC) 2025     DEWEY (obstructive sleep apnea) 2024     Vitamin D deficiency 2024     Vertebral artery stenosis, bilateral 2021     Type 2 diabetes mellitus with hyperglycemia, with long-term current use of insulin ([facility]) 2020     Essential hypertension 2020     Hyperkalemia 2020     Mixed hyperlipidemia 2020       LOS (days): 1  Geometric Mean LOS (GMLOS) (days): 2.2  Days to GMLOS:1.1     OBJECTIVE:  PATIENT READMITTED TO HOSPITAL  Risk of Unplanned Readmission Score: 14.02         Current admission status: Inpatient       Preferred Pharmacy:   [facility]/pharmacy #[id number] - [place], [place] - [address]  [address]  [place] [address]  Phone: [phone] Fax: [phone]    Primary Care Provider: Puneet Charles MD    Primary Insurance: [employer]  Secondary Insurance:     ASSESSMENT:  Active Health Care Proxies       Celestina Bethea Health Care Representative - Spouse   Primary Phone: [phone] (Mobile)  Work Phone: [phone]                 Advance Directives  Does patient have a Health Care POA?: No  Was patient offered paperwork?: No  Does patient currently have a Health Care decision maker?: Yes, please see Health Care Proxy section  Does patient have Advance Directives?: No  Was patient offered paperwork?: No  Primary Contact: Celestina Lake (wife)          Readmission Root Cause  30 Day Readmission: Yes  During your hospital stay, did someone (provider, nurse, ) explain your care to you in a way you could understand?: Yes  Did you feel medically stable to leave the hospital?: Yes  Were you able to pay for your medication at the pharmacy?: Yes  Did you have reliable transportation to take you to your appointments?: Yes  During previous admission, was a post-acute recommendation made?: Yes  What post-acute resources were offered?: [facility]  Patient was readmitted due to: stroke-like symptoms    Patient Information  Admitted from:: Home  Mental Status: Alert  During Assessment patient was accompanied by: Spouse  Assessment information provided by:: Spouse, Patient  Primary Caregiver: Self  Support Systems: Spouse/significant other, Self  [place]: [place]  [place]  Home entry access options. Select all that apply.: No steps to enter home  Type of Current Residence: 3 story home  Upon entering residence, is there a bedroom on the main floor (no further steps)?: Yes  Upon entering residence, is there a bathroom on the main floor (no further steps)?: Yes  Living Arrangements: Lives w/ Spouse/significant other  Is patient a ?: No    Activities of Daily Living Prior to Admission  Functional Status: Assistance  Completes ADLs independently?: Yes  Ambulates independently?: Yes  Does patient use assisted devices?: No  Does patient currently own DME?: Yes  What DME does the patient currently own?: [employer]  Does patient have a history of Outpatient Therapy (PT/OT)?: Yes  Does the patient have a history of Short-Term Rehab?: No  Does patient have a history of HHC?: Yes  Does patient currently have HHC?: Yes ([employer])    Current Home Health Care  Type of Current Home Care Services: Home OT, Home PT, Nurse visit  Current Home Health Follow-Up Provider:: PCP    Patient Information Continued  Income Source:  Pension/residential  Does patient have prescription coverage?: Yes  Can the patient afford their medications and any related supplies (such as glucometers or test strips)?: Yes  Does patient receive dialysis treatments?: No  Does patient have a history of substance abuse?: No  Does patient have a history of Mental Health Diagnosis?: No         Means of Transportation  Means of Transport to \[facility] [place] [facility]\"":: Family transport      Social Determinants of Health (SDOH)      Flowsheet Row Most Recent Value   Housing Stability    In the last 12 months, was there a time when you were not able to pay the mortgage or rent on time? N   In the past 12 months, how many times have you moved where you were living? 0   At any time in the past 12 months, were you homeless or living in a shelter (including now)? N   Transportation Needs    In the past 12 months, has lack of transportation kept you from medical appointments or from getting medications? no   In the past 12 months, has lack of transportation kept you from meetings, work, or from getting things needed for daily living? No   Food Insecurity    Within the past 12 months, you worried that your food would run out before you got the money to buy more. Never true   Within the past 12 months, the food you bought just didn't last and you didn't have money to get more. Never true   Utilities    In the past 12 months has the electric, gas, oil, or water company threatened to shut off services in your home? No            DISCHARGE DETAILS:    Discharge planning discussed with:: Patient, patient's wife Celestina  Freedom of Choice: Yes  Comments - Freedom of Choice: Discussed FOC  CM contacted family/caregiver?: Yes  Were Treatment Team discharge recommendations reviewed with patient/caregiver?: Yes  Did patient/caregiver verbalize understanding of patient care needs?: Yes  Were patient/caregiver advised of the risks associated with not following Treatment Team discharge recommendations?:  Yes    Contacts  Patient Contacts: Celestina  Relationship to Patient:: Family  Contact Method: Phone  Phone Number: [phone]  Reason/Outcome: Discharge Planning, Emergency Contact    Patient is a re-admit- was just discharged on 3/27.  Admitted with worsening stroke-like symptoms.  Patient and wife want patient to be admitted into rehab. CM will follow for discharge needs.

## 2025-04-01 NOTE — ASSESSMENT & PLAN NOTE
>>ASSESSMENT AND PLAN FOR TYPE 2 DIABETES MELLITUS WITH HYPERGLYCEMIA, WITH LONG-TERM CURRENT USE OF INSULIN (HCC) WRITTEN ON 4/1/2025 10:15 AM BY LEXX MIGUEL DO    Lab Results   Component Value Date    HGBA1C 9.0 (H) 03/26/2025       Recent Labs     03/31/25  1145 03/31/25  1618 03/31/25  2105 04/01/25  0628   POCGLU 266* 138 173* 78       Blood Sugar Average: Last 72 hrs:  (P) 163.75    Plan: SSI while impatient, hold PO antihyperglycemics

## 2025-04-01 NOTE — UTILIZATION REVIEW
Initial Clinical Review    Admission: Date/Time/Statement:   Admission Orders (From admission, onward)       Ordered        03/31/25 1302  INPATIENT ADMISSION  Once                          Orders Placed This Encounter   Procedures    INPATIENT ADMISSION     Standing Status:   Standing     Number of Occurrences:   1     Level of Care:   Med Surg [16]     Estimated length of stay:   More than 2 Midnights     Certification:   I certify that inpatient services are medically necessary for this patient for a duration of greater than two midnights. See H&P and MD Progress Notes for additional information about the patient's course of treatment.     ED Arrival Information       Expected   -    Arrival   3/31/2025 11:33    Acuity   Emergent              Means of arrival   Ambulance    Escorted by   [facility]    Service   Neurology    Admission type   Emergency              Arrival complaint   stroke alert             Chief Complaint   Patient presents with    CVA/TIA-like Symptoms       Initial Presentation: 74 y.o. male w/ PMH of DM, HLD, HTN, CKD 3a, DEWEY , h/o stroke presented to the ED from home via EMS w/ worsening L sided weakness x 2 days.   Pt was evaluated by neurology last week, on exam 4/5 strength grossly in the LLUE and LLE. Today noted w/ 3/5 strength in LUE and LLE. Reports to have continued dizziness, have vomiting yesterday morning as well as this morning. Compliant w/ ASA and plavix.   In the ED, /91. FSBG POC Glucose: (!) 266 NC CTH unremarkable and CTA H/N with no LVO or aneurysm, continues to show severe stenosis with focal occlusion at R V4 segment with reconstitution. OOW for TNL. Given absence of IR target pt was deemed not a candidate for mechanical thrombectomy.   On exam, MM dry, dysarthria present.    Admit as Inpatient for evaluation and treatment of stroke like symptoms.  Plan: stroke pathway: Tele. Freq neuro checks.  Normotensive goal. MRI pending. Continue  ASA 81 mg and  Plavix 75 mg qd.  Continue Lipitor 80 mg qd. DVT ppx. PT/OT/ST       Date: 04/01   Day 2: No acute ovn events. Pt noted slight improvement in LUE strength. BP improved. MRI:  Stable subacute infarct in the right ventral medulla. No evidence of new infarct, hemorrhage or mass effect. Cont tele. Freq neuro checks. Cont ASA, Plavix, statin. DVT ppx. PT/OT/ST. SSI while impatient, hold PO antihyperglycemics.  Continue Antihypertensives losartan 100 mg daily, continuing amlodipine 10 mg daily     ED Treatment-Medication Administration from 03/31/2025 1128 to 03/31/2025 1415         Date/Time Order Dose Route Action     03/31/2025 1303 iohexol (OMNIPAQUE) 350 MG/ML injection (MULTI-DOSE) 85 mL 85 mL Intravenous Given     03/31/2025 1400 hydroCHLOROthiazide tablet 12.5 mg -- Oral Held Dose            Scheduled Medications:  amLODIPine, 10 mg, Oral, Daily  [START ON 4/2/2025] aspirin, 81 mg, Oral, Daily  atorvastatin, 40 mg, Oral, QPM  clopidogrel, 75 mg, Oral, Daily  vitamin B-12, 250 mcg, Oral, Daily  enoxaparin, 40 mg, Subcutaneous, Daily  insulin glargine, 40 Units, Subcutaneous, HS  insulin lispro, 1-5 Units, Subcutaneous, TID AC  losartan, 100 mg, Oral, Daily      Continuous IV Infusions: none     PRN Meds:  meclizine, 12.5 mg, Oral, TID PRN  ondansetron, 4 mg, Intravenous, Q8H PRN      ED Triage Vitals   Temperature Pulse Respirations Blood Pressure SpO2 Pain Score   03/31/25 1138 03/31/25 1135 03/31/25 1135 03/31/25 1135 03/31/25 1135 03/31/25 1135   98 °F (36.7 °C) 92 18 (!) 178/91 91 % No Pain     Weight (last 2 days)       Date/Time Weight    03/31/25 15:10:33 70.3 (154.98)            Vital Signs (last 3 days)       Date/Time Temp Pulse Resp BP MAP (mmHg) SpO2 O2 Device Patient Position - Orthostatic VS Pain    04/01/25 07:25:49 98 °F (36.7 °C) 85 18 157/95 116 90 % -- -- --    04/01/25 0400 97.9 °F (36.6 °C) 68 18 138/82 101 -- -- Lying --    04/01/25 02:10:13 98.5 °F (36.9 °C) 72 17 142/88 106 94 % -- -- --     04/01/25 0200 97.9 °F (36.6 °C) 69 18 138/72 94 -- -- Lying --    04/01/25 0000 98.4 °F (36.9 °C) 78 18 132/74 93 -- -- Lying --    03/31/25 2200 97.4 °F (36.3 °C) 76 16 126/63 84 -- -- Lying --    03/31/25 2100 -- 84 -- 141/83 102 -- -- -- --    03/31/25 2002 -- -- -- -- -- -- -- -- No Pain    03/31/25 1920 -- 80 -- 134/82 99 96 % None (Room air) -- --    03/31/25 19:17:02 98.3 °F (36.8 °C) 80 -- 134/82 99 94 % -- -- --    03/31/25 1900 -- 78 -- 140/89 106 94 % -- -- --    03/31/25 1800 -- 84 -- 139/85 103 92 % -- -- --    03/31/25 1700 -- 95 -- 147/88 108 91 % -- -- --    03/31/25 1600 -- 83 -- 144/84 104 92 % -- -- --    03/31/25 15:10:33 98.1 °F (36.7 °C) 84 16 149/86 107 93 % -- -- --    03/31/25 1500 -- -- -- -- -- -- None (Room air) -- --    03/31/25 1435 -- -- -- -- -- -- -- -- No Pain    03/31/25 1141 -- -- -- -- -- -- None (Room air) -- --    03/31/25 1138 98 °F (36.7 °C) -- -- -- -- -- -- -- --    03/31/25 1135 -- 92 18 178/91 -- 91 % None (Room air) Lying No Pain              Pertinent Labs/Diagnostic Test Results:   Radiology:  MRI brain wo contrast   Final Interpretation by Dat Lau MD (04/01 0153)      Stable subacute infarct in the right ventral medulla. No evidence of new infarct, hemorrhage or mass effect.      Workstation performed: [id number]         CTA head and neck with and without contrast   Final Interpretation by Jose Maria Cabrales MD (03/31 9877)      CT Brain:  No acute intracranial abnormality. The small infarct seen on recent MRI is not identified on CT.      CT Angiography:   Stable exam with high-grade stenosis and possible short segment occlusion of the intracranial right vertebral artery.                  Workstation performed: [id number]           Cardiology:  ECG 12 lead   Final Result by Blayne Arechiga MD (03/31 0896)   Normal sinus rhythm   Inferior infarct , age undetermined   Abnormal ECG   When compared with ECG of 25-Mar-2025 10:25,   Inferior infarct  "is now Present   Confirmed by Blayne Arechiga ([id number]) on 3/31/2025 11:53:35 AM        GI:  No orders to display           Results from last 7 days   Lab Units 04/01/25  0505 03/31/25  1146 03/27/25  0434 03/26/25  0538   WBC Thousand/uL 8.04 9.65 7.90 7.82   HEMOGLOBIN g/dL 15.8 15.7 14.7 14.7   HEMATOCRIT % 47.2 48.8 44.6 44.5   PLATELETS Thousands/uL 222 201 193 196   TOTAL NEUT ABS Thousands/µL 4.32  --  4.47 4.10         Results from last 7 days   Lab Units 04/01/25  0505 03/31/25  1146 03/27/25  0434 03/26/25  0538   SODIUM mmol/L 140 139 140 141   POTASSIUM mmol/L 3.8 4.3 4.0 4.1   CHLORIDE mmol/L 105 106 106 108   CO2 mmol/L 25 23 25 25   ANION GAP mmol/L 10 10 9 8   BUN mg/dL 36* 38* 33* 29*   CREATININE mg/dL 1.29 1.29 1.24 1.13   EGFR ml/min/1.73sq m 54 54 57 64   CALCIUM mg/dL 9.3 9.3 9.4 9.4     Results from last 7 days   Lab Units 04/01/25  0505 03/26/25  0538   AST U/L 19 15   ALT U/L 15 13   ALK PHOS U/L 71 68   TOTAL PROTEIN g/dL 6.7 7.0   ALBUMIN g/dL 4.1 4.1   TOTAL BILIRUBIN mg/dL 0.70 0.55     Results from last 7 days   Lab Units 04/01/25  1105 04/01/25  0628 03/31/25  2105 03/31/25  1618 03/31/25  1145 03/27/25  1150 03/27/25  0719 03/26/25  1641 03/26/25  1124 03/26/25  0723 03/25/25  1607   POC GLUCOSE mg/dl 120 78 173* 138 266* 176* 151* 178* 179* 186* 133     Results from last 7 days   Lab Units 04/01/25  0505 03/31/25  1146 03/27/25  0434 03/26/25  0538   GLUCOSE RANDOM mg/dL 80 254* 170* 134         Results from last 7 days   Lab Units 03/26/25  0538   HEMOGLOBIN A1C % 9.0*   EAG mg/dl 212     No results found for: \"BETA-HYDROXYBUTYRATE\"                   Results from last 7 days   Lab Units 03/31/25  1648 03/31/25  1326 03/31/25  1146   HS TNI 0HR ng/L  --   --  6   HS TNI 2HR ng/L  --  5  --    HSTNI D2 ng/L  --  -1  --    HS TNI 4HR ng/L 6  --   --    HSTNI D4 ng/L 0  --   --          Results from last 7 days   Lab Units 03/31/25  1146   PROTIME seconds 12.5   INR  0.90   PTT seconds 23 "                                                                                                               Past Medical History:   Diagnosis Date    COVID-19 07/2022    Diabetes mellitus (HCC)     Type 2    Dyslipidemia     Hypertension     Obstructive sleep apnea on CPAP      Present on Admission:   Essential hypertension   Mixed hyperlipidemia   DEWEY (obstructive sleep apnea)      Admitting Diagnosis: Acute ischemic stroke (HCC) [I63.9]  Stroke-like symptoms [R29.90]  National Institutes of Health (NIH) Stroke Scale level of consciousness score 0, alert; keenly responsive [Z78.9]  Age/Sex: 74 y.o. male    Network Utilization Review Department  ATTENTION: Please call with any questions or concerns to [phone] and carefully listen to the prompts so that you are directed to the right person. All voicemails are confidential.   For Discharge needs, contact Care Management DC Support Team at [phone] opt. [phone]  Send all requests for admission clinical reviews, approved or denied determinations and any other requests to dedicated fax number below belonging to the campus where the patient is receiving treatment. List of dedicated fax numbers for the Facilities:  FACILITY NAME UR FAX NUMBER   ADMISSION DENIALS (Administrative/Medical Necessity) [phone]   DISCHARGE SUPPORT TEAM (NETWORK) [phone]   PARENT CHILD HEALTH (Maternity/NICU/Pediatrics) [phone]   [facility] [phone]   [facility] [phone]   Critical access hospital [phone]   [facility] [phone]   [facility] [phone]   [facility] [phone]   [facility] [phone]   [facility] [phone]   [facility] [phone]   [facility] [phone]   [facility]  [facility] [phone]   [facility] [phone]

## 2025-04-01 NOTE — ASSESSMENT & PLAN NOTE
Lab Results   Component Value Date    HGBA1C 9.0 (H) 03/26/2025       Recent Labs     03/31/25  1618 03/31/25  2105 04/01/25  0628 04/01/25  1105   POCGLU 138 173* 78 120       Blood Sugar Average: Last 72 hrs:  (P) 155  - Current management by internal medicine  - Monitor for signs and symptoms of hypoglycemia   - Current meds: per primary  - Consistent carb diabetic diet  - Recommend close monitoring and optimal management during recovery/rehab phase as well

## 2025-04-01 NOTE — CONSULTS
Consult for stroke pathway received. Attempt visit today. Will follow patient and assess educational needs and provide cardiac education if indicated.

## 2025-04-01 NOTE — PLAN OF CARE
Problem: PAIN - ADULT  Goal: Verbalizes/displays adequate comfort level or baseline comfort level  Description: Interventions:- Encourage patient to monitor pain and request assistance- Assess pain using appropriate pain scale- Administer analgesics based on type and severity of pain and evaluate response- Implement non-pharmacological measures as appropriate and evaluate response- Consider cultural and social influences on pain and pain management- Notify physician/advanced practitioner if interventions unsuccessful or patient reports new pain  Outcome: Progressing     Problem: INFECTION - ADULT  Goal: Absence or prevention of progression during hospitalization  Description: INTERVENTIONS:- Assess and monitor for signs and symptoms of infection- Monitor lab/diagnostic results- Monitor all insertion sites, i.e. indwelling lines, tubes, and drains- Monitor endotracheal if appropriate and nasal secretions for changes in amount and color- Axis appropriate cooling/warming therapies per order- Administer medications as ordered- Instruct and encourage patient and family to use good hand hygiene technique- Identify and instruct in appropriate isolation precautions for identified infection/condition  Outcome: Progressing     Problem: DISCHARGE PLANNING  Goal: Discharge to home or other facility with appropriate resources  Description: INTERVENTIONS:- Identify barriers to discharge w/patient and caregiver- Arrange for needed discharge resources and transportation as appropriate- Identify discharge learning needs (meds, wound care, etc.)- Arrange for interpretive services to assist at discharge as needed- Refer to Case Management Department for coordinating discharge planning if the patient needs post-hospital services based on physician/advanced practitioner order or complex needs related to functional status, cognitive ability, or social support system  Outcome: Progressing     Problem: Knowledge Deficit  Goal:  Patient/family/caregiver demonstrates understanding of disease process, treatment plan, medications, and discharge instructions  Description: Complete learning assessment and assess knowledge base.Interventions:- Provide teaching at level of understanding- Provide teaching via preferred learning methods  Outcome: Progressing     Problem: NEUROSENSORY - ADULT  Goal: Achieves stable or improved neurological status  Description: INTERVENTIONS- Monitor and report changes in neurological status- Monitor vital signs such as temperature, blood pressure, glucose, and any other labs ordered - Initiate measures to prevent increased intracranial pressure- Monitor for seizure activity and implement precautions if appropriate    Outcome: Progressing  Goal: Achieves maximal functionality and self care  Description: INTERVENTIONS- Monitor swallowing and airway patency with patient fatigue and changes in neurological status- Encourage and assist patient to increase activity and self care. - Encourage visually impaired, hearing impaired and aphasic patients to use assistive/communication devices  Outcome: Progressing

## 2025-04-01 NOTE — ASSESSMENT & PLAN NOTE
Lab Results   Component Value Date    HGBA1C 9.0 (H) 03/26/2025       Recent Labs     03/31/25  1145 03/31/25  1618 03/31/25  2105 04/01/25  0628   POCGLU 266* 138 173* 78       Blood Sugar Average: Last 72 hrs:  (P) 163.75    Plan: SSI while impatient, hold PO antihyperglycemics

## 2025-04-01 NOTE — OCCUPATIONAL THERAPY NOTE
Occupational Therapy Evaluation     Patient Name: Jose Maria Bethea  Today's Date: 4/1/2025  Problem List  Active Problems:    Type 2 diabetes mellitus with hyperglycemia, with long-term current use of insulin (HCC)    Essential hypertension    Mixed hyperlipidemia    DEWEY (obstructive sleep apnea)    Stroke-like symptoms    Past Medical History  Past Medical History:   Diagnosis Date    COVID-19 07/2022    Diabetes mellitus (HCC)     Type 2    Dyslipidemia     Hypertension     Obstructive sleep apnea on CPAP      Past Surgical History  Past Surgical History:   Procedure Laterality Date    COLONOSCOPY  2008 04/01/25 0827   OT Last Visit   OT Visit Date 04/01/25   Note Type   Note type Evaluation   Pain Assessment   Pain Assessment Tool 0-10   Restrictions/Precautions   Weight Bearing Precautions Per Order No   Other Precautions Cognitive;Chair Alarm;Bed Alarm;Telemetry;Fall Risk  (left Hemiparesis UE>LE distal >proximal)   Home Living   Type of Home House   Home Layout Two level;Bed/bath upstairs;1/2 bath on main level   Bathroom Shower/Tub Tub/shower unit   Bathroom Toilet Standard   Bathroom Equipment (no DME at baseline)   Bathroom Accessibility Accessible   Home Equipment Walker   Prior Function   Level of [place] Needs assistance with ADLs;Needs assistance with IADLS   Lives With Spouse present with evaluation   Receives Help From Family  (spouse works full time from home, son works during the day)   IADLs Family/Friend/Other provides transportation;Family/Friend/Other provides meals;Family/Friend/Other provides medication management   Falls in the last 6 months 1 to 4  (one with this admission)   Vocational (retired)   Lifestyle   Autonomy Prior to stroke/last admission was I with ADL's/IaDL's, no AD with functional mobility, s/p discharge  (per spouse present~pt has been requiring additional assistance with all functional tasks since discharging home.    Reciprocal Relationships spouse, son   Service  "to Others retired   Intrinsic Gratification \"fixing things in the house\"   General   Family/Caregiver Present Yes  (spouse)   ADL   Eating Assistance 5  Supervision/Setup   Grooming Assistance 5  Supervision/Setup   UB Bathing Assistance 3  Moderate Assistance   LB Bathing Assistance 2  Maximal Assistance   UB Dressing Assistance 2  Maximal Assistance   LB Dressing Assistance 2  Maximal Assistance   LB Dressing Deficit Don/doff R sock;Don/doff L sock   Toileting Assistance  2  Maximal Assistance   Bed Mobility   Supine to Sit 4  Minimal assistance   Additional items Assist x 1;Increased time required;Verbal cues   Sit to Supine 4  Minimal assistance   Additional items Assist x 1   Transfers   Sit to Stand 4  Minimal assistance   Additional items Assist x 2   Stand to Sit 4  Minimal assistance   Additional items Assist x 2   Additional Comments B/L HHA left UE supported with shoulder protection strategy, left leg blocked (foot slipping foward with stand)   Functional Mobility   Functional Mobility 3  Moderate assistance   Additional Comments mod a x 2 with B/LHHA left UE supported a few lateral steps to HOB and assist to advance left leg/weight shift cues.   Balance   Static Sitting Fair   Dynamic Sitting Fair -   Static Standing Poor +   Dynamic Standing Poor -   Ambulatory Poor -   Activity Tolerance   Activity Tolerance Patient limited by fatigue   Medical Staff Made Aware PT Renetta due to the patient's co-morbidities, clinically unstable presentation, and present impairments which are a regression from the patient's baseline.    Nurse Made Aware RN cleared pt for therapy   RUE Assessment   RUE Assessment WFL  (Right hand dominant)   LUE Assessment   LUE Assessment X  (left UE hemiplegia - distal>proximal , strength shoulder 2/5, elbow trace, forearm, wrist and hand 0/5 flaccid.)   Hand Function   Gross Motor Coordination Impaired  (left)   Fine Motor Coordination Impaired  (left)   Sensation   Light Touch " Partial deficits in the LUE   Proprioception   Proprioception Partial deficits in the RUE   Vision-Basic Assessment   Current Vision No visual deficits   Vision - Complex Assessment   Ocular Range of Motion Intact   Tracking Intact   Visual Fields (WFL)   Acuity Able to read clock/calendar on wall without difficulty   Perception   Inattention/Neglect Cues to attend to left side of body  (mild delay with mobility- WFL thumb oppostion test)   Cognition   Overall Cognitive Status WFL   Arousal/Participation Alert;Responsive;Cooperative   Attention Within functional limits   Orientation Level Oriented X4   Memory Within functional limits   Following Commands Follows all commands and directions without difficulty   Comments pt pleasant and cooperative with therapy, good memory with current/past medical events, social history, followed all directives and appeared to comprehend left UE protection strategy education. will continue to assess higher level cognition.   Assessment   Limitation Decreased ADL status;Decreased endurance;Decreased self-care trans;Decreased high-level ADLs;Decreased UE ROM;Decreased UE strength;Decreased fine motor control;Decreased sensation   Prognosis Fair   Assessment Pt is a 74 y.o. male who was admitted to [facility] on 3/31/2025 with worsening left UE weakness for past 2 days, recent admissin 3/25-3/27 with acute infarct and now here with additional stroke like symptoms, MRI. 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 No evidence of new infarct, hemorrhage or mass effect.  Patient   has a past medical history of COVID-19 (07/2022), Diabetes mellitus (HCC), Dyslipidemia, Hypertension, and Obstructive sleep apnea on CPAP. At baseline pt was completing . Pt lives with spouse in a 2SH with 1STE and 1/2 bath on first. Currently pt requires mod/max a  for overall ADLS and min/mod a x 2 with B/L UE -left hemiplegia for functional mobility/transfers. Pt currently presents with  "impairments in the following categories -difficulty performing ADLS and difficulty performing IADLS  activity tolerance and endurance. These impairments, as well as pt's fatigue  limit pt's ability to safely engage in all baseline areas of occupation, includinggrooming, bathing, dressing, toileting, functional mobility/transfers, community mobility, driving, house maintenance, medication management, meal prep, and cleaning From OT standpoint, recommend max level I upon D/C. The patient's raw score on the AM-PAC Daily Activity Inpatient Short Form is 14. A raw score of less than 19 suggests the patient may benefit from discharge to post-acute rehabilitation services. Please refer to the recommendation of the Occupational Therapist for safe discharge planning. OT will continue to follow to address the below stated goals.   Goals   Patient Goals \"find out if i had a stroke or not\"   [facility] Time Frame 10-14   Long Term Goal #1 see goals below   Plan   Treatment Interventions ADL retraining;Functional transfer training;UE strengthening/ROM;Endurance training;Cognitive reorientation;Patient/family training;Equipment evaluation/education;Compensatory technique education;Continued evaluation;Energy conservation;Activityengagement   Goal Expiration Date 04/15/25   OT Frequency 3-5x/wk   Discharge Recommendation   Rehab Resource Intensity Level, OT I (Maximum Resource Intensity)   AM-PAC Daily Activity Inpatient   Lower Body Dressing 2   Bathing 2   Toileting 2   Upper Body Dressing 2   Grooming 3   Eating 3   Daily Activity Raw Score 14   Daily Activity Standardized Score (Calc for Raw Score >=11) 33.39   AM-PAC Applied Cognition Inpatient   Following a Speech/Presentation 3   Understanding Ordinary Conversation 4   Taking Medications 4   Remembering Where Things Are Placed or Put Away 4   Remembering List of 4-5 Errands 4   Taking Care of Complicated Tasks 4   Applied Cognition Raw Score 23   Applied Cognition Standardized " Score 53.08   Barthel Index   Feeding 5   Bathing 0   Grooming Score 0   Dressing Score 5   Bladder Score 5   Bowels Score 5   Toilet Use Score 5   Transfers (Bed/Chair) Score 5   Mobility (Level Surface) Score 0   Stairs Score 0   Barthel Index Score 30   Modified Primrose Scale   Modified Primrose Scale 4   End of Consult   Education Provided Yes;Family or social support of family present for education by provider  (left UE shoulder protection strategies, neuroplasticity for UE recovery, visual hand out for proper left UE positioning to encourage attention, symmetrical sitting, shoulder protraction to decrease risk of pain/spasticity and edema management  with issuded handout posted on white board for pt/family/staff reference.) compensatory LB dressing education.   Patient Position at End of Consult Supine;Bed/Chair alarm activated;All needs within reach;Other (comment)  (spouse present)   Nurse Communication Nurse aware of consult    Occupational Therapy Goals:    *Mod I with bed mobility to engage in functional tasks.  *Mod I Adl's after setup with use of AE PRN  *Mod I toileting and clothing management   *Mod I functional mobility and transfers to/from all surfaces with Fair + dynamic balance and safety for participation in dynamic adls and iadl tasks   *Demonstrate good carryover with safe use of RW during functional tasks   *Assess DME needs   *Increase activity tolerance to 25-30 minutes for participation in adls and enjoyable activities  *Pt to participate in further cognitive testing with good attention and participation to assist with safe d/c recommendations  *Mod I with Simulated IADL management task.  *Demonstrate good carryover of pt/family education and training with good tolerance for increased safety and independence with ADL's/ADl's.  *Pt will improve standing balance to 4-5 minutes with functional tasks to increase I with toileting/transfers.  *Patient will demonstrate 100% carryover of energy  conservation techniques t/o functional I/ADL/leisure tasks w/o cues s/p skilled education to increase endurance during functional tasks  *Pt will increase attention to follow 100% simple one step verbal commands and be A/Ox4 consistently t/o use of external environmental cues w/ mod I  *Pt will tolerate mod manual NDT facilitation to K UE during functional ADL/leisure tasks with mod I to improve functional engagement  to increase neuroplastic recovery  *Pt will utilize LUE as stabilizer assist with all self care/mobility activities w minimal cues  *Pt will demonstrate 100% carryover of one handed dressing techniques w/ mod I s/p skilled education w/o cues     Rosa Chase OTR/L

## 2025-04-01 NOTE — PROGRESS NOTES
Progress Note - Neurology   Name: Jose Maria Bethea 74 y.o. male I MRN: [id number]  Unit/Bed#: [facility] 707-01 I Date of Admission: 3/31/2025   Date of Service: 4/1/2025 I Hospital Day: 1    Assessment & Plan  Stroke-like symptoms  Jose Maria Bethea is a 74-year-old male with a past medical history of diabetes mellitus (DM), hyperlipidemia (HLD), and hypertension (HTN), CKD 3a, DEWEY presenting to the emergency department for evaluation of worsening left-sided weakness for the past two days.  Initial BP Blood Pressure: (!) 178/91, FSBG POC Glucose: (!) 266 NC CTH unremarkable and CTA H/N with no LVO or aneurysm, continues to show severe stenosis with focal occlusion at R V4 segment with reconstitution. As a result of > 4.5 hours from time of symptom onset patient was not determined to be a candidate for thrombolysis (TNK). Given absence of IR target pt was deemed not a candidate for mechanical thrombectomy.   - Home Antiplatelet /Anticoagulants PTA: Aspirin 81 mg QD and Plavix 75 mg qd  - Stroke risk factors: HTN, DM, HLD, and DEWEY  - Prior Stroke Hx: yes: lacunar left centrum semiovale, and right ventral upper medulla   - Past Neurological Hx: stroke      Workup:  - CTA head and neck: No acute intracranial abnormality. The small infarct seen on recent MRI is not identified on CT. Stable exam with high-grade stenosis and possible short segment occlusion of the intracranial right vertebral artery.  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 No evidence of new infarct, hemorrhage or mass effect   - TTE 3/26:  TTE with 65% EF, normal atria.   - Labs: Hemoglobin A1c 9.0 (3/26/2025), LDL 44 (3/26/2025), P2y12 75     Impression: 74 y.o. male with history of  diabetes mellitus (DM), hyperlipidemia (HLD), and hypertension (HTN), CKD 3a, DEWEY presenting to the emergency department for evaluation of worsening left-sided weakness for the past two days.  Patient appears to have completion of recent stroke as no new findings  noted on MRI b.       Plan: Discussed plan with neurology attending, Dr. Crocker  Admit along the stroke pathway with telemetry monitoring  Frequent neuro checks per protocol. If there is any acute changes in exam, please obtain stat CT head and notify neurology team  BP Goals: Normotensive goal  Antiplatelet agents: Continue DAPT, with ASA 81 mg and Plavix 75 mg qd for 21 days then plavix monotherapy on 4/14  Anticoagulation: None  Statin: Continue Lipitor 80 mg qd  Euthermic/Euglycemic  DVT PPx:  Lovenox , SCDs  PT/OT/ST/PMR evaluations when able  Stroke education and counseling  Disposition: PT Recommendations -      Type 2 diabetes mellitus with hyperglycemia, with long-term current use of insulin (HCC)  Lab Results   Component Value Date    HGBA1C 9.0 (H) 03/26/2025       Recent Labs     03/31/25  1145 03/31/25  1618 03/31/25  2105 04/01/25  0628   POCGLU 266* 138 173* 78       Blood Sugar Average: Last 72 hrs:  (P) 163.75    Plan: SSI while impatient, hold PO antihyperglycemics  Essential hypertension  Continue Antihypertensives losartan 100 mg daily, continuing amlodipine 10 mg daily  Mixed hyperlipidemia  Continue PTA Lipitor  DEWEY (obstructive sleep apnea)  CPAP qhs    Jose Maria Bethea will need follow-up in in 6 weeks with neurovascular team for Other in 60 minute appointment. They will not require outpatient neurological testing.  Staff message sent.       Subjective   Patient was seen at bedside, no acute events overnight.  He notes slight improvement in LUE strength but mostly stable from yesterday.      Review of Systems  paresis    Objective :  Temp:  [97.4 °F (36.3 °C)-98.5 °F (36.9 °C)] 98 °F (36.7 °C)  HR:  [68-95] 85  BP: (126-178)/(63-95) 157/95  Resp:  [16-18] 18  SpO2:  [90 %-96 %] 90 %  O2 Device: None (Room air)    Physical Exam  Vitals reviewed.   Constitutional:       General: He is not in acute distress.     Appearance: Normal appearance. He is ill-appearing.   HENT:      Head: Normocephalic.       Mouth/Throat:      Mouth: Mucous membranes are dry.   Eyes:      General: Lids are normal.      Extraocular Movements: Extraocular movements intact.      Conjunctiva/sclera: Conjunctivae normal.      Pupils: Pupils are equal, round, and reactive to light.   Neurological:      Cranial Nerves: Dysarthria present.      Deep Tendon Reflexes: Reflexes are normal and symmetric.     Neurological Exam  Mental Status  Awake, alert and oriented to person, place and time. Mild dysarthria present. Language is fluent with no aphasia.    Cranial Nerves  CN II: Visual fields full to confrontation.  CN III, IV, VI: Extraocular movements intact bilaterally. Normal lids and orbits bilaterally. Pupils equal round and reactive to light bilaterally.  CN V: Facial sensation is normal.  CN VII:  Right: There is no facial weakness.  Left: There is central facial weakness.  CN VIII: Hearing is normal.  CN IX, X: Palate elevates symmetrically  CN XI:  Right: Trapezius strength is normal.  Left: Trapezius strength is weak.  CN XII: Tongue midline without atrophy or fasciculations.    Motor   Left hemiparesis.                                             Right                     Left   Shoulder abduction               5                          3  Elbow flexion                         5                          3  Elbow extension                    5                          3+  Pronation                               5                          2  Hip flexion                              5                          3  Plantarflexion                         5                          3  Dorsiflexion                            5                          3    Sensory  Light touch is normal in upper and lower extremities.     Reflexes  Deep tendon reflexes are 2+ and symmetric in all four extremities.    Coordination  Right: Finger-to-nose normal. Heel-to-shin normal.Left: Finger-to-nose abnormality: Heel-to-shin abnormality:  Left side with  limited by weakness.        Lab Results: I have reviewed the following results:  Imaging Results Review: I personally reviewed the following image studies in PACS and associated radiology reports: MRI brain. My interpretation of the radiology images/reports is: as above.  Other Study Results Review: No additional pertinent studies reviewed.    VTE Pharmacologic Prophylaxis: VTE covered by:  enoxaparin, Subcutaneous, 40 mg at 04/01/25 0920

## 2025-04-02 PROBLEM — I63.9 BRAINSTEM STROKE (HCC): Status: ACTIVE | Noted: 2025-03-31

## 2025-04-02 LAB
GLUCOSE SERPL-MCNC: 113 MG/DL (ref 65–140)
GLUCOSE SERPL-MCNC: 172 MG/DL (ref 65–140)
GLUCOSE SERPL-MCNC: 232 MG/DL (ref 65–140)
GLUCOSE SERPL-MCNC: 268 MG/DL (ref 65–140)

## 2025-04-02 PROCEDURE — 97535 SELF CARE MNGMENT TRAINING: CPT

## 2025-04-02 PROCEDURE — 97110 THERAPEUTIC EXERCISES: CPT

## 2025-04-02 PROCEDURE — 99232 SBSQ HOSP IP/OBS MODERATE 35: CPT | Performed by: STUDENT IN AN ORGANIZED HEALTH CARE EDUCATION/TRAINING PROGRAM

## 2025-04-02 PROCEDURE — 97530 THERAPEUTIC ACTIVITIES: CPT

## 2025-04-02 PROCEDURE — 82948 REAGENT STRIP/BLOOD GLUCOSE: CPT

## 2025-04-02 PROCEDURE — 97116 GAIT TRAINING THERAPY: CPT

## 2025-04-02 RX ADMIN — AMLODIPINE BESYLATE 10 MG: 10 TABLET ORAL at 09:45

## 2025-04-02 RX ADMIN — ENOXAPARIN SODIUM 40 MG: 40 INJECTION SUBCUTANEOUS at 09:45

## 2025-04-02 RX ADMIN — ATORVASTATIN CALCIUM 40 MG: 40 TABLET, FILM COATED ORAL at 17:29

## 2025-04-02 RX ADMIN — CYANOCOBALAMIN TAB 500 MCG 250 MCG: 500 TAB at 09:45

## 2025-04-02 RX ADMIN — INSULIN LISPRO 1 UNITS: 100 INJECTION, SOLUTION INTRAVENOUS; SUBCUTANEOUS at 11:45

## 2025-04-02 RX ADMIN — ASPIRIN 81 MG CHEWABLE TABLET 81 MG: 81 TABLET CHEWABLE at 09:45

## 2025-04-02 RX ADMIN — INSULIN LISPRO 2 UNITS: 100 INJECTION, SOLUTION INTRAVENOUS; SUBCUTANEOUS at 17:29

## 2025-04-02 RX ADMIN — INSULIN GLARGINE 40 UNITS: 100 INJECTION, SOLUTION SUBCUTANEOUS at 21:43

## 2025-04-02 RX ADMIN — LOSARTAN POTASSIUM 100 MG: 50 TABLET, FILM COATED ORAL at 09:45

## 2025-04-02 RX ADMIN — CLOPIDOGREL BISULFATE 75 MG: 75 TABLET, FILM COATED ORAL at 09:45

## 2025-04-02 NOTE — PLAN OF CARE
Problem: PAIN - ADULT  Goal: Verbalizes/displays adequate comfort level or baseline comfort level  Description: Interventions:- Encourage patient to monitor pain and request assistance- Assess pain using appropriate pain scale- Administer analgesics based on type and severity of pain and evaluate response- Implement non-pharmacological measures as appropriate and evaluate response- Consider cultural and social influences on pain and pain management- Notify physician/advanced practitioner if interventions unsuccessful or patient reports new pain  Outcome: Progressing     Problem: INFECTION - ADULT  Goal: Absence or prevention of progression during hospitalization  Description: INTERVENTIONS:- Assess and monitor for signs and symptoms of infection- Monitor lab/diagnostic results- Monitor all insertion sites, i.e. indwelling lines, tubes, and drains- Monitor endotracheal if appropriate and nasal secretions for changes in amount and color- [place] appropriate cooling/warming therapies per order- Administer medications as ordered- Instruct and encourage patient and family to use good hand hygiene technique- Identify and instruct in appropriate isolation precautions for identified infection/condition  Outcome: Progressing

## 2025-04-02 NOTE — ASSESSMENT & PLAN NOTE
Continue Antihypertensives losartan 100 mg daily, continuing amlodipine 10 mg daily.  May restart PTA antihypertensives on discharge

## 2025-04-02 NOTE — RESTORATIVE TECHNICIAN NOTE
Restorative Technician Note      Patient Name: Jose Maria Bethea     Restorative Tech Visit Date: 04/02/25  Note Type: Bracing, Initial consult  Patient Position Upon Consult: Supine  Brace Applied: Multipodous Boot (LLE)  Additional Brace Ordered: No  Patient Position When Brace Applied: Supine  Bracing Recommendations: Recommendation (comment) (per dr order pt is allowed to take breaks with multipodus boot off)  Education Provided: Yes  Patient Position at End of Consult: Supine; All needs within reach; Bed/Chair alarm activated  Nurse Communication: Nurse aware of consult, application of brace      Please contact [facility] PT Restorative tech on Epic Secure Chat  in regards to bracing instruction and/or adjustment.    Angelica Morse, Restorative Technician

## 2025-04-02 NOTE — PROGRESS NOTES
Pa;lProgress Note - Neurology   Name: Jose Maria Bethea 74 y.o. male I MRN: [id number]  Unit/Bed#: [facility] 707-01 I Date of Admission: 3/31/2025   Date of Service: 4/2/2025 I Hospital Day: 2    Assessment & Plan  Brainstem stroke (HCC)  Jose Maria Bethea is a 74-year-old male with a past medical history of diabetes mellitus (DM), hyperlipidemia (HLD), and hypertension (HTN), CKD 3a, DEWEY presenting to the emergency department for evaluation of worsening left-sided weakness for the past two days.  Initial BP Blood Pressure: (!) 178/91, FSBG POC Glucose: (!) 266 NC CTH unremarkable and CTA H/N with no LVO or aneurysm, continues to show severe stenosis with focal occlusion at R V4 segment with reconstitution. As a result of > 4.5 hours from time of symptom onset patient was not determined to be a candidate for thrombolysis (TNK). Given absence of IR target pt was deemed not a candidate for mechanical thrombectomy.   - Home Antiplatelet /Anticoagulants PTA: Aspirin 81 mg QD and Plavix 75 mg qd  - Stroke risk factors: HTN, DM, HLD, and DEWEY  - Prior Stroke Hx: yes: lacunar left centrum semiovale, and right ventral upper medulla   - Past Neurological Hx: stroke      Workup:  - CTA head and neck: No acute intracranial abnormality. The small infarct seen on recent MRI is not identified on CT. Stable exam with high-grade stenosis and possible short segment occlusion of the intracranial right vertebral artery.  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 No evidence of new infarct, hemorrhage or mass effect   - TTE 3/26:  TTE with 65% EF, normal atria.   - Labs: Hemoglobin A1c 9.0 (3/26/2025), LDL 44 (3/26/2025), P2y12 75     Impression: 74 y.o. male with history of  diabetes mellitus (DM), hyperlipidemia (HLD), and hypertension (HTN), CKD 3a, DEWEY presenting to the emergency department for evaluation of worsening left-sided weakness for the past two days.  Patient appears to have completion of recent stroke as no new  findings noted on MRI b.       Plan: Discussed plan with neurology attending, Dr. Crocker  Admit along the stroke pathway with telemetry monitoring  Frequent neuro checks per protocol. If there is any acute changes in exam, please obtain stat CT head and notify neurology team  BP Goals: Normotensive goal  Antiplatelet agents: Continue DAPT, with ASA 81 mg and Plavix 75 mg qd for 21 days then plavix monotherapy on 4/14  Anticoagulation: None  Statin: Continue Lipitor 80 mg qd  Euthermic/Euglycemic  DVT PPx:  Lovenox , SCDs  PT/OT/ST/PMR evaluations when able  Stroke education and counseling  Disposition: PT Recommendations - Rehab Resource Intensity Level, PT: I (Maximum Resource Intensity)    Type 2 diabetes mellitus with hyperglycemia, with long-term current use of insulin ([facility])  Lab Results   Component Value Date    HGBA1C 9.0 (H) 03/26/2025       Recent Labs     04/01/25  1637 04/01/25  2106 04/02/25  0623 04/02/25  1113   POCGLU 179* 201* 113 172*       Blood Sugar Average: Last 72 hrs:  (P) 160    Plan: SSI while impatient, hold PO antihyperglycemics  Essential hypertension  Continue Antihypertensives losartan 100 mg daily, continuing amlodipine 10 mg daily  Mixed hyperlipidemia  Continue PTA Lipitor  DEWEY (obstructive sleep apnea)  CPAP qhs    Jose Maria Bethea will need follow-up in in 6 weeks with neurovascular team for Other in 60 minute appointment. They will not require outpatient neurological testing.  Staff message sent.       Subjective   Patient was seen at bedside, he feels some increasing strength in the LUE and LLE, otherwise no changes or complaints    Review of Systems  paresis    Objective :  Temp:  [97.5 °F (36.4 °C)-98.8 °F (37.1 °C)] 97.5 °F (36.4 °C)  HR:  [73-85] 80  BP: (148-155)/() 148/93  Resp:  [15-18] 16  SpO2:  [92 %-96 %] 93 %  O2 Device: None (Room air)    Physical Exam  Vitals reviewed.   Constitutional:       General: He is not in acute distress.     Appearance: Normal appearance.    HENT:      Head: Normocephalic.      Mouth/Throat:      Mouth: Mucous membranes are dry.   Eyes:      General: Lids are normal.      Extraocular Movements: Extraocular movements intact.      Conjunctiva/sclera: Conjunctivae normal.      Pupils: Pupils are equal, round, and reactive to light.   Neurological:      Cranial Nerves: Dysarthria present.      Deep Tendon Reflexes: Reflexes are normal and symmetric.   Neurological Exam  Mental Status  Awake, alert and oriented to person, place and time. Mild dysarthria present. Language is fluent with no aphasia.    Cranial Nerves  CN II: Visual fields full to confrontation.  CN III, IV, VI: Extraocular movements intact bilaterally. Normal lids and orbits bilaterally. Pupils equal round and reactive to light bilaterally.  CN V: Facial sensation is normal.  CN VII:  Right: There is no facial weakness.  Left: There is central facial weakness.  CN VIII: Hearing is normal.  CN IX, X: Palate elevates symmetrically  CN XI:  Right: Trapezius strength is normal.  Left: Trapezius strength is weak.  CN XII: Tongue midline without atrophy or fasciculations.    Motor   Left hemiparesis.                                             Right                     Left   Shoulder abduction               5                          2  Elbow flexion                         5                          3  Elbow extension                    5                          3+  Pronation                               5                          2  Hip flexion                              5                          3  Plantarflexion                         5                          3  Dorsiflexion                            5                          3    Sensory  Light touch is normal in upper and lower extremities.     Reflexes  Deep tendon reflexes are 2+ and symmetric in all four extremities.    Coordination  Right: Finger-to-nose normal. Heel-to-shin normal.Left: Finger-to-nose abnormality:  Heel-to-shin abnormality:  Left side with limited by weakness.        Lab Results: I have reviewed the following results:  Imaging Results Review: No pertinent imaging studies reviewed.  Other Study Results Review: No additional pertinent studies reviewed.    VTE Pharmacologic Prophylaxis: VTE covered by:  enoxaparin, Subcutaneous, 40 mg at 04/02/25 0903

## 2025-04-02 NOTE — ASSESSMENT & PLAN NOTE
Jose Maria Bethea is a 74-year-old male with a past medical history of diabetes mellitus (DM), hyperlipidemia (HLD), and hypertension (HTN), CKD 3a, DEWEY presenting to the emergency department for evaluation of worsening left-sided weakness for the past two days.  Initial BP Blood Pressure: (!) 178/91, FSBG POC Glucose: (!) 266 NC CTH unremarkable and CTA H/N with no LVO or aneurysm, continues to show severe stenosis with focal occlusion at R V4 segment with reconstitution. As a result of > 4.5 hours from time of symptom onset patient was not determined to be a candidate for thrombolysis (TNK). Given absence of IR target pt was deemed not a candidate for mechanical thrombectomy.   - Home Antiplatelet /Anticoagulants PTA: Aspirin 81 mg QD and Plavix 75 mg qd  - Stroke risk factors: HTN, DM, HLD, and DEWEY  - Prior Stroke Hx: yes: lacunar left centrum semiovale, and right ventral upper medulla   - Past Neurological Hx: stroke      Workup:  - CTA head and neck: No acute intracranial abnormality. The small infarct seen on recent MRI is not identified on CT. Stable exam with high-grade stenosis and possible short segment occlusion of the intracranial right vertebral artery.  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 No evidence of new infarct, hemorrhage or mass effect   - TTE 3/26:  TTE with 65% EF, normal atria.   - Labs: Hemoglobin A1c 9.0 (3/26/2025), LDL 44 (3/26/2025), P2y12 75     Impression: 74 y.o. male with history of  diabetes mellitus (DM), hyperlipidemia (HLD), and hypertension (HTN), CKD 3a, DEWEY presenting to the emergency department for evaluation of worsening left-sided weakness for the past two days.  Patient appears to have completion of recent stroke as no new findings noted on MRI b.       Plan: Discussed plan with neurology attending, Dr. Crocker  BP Goals: Normotensive goal  Antiplatelet agents: Continue DAPT, with ASA 81 mg and Plavix 75 mg qd for 21 days then plavix monotherapy on  4/14  Anticoagulation: None  Statin: Continue Lipitor 80 mg qd  Euthermic/Euglycemic  DVT PPx:  Lovenox , SCDs  PT/OT/ST/PMR evaluations when able  Stroke education and counseling  Disposition: PT Recommendations - Rehab Resource Intensity Level, PT: I (Maximum Resource Intensity)  Patient to be discharged to [facility]

## 2025-04-02 NOTE — UTILIZATION REVIEW
Continued Stay Review    Date: 4-2-25                           Current Patient Class: Inpatient Current Level of Care: med surg    HPI:74 y.o. male initially admitted on 3-31-25       Current Diagnosis:    Worsening left-sided weakness for the past two days.   CTH unremarkable and CTA H/N with no LVO or aneurysm, continues to show severe stenosis with focal occlusion at R V4 segment with reconstitution.   Not a candidate for TNK given that he presented outside of the window. P2Y12 75.     PMHX:    L hemiparesis 3/25-3/27 and found to have acute R ventral upper medulla infarct on MRI brain with notable R vertebral severe stenosis and short segment occlusion with reconstitution on CTA   DAPT for 21 days           Assessment/Plan:       MRI of brain w/o showed no evidence of new infarct or other intracranial abnormality.  Neurology suspects worsened LUE/LLE weakness is 2/2 completed R medulla infarct.  Normotension.   Continue DAPT with aspirin 81 mg daily, plavix daily.  Plan for DAPT 21 days followed by plavix monotherapy.  PT/OT evaluations completed.  Recommending inpatient rehab.         Scheduled Medications:    amLODIPine, 10 mg, Oral, Daily  aspirin, 81 mg, Oral, Daily  atorvastatin, 40 mg, Oral, QPM  clopidogrel, 75 mg, Oral, Daily  vitamin B-12, 250 mcg, Oral, Daily  enoxaparin, 40 mg, Subcutaneous, Daily  insulin glargine, 40 Units, Subcutaneous, HS  insulin lispro, 1-5 Units, Subcutaneous, TID AC  losartan, 100 mg, Oral, Daily      Continuous IV Infusions:     PRN Meds:  meclizine, 12.5 mg, Oral, TID PRN  ondansetron, 4 mg, Intravenous, Q8H PRN      Discharge Plan: Acute rehab discharge planning.   Vital Signs (last 3 days)       Date/Time Temp Pulse Resp BP MAP (mmHg) SpO2 O2 Device Howe Coma Scale Score Pain    04/02/25 1143 -- -- -- -- -- -- -- -- No Pain    04/02/25 10:35:57 97.5 °F (36.4 °C) 80 16 148/93 111 93 % -- -- --    04/02/25 0900 -- -- -- -- -- -- -- 15 --    04/02/25 07:24:59 98.8 °F  (37.1 °C) 81 16 148/93 111 95 % -- -- --    04/02/25 07:23:34 98.8 °F (37.1 °C) 79 16 148/93 111 93 % -- -- --    04/02/25 05:04:39 -- 73 -- 151/82 105 96 % -- -- --    04/02/25 05:03:59 -- 79 -- 152/102 119 95 % -- -- --    04/02/25 05:03:22 -- 85 -- 152/102 119 96 % -- -- --    04/02/25 0400 -- -- -- -- -- -- -- 15 --    04/01/25 2354 97.9 °F (36.6 °C) 82 15 149/84 106 92 % None (Room air) 15 --    04/01/25 23:51:49 97.9 °F (36.6 °C) 80 15 149/84 106 93 % -- -- No Pain    04/01/25 21:39:42 98.2 °F (36.8 °C) 83 -- 152/83 106 92 % -- -- --    04/01/25 2000 -- -- -- -- -- -- -- -- No Pain    04/01/25 16:06:34 98.1 °F (36.7 °C) 81 18 150/82 105 94 % None (Room air) -- --    04/01/25 16:06:05 98.1 °F (36.7 °C) 78 -- 150/82 105 93 % -- -- --    04/01/25 12:23:10 -- 81 -- 155/95 115 94 % -- -- --    04/01/25 0844 -- -- -- -- -- -- -- -- No Pain    04/01/25 07:25:49 98 °F (36.7 °C) 85 18 157/95 116 90 % -- -- --    04/01/25 0400 97.9 °F (36.6 °C) 68 18 138/82 101 -- -- -- --    04/01/25 02:10:13 98.5 °F (36.9 °C) 72 17 142/88 106 94 % -- -- --    04/01/25 0200 97.9 °F (36.6 °C) 69 18 138/72 94 -- -- -- --    04/01/25 0000 98.4 °F (36.9 °C) 78 18 132/74 93 -- -- -- --    03/31/25 2200 97.4 °F (36.3 °C) 76 16 126/63 84 -- -- -- --    03/31/25 2100 -- 84 -- 141/83 102 -- -- -- --    03/31/25 2002 -- -- -- -- -- -- -- -- No Pain    03/31/25 1920 -- 80 -- 134/82 99 96 % None (Room air) -- --    03/31/25 19:17:02 98.3 °F (36.8 °C) 80 -- 134/82 99 94 % -- -- --    03/31/25 1900 -- 78 -- 140/89 106 94 % -- -- --    03/31/25 1800 -- 84 -- 139/85 103 92 % -- -- --    03/31/25 1700 -- 95 -- 147/88 108 91 % -- -- --    03/31/25 1600 -- 83 -- 144/84 104 92 % -- -- --    03/31/25 15:10:33 98.1 °F (36.7 °C) 84 16 149/86 107 93 % -- -- --    03/31/25 1500 -- -- -- -- -- -- None (Room air) -- --    03/31/25 1435 -- -- -- -- -- -- -- -- No Pain    03/31/25 1141 -- -- -- -- -- -- None (Room air) -- --    03/31/25 1138 98 °F (36.7 °C) -- --  -- -- -- -- -- --    03/31/25 1135 -- 92 18 178/91 -- 91 % None (Room air) -- No Pain       Weight (last 2 days)       Date/Time Weight    03/31/25 15:10:33 70.3 (154.98)            Pertinent Labs/Diagnostic Results:   Radiology:  MRI brain wo contrast   Final (04/01 0153)      Stable subacute infarct in the right ventral medulla. No evidence of new infarct, hemorrhage or mass effect.            CTA head and neck with and without contrast   Final (03/31 1344)      CT Brain:  No acute intracranial abnormality. The small infarct seen on recent MRI is not identified on CT.      CT Angiography:   Stable exam with high-grade stenosis and possible short segment occlusion of the intracranial right vertebral artery.        Cardiology:  ECG 12 lead   Final (03/31 1153)   Normal sinus rhythm   Inferior infarct , age undetermined   Abnormal ECG   When compared with ECG of 25-Mar-2025 10:25,   Inferior infarct is now Present                Results from last 7 days   Lab Units 04/01/25  0505 03/31/25  1146 03/27/25  0434   WBC Thousand/uL 8.04 9.65 7.90   HEMOGLOBIN g/dL 15.8 15.7 14.7   HEMATOCRIT % 47.2 48.8 44.6   PLATELETS Thousands/uL 222 201 193   TOTAL NEUT ABS Thousands/µL 4.32  --  4.47         Results from last 7 days   Lab Units 04/01/25  0505 03/31/25  1146 03/27/25  0434   SODIUM mmol/L 140 139 140   POTASSIUM mmol/L 3.8 4.3 4.0   CHLORIDE mmol/L 105 106 106   CO2 mmol/L 25 23 25   ANION GAP mmol/L 10 10 9   BUN mg/dL 36* 38* 33*   CREATININE mg/dL 1.29 1.29 1.24   EGFR ml/min/1.73sq m 54 54 57   CALCIUM mg/dL 9.3 9.3 9.4     Results from last 7 days   Lab Units 04/01/25  0505   AST U/L 19   ALT U/L 15   ALK PHOS U/L 71   TOTAL PROTEIN g/dL 6.7   ALBUMIN g/dL 4.1   TOTAL BILIRUBIN mg/dL 0.70     Results from last 7 days   Lab Units 04/02/25  1113 04/02/25  0623 04/01/25  2106 04/01/25  1637 04/01/25  1105 04/01/25  0628 03/31/25  2105 03/31/25  1618 03/31/25  1145 03/27/25  1150 03/27/25  0719 03/26/25  1641   POC  GLUCOSE mg/dl 172* 113 201* 179* 120 78 173* 138 266* 176* 151* 178*     Results from last 7 days   Lab Units 04/01/25  0505 03/31/25  1146 03/27/25  0434   GLUCOSE RANDOM mg/dL 80 254* 170*       Results from last 7 days   Lab Units 03/31/25  1648 03/31/25  1326 03/31/25  1146   HS TNI 0HR ng/L  --   --  6   HS TNI 2HR ng/L  --  5  --    HSTNI D2 ng/L  --  -1  --    HS TNI 4HR ng/L 6  --   --    HSTNI D4 ng/L 0  --   --          Results from last 7 days   Lab Units 03/31/25  1146   PROTIME seconds 12.5   INR  0.90   PTT seconds 23       Network Utilization Review Department  ATTENTION: Please call with any questions or concerns to [phone] and carefully listen to the prompts so that you are directed to the right person. All voicemails are confidential.   For Discharge needs, contact Care Management DC Support Team at [phone] opt. 2  Send all requests for admission clinical reviews, approved or denied determinations and any other requests to dedicated fax number below belonging to the campus where the patient is receiving treatment. List of dedicated fax numbers for the Facilities:  FACILITY NAME UR FAX NUMBER   ADMISSION DENIALS (Administrative/Medical Necessity) [phone]   DISCHARGE SUPPORT TEAM (NETWORK) [phone]   PARENT CHILD HEALTH (Maternity/NICU/Pediatrics) [phone]   [facility] [phone]   [facility] [phone]   [facility] [phone]   General acute hospital [phone]   [facility] [phone]   [facility] [phone]   [facility] [phone]   [facility] [phone]   [facility] [phone]   [facility] [phone]   [facility] [phone]   [facility]  [facility] [phone]

## 2025-04-02 NOTE — DISCHARGE SUPPORT
Case Management Assessment & Discharge Planning Note    Patient name Jose Maria Bethea  Location [facility] 707/[facility] 707-01 MRN [id number]  : 1950 Date 2025       Current Admission Date: 3/31/2025  Current Admission Diagnosis:Type 2 diabetes mellitus with hyperglycemia, with long-term current use of insulin (HCC)   Patient Active Problem List    Diagnosis Date Noted Date Diagnosed    Brainstem stroke (HCC) 2025     Acute ischemic stroke (HCC) 2025     Type 2 diabetes mellitus with diabetic cataract, with long-term current use of insulin (HCC) 2025     Chronic kidney disease, stage 3a ([facility]) 2025     DEWEY (obstructive sleep apnea) 2024     Vitamin D deficiency 2024     Vertebral artery stenosis, bilateral 2021     Type 2 diabetes mellitus with hyperglycemia, with long-term current use of insulin ([facility]) 2020     Essential hypertension 2020     Hyperkalemia 2020     Mixed hyperlipidemia 2020       LOS (days): 2  Geometric Mean LOS (GMLOS) (days): 2.2  Days to GMLOS:0.3   Facility Authorization Initiated  [facility] received request for auth from : Gardenia GODINEZ  Authorization Request Submitted for: Acute Rehab  Requested Start of Care Date: 25  Facility Name: [facility]  Facility NPI: [id number]  Facility MD: Angelo Kirkland  [facility] MD NPI: [id number]  Authorization initiated by contacting insurance: CBC  Via: [employer] Portal  Clinicals submitted via: Portal Attachment  Pending reference #: [id number]   notified: Yasmin GODINEZ     Updates to authorization status will be noted in chart.    Please reach out to CM for updates on any clinical information.

## 2025-04-02 NOTE — PHYSICAL THERAPY NOTE
PHYSICAL THERAPY NOTE          Patient Name: Jose Maria Bethea  Today's Date: 4/2/2025 04/02/25 1143   PT Last Visit   PT Visit Date 04/02/25   Note Type   Note Type Treatment   Pain Assessment   Pain Assessment Tool 0-10   Pain Score No Pain   Restrictions/Precautions   Weight Bearing Precautions Per Order No   Braces or Orthoses Sling  (LUE sling during transfers and mobility)   Other Precautions Chair Alarm;Bed Alarm;Multiple lines;Telemetry;Fall Risk;Impulsive  (L hemiparesis UE>LE)   General   Chart Reviewed Yes   Additional Pertinent History L quad strength today 2+/5, 0/5 DF/PF   Response to Previous Treatment Patient with no complaints from previous session.   Family/Caregiver Present Yes  (wife)   Cognition   Overall Cognitive Status WFL   Arousal/Participation Alert;Responsive;Cooperative   Attention Within functional limits   Orientation Level Oriented X4   Memory Within functional limits   Following Commands Follows one step commands without difficulty   Comments pt pleasant and cooperative   Subjective   Subjective pt agreeable to mobilize   Bed Mobility   Supine to Sit 3  Moderate assistance   Additional items Assist x 1;HOB elevated;Increased time required;Verbal cues;LE management   Sit to Supine Unable to assess   Additional Comments pt OOB in chair at end of session   Transfers   Sit to Stand 3  Moderate assistance   Additional items Assist x 1;Armrests;Increased time required;Verbal cues   Stand to Sit 3  Moderate assistance   Additional items Assist x 1;Armrests;Increased time required;Verbal cues   Additional Comments x3 STS R HHA, L knee block. progressing to min A throughout session   Ambulation/Elevation   Gait pattern L Hemiparesis;L Knee Edy;L Foot drag;Improper Weight shift;Decreased foot clearance;Inconsistent jose miguel;Foward flexed;Short stride   Gait Assistance 3  Moderate assist   Additional items  Assist x 2;Verbal cues;Tactile cues   Assistive Device Other (Comment)  (R support on HR)   Distance 3' + 15' +15'   Stair Management Assistance Not tested   Ambulation/Elevation Additional Comments at R HR in hallway with mod A at LLE, 2nd person mod A at trunk and 3rd person for chair follow   Balance   Static Sitting Fair +   Dynamic Sitting Fair   Static Standing Poor +   Dynamic Standing Poor   Ambulatory Poor -   Endurance Deficit   Endurance Deficit Yes   Endurance Deficit Description pt limited by weakness, fatigue and decreased activity tolerance   Activity Tolerance   Activity Tolerance Patient limited by fatigue   Medical Staff Made Aware OT Katharine, restorative [place]   Nurse Made Aware yes-RN cleared   Exercises   Knee AROM Long Arc Quad Sitting;5 reps;AROM;Left   Assessment   Prognosis Good   Problem List Decreased range of motion;Decreased strength;Decreased endurance;Impaired balance;Decreased coordination;Decreased mobility;Impaired judgement;Decreased safety awareness;Impaired tone   Assessment Pt very pleasant and agreeable to participate in PT session. Completes mobility and therapeutic activity as outlined above. Pt able to demonstrates improvements in L sided quadriceps strength. Pt able to ambulated 15'x2 forwards with R HR support, mod Ax1 to advance and place LLE and mod Ax1 at trunk for upright support. Pt leaning to R throughout session requiring cues to maintain upright posture. Pt required seated rest breaks throughout. Reports fatigue at end of session. Pt is progressing well towards his goals and is motivated. Pt will continue to benefit from skilled acute PT services to address deficits and promote mobility. Pt left upright in chair with chair alarm donned, call bell and persona items within reach and all needs met.   Barriers to Discharge Inaccessible home environment;Decreased caregiver support   Goals   Patient Goals to go to [facility] today   STG Expiration Date 04/15/25   PT Treatment  Day 1   Plan   Treatment/Interventions Functional transfer training;LE strengthening/ROM;Therapeutic exercise;Endurance training;Patient/family training;Equipment eval/education;Gait training;Bed mobility;Compensatory technique education;Continued evaluation;Spoke to nursing;OT   Progress Progressing toward goals   PT Frequency 3-5x/wk   Discharge Recommendation   Rehab Resource Intensity Level, PT I (Maximum Resource Intensity)   AM-PAC Basic Mobility Inpatient   Turning in Flat Bed Without Bedrails 3   Lying on Back to Sitting on Edge of Flat Bed Without Bedrails 2   Moving Bed to Chair 2   Standing Up From Chair Using Arms 2   Walk in Room 2   Climb 3-5 Stairs With Railing 1   Basic Mobility Inpatient Raw Score 12   Basic Mobility Standardized Score 32.23   [facility] Highest Level Of Mobility   -[facility] Goal 4: Move to chair/commode   -[facility] Achieved 6: Walk 10 steps or more   Education   Education Provided Mobility training;Home exercise program   Patient Demonstrates acceptance/verbal understanding   End of Consult   Patient Position at End of Consult Bedside chair;Bed/Chair alarm activated;All needs within reach   Renetta Veliz DPT

## 2025-04-02 NOTE — ASSESSMENT & PLAN NOTE
>>ASSESSMENT AND PLAN FOR TYPE 2 DIABETES MELLITUS WITH HYPERGLYCEMIA, WITH LONG-TERM CURRENT USE OF INSULIN (HCC) WRITTEN ON 4/2/2025 11:16 AM BY LEXX MIGUEL,     Lab Results   Component Value Date    HGBA1C 9.0 (H) 03/26/2025       Recent Labs     04/01/25  1637 04/01/25  2106 04/02/25  0623 04/02/25  1113   POCGLU 179* 201* 113 172*       Blood Sugar Average: Last 72 hrs:  (P) 160    Plan: SSI while impatient, hold PO antihyperglycemics

## 2025-04-02 NOTE — PLAN OF CARE
Problem: PAIN - ADULT  Goal: Verbalizes/displays adequate comfort level or baseline comfort level  Description: Interventions:- Encourage patient to monitor pain and request assistance- Assess pain using appropriate pain scale- Administer analgesics based on type and severity of pain and evaluate response- Implement non-pharmacological measures as appropriate and evaluate response- Consider cultural and social influences on pain and pain management- Notify physician/advanced practitioner if interventions unsuccessful or patient reports new pain  Outcome: Progressing     Problem: INFECTION - ADULT  Goal: Absence or prevention of progression during hospitalization  Description: INTERVENTIONS:- Assess and monitor for signs and symptoms of infection- Monitor lab/diagnostic results- Monitor all insertion sites, i.e. indwelling lines, tubes, and drains- Monitor endotracheal if appropriate and nasal secretions for changes in amount and color- [place] appropriate cooling/warming therapies per order- Administer medications as ordered- Instruct and encourage patient and family to use good hand hygiene technique- Identify and instruct in appropriate isolation precautions for identified infection/condition  Outcome: Progressing     Problem: SAFETY ADULT  Goal: Patient will remain free of falls  Description: INTERVENTIONS:- Educate patient/family on patient safety including physical limitations- Instruct patient to call for assistance with activity - Consult OT/PT to assist with strengthening/mobility - Keep Call bell within reach- Keep bed low and locked with side rails adjusted as appropriate- Keep care items and personal belongings within reach- Initiate and maintain comfort rounds-  Outcome: Progressing  Goal: Maintain or return to baseline ADL function  Description: INTERVENTIONS:-  Assess patient's ability to carry out ADLs; assess patient's baseline for ADL function and identify physical deficits which impact ability  to perform ADLs (bathing, care of mouth/teeth, toileting, grooming, dressing, etc.)- Assess/evaluate cause of self-care deficits - Assess range of motion- Assess patient's mobility; develop plan if impaired- Assess patient's need for assistive devices and provide as appropriate- Encourage maximum independence but intervene and supervise when necessary- Involve family in performance of ADLs- Assess for home care needs following discharge - Consider OT consult to assist with ADL evaluation and planning for discharge- Provide patient education as appropriate  Outcome: Progressing  Goal: Maintains/Returns to pre admission functional level  Description: INTERVENTIONS:- Perform AM-PAC 6 Click Basic Mobility/ Daily Activity assessment daily.- Set and communicate daily mobility goal to care team and patient/family/caregiver. - Collaborate with rehabilitation services on mobility goals if consulted  Outcome: Progressing     Problem: DISCHARGE PLANNING  Goal: Discharge to home or other facility with appropriate resources  Description: INTERVENTIONS:- Identify barriers to discharge w/patient and caregiver- Arrange for needed discharge resources and transportation as appropriate- Identify discharge learning needs (meds, wound care, etc.)- Arrange for interpretive services to assist at discharge as needed- Refer to Case Management Department for coordinating discharge planning if the patient needs post-hospital services based on physician/advanced practitioner order or complex needs related to functional status, cognitive ability, or social support system  Outcome: Progressing     Problem: Knowledge Deficit  Goal: Patient/family/caregiver demonstrates understanding of disease process, treatment plan, medications, and discharge instructions  Description: Complete learning assessment and assess knowledge base.Interventions:- Provide teaching at level of understanding- Provide teaching via preferred learning methods  Outcome:  Progressing     Problem: NEUROSENSORY - ADULT  Goal: Achieves stable or improved neurological status  Description: INTERVENTIONS- Monitor and report changes in neurological status- Monitor vital signs such as temperature, blood pressure, glucose, and any other labs ordered - Initiate measures to prevent increased intracranial pressure- Monitor for seizure activity and implement precautions if appropriate    Outcome: Progressing  Goal: Achieves maximal functionality and self care  Description: INTERVENTIONS- Monitor swallowing and airway patency with patient fatigue and changes in neurological status- Encourage and assist patient to increase activity and self care. - Encourage visually impaired, hearing impaired and aphasic patients to use assistive/communication devices  Outcome: Progressing     Problem: Nutrition/Hydration-ADULT  Goal: Nutrient/Hydration intake appropriate for improving, restoring or maintaining nutritional needs  Description: Monitor and assess patient's nutrition/hydration status for malnutrition. Collaborate with interdisciplinary team and initiate plan and interventions as ordered.  Monitor patient's weight and dietary intake as ordered or per policy. Utilize nutrition screening tool and intervene as necessary. Determine patient's food preferences and provide high-protein, high-caloric foods as appropriate. INTERVENTIONS:- Monitor oral intake, urinary output, labs, and treatment plans- Assess nutrition and hydration status and recommend course of action- Evaluate amount of meals eaten- Assist patient with eating if necessary - Allow adequate time for meals- Recommend/ encourage appropriate diets, oral nutritional supplements, and vitamin/mineral supplements- Order, calculate, and assess calorie counts as needed- Recommend, monitor, and adjust tube feedings and TPN/PPN based on assessed needs- Assess need for intravenous fluids- Provide specific nutrition/hydration education as appropriate-  Include patient/family/caregiver in decisions related to nutrition  Outcome: Progressing     Problem: Prexisting or High Potential for Compromised Skin Integrity  Goal: Skin integrity is maintained or improved  Description: INTERVENTIONS:- Identify patients at risk for skin breakdown- Assess and monitor skin integrity- Assess and monitor nutrition and hydration status- Monitor labs - Assess for incontinence - Turn and reposition patient- Assist with mobility/ambulation- Relieve pressure over bony prominences- Avoid friction and shearing- Provide appropriate hygiene as needed including keeping skin clean and dry- Evaluate need for skin moisturizer/barrier cream- Collaborate with interdisciplinary team - Patient/family teaching- Consider wound care consult   Outcome: Progressing

## 2025-04-02 NOTE — CASE MANAGEMENT
Case Management Discharge Planning Note    Patient name Jose Maria Bethea  Location [facility] 707-01 MRN [id number]  : 1950 Date 2025       Current Admission Date: 3/31/2025  Current Admission Diagnosis:Brainstem stroke (HCC)   Patient Active Problem List    Diagnosis Date Noted Date Diagnosed    Brainstem stroke (HCC) 2025     Acute ischemic stroke (HCC) 2025     Type 2 diabetes mellitus with diabetic cataract, with long-term current use of insulin (HCC) 2025     Chronic kidney disease, stage 3a (HCC) 2025     DEWEY (obstructive sleep apnea) 2024     Vitamin D deficiency 2024     Vertebral artery stenosis, bilateral 2021     Type 2 diabetes mellitus with hyperglycemia, with long-term current use of insulin ([facility]) 2020     Essential hypertension 2020     Hyperkalemia 2020     Mixed hyperlipidemia 2020       LOS (days): 2  Geometric Mean LOS (GMLOS) (days): 2.2  Days to GMLOS:0.2     OBJECTIVE:  Risk of Unplanned Readmission Score: 14.19         Current admission status: Inpatient   Preferred Pharmacy:   [id number] - [place], [place] - [address]  [address] [place] [address]  Phone: [phone] Fax: [phone]    Primary Care Provider: Puneet Charles MD    Primary Insurance: [employer]  Secondary Insurance:     DISCHARGE DETAILS:          Other Referral/Resources/Interventions Provided:  Referral Comments: Referrals placed by CM for [facility] and [facility]- accepted by both. Patient and his wife chose [facility].  Patient medically clear- insurance auth. requested via CM dicsharge support.

## 2025-04-02 NOTE — PLAN OF CARE
Problem: PHYSICAL THERAPY ADULT  Goal: Performs mobility at highest level of function for planned discharge setting.  See evaluation for individualized goals.  Description: Treatment/Interventions: Functional transfer training, LE strengthening/ROM, ADL retraining, Elevations, Therapeutic exercise, Endurance training, Patient/family training, Equipment eval/education, Gait training, Bed mobility, Compensatory technique education, Spoke to nursing, OT  Equipment Recommended: Other (Comment) (TBD)       See flowsheet documentation for full assessment, interventions and recommendations.  Outcome: Progressing  Note: Prognosis: Good  Problem List: Decreased range of motion, Decreased strength, Decreased endurance, Impaired balance, Decreased coordination, Decreased mobility, Impaired judgement, Decreased safety awareness, Impaired tone  Assessment: Pt very pleasant and agreeable to participate in PT session. Completes mobility and therapeutic activity as outlined above. Pt able to demonstrates improvements in L sided quadriceps strength. Pt able to ambulated 15'x2 forwards with R HR support, mod Ax1 to advance and place LLE and mod Ax1 at trunk for upright support. Pt leaning to R throughout session requiring cues to maintain upright posture. Pt required seated rest breaks throughout. Reports fatigue at end of session. Pt is progressing well towards his goals and is motivated. Pt will continue to benefit from skilled acute PT services to address deficits and promote mobility. Pt left upright in chair with chair alarm donned, call bell and persona items within reach and all needs met.  Barriers to Discharge: Inaccessible home environment, Decreased caregiver support     Rehab Resource Intensity Level, PT: I (Maximum Resource Intensity)    See flowsheet documentation for full assessment.

## 2025-04-02 NOTE — ASSESSMENT & PLAN NOTE
Lab Results   Component Value Date    HGBA1C 9.0 (H) 03/26/2025       Recent Labs     04/01/25  1637 04/01/25  2106 04/02/25  0623 04/02/25  1113   POCGLU 179* 201* 113 172*         Blood Sugar Average: Last 72 hrs:  (P) 160    Plan: SSI while impatient, hold PO antihyperglycemics.  May restart upon dc

## 2025-04-02 NOTE — ASSESSMENT & PLAN NOTE
>>ASSESSMENT AND PLAN FOR TYPE 2 DIABETES MELLITUS WITH HYPERGLYCEMIA, WITH LONG-TERM CURRENT USE OF INSULIN (HCC) WRITTEN ON 4/3/2025 10:37 AM BY LEXX MIGUEL,     Lab Results   Component Value Date    HGBA1C 9.0 (H) 03/26/2025       Recent Labs     04/01/25  1637 04/01/25  2106 04/02/25  0623 04/02/25  1113   POCGLU 179* 201* 113 172*         Blood Sugar Average: Last 72 hrs:  (P) 160    Plan: SSI while impatient, hold PO antihyperglycemics.  May restart upon dc

## 2025-04-02 NOTE — ED ATTENDING ATTESTATION
3/31/2025  I, Willian Malone MD, saw and evaluated the patient. I have discussed the patient with the resident/non-physician practitioner and agree with the resident's/non-physician practitioner's findings, Plan of Care, and MDM as documented in the resident's/non-physician practitioner's note, except where noted. All available labs and Radiology studies were reviewed.  I was present for key portions of any procedure(s) performed by the resident/non-physician practitioner and I was immediately available to provide assistance.       At this point I agree with the current assessment done in the Emergency Department.  I have conducted an independent evaluation of this patient a history and physical is as follows:    ED Course     Impression: Strokelike symptoms history of recent CVA  Differential diagnosis: Ischemic stroke, hemorrhagic stroke, TIA,  Patient presents with strokelike symptoms left upper and lower extremity weakness recently diagnosed with stroke and discharged to home patient notes symptoms recurred this past Friday prior to arrival patient is unable to specify specific onset time symptoms for last known well.    On arrival, patient has hemiplegia of left upper and lower extremity.  Patient seen and evaluated by neurology service.  Patient deemed not a TNKase candidate due to outside lytic window patient deemed not an LVO candidate per CT imaging.  Patient will be admitted to neurology service for further evaluation and   management.    Critical Care Time  Procedures

## 2025-04-02 NOTE — DISCHARGE SUMMARY
NEUROLOGY RESIDENCY - DISCHARGE SUMMARY     Name: Jose Maria Bethea   Age & Sex: 74 y.o. male   MRN: [id number]  [facility]   Encounter: [id number]    Discharging Resident Physician: Forrest Samayoa DO  Attending: Yaima Crocker MD  PCP: Puneet Charles MD  Admission Date: 3/31/2025  Discharge Date: 04/03/25    Jose Maria Bethea will need follow-up in in 6 weeks with neurovascular team for Other in 60 minute appointment. They will not require outpatient neurological testing.  Staff message sent.         ASSESSMENT & PLAN     * Brainstem stroke (HCC)  Assessment & Plan  Jose Maria Bethea is a 74-year-old male with a past medical history of diabetes mellitus (DM), hyperlipidemia (HLD), and hypertension (HTN), CKD 3a, DEWEY presenting to the emergency department for evaluation of worsening left-sided weakness for the past two days.  Initial BP Blood Pressure: (!) 178/91, FSBG POC Glucose: (!) 266 NC CTH unremarkable and CTA H/N with no LVO or aneurysm, continues to show severe stenosis with focal occlusion at R V4 segment with reconstitution. As a result of > 4.5 hours from time of symptom onset patient was not determined to be a candidate for thrombolysis (TNK). Given absence of IR target pt was deemed not a candidate for mechanical thrombectomy.   - Home Antiplatelet /Anticoagulants PTA: Aspirin 81 mg QD and Plavix 75 mg qd  - Stroke risk factors: HTN, DM, HLD, and DEWEY  - Prior Stroke Hx: yes: lacunar left centrum semiovale, and right ventral upper medulla   - Past Neurological Hx: stroke      Workup:  - CTA head and neck: No acute intracranial abnormality. The small infarct seen on recent MRI is not identified on CT. Stable exam with high-grade stenosis and possible short segment occlusion of the intracranial right vertebral artery.  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 No evidence of new infarct, hemorrhage or mass effect   - TTE 3/26:  TTE with 65% EF, normal atria.   - Labs: Hemoglobin  A1c 9.0 (3/26/2025), LDL 44 (3/26/2025), P2y12 75     Impression: 74 y.o. male with history of  diabetes mellitus (DM), hyperlipidemia (HLD), and hypertension (HTN), CKD 3a, DEWEY presenting to the emergency department for evaluation of worsening left-sided weakness for the past two days.  Patient appears to have completion of recent stroke as no new findings noted on MRI b.       Plan: Discussed plan with neurology attending, Dr. Crocker  BP Goals: Normotensive goal  Antiplatelet agents: Continue DAPT, with ASA 81 mg and Plavix 75 mg qd for 21 days then plavix monotherapy on 4/14  Anticoagulation: None  Statin: Continue Lipitor 80 mg qd  Euthermic/Euglycemic  DVT PPx:  Lovenox , SCDs  PT/OT/ST/PMR evaluations when able  Stroke education and counseling  Disposition: PT Recommendations - Rehab Resource Intensity Level, PT: I (Maximum Resource Intensity)  Patient to be discharged to [facility]      DEWEY (obstructive sleep apnea)  Assessment & Plan  CPAP qhs    Mixed hyperlipidemia  Assessment & Plan  Continue PTA Lipitor    Essential hypertension  Assessment & Plan  Continue Antihypertensives losartan 100 mg daily, continuing amlodipine 10 mg daily.  May restart PTA antihypertensives on discharge    Type 2 diabetes mellitus with hyperglycemia, with long-term current use of insulin (HCC)  Assessment & Plan  Lab Results   Component Value Date    HGBA1C 9.0 (H) 03/26/2025       Recent Labs     04/01/25  1637 04/01/25  2106 04/02/25  0623 04/02/25  1113   POCGLU 179* 201* 113 172*         Blood Sugar Average: Last 72 hrs:  (P) 160    Plan: SSI while impatient, hold PO antihyperglycemics.  May restart upon dc             Disposition:     Other: Acute rehab at [facility]    Reason for Admission: Stroke    Consultations During Hospital Stay:  IP CONSULT TO PHYSICAL MEDICINE REHAB  IP CONSULT TO CASE MANAGEMENT  IP CONSULT TO NUTRITION SERVICES    Procedures Performed:   None    Significant Findings / Test Results:   Labs:  "Hemoglobin A1c 9.0 (3/26/2025), LDL 44 (3/26/2025)    MRI brain wo contrast  Result Date: 4/1/2025  Impression: Stable subacute infarct in the right ventral medulla. No evidence of new infarct, hemorrhage or mass effect. Workstation performed: [id number]     CTA head and neck with and without contrast  Result Date: 3/31/2025  Impression: CT Brain:  No acute intracranial abnormality. The small infarct seen on recent MRI is not identified on CT. CT Angiography: Stable exam with high-grade stenosis and possible short segment occlusion of the intracranial right vertebral artery. Workstation performed: [id number]       Incidental Findings:   None    Test Results Pending at Discharge (will require follow up):   None     Outpatient Tests Requested:  None    Complications: None    Hospital Course:     Jose Maria Bethea is a 74 y.o. male patient who originally presented to the hospital on 3/31/2025 due to worsening left-sided weakness.  He was recently discharged from the hospital on March 27 where he is found to have a right ventral medulla stroke on DAPT for 21 days.  He returned due to worsening left-sided weakness ongoing for 2 days.  Repeat MRI did not show any new stroke.  Plan to continue DAPT, transitioning to Plavix monotherapy on April 14, 2025.  Patient likely having completion of prior stroke.  He will go to acute rehab at [facility].    Condition at Discharge: stable     Discharge Day Visit / Exam:     Subjective:  Patient evaluated at bedside, left side appeared mildly weaker today but otherwise patient remains stable without new complaints.      Vitals: Blood Pressure: 170/91 (04/03/25 0728)  Pulse: 87 (04/03/25 0728)  Temperature: 98 °F (36.7 °C) (04/03/25 0728)  Temp Source: Oral (04/01/25 4294)  Respirations: 20 (04/02/25 1535)  Height: 5' 6\" (167.6 cm) (03/31/25 1510)  Weight - Scale: 70.3 kg (154 lb 15.7 oz) (03/31/25 1510)  SpO2: 91 % (04/03/25 0728)    Exam:     Physical Exam  Vitals reviewed. "   Constitutional:       General: He is not in acute distress.     Appearance: Normal appearance.   HENT:      Head: Normocephalic.      Mouth/Throat:      Mouth: Mucous membranes are dry.   Eyes:      General: Lids are normal.      Extraocular Movements: Extraocular movements intact.      Conjunctiva/sclera: Conjunctivae normal.      Pupils: Pupils are equal, round, and reactive to light.   Neurological:      Mental Status: He is oriented to person, place, and time.      Cranial Nerves: Dysarthria present.      Deep Tendon Reflexes: Reflexes are normal and symmetric.   Psychiatric:         Speech: Speech is slurred.         Neurologic Exam     Mental Status   Oriented to person, place, and time.   Speech: slurred (Mild baseline dysarthria )  Level of consciousness: alert    Cranial Nerves     CN II   Visual fields full to confrontation.     CN III, IV, VI   Pupils are equal, round, and reactive to light.    CN V   Facial sensation intact.     CN VII   Right facial weakness: central (Chronic from dental procedure)    CN VIII   CN VIII normal.     CN IX, X   CN IX normal.   CN X normal.     CN XI   CN XI normal.     CN XII   CN XII normal.     Motor Exam     Strength   Right deltoid: 5/5  Left deltoid: 2/5  Right biceps: 5/5  Left biceps: 2/5  Right triceps: 5/5  Left triceps: 2/5  Right iliopsoas: 5/5  Left iliopsoas: 2/5  Right quadriceps: 5/5  Left quadriceps: 2/5  Right anterior tibial: 5/5  Left anterior tibial: 0/5  Right gastroc: 5/5  Left gastroc: 0/5    Sensory Exam   Light touch normal.     Gait, Coordination, and Reflexes     Reflexes   Reflexes 2+ except as noted. Finger-nose and heel-to-shin intact on the right, weakness limited on the left.       Discussion with Family: Wife    Discharge instructions/Information to patient and family:   See after visit summary for information provided to patient and family.      Provisions for Follow-Up Care:  See after visit summary for information related to follow-up  care and any pertinent home health orders.      Planned Readmission: None    Discharge Statement:  I spent 30 minutes discharging the patient. This time was spent on the day of discharge. I had direct contact with the patient on the day of discharge. Greater than 50% of the total time was spent examining patient, answering all patient questions, arranging and discussing plan of care with patient as well as directly providing post-discharge instructions.  Additional time then spent on discharge activities.      Discharge Medications:  See after visit summary for reconciled discharge medications provided to patient and family.      ** Please Note: This note has been constructed using a voice recognition system **      ======    I have discussed the patient's history, physical exam findings, assessment, and plan in detail with attending, Dr. Crocker    Thank you for allowing me to participate in the care of your patient, Jose Maria Bethea.    Forrest Samayoa DO  [facility] Neurology Residency, PGY-2

## 2025-04-02 NOTE — PROGRESS NOTES
Patient:    MRN:  [id number]    Raúl Request ID:  [id number]    Level of care reserved:  Inpatient Rehab Facility    Partner Reserved:  [place] [id number] [phone]    Clinical needs requested:    Geography searched:  10 miles around [id number]    Start of Service:    Request sent:  1:39pm EDT on 4/1/2025 by Gardenia Veras    Partner reserved:  9:56am EDT on 4/2/2025 by Gardenia Veras    Choice list shared:  9:56am EDT on 4/2/2025 by Gardenia Veras

## 2025-04-02 NOTE — OCCUPATIONAL THERAPY NOTE
"  Occupational Therapy Progress Note     Patient Name: Jose Maria Bethea  Today's Date: 4/2/2025  Problem List  Principal Problem:    Brainstem stroke (HCC)  Active Problems:    Type 2 diabetes mellitus with hyperglycemia, with long-term current use of insulin (HCC)    Essential hypertension    Mixed hyperlipidemia    DEWEY (obstructive sleep apnea)        04/02/25 1122   OT Last Visit   OT Visit Date 04/02/25   Note Type   Note Type Treatment   Pain Assessment   Pain Assessment Tool 0-10   Pain Score No Pain   Restrictions/Precautions   Weight Bearing Precautions Per Order No   Braces or Orthoses Sling  (Sling for left UE hemiplegia with transfers/mobility)   Other Precautions Cognitive;Chair Alarm;Bed Alarm;Fall Risk;Telemetry;Impulsive  (Left facial droop, Left hemiparesis, left hemibody neglect/impaired proprioception, impulsive)   Lifestyle   Autonomy Prior to stroke/last admission was I with ADL's/IADL's, no AD with functional mobility, s/p discharge has been requring increased assistance with all functional tasks.   Reciprocal Relationships spouse, son   Service to Others retired   Intrinsic Gratification \"fixing things in the house\"   ADL   Eating Assistance 5  Supervision/Setup   Eating Deficit Verbal cueing;Setup  (using right UE dominant hand)   LB Dressing Assistance 2  Maximal Assistance   LB Dressing Deficit Don/doff R sock;Don/doff L sock   Bed Mobility   Supine to Sit 3  Moderate assistance   Additional items Assist x 1;HOB elevated;Increased time required;LE management;Verbal cues   Sit to Supine Unable to assess   Transfers   Sit to Stand 3  Moderate assistance   Additional items Assist x 1   Stand to Sit 3  Moderate assistance   Additional items Assist x 1   Additional Comments +HHA and SBA for safety   Functional Mobility   Functional Mobility 3  Moderate assistance   Additional Comments mod a x 2 with B/l HHA and left UE supported for shoulder protection strategy left LE supported and assisting to " advance LE with pivoting steps.   Therapeutic Exercise - ROM   UE-ROM Yes   ROM - Left Upper Extremities    L Shoulder AAROM;Flexion  (shoulder elevation/depression)   L Elbow Elbow extension;Elbow flexion   L Wrist Wrist flexion;Wrist extension;PROM   L Hand Prolonged stretch;Thumb;Index finger;Long finger;Ring finger;Little finger;PROM   L Position Seated   L Weight/Reps/Sets 3x   LUE ROM Comment proximal strength>distal~ shoulder 2-/5, 0/5 elbow, wrist, hand, issued visual handout of left UE Self ROM HEP and encouraged pt perform throughout day~ wife present and supportive   Cognition   Overall Cognitive Status Impaired   Arousal/Participation Alert;Responsive;Cooperative   Attention Attends with cues to redirect   Orientation Level Oriented X4   Memory Decreased recall of precautions   Following Commands Follows one step commands without difficulty   Comments pt pleasant and motivated for therapy, impaired divided/alternation attention, impulsive with decreased insight into deficits/abilities   Perception   Perception Yes   Left Attention left UE unilateral neglect   Activity Tolerance   Activity Tolerance Patient limited by fatigue   Assessment   Assessment Patient participated in Skilled OT session this date with interventions consisting of self care tasks, functional transfers/mobility, Left UE self PROM exercises for HEP-educated provided to pt/spouse present with visual handouts . Patient agreeable to OT treatment session, upon arrival patient was found supine in bed with spouse present.  In comparison to previous session, patient with improvements in functional mobility . Patient requiring verbal cues for safety, verbal cues for correct technique, and verbal cues for pacing thru activity steps. Patient continues to be functioning below baseline level, occupational performance remains limited secondary to factors listed above and increased risk for falls and injury.   From OT standpoint, recommendation at  time of d/c would be max level I. The patient's raw score on the AM-PAC Daily Activity Inpatient Short Form is 14. A raw score of less than 19 suggests the patient may benefit from discharge to post-acute rehabilitation services. Please refer to the recommendation of the Occupational Therapist for safe discharge planning. Patient to benefit from continued Occupational Therapy treatment while in the hospital to address deficits as defined above and maximize level of functional independence with ADLs and functional mobility.   Plan   Treatment Interventions ADL retraining;Functional transfer training;Endurance training;Patient/family training;Cognitive reorientation;Equipment evaluation/education;Compensatory technique education;Continued evaluation;Activityengagement;Energy conservation   Goal Expiration Date 04/15/25   OT Treatment Day 1   OT Frequency 3-5x/wk   Discharge Recommendation   Rehab Resource Intensity Level, OT I (Maximum Resource Intensity)   AM-PAC Daily Activity Inpatient   Lower Body Dressing 2   Bathing 2   Toileting 2   Upper Body Dressing 2   Grooming 3   Eating 3   Daily Activity Raw Score 14   Daily Activity Standardized Score (Calc for Raw Score >=11) 33.39   -PAC Applied Cognition Inpatient   Following a Speech/Presentation 2   Understanding Ordinary Conversation 4   Taking Medications 3   Remembering Where Things Are Placed or Put Away 3   Remembering List of 4-5 Errands 3   Taking Care of Complicated Tasks 3   Applied Cognition Raw Score 18   Applied Cognition Standardized Score 38.07   Barthel Index   Feeding 5   Bathing 0   Grooming Score 0   Dressing Score 5   Bladder Score 10   Bowels Score 10   Toilet Use Score 0   Transfers (Bed/Chair) Score 5   Mobility (Level Surface) Score 0   Stairs Score 0   Barthel Index Score 35   Modified King Scale   Modified Rena Scale 4   End of Consult   Education Provided Yes;Family or social support of family present for education by provider  (left  UE HEP, neuroplasicity technique for UE motor function)   Patient Position at End of Consult Bedside chair;Bed/Chair alarm activated;All needs within reach   Nurse Communication Nurse aware of consult     Rosa Chase OTR/L

## 2025-04-02 NOTE — PLAN OF CARE
Problem: OCCUPATIONAL THERAPY ADULT  Goal: Performs self-care activities at highest level of function for planned discharge setting.  See evaluation for individualized goals.  Description: Treatment Interventions: ADL retraining, Functional transfer training, UE strengthening/ROM, Endurance training, Cognitive reorientation, Patient/family training, Equipment evaluation/education, Compensatory technique education, Continued evaluation, Energy conservation, Activityengagement          See flowsheet documentation for full assessment, interventions and recommendations.   Note: Limitation: Decreased ADL status, Decreased endurance, Decreased self-care trans, Decreased high-level ADLs, Decreased UE ROM, Decreased UE strength, Decreased fine motor control, Decreased sensation  Prognosis: Fair  Assessment: Patient participated in Skilled OT session this date with interventions consisting of self care tasks, functional transfers/mobility, Left UE self PROM exercises for HEP-educated provided to pt/spouse present with visual handouts . Patient agreeable to OT treatment session, upon arrival patient was found supine in bed with spouse present.  In comparison to previous session, patient with improvements in functional mobility . Patient requiring verbal cues for safety, verbal cues for correct technique, and verbal cues for pacing thru activity steps. Patient continues to be functioning below baseline level, occupational performance remains limited secondary to factors listed above and increased risk for falls and injury.   From OT standpoint, recommendation at time of d/c would be max level I. The patient's raw score on the AM-PAC Daily Activity Inpatient Short Form is 14. A raw score of less than 19 suggests the patient may benefit from discharge to post-acute rehabilitation services. Please refer to the recommendation of the Occupational Therapist for safe discharge planning. Patient to benefit from continued  Occupational Therapy treatment while in the hospital to address deficits as defined above and maximize level of functional independence with ADLs and functional mobility.     Rehab Resource Intensity Level, OT: I (Maximum Resource Intensity)

## 2025-04-02 NOTE — PLAN OF CARE
Problem: SAFETY ADULT  Goal: Patient will remain free of falls  Description: INTERVENTIONS:- Educate patient/family on patient safety including physical limitations- Instruct patient to call for assistance with activity - Consult OT/PT to assist with strengthening/mobility - Keep Call bell within reach- Keep bed low and locked with side rails adjusted as appropriate- Keep care items and personal belongings within reach- Initiate and maintain comfort rounds-  Outcome: Progressing  Goal: Maintain or return to baseline ADL function  Description: INTERVENTIONS:-  Assess patient's ability to carry out ADLs; assess patient's baseline for ADL function and identify physical deficits which impact ability to perform ADLs (bathing, care of mouth/teeth, toileting, grooming, dressing, etc.)- Assess/evaluate cause of self-care deficits - Assess range of motion- Assess patient's mobility; develop plan if impaired- Assess patient's need for assistive devices and provide as appropriate- Encourage maximum independence but intervene and supervise when necessary- Involve family in performance of ADLs- Assess for home care needs following discharge - Consider OT consult to assist with ADL evaluation and planning for discharge- Provide patient education as appropriate  Outcome: Progressing  Goal: Maintains/Returns to pre admission functional level  Description: INTERVENTIONS:- Perform AM-PAC 6 Click Basic Mobility/ Daily Activity assessment daily.- Set and communicate daily mobility goal to care team and patient/family/caregiver. - Collaborate with rehabilitation services on mobility goals if consulted  Outcome: Progressing     Problem: DISCHARGE PLANNING  Goal: Discharge to home or other facility with appropriate resources  Description: INTERVENTIONS:- Identify barriers to discharge w/patient and caregiver- Arrange for needed discharge resources and transportation as appropriate- Identify discharge learning needs (meds, wound care,  etc.)- Arrange for interpretive services to assist at discharge as needed- Refer to Case Management Department for coordinating discharge planning if the patient needs post-hospital services based on physician/advanced practitioner order or complex needs related to functional status, cognitive ability, or social support system  Outcome: Progressing     Problem: NEUROSENSORY - ADULT  Goal: Achieves stable or improved neurological status  Description: INTERVENTIONS- Monitor and report changes in neurological status- Monitor vital signs such as temperature, blood pressure, glucose, and any other labs ordered - Initiate measures to prevent increased intracranial pressure- Monitor for seizure activity and implement precautions if appropriate    Outcome: Progressing  Goal: Achieves maximal functionality and self care  Description: INTERVENTIONS- Monitor swallowing and airway patency with patient fatigue and changes in neurological status- Encourage and assist patient to increase activity and self care. - Encourage visually impaired, hearing impaired and aphasic patients to use assistive/communication devices  Outcome: Progressing     Problem: Prexisting or High Potential for Compromised Skin Integrity  Goal: Skin integrity is maintained or improved  Description: INTERVENTIONS:- Identify patients at risk for skin breakdown- Assess and monitor skin integrity- Assess and monitor nutrition and hydration status- Monitor labs - Assess for incontinence - Turn and reposition patient- Assist with mobility/ambulation- Relieve pressure over bony prominences- Avoid friction and shearing- Provide appropriate hygiene as needed including keeping skin clean and dry- Evaluate need for skin moisturizer/barrier cream- Collaborate with interdisciplinary team - Patient/family teaching- Consider wound care consult   Outcome: Progressing      34M hx asthma w, sob, diffuse wheezing and prolonged end exp phase w/ initial peak flow 100, will trial nebs, steroids, reassess, sats > 95% with mild accessory muscle use.

## 2025-04-02 NOTE — RESTORATIVE TECHNICIAN NOTE
Restorative Technician Note      Patient Name: Jose Maria Bethea     Activity Performed: Ambulated; Dangled; Stood; Other (Comment) (L UE arm sling on.)  Assistive Device: Other (Comment) (Assist x2 OOB to the chair/ambulate in the gary at handrail x2 with chair follow. Assisted PT Renetta.)  Education Provided: Yes  Patient Position at End of Consult: Bedside chair; All needs within reach; Bed/Chair alarm activated    Cachorro TONY, Restorative Technician,

## 2025-04-02 NOTE — ASSESSMENT & PLAN NOTE
Jose Maria Bethea is a 74-year-old male with a past medical history of diabetes mellitus (DM), hyperlipidemia (HLD), and hypertension (HTN), CKD 3a, DEWEY presenting to the emergency department for evaluation of worsening left-sided weakness for the past two days.  Initial BP Blood Pressure: (!) 178/91, FSBG POC Glucose: (!) 266 NC CTH unremarkable and CTA H/N with no LVO or aneurysm, continues to show severe stenosis with focal occlusion at R V4 segment with reconstitution. As a result of > 4.5 hours from time of symptom onset patient was not determined to be a candidate for thrombolysis (TNK). Given absence of IR target pt was deemed not a candidate for mechanical thrombectomy.   - Home Antiplatelet /Anticoagulants PTA: Aspirin 81 mg QD and Plavix 75 mg qd  - Stroke risk factors: HTN, DM, HLD, and DEWEY  - Prior Stroke Hx: yes: lacunar left centrum semiovale, and right ventral upper medulla   - Past Neurological Hx: stroke      Workup:  - CTA head and neck: No acute intracranial abnormality. The small infarct seen on recent MRI is not identified on CT. Stable exam with high-grade stenosis and possible short segment occlusion of the intracranial right vertebral artery.  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 No evidence of new infarct, hemorrhage or mass effect   - TTE 3/26:  TTE with 65% EF, normal atria.   - Labs: Hemoglobin A1c 9.0 (3/26/2025), LDL 44 (3/26/2025), P2y12 75     Impression: 74 y.o. male with history of  diabetes mellitus (DM), hyperlipidemia (HLD), and hypertension (HTN), CKD 3a, DEWEY presenting to the emergency department for evaluation of worsening left-sided weakness for the past two days.  Patient appears to have completion of recent stroke as no new findings noted on MRI b.       Plan: Discussed plan with neurology attending, Dr. Crocker  Admit along the stroke pathway with telemetry monitoring  Frequent neuro checks per protocol. If there is any acute changes in exam, please obtain  stat CT head and notify neurology team  BP Goals: Normotensive goal  Antiplatelet agents: Continue DAPT, with ASA 81 mg and Plavix 75 mg qd for 21 days then plavix monotherapy on 4/14  Anticoagulation: None  Statin: Continue Lipitor 80 mg qd  Euthermic/Euglycemic  DVT PPx:  Lovenox , SCDs  PT/OT/ST/PMR evaluations when able  Stroke education and counseling  Disposition: PT Recommendations - Rehab Resource Intensity Level, PT: I (Maximum Resource Intensity)

## 2025-04-02 NOTE — ARC ADMISSION
Patient appears appropriate for ARC pending medical readiness / continued functional need /insurance auth /  bed avail.

## 2025-04-02 NOTE — ASSESSMENT & PLAN NOTE
Lab Results   Component Value Date    HGBA1C 9.0 (H) 03/26/2025       Recent Labs     04/01/25  1637 04/01/25  2106 04/02/25  0623 04/02/25  1113   POCGLU 179* 201* 113 172*       Blood Sugar Average: Last 72 hrs:  (P) 160    Plan: SSI while impatient, hold PO antihyperglycemics

## 2025-04-03 VITALS
SYSTOLIC BLOOD PRESSURE: 170 MMHG | DIASTOLIC BLOOD PRESSURE: 91 MMHG | OXYGEN SATURATION: 91 % | TEMPERATURE: 98 F | WEIGHT: 154.98 LBS | RESPIRATION RATE: 20 BRPM | BODY MASS INDEX: 24.91 KG/M2 | HEIGHT: 66 IN | HEART RATE: 87 BPM

## 2025-04-03 LAB
GLUCOSE SERPL-MCNC: 128 MG/DL (ref 65–140)
GLUCOSE SERPL-MCNC: 94 MG/DL (ref 65–140)

## 2025-04-03 PROCEDURE — 82948 REAGENT STRIP/BLOOD GLUCOSE: CPT

## 2025-04-03 PROCEDURE — 99238 HOSP IP/OBS DSCHRG MGMT 30/<: CPT | Performed by: STUDENT IN AN ORGANIZED HEALTH CARE EDUCATION/TRAINING PROGRAM

## 2025-04-03 RX ORDER — ASPIRIN 81 MG/1
81 TABLET, CHEWABLE ORAL DAILY
Start: 2025-04-03 | End: 2025-04-15

## 2025-04-03 RX ADMIN — CYANOCOBALAMIN TAB 500 MCG 250 MCG: 500 TAB at 08:12

## 2025-04-03 RX ADMIN — CLOPIDOGREL BISULFATE 75 MG: 75 TABLET, FILM COATED ORAL at 08:12

## 2025-04-03 RX ADMIN — ENOXAPARIN SODIUM 40 MG: 40 INJECTION SUBCUTANEOUS at 08:12

## 2025-04-03 RX ADMIN — AMLODIPINE BESYLATE 10 MG: 10 TABLET ORAL at 08:12

## 2025-04-03 RX ADMIN — LOSARTAN POTASSIUM 100 MG: 50 TABLET, FILM COATED ORAL at 08:12

## 2025-04-03 RX ADMIN — ASPIRIN 81 MG CHEWABLE TABLET 81 MG: 81 TABLET CHEWABLE at 08:12

## 2025-04-03 NOTE — UTILIZATION REVIEW
NOTIFICATION OF ADMISSION DISCHARGE   This is a Notification of Discharge from [facility]. Please be advised that this patient has been discharge from our facility. Below you will find the admission and discharge date and time including the patient’s disposition.   UTILIZATION REVIEW CONTACT:  Utilization Review Assistants  Network Utilization Review Department  Phone: [phone] x carefully listen to the prompts. All voicemails are confidential.  Email: [email or web address]     ADMISSION INFORMATION  PRESENTATION DATE: 3/31/2025 11:33 AM  OBERVATION ADMISSION DATE: N/A  INPATIENT ADMISSION DATE: 3/31/25  1:02 PM   DISCHARGE DATE: 4/3/2025 12:17 PM   [facility]POS[facility]    Network Utilization Review Department  ATTENTION: Please call with any questions or concerns to [phone] and carefully listen to the prompts so that you are directed to the right person. All voicemails are confidential.   For Discharge needs, contact Care Management DC Support Team at [phone] opt. [phone]  Send all requests for admission clinical reviews, approved or denied determinations and any other requests to dedicated fax number below belonging to the campus where the patient is receiving treatment. List of dedicated fax numbers for the Facilities:  FACILITY NAME UR FAX NUMBER   ADMISSION DENIALS (Administrative/Medical Necessity) [phone]   DISCHARGE SUPPORT TEAM ([facility]) [phone]   PARENT CHILD HEALTH (Maternity/NICU/Pediatrics) [phone]   [facility] [phone]   [facility] [phone]   [facility] [phone]   [facility] [phone]   [facility] [phone]   [facility] [phone]   [facility] [phone]   [facility] [phone]   [facility]  [facility] [phone]   [facility] [phone]   [facility] [phone]   [facility] [phone]

## 2025-04-03 NOTE — DISCHARGE SUPPORT
Case Management Assessment & Discharge Planning Note    Patient name Jose Maria Bethea  [facility] MRN [id number]  : 1950 Date 4/3/2025       Current Admission Date: 3/31/2025  Current Admission Diagnosis:Brainstem stroke (HCC)   Patient Active Problem List    Diagnosis Date Noted Date Diagnosed    Brainstem stroke (HCC) 2025     Acute ischemic stroke (HCC) 2025     Type 2 diabetes mellitus with diabetic cataract, with long-term current use of insulin (HCC) 2025     Chronic kidney disease, stage 3a (HCC) 2025     DEWEY (obstructive sleep apnea) 2024     Vitamin D deficiency 2024     Vertebral artery stenosis, bilateral 2021     Type 2 diabetes mellitus with hyperglycemia, with long-term current use of insulin (HCC) 2020     Essential hypertension 2020     Hyperkalemia 2020     Mixed hyperlipidemia 2020       LOS (days): 3  Geometric Mean LOS (GMLOS) (days): 3.8  Days to GMLOS:1   Facility Authorization Approved  MA Support Center received approved auth for: Acute Rehab  Insurance: [facility]  Authorization Obtained Via: Phone  Name/Phone # of Rep who called in determination: Juan Jose  Facility Name: [facility]  Approved Authorization Number: [id number]  Start of Care Date: 25  Next Review Date: 25  Continued Stay Care Coordinator Name: Juan Jose  Continued Stay Care Coordinator Phone #: [phone] x [phone]  Submit Next Review to: fax [phone]   notified: Gardenia pitts     Updates to authorization status will be noted in chart.    Please reach out to CM for updates on any clinical information.

## 2025-04-03 NOTE — CASE MANAGEMENT
Case Management Discharge Planning Note    Patient name Jose Maria Bethea  Location [facility] MRN [id number]  : 1950 Date 4/3/2025       Current Admission Date: 3/31/2025  Current Admission Diagnosis:Brainstem stroke (HCC)   Patient Active Problem List    Diagnosis Date Noted Date Diagnosed    Brainstem stroke (HCC) 2025     Acute ischemic stroke (HCC) 2025     Type 2 diabetes mellitus with diabetic cataract, with long-term current use of insulin (HCC) 2025     Chronic kidney disease, stage 3a (HCC) 2025     DEWEY (obstructive sleep apnea) 2024     Vitamin D deficiency 2024     Vertebral artery stenosis, bilateral 2021     Type 2 diabetes mellitus with hyperglycemia, with long-term current use of insulin ([facility]) 2020     Essential hypertension 2020     Hyperkalemia 2020     Mixed hyperlipidemia 2020       LOS (days): 3  Geometric Mean LOS (GMLOS) (days): 3.8  Days to GMLOS:0.8     OBJECTIVE:  Risk of Unplanned Readmission Score: 14.41         Current admission status: Inpatient   Preferred Pharmacy:   [id number] - [place], [place] - [address]  [address]  [place] [address]  Phone: [phone] Fax: [phone]    Primary Care Provider: Puneet Charles MD    Primary Insurance: [employer]  Secondary Insurance:     DISCHARGE DETAILS:    Discharge planning discussed with:: Patient, patient's wife        Other Referral/Resources/Interventions Provided:  Referral Comments: Received insurance auth.- patient for discharge to [facility]- Transport arranged via Roundtrip: [employer] 11:30.  Wife, patient, provider, and nurse made aware.         Treatment Team Recommendation: Acute Rehab  Discharge Destination Plan:: Acute Rehab  Transport at Discharge : Stretcher van     Number/Name of Dispatcher: [employer]  Transported by (Company and Unit #): [employer]  ETA of Transport (Date): 25  ETA of  Transport (Time): 1130        Accepting Facility Name, City & State : [facility], [place], [place]

## 2025-04-04 ENCOUNTER — TELEPHONE (OUTPATIENT)
Age: 75
End: 2025-04-04

## 2025-04-04 NOTE — TELEPHONE ENCOUNTER
Patients wife Celestina called to let Dr. Saucedo know that Jose Maria had a stroke and was admitted into the hospital and there for about 10 days. He is now at [facility] and she is unsure how long he will be there. If the rehab needs anything as far as diabetic orders she will tell them to call the office.  TODD

## 2025-04-04 NOTE — TELEPHONE ENCOUNTER
Pt wife reports pt is in [facility] fro left side paralyzation that happened over the weekend. Pt wife will cancel TCM appt.

## 2025-04-11 ENCOUNTER — TELEPHONE (OUTPATIENT)
Dept: NEUROLOGY | Facility: CLINIC | Age: 75
End: 2025-04-11

## 2025-04-11 NOTE — TELEPHONE ENCOUNTER
Post CVA Discharge Follow Up  Hospitalization: 3/25/25-3/27/25, 3/31/25-4/3/25    According to chart, patient discharged to [facility]  Called facility and requested to speak with the patient's nurse to obtain an update. Spoke with Rosalio.   Denies any new or worsening stroke-like symptoms since discharge.    Ambulation / ADLs:  Patient mobilizing via minimal assist X 1.  He continues to require assistance with ADLs and transfers.     Patient maintained on a regular consistency diet.  He is incontinent at this time.     Medication Review:  Reviewed neurology-related medications with them. No reported missed doses, medication side effects, or signs of bleeding.     Last reported /80. The provider at facility is aware.   Last reported blood glucose 205    Appointments:  Please refer to Bettina's telephone note from 3/27/25.    Estimated discharge date: 4/18th.    He denies any questions or concerns at this time.        eBttina- can you please reach out to the patient's wife to schedule the neurology HFU appt?    Thank you!

## 2025-04-25 ENCOUNTER — NURSING HOME VISIT (OUTPATIENT)
Dept: GERIATRICS | Facility: OTHER | Age: 75
End: 2025-04-25
Payer: COMMERCIAL

## 2025-04-25 ENCOUNTER — TELEPHONE (OUTPATIENT)
Dept: NEUROLOGY | Facility: CLINIC | Age: 75
End: 2025-04-25

## 2025-04-25 VITALS
SYSTOLIC BLOOD PRESSURE: 150 MMHG | RESPIRATION RATE: 18 BRPM | HEART RATE: 60 BPM | DIASTOLIC BLOOD PRESSURE: 88 MMHG | BODY MASS INDEX: 23.24 KG/M2 | OXYGEN SATURATION: 94 % | WEIGHT: 144 LBS | TEMPERATURE: 97.9 F

## 2025-04-25 DIAGNOSIS — I63.9 BRAINSTEM STROKE (HCC): Primary | ICD-10-CM

## 2025-04-25 DIAGNOSIS — E11.36 TYPE 2 DIABETES MELLITUS WITH DIABETIC CATARACT, WITH LONG-TERM CURRENT USE OF INSULIN (HCC): ICD-10-CM

## 2025-04-25 DIAGNOSIS — N18.31 CHRONIC KIDNEY DISEASE, STAGE 3A (HCC): ICD-10-CM

## 2025-04-25 DIAGNOSIS — E78.2 MIXED HYPERLIPIDEMIA: ICD-10-CM

## 2025-04-25 DIAGNOSIS — Z79.4 TYPE 2 DIABETES MELLITUS WITH DIABETIC CATARACT, WITH LONG-TERM CURRENT USE OF INSULIN (HCC): ICD-10-CM

## 2025-04-25 DIAGNOSIS — I10 ESSENTIAL HYPERTENSION: ICD-10-CM

## 2025-04-25 DIAGNOSIS — G47.33 OSA (OBSTRUCTIVE SLEEP APNEA): ICD-10-CM

## 2025-04-25 PROBLEM — E87.5 HYPERKALEMIA: Status: RESOLVED | Noted: 2020-12-08 | Resolved: 2025-04-25

## 2025-04-25 PROCEDURE — 99306 1ST NF CARE HIGH MDM 50: CPT | Performed by: FAMILY MEDICINE

## 2025-04-25 NOTE — ASSESSMENT & PLAN NOTE
With recent right ventral macular infarct and residual left hemiparesis and dysarthria.  History of prior stroke: Lacunar left centrum semiovale infarct in 2021  Manage HTN, maintain normotensive BP <130/80, continue nifedipine 60 mg daily, valsartan 320 mg daily prescribed from [facility]. Per hospital documentation, patient was on amlodipine, losartan, and HCTZ while in [facility].   Manage DM, HLD, DEWEY with CPAP  Continue Plavix 75 mg daily, atorvastatin 80 mg daily  OT/PT/ST  Dysphagia 3 (dental soft) diet, with carb controlled, thin liquids  Follow-up with cardiology, neurology

## 2025-04-25 NOTE — ASSESSMENT & PLAN NOTE
Lab Results   Component Value Date    EGFR 54 04/01/2025    EGFR 54 03/31/2025    EGFR 57 03/27/2025    CREATININE 1.29 04/01/2025    CREATININE 1.29 03/31/2025    CREATININE 1.24 03/27/2025   Stable, recent BMP showed Creatinine 1.16, eGFR 66 (4/24/25)  Avoid NSAIDs. Avoid hypotension.  Monitor BMP.

## 2025-04-25 NOTE — PROGRESS NOTES
[facility]  [address]  [place] [address]  [facility] [address]  History and Physical  NAME: Jose Maria Bethea  AGE: 74 y.o. SEX: male [phone]  DATE OF ENCOUNTER: 4/25/2025  Pain: none reported  Rehab Potential: good  Patient Informed of Medical Condition: yes  Patient is Capable of Understanding Their Right: yes  Prognosis: fair  Discharge Plan: home with spouse  Surrogate Decision Maker: spouse, Celestina Bethea  Advanced Directives: yes  Code status: full code  PCP: Puneet Charles MD  Assessment and Plan   Brainstem stroke (HCC)  With recent right ventral macular infarct and residual left hemiparesis and dysarthria.  History of prior stroke: Lacunar left centrum semiovale infarct in 2021  Manage HTN, maintain normotensive BP <130/80, continue nifedipine 60 mg daily, valsartan 320 mg daily prescribed from [facility]. Per hospital documentation, patient was on amlodipine, losartan, and HCTZ while in [facility].   Manage DM, HLD, DEWEY with CPAP  Continue Plavix 75 mg daily, atorvastatin 80 mg daily  OT/PT/ST  Dysphagia 3 (dental soft) diet, with carb controlled, thin liquids  Follow-up with cardiology, neurology    Essential hypertension  Home dose amlodipine 10 mg daily was changed to nifedipine 60 mg daily for better BP control in [facility]. Currently on valsartan 320 mg daily and nifedipine 60 mg daily.  Continue current regimen for now.  Monitor BP    DEWEY (obstructive sleep apnea)  Continue CPAP nightly    Type 2 diabetes mellitus with diabetic cataract, with long-term current use of insulin ([facility])  Lab Results   Component Value Date    HGBA1C 9.0 (H) 03/26/2025   Inadequate controlled diabetes  Currently on insulin glargine 15 units at bedtime, sliding scale, metformin 1000 mg twice daily, glimepiride 2 mg twice daily, Jardiance 25 mg daily  BG HS was high 232 last night, but FG was low 79 this morning. Would decrease insulin glargine to 10 U HS.  Continue monitoring  BG    Chronic kidney disease, stage 3a (HCC)  Lab Results   Component Value Date    EGFR 54 04/01/2025    EGFR 54 03/31/2025    EGFR 57 03/27/2025    CREATININE 1.29 04/01/2025    CREATININE 1.29 03/31/2025    CREATININE 1.24 03/27/2025   Stable, recent BMP showed Creatinine 1.16, eGFR 66 (4/24/25)  Avoid NSAIDs. Avoid hypotension.  Monitor BMP.    Mixed hyperlipidemia  Continue lipitor 80 mg daily  All medications and routine orders were reviewed and updated as needed.  Plan discussed with: Patient and wife. Coordination of care with nursing, OT/PT, SW, dietician.  Chief Complaint   Seen for admission at [facility]  History of Present Illness   74 y.o. male with HTN, HLD, CKD stage IIIa, DM, DEWEY who is seen today, following hospitalization at [facility] from 3/31/2025 to 4/3/2025 for worsening left-sided weakness. He was admitted from 3/25 to 3/27 for acute right ventral upper medullary infarct with left hemiparesis.  Neurology evaluated, determined that worsening left-sided weakness was likely due to completion of prior stroke. He completed inpatient rehab at [facility] from 4/3/25 to 4/24/25.  During this rehab course, he was given donepezil 5 mg daily at bedtime, dronabinol 2.5 mg 2 times daily as needed for nausea/poor appetite.  Enoxaparin SQ was placed for DVT prophylaxis.  Scopolamine 1.5 mg 1 patch was applied every 72 hours for nausea/dizziness. He was discharged to [facility] to continue OT/PT/ST.  He is sitting in recliner, wife at bed side. He states his appetite is back, so he no longer ask for dronabinol. He denies HA, CP, SOB, or dysuria. Last BM was yesterday.   Wife reports he c/o dizziness with low BP in the 130s.    HISTORY:  Past Medical History:   Diagnosis Date    COVID-19 07/2022    Diabetes mellitus (HCC)     Type 2    Dyslipidemia     Hypertension     Obstructive sleep apnea on CPAP      Family History   Problem Relation Age  of Onset    Hypertension Mother      Social History     Socioeconomic History    Marital status: /Civil Union     Spouse name: None    Number of children: None    Years of education: None    Highest education level: None   Occupational History    None   Tobacco Use    Smoking status: Former     Types: Cigarettes    Smokeless tobacco: Never    Tobacco comments:     1 year as a teenager   Vaping Use    Vaping status: Never Used   Substance and Sexual Activity    Alcohol use: Not Currently    Drug use: Not Currently    Sexual activity: Not Currently     Partners: Female   Other Topics Concern    None   Social History Narrative    None     Social Drivers of Health     Financial Resource Strain: Low Risk  (4/14/2023)    Overall Financial Resource Strain (CARDIA)     Difficulty of Paying Living Expenses: Not hard at all   Food Insecurity: No Food Insecurity (4/1/2025)    Hunger Vital Sign     Worried About Running Out of Food in the Last Year: Never true     Ran Out of Food in the Last Year: Never true   Transportation Needs: No Transportation Needs (4/1/2025)    PRAPARE - Transportation     Lack of Transportation (Medical): No     Lack of Transportation (Non-Medical): No   Physical Activity: Not on file   Stress: Not on file   Social Connections: Not on file   Intimate Partner Violence: Unknown (3/31/2025)    Nursing IPS     Feels Physically and Emotionally Safe: Not on file     Physically Hurt by Someone: Not on file     Humiliated or Emotionally Abused by Someone: Not on file     Physically Hurt by Someone: No     Hurt or Threatened by Someone: No   Housing Stability: Low Risk  (4/1/2025)    Housing Stability Vital Sign     Unable to Pay for Housing in the Last Year: No     Number of Times Moved in the Last Year: 0     Homeless in the Last Year: No       Allergies:  No Known Allergies    Review of Systems   As per HPI, otherwise negative.  Medications and orders   All medications reviewed and updated in Nursing  Home EMR.  Objective   /88   Pulse 60   Temp 97.9 °F (36.6 °C)   Resp 18   Wt 65.3 kg (144 lb)   SpO2 94%   BMI 23.24 kg/m²   Physical Exam  Vitals and nursing note reviewed.   General: no acute distress.  HENT:      Head: Normocephalic.      Nose: Nose normal.      Mouth: Mucous membranes are moist.   Eyes:       Right eye: No discharge.         Left eye: No discharge.      Conjunctivae normal.      PERRLA  Cardiovascular:      Normal rate and regular rhythm. No murmur heard.  Pulmonary:      Pulmonary effort is normal. No wheezing, rhonchi or rales.   Abdominal:      Bowel sounds are normal. There is no distension.      Abdomen is soft. There is no abdominal tenderness.   Musculoskeletal:      Cervical back: Neck supple.      Right lower leg: No edema.      Left lower leg: mild edema in left foot.   Skin:     General: Skin is warm.   Neurological: alert. Left hemiparesis. No focal facial nerve deficits.      Oriented to person, place, and time. Cooperative, follow commands      Behavior: normal.    Pertinent Laboratory/Diagnostic Studies:   The following labs/studies were reviewed please see chart or hospital paperwork for details.    Labs & Imaging:  BMP on 4/24/25 with glucose 82, BUN 32, creatinine 1.16, EGFR 66, sodium 142, potassium 4.3, chloride 106, calcium 9.2.  CBC 4/24/25 reviewed, WBC 7.2, Hgb 13.1, MCV 90, MCH 29.9,   Lab Results   Component Value Date    HGBA1C 9.0 (H) 03/26/2025     Lab Results   Component Value Date    CHOLESTEROL 113 03/26/2025    CHOLESTEROL 137 10/30/2024    CHOLESTEROL 142 04/27/2021     Lab Results   Component Value Date    HDL 44 03/26/2025    HDL 41 10/30/2024    HDL 47 04/27/2021     Lab Results   Component Value Date    TRIG 125 03/26/2025    TRIG 156 (H) 10/30/2024    TRIG 99 04/27/2021     Lab Results   Component Value Date    NONHDLC 96 10/30/2024    NONHDLC 90 07/21/2019    NONHDLC 71 12/30/2018     Lab Results   Component Value Date    LDLCALC 44  03/26/2025    LDLCALC 65 10/30/2024    LDLDIRECT 68 12/30/2018    LDLDIRECT 65 05/05/2017     Lab Results   Component Value Date    BBEMXFGJ77 237 10/30/2024     Lab Results   Component Value Date    SKS7QWJEIFMY 1.156 10/30/2024     Lab Results   Component Value Date    TMZW74SUFZTD 34.7 03/03/2024      I have spent a total time of 75 minutes in caring for this patient on the day of the visit/encounter including diagnostic results, prognosis, risks and benefits of tx options, instructions for management, patient and family education, counseling / coordination of care, documenting in the medical record, reviewing / ordering tests, medicine, procedures , obtaining and reviewing history, communicating with other healthcare professionals.  Katiana Diaz MD

## 2025-04-25 NOTE — TELEPHONE ENCOUNTER
Post CVA Discharge Follow Up  Hospitalization: 3/25/25-3/27/25, 3/31/25-4/3/25     According to chart, patient discharged to [facility].  Called facility and requested to speak with the patient's nurse to obtain an update. Spoke with Claudine.  Denies any new or worsening stroke-like symptoms.      Ambulation / ADLs:  Patient mobilizing via assist X 1.  He continues to require assistance with ADLs and transfers.      Patient maintained on a regular consistency diet.  He is continent at this time. He uses the urinal.     Medication Review:  Reviewed neurology-related medications with them. No reported missed doses, medication side effects, or signs of bleeding.      Last reported /71. The provider at the facility is aware. Dr. Diaz saw the patient today on 4/25/25.  Last reported blood glucose 142     Appointments:  Stroke HFU scheduled for 6/12/25.     She denies any questions or concerns at this time.

## 2025-04-25 NOTE — ASSESSMENT & PLAN NOTE
Home dose amlodipine 10 mg daily was changed to nifedipine 60 mg daily for better BP control in [facility]. Currently on valsartan 320 mg daily and nifedipine 60 mg daily.  Continue current regimen for now.  Monitor BP

## 2025-04-25 NOTE — ASSESSMENT & PLAN NOTE
Lab Results   Component Value Date    HGBA1C 9.0 (H) 03/26/2025   Inadequate controlled diabetes  Currently on insulin glargine 15 units at bedtime, sliding scale, metformin 1000 mg twice daily, glimepiride 2 mg twice daily, Jardiance 25 mg daily  BG HS was high 232 last night, but FG was low 79 this morning. Would decrease insulin glargine to 10 U HS.  Continue monitoring BG

## 2025-04-28 ENCOUNTER — NURSING HOME VISIT (OUTPATIENT)
Dept: GERIATRICS | Facility: OTHER | Age: 75
End: 2025-04-28
Payer: COMMERCIAL

## 2025-04-28 DIAGNOSIS — G47.33 OSA (OBSTRUCTIVE SLEEP APNEA): ICD-10-CM

## 2025-04-28 DIAGNOSIS — K59.01 SLOW TRANSIT CONSTIPATION: ICD-10-CM

## 2025-04-28 DIAGNOSIS — I63.9 BRAINSTEM STROKE (HCC): Primary | ICD-10-CM

## 2025-04-28 DIAGNOSIS — Z79.4 TYPE 2 DIABETES MELLITUS WITH DIABETIC CATARACT, WITH LONG-TERM CURRENT USE OF INSULIN (HCC): ICD-10-CM

## 2025-04-28 DIAGNOSIS — Z79.4 TYPE 2 DIABETES MELLITUS WITH HYPERGLYCEMIA, WITH LONG-TERM CURRENT USE OF INSULIN (HCC): ICD-10-CM

## 2025-04-28 DIAGNOSIS — E11.36 TYPE 2 DIABETES MELLITUS WITH DIABETIC CATARACT, WITH LONG-TERM CURRENT USE OF INSULIN (HCC): ICD-10-CM

## 2025-04-28 DIAGNOSIS — E11.65 TYPE 2 DIABETES MELLITUS WITH HYPERGLYCEMIA, WITH LONG-TERM CURRENT USE OF INSULIN (HCC): ICD-10-CM

## 2025-04-28 DIAGNOSIS — R13.10 DYSPHAGIA, UNSPECIFIED TYPE: ICD-10-CM

## 2025-04-28 DIAGNOSIS — I10 ESSENTIAL HYPERTENSION: ICD-10-CM

## 2025-04-28 DIAGNOSIS — R53.81 DEBILITY: ICD-10-CM

## 2025-04-28 PROCEDURE — 99309 SBSQ NF CARE MODERATE MDM 30: CPT | Performed by: NURSE PRACTITIONER

## 2025-04-28 RX ORDER — SENNOSIDES A AND B 8.6 MG/1
1 TABLET, FILM COATED ORAL 2 TIMES DAILY
COMMUNITY

## 2025-04-28 RX ORDER — DONEPEZIL HYDROCHLORIDE 5 MG/1
5 TABLET, FILM COATED ORAL
COMMUNITY

## 2025-04-28 RX ORDER — INSULIN GLARGINE 100 [IU]/ML
10 INJECTION, SOLUTION SUBCUTANEOUS
Status: SHIPPED
Start: 2025-04-28

## 2025-04-28 NOTE — ASSESSMENT & PLAN NOTE
Lab Results   Component Value Date    HGBA1C 9.0 (H) 03/26/2025   Patient uses the freestyle tamiko glucose monitoring system which requires no fingersticks for blood sugar checks  Blood glucose trending 140s to 200s  No episodes of hypoglycemia reported  Currently on glargine insulin 15 units nightly, sliding scale insulin coverage with aspart insulin 4 times daily, glimepiride 2 mg twice daily, metformin ER 1000 mg twice daily, and Jardiance 25 mg daily  Will continue to monitor blood glucose log while patient is at [facility] nursing [facility] and adjust medications if clinically indicated

## 2025-04-28 NOTE — PROGRESS NOTES
Facility: [facility]  POS: 31  Progress Note    Chief Complaint/Reason for visit: STR follow-up visit  Code status: Full code  History of Present Illness: 74-year-old male seen and examined for STR follow-up of acute and chronic medical conditions.  Patient is currently residing at [facility] for rehabilitation.  Received patient seated in chair with wife at bedside.  Patient appeared in no distress.  Discussed reported BP readings from nursing staff and plan.  Patient also complaining of constipation which was addressed.  Denies shortness of breath, chest pain, headache, abdominal pain, nausea, vomiting, or diarrhea.  See A/P for additional information.  Past Medical History: unchanged from history and physical  Past Medical History:   Diagnosis Date    COVID-19 07/2022    Diabetes mellitus (HCC)     Type 2    Dyslipidemia     Hypertension     Obstructive sleep apnea on CPAP      Family History: Unchanged from history and physical  Social History: Unchanged from history and physical  Review of systems: As per review of medical illness, all other systems reviewed and negative.  Medications: All medication and routine orders were reviewed and updated  Allergies: NKDA  Consults reviewed:PT and OT  Labs/Diagnostics (reviewed by this provider): Copy in Chart  Imaging Reviewed: None today  Physical Exam  Weight: 142.4 lb Temp: 97.4           BP: 156/86  Pulse: 78 Resp: 16     Orientation:Person and Place     Physical Exam  Vitals and nursing note reviewed.   Constitutional:       General: He is not in acute distress.     Appearance: He is not toxic-appearing or diaphoretic.      Comments: Frail elderly male who appears with chronic illness.  Left arm maintained in soft brace.   HENT:      Head: Normocephalic.      Nose: No congestion.      Mouth/Throat:      Mouth: Mucous membranes are moist.      Pharynx: No oropharyngeal exudate.   Eyes:      Conjunctiva/sclera: Conjunctivae normal.   Cardiovascular:       Rate and Rhythm: Normal rate and regular rhythm.   Pulmonary:      Effort: Pulmonary effort is normal. No respiratory distress.   Abdominal:      General: Bowel sounds are normal. There is no distension.      Palpations: Abdomen is soft.      Tenderness: There is no abdominal tenderness. There is no guarding.   Musculoskeletal:      Cervical back: Neck supple.      Right lower leg: No edema.      Left lower leg: No edema.      Comments: Moves all 4 extremities.   Skin:     General: Skin is warm and dry.      Capillary Refill: Capillary refill takes less than 2 seconds.   Neurological:      Mental Status: He is alert. Mental status is at baseline.      Motor: Weakness present.   Psychiatric:         Mood and Affect: Mood normal.         Behavior: Behavior normal.         Thought Content: Thought content normal.       Assessment/Plan:  74-year-old male with:    Brainstem stroke (HCC)  With recent right ventral macular infarct and residual left hemiparesis and dysarthria.  Also with history of prior lacunar left centrum semiovale infarct in 2021  Patient is currently residing at [facility] for rehabilitation  [facility]  Patient's wife would like patient to continue this medication  Will monitor patient's vital signs and manage hypertension while at SNF with goal BP <130/80  Patient was previously on amlodipine, losartan, and HCTZ while at [facility]  Maintain soft left arm brace  Continue clopidogrel, Lovenox, and statin  Currently on nifedipine 60 mg daily, valsartan 320 mg daily   prescribed while at [facility]  With reported higher BP readings at SNF.  Patient stated that nurses has been checking his blood pressure using the left arm which is the affected stroke side.  She is requesting the nurses check his BP in the right arm only which was ordered  Follow-up with neurology and cardiology outpatient    Dysphagia  Currently on dysphagia 3 with  thin liquid diet  Continue speech therapy  Continue aspiration precautions    Essential hypertension  Goal BP <130/80  SBPs reported as trending higher 130-170s. Patient's wife requested that nursing staff use only his right arm for BP checks; ordered  Home dose amlodipine 10 mg daily was [facility] to [facility]ed[facility] 60 mg [facility]  Patient is also on valsartan 320 mg daily  Will continue to monitor and adjust medications if clinically indicated    Type 2 diabetes mellitus with diabetic cataract, with long-term current use of insulin ([facility])    Lab Results   Component Value Date    HGBA1C 9.0 (H) 03/26/2025   Patient uses the freestyle tamiko glucose monitoring system which requires no fingersticks for blood sugar checks  Blood glucose trending 140s to 200s  No episodes of hypoglycemia reported  Currently on glargine insulin 10 units nightly, sliding scale insulin coverage with aspart insulin 4 times daily, glimepiride 2 mg twice daily, metformin ER 1000 mg twice daily, and Jardiance 25 mg daily  Will continue to monitor blood glucose log while patient is at [facility] and adjust medications if clinically indicated    DEWEY (obstructive sleep apnea)  Uses his own CPAP every night  No issues with mask or machine    Slow transit constipation  Patient reports current bowel regimen is not effective  Will discontinue Colace and order senna twice daily  Continue MiraLAX daily  Encourage hydration    Debility  In setting of recent stroke  Continue supportive care at SNF for ADLs  Continue PT/OT  Continue fall precautions  Ensure adequate hydration and nutrition     This note was completed in part utilizing [employer] direct voice recognition software.  Grammatical errors, random word insertion, spelling mistakes, and incomplete sentences may be an occasional consequence of the system secondary to software limitations, ambient noise and hardware issues.  At the time of  dictation, efforts were made to edit, clarify and/or correct errors.  Please read the chart carefully and recognize, using context, where substitutions have occurred.  If you have any questions or concerns about the context, text or information contained within the body of this dictation, please contact myself, the provider, for further clarification.    VICENTE Reed  4/28/20252:10 PM

## 2025-04-28 NOTE — ASSESSMENT & PLAN NOTE
Currently on dysphagia 3 with thin liquid diet  Continue speech therapy  Continue aspiration precautions

## 2025-04-28 NOTE — ASSESSMENT & PLAN NOTE
In setting of recent stroke  Continue supportive care at SNF for ADLs  Continue PT/OT  Continue fall precautions  Ensure adequate hydration and nutrition

## 2025-04-28 NOTE — ASSESSMENT & PLAN NOTE
Goal BP <130/80  SBPs reported as trending higher 130-170s. Patient's wife requested that nursing staff use only his right arm for BP checks; ordered  Home dose amlodipine 10 mg daily was changed to nifedipine 60 mg daily for better BP control at [facility].  Patient is also on valsartan 320 mg daily  Will continue to monitor and adjust medications if clinically indicated

## 2025-04-28 NOTE — ASSESSMENT & PLAN NOTE
With recent right ventral macular infarct and residual left hemiparesis and dysarthria.  Also with history of prior lacunar left centrum semiovale infarct in 2021  Patient is currently residing at [facility] for rehabilitation  Will monitor patient's vital signs and manage hypertension while at SNF with goal BP <130/80  Patient was previously on amlodipine, losartan, and HCTZ while at [facility]  Maintain soft left arm brace  Continue clopidogrel, Lovenox, and statin  Currently on nifedipine 60 mg daily, valsartan 320 mg daily   prescribed while at [facility] rehab  With reported higher BP readings at SNF.  Patient stated that nurses has been checking his blood pressure using the left arm which is the affected stroke side.  She is requesting the nurses check his BP in the right arm only which was ordered  Follow-up with neurology and cardiology outpatient

## 2025-04-28 NOTE — ASSESSMENT & PLAN NOTE
Patient reports current bowel regimen is not effective  Will discontinue Colace and order senna twice daily  Continue MiraLAX daily  Encourage hydration

## 2025-05-01 ENCOUNTER — NURSING HOME VISIT (OUTPATIENT)
Dept: GERIATRICS | Facility: OTHER | Age: 75
End: 2025-05-01
Payer: COMMERCIAL

## 2025-05-01 DIAGNOSIS — Z79.4 TYPE 2 DIABETES MELLITUS WITH DIABETIC CATARACT, WITH LONG-TERM CURRENT USE OF INSULIN (HCC): ICD-10-CM

## 2025-05-01 DIAGNOSIS — I63.9 BRAINSTEM STROKE (HCC): Primary | ICD-10-CM

## 2025-05-01 DIAGNOSIS — I10 ESSENTIAL HYPERTENSION: ICD-10-CM

## 2025-05-01 DIAGNOSIS — R53.81 DEBILITY: ICD-10-CM

## 2025-05-01 DIAGNOSIS — E11.36 TYPE 2 DIABETES MELLITUS WITH DIABETIC CATARACT, WITH LONG-TERM CURRENT USE OF INSULIN (HCC): ICD-10-CM

## 2025-05-01 DIAGNOSIS — R13.10 DYSPHAGIA, UNSPECIFIED TYPE: ICD-10-CM

## 2025-05-01 DIAGNOSIS — K59.01 SLOW TRANSIT CONSTIPATION: ICD-10-CM

## 2025-05-01 PROCEDURE — 99309 SBSQ NF CARE MODERATE MDM 30: CPT | Performed by: NURSE PRACTITIONER

## 2025-05-01 NOTE — PROGRESS NOTES
Facility: [facility]  POS: 31  Progress Note    Chief Complaint/Reason for visit: STR follow-up visit  Code status: Full code  History of Present Illness: 74-year-old male seen and examined for STR follow-up of acute and chronic medical conditions.  Patient is currently residing at [facility] for rehabilitation.  Received patient resting in bed and he appeared in no distress.  Patient continues to participate in therapy.  No further complaints of constipation.  No episodes of hypoglycemia reported.  He denies having shortness of breath, chest pain, headache, dizziness, abdominal pain, nausea, vomiting, or diarrhea.  No acute issues reported by nursing staff.  See A/P for additional information.  Past Medical History: unchanged from history and physical  Past Medical History:   Diagnosis Date    COVID-19 07/2022    Diabetes mellitus (HCC)     Type 2    Dyslipidemia     Hypertension     Obstructive sleep apnea on CPAP      Family History: Unchanged from history and physical  Social History: Unchanged from history and physical  Review of systems: As per review of medical illness, all other systems reviewed and negative.  Medications: All medication and routine orders were reviewed and updated  Allergies: NKDA  Consults reviewed:PT, OT, and Other  Labs/Diagnostics (reviewed by this provider): Copy in Chart  Imaging Reviewed:  Physical Exam  Weight: 177.6 lb Temp: 97.9          BP: 166/78    Pulse: 76 Resp: 16  Orientation:Person, Place, and Day     Physical Exam  Vitals and nursing note reviewed.   Constitutional:       General: He is not in acute distress.     Appearance: He is not toxic-appearing or diaphoretic.   HENT:      Head: Normocephalic.      Nose: No congestion.      Mouth/Throat:      Mouth: Mucous membranes are moist.      Pharynx: No oropharyngeal exudate.   Cardiovascular:      Rate and Rhythm: Normal rate and regular rhythm.   Pulmonary:      Effort: Pulmonary effort is normal. No  respiratory distress.   Abdominal:      General: Bowel sounds are normal. There is no distension.      Palpations: Abdomen is soft.      Tenderness: There is no abdominal tenderness. There is no guarding.   Musculoskeletal:      Cervical back: Neck supple.      Right lower leg: No edema.      Left lower leg: No edema.      Comments: Moves all 4 extremities.   Skin:     General: Skin is warm and dry.      Capillary Refill: Capillary refill takes less than 2 seconds.   Neurological:      Mental Status: He is alert. Mental status is at baseline.   Psychiatric:         Mood and Affect: Mood normal.         Behavior: Behavior normal.         Thought Content: Thought content normal.       Assessment/Plan:  74-year-old male with:    Brainstem stroke ([facility])  With left-sided paralysis  Patient is currently residing at [facility] for rehabilitation  Manage hypertension while at [facility] with goal BP <130/80  Patient was previously on amlodipine, losartan, and HCTZ while at [facility]  Currently on nifedipine 60 mg daily, valsartan 320 mg daily which was prescribed at [facility] rehab  BPs right arm only (nonaffected arm)  Continue clopidogrel and atorvastatin  Donepezil 5 mg ordered daily at [facility]  Continue PT/OT  Follow-up with neurology and cardiology outpatient    Dysphagia  Continue dysphagia 3 with thin liquid diet   Continue speech therapy  Continue aspiration precautions    Essential hypertension  Goal BP<130/80  Currently on nifedipine 60 mg daily and valsartan 320 mg daily    Type 2 diabetes mellitus with diabetic cataract, with long-term current use of insulin ([facility])    Lab Results   Component Value Date    HGBA1C 9.0 (H) 03/26/2025   Blood glucose stable on current medication regimen  Continue glargine insulin 15 units every night, sliding scale insulin coverage with aspart insulin 4 times daily, glimepiride 2 mg twice daily, metformin ER 1000 mg twice daily, and Jardiance 25 mg daily  Continue  to monitor blood glucose readings and adjust medications if clinically indicated    Debility  In setting of recent stroke  Continue supportive care at SNF for ADLs  Continue PT/OT  Continue fall precautions  Ensure adequate hydration and nutrition    Slow transit constipation  Bowel regimen was adjusted with effectiveness  Continue MiraLAX daily and senna twice daily  Encourage p.o. hydration     This note was completed in part utilizing [employer] direct voice recognition software.  Grammatical errors, random word insertion, spelling mistakes, and incomplete sentences may be an occasional consequence of the system secondary to software limitations, ambient noise and hardware issues.  At the time of dictation, efforts were made to edit, clarify and/or correct errors.  Please read the chart carefully and recognize, using context, where substitutions have occurred.  If you have any questions or concerns about the context, text or information contained within the body of this dictation, please contact myself, the provider, for further clarification.    VICENTE Reed  5/1/20258:46 PM

## 2025-05-02 NOTE — ASSESSMENT & PLAN NOTE
Lab Results   Component Value Date    HGBA1C 9.0 (H) 03/26/2025   Blood glucose stable on current medication regimen  Continue glargine insulin 15 units every night, sliding scale insulin coverage with aspart insulin 4 times daily, glimepiride 2 mg twice daily, metformin ER 1000 mg twice daily, and Jardiance 25 mg daily  Continue to monitor blood glucose readings and adjust medications if clinically indicated

## 2025-05-02 NOTE — ASSESSMENT & PLAN NOTE
Bowel regimen was adjusted with effectiveness  Continue MiraLAX daily and senna twice daily  Encourage p.o. hydration

## 2025-05-02 NOTE — ASSESSMENT & PLAN NOTE
Continue dysphagia 3 with thin liquid diet   Continue speech therapy  Continue aspiration precautions

## 2025-05-02 NOTE — ASSESSMENT & PLAN NOTE
With left-sided paralysis  Patient is currently residing at [facility] for rehabilitation  Manage hypertension while at [facility] with goal BP <130/80  Patient was previously on amlodipine, losartan, and HCTZ while at [facility]  Currently on nifedipine 60 mg daily, valsartan 320 mg daily which was prescribed at [facility] rehab  BPs right arm only (nonaffected arm)  Continue clopidogrel and atorvastatin  Donepezil 5 mg ordered daily at [facility]  Continue PT/OT  Follow-up with neurology and cardiology outpatient

## 2025-05-06 ENCOUNTER — NURSING HOME VISIT (OUTPATIENT)
Dept: GERIATRICS | Facility: OTHER | Age: 75
End: 2025-05-06
Payer: COMMERCIAL

## 2025-05-06 DIAGNOSIS — K59.01 SLOW TRANSIT CONSTIPATION: ICD-10-CM

## 2025-05-06 DIAGNOSIS — G47.33 OSA (OBSTRUCTIVE SLEEP APNEA): ICD-10-CM

## 2025-05-06 DIAGNOSIS — I10 ESSENTIAL HYPERTENSION: ICD-10-CM

## 2025-05-06 DIAGNOSIS — R13.10 DYSPHAGIA, UNSPECIFIED TYPE: ICD-10-CM

## 2025-05-06 DIAGNOSIS — Z79.4 TYPE 2 DIABETES MELLITUS WITH DIABETIC CATARACT, WITH LONG-TERM CURRENT USE OF INSULIN (HCC): ICD-10-CM

## 2025-05-06 DIAGNOSIS — N18.31 CHRONIC KIDNEY DISEASE, STAGE 3A (HCC): ICD-10-CM

## 2025-05-06 DIAGNOSIS — E11.36 TYPE 2 DIABETES MELLITUS WITH DIABETIC CATARACT, WITH LONG-TERM CURRENT USE OF INSULIN (HCC): ICD-10-CM

## 2025-05-06 DIAGNOSIS — E78.2 MIXED HYPERLIPIDEMIA: ICD-10-CM

## 2025-05-06 DIAGNOSIS — I63.9 BRAINSTEM STROKE (HCC): Primary | ICD-10-CM

## 2025-05-06 PROCEDURE — 99309 SBSQ NF CARE MODERATE MDM 30: CPT | Performed by: NURSE PRACTITIONER

## 2025-05-06 NOTE — ASSESSMENT & PLAN NOTE
With left hemiparesis  Patient is participating in therapy at SNF  Continue donepezil 5 mg (ordered at [facility])  Continue clopidogrel and atorvastatin  Continue Lovenox to prevent DVT  Continue PT/OT  Control risk factors  Follow-up with neurology and cardiology outpatient

## 2025-05-06 NOTE — PROGRESS NOTES
Facility: [facility]  POS: 31  Progress Note    Chief Complaint/Reason for visit: STR follow-up visit  Code status: Full code  History of Present Illness: 74-year-old male seen and examined for STR follow-up of acute and chronic medical conditions.  Patient is currently residing at [facility] for rehabilitation.  At time of examination, found patient resting in bed and he appeared in no distress.  Patient's wife was visiting.  Patient is participating in therapy and ambulating up to 10 feet with maximum assistance (gait pattern improving) and able to self propel in wheelchair 30 feet with right upper extremity.  He requires maximum assistance with toileting.  Patient denies shortness of breath, chest pain, headache, dizziness, abdominal pain, nausea, vomiting, diarrhea, or constipation.  Patient reports that he was incontinent x 2 during the night and is requesting that stool softeners be discontinued.  Past Medical History: unchanged from history and physical  Past Medical History:   Diagnosis Date    COVID-19 07/2022    Diabetes mellitus (HCC)     Type 2    Dyslipidemia     Hypertension     Obstructive sleep apnea on CPAP      Family History: Unchanged from history and physical  Social History: Unchanged from history and physical  Review of systems: As per review of medical illness, all other systems reviewed and negative.  Medications: All medication and routine orders were reviewed and updated  Allergies: Reviewed and unchanged  Consults reviewed:PT, OT, and Other  Labs/Diagnostics (reviewed by this provider): Copy in Chart  Imaging Reviewed: None today  Physical Exam  Weight: 142.4 pounds temp: 97.8          BP: 146/72    pulse: 78 Resp: 16      Orientation:Person, Place, and Day     Physical Exam  Vitals and nursing note reviewed.   Constitutional:       General: He is not in acute distress.     Appearance: He is not toxic-appearing or diaphoretic.   HENT:      Head: Normocephalic.      Nose:  No congestion or rhinorrhea.      Mouth/Throat:      Mouth: Mucous membranes are moist.      Pharynx: No oropharyngeal exudate.   Eyes:      General:         Right eye: No discharge.         Left eye: No discharge.      Extraocular Movements: Extraocular movements intact.      Conjunctiva/sclera: Conjunctivae normal.      Pupils: Pupils are equal, round, and reactive to light.      Comments: Wears prescription glasses.   Cardiovascular:      Rate and Rhythm: Normal rate and regular rhythm.      Pulses: Normal pulses.   Pulmonary:      Effort: Pulmonary effort is normal. No respiratory distress.      Breath sounds: Normal breath sounds. No wheezing, rhonchi or rales.   Abdominal:      General: Bowel sounds are normal. There is no distension.      Palpations: Abdomen is soft.      Tenderness: There is no abdominal tenderness. There is no guarding.   Musculoskeletal:      Cervical back: Neck supple. No rigidity.      Right lower leg: No edema.      Left lower leg: No edema.      Comments: Left hemiparesis.   Lymphadenopathy:      Cervical: No cervical adenopathy.   Skin:     General: Skin is warm and dry.      Capillary Refill: Capillary refill takes less than 2 seconds.   Neurological:      Mental Status: He is alert. Mental status is at baseline.      Motor: Weakness present.      Gait: Gait abnormal.   Psychiatric:         Mood and Affect: Mood normal.         Behavior: Behavior normal.         Thought Content: Thought content normal.       Assessment/Plan:  74-year-old male with:    Brainstem stroke (HCC)  With left hemiparesis  Patient is participating in therapy at SNF  [facility]  Continue clopidogrel and atorvastatin  Continue Lovenox to prevent DVT  Continue PT/OT  Control risk factors  Follow-up with neurology and cardiology outpatient    Dysphagia  Diet has been upgraded to regular texture, diabetic diet with thin liquids  Continue speech therapy  Continue  aspiration precautions    Essential hypertension  Goal BP <130/80  Currently on nifedipine 60 mg daily and valsartan 320 mg daily    DEWEY (obstructive sleep apnea)  Uses his own CPAP every night  No issues reported    Mixed hyperlipidemia  Continue Lipitor 80 mg daily    Type 2 diabetes mellitus with diabetic cataract, with long-term current use of insulin ([facility])    Lab Results   Component Value Date    HGBA1C 9.0 (H) 03/26/2025   Blood glucose within acceptable range for patient's age and comorbidities  Continue Lantus insulin 10 units every night, NovoLog sliding scale insulin coverage according to blood glucose levels 4 times daily, glimepiride 2 mg twice daily, metformin ER 1000 mg twice daily, and Jardiance 25 mg daily  Continue to monitor while at SNF and adjust medications if clinically indicated    Chronic kidney disease, stage 3a  Recent creatinine stable 1.15/GFR 67  Avoid nephrotoxic medications such as NSAIDs   Avoid hypotension  Ensure adequate hydration  Scheduled BMP 5/7/2025; will add CBC    Slow transit constipation  Patient reports having frequent bowel movements  Will discontinue senna  Continue MiraLAX daily  Encourage p.o. hydration  Will continue to monitor and adjust medications if clinically indicated     This note was completed in part utilizing [employer] direct voice recognition software.  Grammatical errors, random word insertion, spelling mistakes, and incomplete sentences may be an occasional consequence of the system secondary to software limitations, ambient noise and hardware issues.  At the time of dictation, efforts were made to edit, clarify and/or correct errors.  Please read the chart carefully and recognize, using context, where substitutions have occurred.  If you have any questions or concerns about the context, text or information contained within the body of this dictation, please contact myself, the provider, for further clarification.    Sarah Saunders,  VICENTE  5/6/20257:21 PM

## 2025-05-06 NOTE — ASSESSMENT & PLAN NOTE
Patient reports having frequent bowel movements  Will discontinue senna  Continue MiraLAX daily  Encourage p.o. hydration  Will continue to monitor and adjust medications if clinically indicated   pulz

## 2025-05-06 NOTE — ASSESSMENT & PLAN NOTE
Diet has been upgraded to regular texture, diabetic diet with thin liquids  Continue speech therapy  Continue aspiration precautions

## 2025-05-06 NOTE — ASSESSMENT & PLAN NOTE
Lab Results   Component Value Date    HGBA1C 9.0 (H) 03/26/2025   Blood glucose within acceptable range for patient's age and comorbidities  Continue Lantus insulin 10 units every night, NovoLog sliding scale insulin coverage according to blood glucose levels 4 times daily, glimepiride 2 mg twice daily, metformin ER 1000 mg twice daily, and Jardiance 25 mg daily  Continue to monitor while at SNF and adjust medications if clinically indicated

## 2025-05-09 ENCOUNTER — NURSING HOME VISIT (OUTPATIENT)
Dept: GERIATRICS | Facility: OTHER | Age: 75
End: 2025-05-09
Payer: COMMERCIAL

## 2025-05-09 DIAGNOSIS — N18.31 CHRONIC KIDNEY DISEASE, STAGE 3A (HCC): ICD-10-CM

## 2025-05-09 DIAGNOSIS — R11.2 NAUSEA AND VOMITING, UNSPECIFIED VOMITING TYPE: ICD-10-CM

## 2025-05-09 DIAGNOSIS — E11.36 TYPE 2 DIABETES MELLITUS WITH DIABETIC CATARACT, WITH LONG-TERM CURRENT USE OF INSULIN (HCC): ICD-10-CM

## 2025-05-09 DIAGNOSIS — I63.9 BRAINSTEM STROKE (HCC): Primary | ICD-10-CM

## 2025-05-09 DIAGNOSIS — R53.81 DEBILITY: ICD-10-CM

## 2025-05-09 DIAGNOSIS — I10 ESSENTIAL HYPERTENSION: ICD-10-CM

## 2025-05-09 DIAGNOSIS — Z79.4 TYPE 2 DIABETES MELLITUS WITH DIABETIC CATARACT, WITH LONG-TERM CURRENT USE OF INSULIN (HCC): ICD-10-CM

## 2025-05-09 DIAGNOSIS — K59.01 SLOW TRANSIT CONSTIPATION: ICD-10-CM

## 2025-05-09 PROCEDURE — 99309 SBSQ NF CARE MODERATE MDM 30: CPT | Performed by: NURSE PRACTITIONER

## 2025-05-09 RX ORDER — ONDANSETRON 4 MG/1
4 TABLET, FILM COATED ORAL 3 TIMES DAILY
COMMUNITY
End: 2025-05-16

## 2025-05-09 NOTE — ASSESSMENT & PLAN NOTE
Lab Results   Component Value Date    EGFR 54 04/01/2025    EGFR 54 03/31/2025    EGFR 57 03/27/2025    CREATININE 1.29 04/01/2025    CREATININE 1.29 03/31/2025    CREATININE 1.24 03/27/2025   Recent BMP stable  Avoid nephrotoxic medications such as NSAIDs  Avoid hypotension

## 2025-05-09 NOTE — ASSESSMENT & PLAN NOTE
Lab Results   Component Value Date    HGBA1C 9.0 (H) 03/26/2025   Blood glucose readings acceptable  Has CGM (Eddie)   Continue Lantus insulin 10 units every night, NovoLog sliding scale insulin coverage according to blood glucose levels 4 times daily, glimepiride 2 mg twice daily, metformin ER 1000 mg twice daily, and Jardiance 25 mg daily  Continue to monitor while at SNF and adjust medications if clinically indicated

## 2025-05-09 NOTE — ASSESSMENT & PLAN NOTE
Goal BP <130/80  Currently on nifedipine 60 mg daily and valsartan 320 mg daily  Continue to monitor

## 2025-05-09 NOTE — ASSESSMENT & PLAN NOTE
With left hemiparesis  [facility]   With associated nausea/vomiting and currently maintained on scopolamine patch every 3 days along with Zofran as needed.  Patient's wife notes the patient becomes symptomatic with nausea and vomiting on day of scopolamine patch change and discussed plan for scheduled Zofran 1 day prior to scopolamine patch change every 3 days  Order Zofran before meals every 3 days starting 5/11/2025  Continue Lovenox for DVT prevention  Continue clopidogrel and atorvastatin  Control risk factors; BP management  Follow-up with neurology and cardiology outpatient

## 2025-05-09 NOTE — ASSESSMENT & PLAN NOTE
Chronic, maintained on scopolamine patch every 3 days and as needed Zofran  Will order Zofran before meals every 3 days starting 5/11/2025 (the day before scopolamine patch requires change)

## 2025-05-09 NOTE — ASSESSMENT & PLAN NOTE
In setting of stroke  Continue supportive care at SNF for ADLs  Continue PT/OT  Continue fall precautions  Ensure adequate hydration and nutrition

## 2025-05-09 NOTE — PROGRESS NOTES
Facility: [facility]  POS: 31  Progress Note    Chief Complaint/Reason for visit: STR follow-up visit  Code status: Full code  History of Present Illness: 74-year-old female seen and examined for STR follow-up of acute and chronic medical conditions.  Patient is currently residing at [facility] for rehabilitation.  At time of examination, received patient resting in bed, and he appeared in no distress.  Patient wife reported that he had 1 episode of vomiting yesterday and also this morning.  Patient attributed vomiting from motion sickness.  He currently receives scopolamine patch every 3 days and PRN zofran.  Patient's nurse informed that patient would like to have Zofran today prior to lunch.  No acute abdominal findings with exam.  Recent lab results stable for patient.  Provided patient and wife a copy of recent lab results.  See A/P for additional information.  Past Medical History: unchanged from history and physical  Past Medical History:   Diagnosis Date    COVID-19 07/2022    Diabetes mellitus (HCC)     Type 2    Dyslipidemia     Hypertension     Obstructive sleep apnea on CPAP      Family History: Unchanged from history and physical  Social History: Unchanged from history and physical  Review of systems: As per review of medical illness, all other systems reviewed and negative.  Medications: All medication and routine orders were reviewed and updated  Allergies: Reviewed and unchanged  Consults reviewed:PT, OT, and Other  Labs/Diagnostics (reviewed by this provider): Copy in Chart  Imaging Reviewed: None today  Physical Exam  Weight: 142.4 pounds temp: 98          BP: 164/90 @ 12:30 PM (manual right arm)  pulse: 80 resp: 16     Orientation:Person and Place     Physical Exam  Vitals and nursing note reviewed. Exam conducted with a chaperone present (Patient's wife present in the room).   Constitutional:       General: He is not in acute distress.     Appearance: He is not toxic-appearing or  diaphoretic.   HENT:      Head: Normocephalic.      Nose: No congestion.      Mouth/Throat:      Mouth: Mucous membranes are moist.      Pharynx: No oropharyngeal exudate.   Eyes:      Conjunctiva/sclera: Conjunctivae normal.   Cardiovascular:      Rate and Rhythm: Normal rate and regular rhythm.   Pulmonary:      Effort: Pulmonary effort is normal. No respiratory distress.   Abdominal:      General: Bowel sounds are normal. There is no distension.      Palpations: Abdomen is soft.      Tenderness: There is no abdominal tenderness. There is no guarding.   Musculoskeletal:      Cervical back: Neck supple. No rigidity.      Right lower leg: No edema.      Left lower leg: No edema.      Comments: Left hemiparesis from stroke.   Skin:     General: Skin is warm and dry.      Capillary Refill: Capillary refill takes less than 2 seconds.   Neurological:      Mental Status: He is alert. Mental status is at baseline.      Gait: Gait abnormal.   Psychiatric:         Mood and Affect: Mood normal.         Behavior: Behavior normal.         Thought Content: Thought content normal.       Assessment/Plan:  74-year-old male with:    Brainstem stroke (HCC)  With left hemiparesis  [facility]   With associated nausea/vomiting and currently maintained on scopolamine patch every 3 days along with Zofran as needed.  Patient's wife notes the patient becomes symptomatic with nausea and vomiting on day of scopolamine patch change and discussed plan for scheduled Zofran 1 day prior to scopolamine patch change every 3 days  Order Zofran before meals every 3 days starting 5/11/2025  Continue Lovenox for DVT prevention  Continue clopidogrel and atorvastatin  Control risk factors; BP management  Follow-up with neurology and cardiology outpatient    Nausea and vomiting  Chronic, maintained on scopolamine patch every 3 days and as needed Zofran  Will order Zofran before meals every 3 days  starting 5/11/2025 (the day before scopolamine patch requires change)    Essential hypertension  Goal BP <130/80  Currently on nifedipine 60 mg daily and valsartan 320 mg daily  Continue to monitor    Slow transit constipation  Patient stated that he had a bowel movement today  Currently on MiraLAX daily  Encourage p.o. hydration  Continue to monitor and adjust medications if clinically indicated    Type 2 diabetes mellitus with diabetic cataract, with long-term current use of insulin ([facility])    Lab Results   Component Value Date    HGBA1C 9.0 (H) 03/26/2025   Blood glucose readings acceptable  Has CGM (Eddie)   Continue Lantus insulin 10 units every night, NovoLog sliding scale insulin coverage according to blood glucose levels 4 times daily, glimepiride 2 mg twice daily, metformin ER 1000 mg twice daily, and Jardiance 25 mg daily  Continue to monitor while at [facility] and adjust medications if clinically indicated    Chronic kidney disease, stage 3a ([facility])  Lab Results   Component Value Date    EGFR 54 04/01/2025    EGFR 54 03/31/2025    EGFR 57 03/27/2025    CREATININE 1.29 04/01/2025    CREATININE 1.29 03/31/2025    CREATININE 1.24 03/27/2025   Recent BMP stable  Avoid nephrotoxic medications such as NSAIDs  Avoid hypotension    This note was completed in part utilizing [employer] direct voice recognition software.  Grammatical errors, random word insertion, spelling mistakes, and incomplete sentences may be an occasional consequence of the system secondary to software limitations, ambient noise and hardware issues.  At the time of dictation, efforts were made to edit, clarify and/or correct errors.  Please read the chart carefully and recognize, using context, where substitutions have occurred.  If you have any questions or concerns about the context, text or information contained within the body of this dictation, please contact myself, the provider, for further clarification.    Sarah Saunders,  VICENTE  5/9/202512:52 PM

## 2025-05-09 NOTE — ASSESSMENT & PLAN NOTE
Patient stated that he had a bowel movement today  Currently on MiraLAX daily  Encourage p.o. hydration  Continue to monitor and adjust medications if clinically indicated

## 2025-05-14 ENCOUNTER — NURSING HOME VISIT (OUTPATIENT)
Dept: GERIATRICS | Facility: OTHER | Age: 75
End: 2025-05-14
Payer: COMMERCIAL

## 2025-05-14 DIAGNOSIS — G47.33 OSA (OBSTRUCTIVE SLEEP APNEA): ICD-10-CM

## 2025-05-14 DIAGNOSIS — R53.81 DEBILITY: ICD-10-CM

## 2025-05-14 DIAGNOSIS — K59.01 SLOW TRANSIT CONSTIPATION: ICD-10-CM

## 2025-05-14 DIAGNOSIS — I63.9 BRAINSTEM STROKE (HCC): Primary | ICD-10-CM

## 2025-05-14 DIAGNOSIS — I10 ESSENTIAL HYPERTENSION: ICD-10-CM

## 2025-05-14 DIAGNOSIS — R13.10 DYSPHAGIA, UNSPECIFIED TYPE: ICD-10-CM

## 2025-05-14 DIAGNOSIS — R11.2 NAUSEA AND VOMITING, UNSPECIFIED VOMITING TYPE: ICD-10-CM

## 2025-05-14 DIAGNOSIS — E11.36 TYPE 2 DIABETES MELLITUS WITH DIABETIC CATARACT, WITH LONG-TERM CURRENT USE OF INSULIN (HCC): ICD-10-CM

## 2025-05-14 DIAGNOSIS — Z79.4 TYPE 2 DIABETES MELLITUS WITH DIABETIC CATARACT, WITH LONG-TERM CURRENT USE OF INSULIN (HCC): ICD-10-CM

## 2025-05-14 PROCEDURE — 99309 SBSQ NF CARE MODERATE MDM 30: CPT | Performed by: NURSE PRACTITIONER

## 2025-05-14 NOTE — ASSESSMENT & PLAN NOTE
With left hemiparesis  [facility]   With associated nausea/vomiting and currently maintained on scopolamine patch every 3 days along with Zofran as neededContinue Zofran before meals every 3 days which appears to be effective for patient.  No recent report of nausea or vomiting  Continue Lovenox for DVT prevention  Continue clopidogrel and atorvastatin  Control risk factors; BP management  Follow-up with neurology and cardiology outpatient

## 2025-05-14 NOTE — ASSESSMENT & PLAN NOTE
Currently on a regular texture, diabetic diet with thin liquids  No issues reported  Continue speech therapy  Continue aspiration precautions

## 2025-05-14 NOTE — ASSESSMENT & PLAN NOTE
With elevation in blood pressure readings noted today, but has been fairly stable 120s-140s  Will continue to monitor BP log and collaborate with neurology regarding antihypertensives and discussed BP goal  Currently on nifedipine 60 mg daily and valsartan 320 mg daily

## 2025-05-14 NOTE — PROGRESS NOTES
Facility: [facility]  POS: 31  Progress Note    Chief Complaint/Reason for visit: STR follow-up visit  Code status: Full code  History of Present Illness: 74-year-old male seen and examined for STR follow-up of acute and chronic medical conditions.  Patient is currently residing at [facility] rehabilitation.  Received patient seated in chair and he appeared in no distress.  Patient's wife was present.  Patient's wife reports that patient's blood pressure was reading high on blood pressure machine this morning when his nurse checked his blood pressure.  Assessed patient's blood pressure manually right arm 178/90 then 2 hours later 160/82.  Will collaborate with neurology office before increasing antihypertensive medications.  Patient denies having headache.  No further complaints of nausea or vomiting since schedule Zofran was started the day before scopolamine patch is due to be changed.  He continues to participate in therapy, ambulating approximately 10 feet with moderate assistance, self propels in wheelchair approximately 30 feet using RUE and LLE.  See A/P for additional information.  Past Medical History: unchanged from history and physical  Past Medical History:   Diagnosis Date    COVID-19 07/2022    Diabetes mellitus (HCC)     Type 2    Dyslipidemia     Hypertension     Obstructive sleep apnea on CPAP      Family History: Unchanged from history and physical  Social History: Unchanged from history and physical  Review of systems: As per review of medical illness, all other systems reviewed and negative.  Medications: All medication and routine orders were reviewed and updated  Allergies: Reviewed and unchanged  Consults reviewed:PT and OT  Labs/Diagnostics (reviewed by this provider): Copy in Chart  Imaging Reviewed: None today  Physical Exam  Weight: 135 pounds temp: 97.7         BP: See above     pulse: 80     resp: 16         Orientation:Person, Place, and Day     Physical Exam  Vitals and  nursing note reviewed.   Constitutional:       General: He is not in acute distress.     Appearance: He is not toxic-appearing or diaphoretic.   HENT:      Head: Normocephalic.      Nose: No congestion.      Mouth/Throat:      Mouth: Mucous membranes are moist.      Pharynx: No oropharyngeal exudate.     Eyes:      Conjunctiva/sclera: Conjunctivae normal.       Cardiovascular:      Rate and Rhythm: Normal rate and regular rhythm.   Pulmonary:      Effort: Pulmonary effort is normal. No respiratory distress.   Abdominal:      General: Bowel sounds are normal. There is no distension.      Palpations: Abdomen is soft.      Tenderness: There is no abdominal tenderness. There is no guarding.     Musculoskeletal:      Cervical back: Neck supple.      Comments: Left-sided hemiparesis.     Skin:     General: Skin is warm and dry.      Capillary Refill: Capillary refill takes less than 2 seconds.     Neurological:      Mental Status: He is alert. Mental status is at baseline.      Motor: Weakness present.      Gait: Gait abnormal.     Psychiatric:         Mood and Affect: Mood normal.         Behavior: Behavior normal.         Thought Content: Thought content normal.       Assessment/Plan:  74-year-old male with:    Brainstem stroke (HCC)  With left hemiparesis  Continue donepezil 5 mg which was ordered from [facility] rehab   With associated nausea/vomiting and currently maintained on scopolamine patch every 3 days along with Zofran as neededContinue Zofran before meals every 3 days which appears to be effective for patient.  No recent report of nausea or vomiting  Continue Lovenox for DVT prevention  Continue clopidogrel and atorvastatin  Control risk factors; BP management  Follow-up with neurology and cardiology outpatient    Essential hypertension  With elevation in blood pressure readings noted today, but has been fairly stable 120s-140s  Will continue to monitor BP log and collaborate with neurology regarding  antihypertensives and discussed BP goal  Currently on nifedipine 60 mg daily and valsartan 320 mg daily    Nausea and vomiting  Chronic, maintained on scopolamine patch every 3 days, scheduled Zofran, and PRN Zofran    Type 2 diabetes mellitus with diabetic cataract, with long-term current use of insulin ([facility])    Lab Results   Component Value Date    HGBA1C 9.0 (H) 03/26/2025   Has personal CGM  Blood glucose stable  No episodes of hypoglycemia reported  Continue Lantus insulin 10 units every night, NovoLog sliding scale insulin coverage according to blood glucose levels 4 times daily, glimepiride 2 mg twice daily, metformin ER 1000 mg twice daily, and Jardiance 25 mg daily  Continue to monitor while at SNF and adjust medications if clinically indicated    Dysphagia  Currently on a regular texture, diabetic diet with thin liquids  No issues reported  Continue speech therapy  Continue aspiration precautions    DEWEY (obstructive sleep apnea)  Continue CPAP every night    Debility  Multifactorial  Continue supportive care at SNF for ADLs  Continue PT/OT  Continue fall precautions  Ensure adequate hydration and nutrition     Constipation  In setting of immobility  Patient reports that he was constipated   Continue MiraLAX daily and PRN     This note was completed in part utilizing [employer] direct voice recognition software.  Grammatical errors, random word insertion, spelling mistakes, and incomplete sentences may be an occasional consequence of the system secondary to software limitations, ambient noise and hardware issues.  At the time of dictation, efforts were made to edit, clarify and/or correct errors.  Please read the chart carefully and recognize, using context, where substitutions have occurred.  If you have any questions or concerns about the context, text or information contained within the body of this dictation, please contact myself, the provider, for further clarification.    Sarah Mariee  VICENTE Saunders  5/14/20254:08 PM

## 2025-05-14 NOTE — ASSESSMENT & PLAN NOTE
Lab Results   Component Value Date    HGBA1C 9.0 (H) 03/26/2025   Has personal CGM  Blood glucose stable  No episodes of hypoglycemia reported  Continue Lantus insulin 10 units every night, NovoLog sliding scale insulin coverage according to blood glucose levels 4 times daily, glimepiride 2 mg twice daily, metformin ER 1000 mg twice daily, and Jardiance 25 mg daily  Continue to monitor while at SNF and adjust medications if clinically indicated

## 2025-05-14 NOTE — ASSESSMENT & PLAN NOTE
Multifactorial  Continue supportive care at SNF for ADLs  Continue PT/OT  Continue fall precautions  Ensure adequate hydration and nutrition

## 2025-05-16 ENCOUNTER — NURSING HOME VISIT (OUTPATIENT)
Dept: GERIATRICS | Facility: OTHER | Age: 75
End: 2025-05-16
Payer: COMMERCIAL

## 2025-05-16 DIAGNOSIS — I10 ESSENTIAL HYPERTENSION: ICD-10-CM

## 2025-05-16 DIAGNOSIS — I63.9 BRAINSTEM STROKE (HCC): Primary | ICD-10-CM

## 2025-05-16 DIAGNOSIS — G47.33 OSA (OBSTRUCTIVE SLEEP APNEA): ICD-10-CM

## 2025-05-16 DIAGNOSIS — R13.10 DYSPHAGIA, UNSPECIFIED TYPE: ICD-10-CM

## 2025-05-16 DIAGNOSIS — R11.2 NAUSEA AND VOMITING, UNSPECIFIED VOMITING TYPE: ICD-10-CM

## 2025-05-16 DIAGNOSIS — Z79.4 TYPE 2 DIABETES MELLITUS WITH DIABETIC CATARACT, WITH LONG-TERM CURRENT USE OF INSULIN (HCC): ICD-10-CM

## 2025-05-16 DIAGNOSIS — E11.36 TYPE 2 DIABETES MELLITUS WITH DIABETIC CATARACT, WITH LONG-TERM CURRENT USE OF INSULIN (HCC): ICD-10-CM

## 2025-05-16 DIAGNOSIS — N18.31 CHRONIC KIDNEY DISEASE, STAGE 3A (HCC): ICD-10-CM

## 2025-05-16 PROCEDURE — 99309 SBSQ NF CARE MODERATE MDM 30: CPT | Performed by: NURSE PRACTITIONER

## 2025-05-16 RX ORDER — POLYETHYLENE GLYCOL 3350 17 G/17G
17 POWDER, FOR SOLUTION ORAL DAILY
COMMUNITY

## 2025-05-16 RX ORDER — ONDANSETRON 4 MG/1
4 TABLET, FILM COATED ORAL 3 TIMES DAILY
Status: SHIPPED
Start: 2025-05-16

## 2025-05-16 NOTE — PROGRESS NOTES
Facility: [facility]  POS: 31  Progress Note    Chief Complaint/Reason for visit: STR follow-up visit  Code status: Full code  History of Present Illness: 74-year-old male seen and examined for STR follow-up of acute and chronic medical conditions.  Patient is currently residing at [facility] for rehabilitation.  At time of examination, received patient resting in bed, and he appeared in no distress.  Patient attended the gym today for therapy and felt that he did well.  He dressed himself this morning.  He is now on a scheduled regimen of Zofran before meals and states that it is effective.  Obtained BP manually right arm at time of exam 132/70. BPs reading higher with machine BP monitor.  Denies shortness of breath, chest pain, headache, dizziness, abdominal pain, nausea, vomiting, diarrhea, or constipation.  See A/P for additional information.  Past Medical History: unchanged from history and physical  Past Medical History:   Diagnosis Date    COVID-19 07/2022    Diabetes mellitus (HCC)     Type 2    Dyslipidemia     Hypertension     Obstructive sleep apnea on CPAP      Family History: Unchanged from history and physical  Social History: Unchanged from history and physical  Review of systems: As per review of medical illness, all other systems reviewed and negative.  Medications: All medication and routine orders were reviewed and updated  Allergies: Reviewed and unchanged  Consults reviewed:PT and OT  Labs/Diagnostics (reviewed by this provider): Copy in Chart BMP 5/14/2025 stable  Imaging Reviewed: None today  Physical Exam  Weight: 135 pounds      temp: 98           BP: 132/70 manual BP cuff  pulse: 74         resp: 14       O2 Sat: 94% on room air  Orientation:Person, Place, and Day     Physical Exam  Vitals and nursing note reviewed.   Constitutional:       General: He is not in acute distress.     Appearance: He is not toxic-appearing or diaphoretic.   HENT:      Head: Normocephalic.       Nose: No congestion.     Eyes:      General:         Right eye: No discharge.         Left eye: No discharge.      Conjunctiva/sclera: Conjunctivae normal.       Cardiovascular:      Rate and Rhythm: Normal rate and regular rhythm.      Pulses: Normal pulses.   Pulmonary:      Effort: Pulmonary effort is normal. No respiratory distress.      Breath sounds: Normal breath sounds. No wheezing, rhonchi or rales.   Abdominal:      General: Bowel sounds are normal. There is no distension.      Palpations: Abdomen is soft.      Tenderness: There is no abdominal tenderness. There is no guarding.     Musculoskeletal:      Cervical back: Neck supple. No rigidity.      Right lower leg: No edema.      Left lower leg: No edema.      Comments: Left-sided hemiparesis.  Left upper extremity support brace in place.   Lymphadenopathy:      Cervical: No cervical adenopathy.     Skin:     General: Skin is warm and dry.      Capillary Refill: Capillary refill takes less than 2 seconds.     Neurological:      Mental Status: He is alert. Mental status is at baseline.     Psychiatric:         Mood and Affect: Mood normal.         Behavior: Behavior normal.         Thought Content: Thought content normal.       Assessment/Plan:  74-year-old male with:    Brainstem stroke (HCC)  With recent right ventral macular infarct with residual left hemiparesis and dysarthria.  History of prior stroke: Lacunar left centrum semiovale infarct in 2021  Continue supportive care at SNF for ADLs  Continue PT/OT  Continue donepezil 5 mg which was ordered from [facility] rehab; patient's wife would like patient to continue this medication  Continue Lovenox for DVT prevention  Continue clopidogrel and atorvastatin  Continue Zofran before meals and every 4 hours as needed for nausea/vomiting; scheduled doses have been effective in controlling nausea  Control risk factors  Follow-up with cardiology and neurology    Nausea and vomiting  Chronic, position  changes appears to be exacerbating symptoms  Currently controlled with scheduled Zofran doses before meals 3 times daily  Continue Zofran every 4 hours as needed    Essential hypertension  BPs are taken manually  Manual BP reading 132/70 in right arm at time of exam  Currently on nifedipine 60 mg daily and valsartan 320 mg daily  We will continue to monitor closely and will collaborate with neurology and cardiology prior to adjusting medications    DEWEY (obstructive sleep apnea)  Continue CPAP every night  No issues reported    Type 2 diabetes mellitus with diabetic cataract, with long-term current use of insulin ([facility])    Lab Results   Component Value Date    HGBA1C 9.0 (H) 03/26/2025   Personal CGM which does not require fingersticks  Blood glucose readings acceptable for patient's age and comorbidities  Continue Lantus insulin 10 units every night, NovoLog sliding scale insulin coverage according to blood glucose levels 4 times a day, glimepiride 2 mg twice daily, metformin ER 1000 mg twice daily, and Jardiance 25 mg daily  Continue to monitor while at [facility] and adjust medications if clinically indicated    Chronic kidney disease, stage 3a ([facility])  Lab Results   Component Value Date    EGFR 54 04/01/2025    EGFR 54 03/31/2025    EGFR 57 03/27/2025    CREATININE 1.29 04/01/2025    CREATININE 1.29 03/31/2025    CREATININE 1.24 03/27/2025   Recent creatinine 1.21/GFR 63 on 5/14/2025  Avoid nephrotoxic medications such as NSAIDs  Avoid hypotension  Ensure adequate hydration    Dysphagia  Currently on a regular texture, diabetic diet with thin liquids without any issues reported  Continue speech therapy  Continue aspiration precautions     This note was completed in part utilizing [employer] direct voice recognition software.  Grammatical errors, random word insertion, spelling mistakes, and incomplete sentences may be an occasional consequence of the system secondary to software limitations, ambient noise and  hardware issues.  At the time of dictation, efforts were made to edit, clarify and/or correct errors.  Please read the chart carefully and recognize, using context, where substitutions have occurred.  If you have any questions or concerns about the context, text or information contained within the body of this dictation, please contact myself, the provider, for further clarification.    VICENTE Reed  5/16/20252:20 PM

## 2025-05-16 NOTE — ASSESSMENT & PLAN NOTE
BPs are taken manually  Manual BP reading 132/70 in right arm at time of exam  Currently on nifedipine 60 mg daily and valsartan 320 mg daily  We will continue to monitor closely and will collaborate with neurology and cardiology prior to adjusting medications

## 2025-05-16 NOTE — ASSESSMENT & PLAN NOTE
Lab Results   Component Value Date    EGFR 54 04/01/2025    EGFR 54 03/31/2025    EGFR 57 03/27/2025    CREATININE 1.29 04/01/2025    CREATININE 1.29 03/31/2025    CREATININE 1.24 03/27/2025   Recent creatinine 1.21/GFR 63 on 5/14/2025  Avoid nephrotoxic medications such as NSAIDs  Avoid hypotension  Ensure adequate hydration

## 2025-05-16 NOTE — ASSESSMENT & PLAN NOTE
With recent right ventral macular infarct with residual left hemiparesis and dysarthria.  History of prior stroke: Lacunar left centrum semiovale infarct in 2021  Continue supportive care at SNF for ADLs  Continue PT/OT  Continue donepezil 5 mg which was ordered from [facility] rehab; patient's wife would like patient to continue this medication  Continue Lovenox for DVT prevention  Continue clopidogrel and atorvastatin  Continue Zofran before meals and every 4 hours as needed for nausea/vomiting; scheduled doses have been effective in controlling nausea  Control risk factors  Follow-up with cardiology and neurology

## 2025-05-16 NOTE — ASSESSMENT & PLAN NOTE
Chronic, position changes appears to be exacerbating symptoms  Currently controlled with scheduled Zofran doses before meals 3 times daily  Continue Zofran every 4 hours as needed

## 2025-05-16 NOTE — ASSESSMENT & PLAN NOTE
Lab Results   Component Value Date    HGBA1C 9.0 (H) 03/26/2025   Personal CGM which does not require fingersticks  Blood glucose readings acceptable for patient's age and comorbidities  Continue Lantus insulin 10 units every night, NovoLog sliding scale insulin coverage according to blood glucose levels 4 times a day, glimepiride 2 mg twice daily, metformin ER 1000 mg twice daily, and Jardiance 25 mg daily  Continue to monitor while at SNF and adjust medications if clinically indicated

## 2025-05-16 NOTE — ASSESSMENT & PLAN NOTE
Currently on a regular texture, diabetic diet with thin liquids without any issues reported  Continue speech therapy  Continue aspiration precautions

## 2025-05-20 ENCOUNTER — NURSING HOME VISIT (OUTPATIENT)
Dept: GERIATRICS | Facility: OTHER | Age: 75
End: 2025-05-20

## 2025-05-20 DIAGNOSIS — I63.9 BRAINSTEM STROKE (HCC): Primary | ICD-10-CM

## 2025-05-20 DIAGNOSIS — E11.36 TYPE 2 DIABETES MELLITUS WITH DIABETIC CATARACT, WITH LONG-TERM CURRENT USE OF INSULIN (HCC): ICD-10-CM

## 2025-05-20 DIAGNOSIS — N18.31 CHRONIC KIDNEY DISEASE, STAGE 3A (HCC): ICD-10-CM

## 2025-05-20 DIAGNOSIS — R53.81 DEBILITY: ICD-10-CM

## 2025-05-20 DIAGNOSIS — Z79.4 TYPE 2 DIABETES MELLITUS WITH DIABETIC CATARACT, WITH LONG-TERM CURRENT USE OF INSULIN (HCC): ICD-10-CM

## 2025-05-20 DIAGNOSIS — I10 ESSENTIAL HYPERTENSION: ICD-10-CM

## 2025-05-20 NOTE — PROGRESS NOTES
Facility: [facility]   POS: 31  Progress Note    Chief Complaint/Reason for visit: STR follow up visit   Code status: Full code  History of Present Illness: 74-year-old male seen and examined for STR follow-up of acute and chronic medical conditions.  Patient is currently residing at [facility] for rehabilitation.  Patient was seen by physiatrist yesterday outpatient; notes reviewed.  Lovenox was discontinued and new medication amantadine was ordered.  Received patient seated in wheelchair and he appears in no distress.  No complaints of nausea or vomiting on scheduled Zofran.  Denies having pain or discomfort.  No new focal deficits noted.  Patient feels that he is making progress at SNF.  Continues to participate in therapy.  See A/P for additional information.  Past Medical History: unchanged from history and physical  Past Medical History:   Diagnosis Date    COVID-19 07/2022    Diabetes mellitus (HCC)     Type 2    Dyslipidemia     Hypertension     Obstructive sleep apnea on CPAP      Family History: Unchanged from history and physical  Social History: Unchanged from history and physical  Review of systems: As per review of medical illness, all other systems reviewed and negative.  Medications: All medication and routine orders were reviewed and updated  Allergies: Reviewed and unchanged  Consults reviewed:PT, OT, and Other  Labs/Diagnostics (reviewed by this provider): Copy in Chart  Imaging Reviewed: None today  Physical Exam  Weight: 135 lb Temp: 97.7           BP: 169/86  Pulse: 83  Resp:  16       Orientation:Person, Place, and Day     Physical Exam  Vitals and nursing note reviewed.   Constitutional:       General: He is not in acute distress.     Appearance: He is not ill-appearing, toxic-appearing or diaphoretic.   HENT:      Head: Normocephalic.      Nose: No congestion.      Mouth/Throat:      Mouth: Mucous membranes are moist.      Pharynx: No oropharyngeal exudate.     Eyes:       Conjunctiva/sclera: Conjunctivae normal.       Cardiovascular:      Rate and Rhythm: Normal rate and regular rhythm.   Pulmonary:      Effort: Pulmonary effort is normal. No respiratory distress.   Abdominal:      General: Bowel sounds are normal. There is no distension.      Palpations: Abdomen is soft.      Tenderness: There is no abdominal tenderness. There is no guarding.     Musculoskeletal:      Cervical back: Neck supple.      Right lower leg: No edema.      Left lower leg: No edema.      Comments: Left sided hemiparesis. Wear left shoulder/arm support brace.      Skin:     General: Skin is warm and dry.      Capillary Refill: Capillary refill takes less than 2 seconds.     Neurological:      Mental Status: He is alert and oriented to person, place, and time.      Gait: Gait abnormal.     Psychiatric:         Mood and Affect: Mood normal.         Behavior: Behavior normal.         Thought Content: Thought content normal.       Assessment/Plan:  74-year-old male with:    Brainstem stroke ([facility])  With recent history of brainstem stroke with left sided hemiparesis, dysarthria, and vertigo  Patient had follow-up with physiatrist yesterday outpatient.  New orders for amantadine 50 mg twice daily and Lovenox was discontinued  As per physiatry notes: If patient tolerates amantadine, consider increasing nifedipine  Continue donepezil 5 mg daily  Continue clopidogrel and atorvastatin  Continue scheduled Zofran before meals and as needed dosing which has been effective in controlling nausea  Continue PT/OT/ST  Follow-up with cardiology, neurology, and physiatry    Essential hypertension  BPs are taken manually right arm  Currently on nifedipine 60 mg daily and valsartan 320 mg daily  If patient tolerates amantadine, consider increasing nifedipine as per physiatry    Type 2 diabetes mellitus with diabetic cataract, with long-term current use of insulin ([facility])    Lab Results   Component Value Date    HGBA1C 9.0 (H)  03/26/2025   Uses personal CGM which does not require fingersticks  Stable blood glucose readings.  No episodes of hypo/hyperglycemia reported   Continue current insulin regimen, glimepiride 2 mg twice daily, metformin ER 1000 mg twice daily, and Jardiance 25 mg daily  Continue to monitor blood glucose while at SNF and adjust medications if clinically indicated    Chronic kidney disease, stage 3a (HCC)  Lab Results   Component Value Date    EGFR 54 04/01/2025    EGFR 54 03/31/2025    EGFR 57 03/27/2025    CREATININE 1.29 04/01/2025    CREATININE 1.29 03/31/2025    CREATININE 1.24 03/27/2025   Most recent BMP at baseline  Avoid nephrotoxic medications such as NSAIDs  Avoid hypotension  Ensure adequate hydration    Debility  Multifactorial  Continue supportive care at SNF for ADLs  Continue PT/OT  Continue fall precautions  Ensure adequate hydration and nutrition   assisting patient with discharge planning     This note was completed in part utilizing [employer] direct voice recognition software.  Grammatical errors, random word insertion, spelling mistakes, and incomplete sentences may be an occasional consequence of the system secondary to software limitations, ambient noise and hardware issues.  At the time of dictation, efforts were made to edit, clarify and/or correct errors.  Please read the chart carefully and recognize, using context, where substitutions have occurred.  If you have any questions or concerns about the context, text or information contained within the body of this dictation, please contact myself, the provider, for further clarification.    VICENTE Reed  5/20/202510:36 AM

## 2025-05-20 NOTE — ASSESSMENT & PLAN NOTE
Multifactorial  Continue supportive care at SNF for ADLs  Continue PT/OT  Continue fall precautions  Ensure adequate hydration and nutrition   assisting patient with discharge planning

## 2025-05-20 NOTE — ASSESSMENT & PLAN NOTE
Lab Results   Component Value Date    HGBA1C 9.0 (H) 03/26/2025   Uses personal CGM which does not require fingersticks  Stable blood glucose readings.  No episodes of hypo/hyperglycemia reported   Continue current insulin regimen, glimepiride 2 mg twice daily, metformin ER 1000 mg twice daily, and Jardiance 25 mg daily  Continue to monitor blood glucose while at SNF and adjust medications if clinically indicated

## 2025-05-20 NOTE — ASSESSMENT & PLAN NOTE
BPs are taken manually right arm  Currently on nifedipine 60 mg daily and valsartan 320 mg daily  If patient tolerates amantadine, consider increasing nifedipine as per physiatry

## 2025-05-20 NOTE — ASSESSMENT & PLAN NOTE
With recent history of brainstem stroke with left sided hemiparesis, dysarthria, and vertigo  Patient had follow-up with physiatrist yesterday outpatient.  New orders for amantadine 50 mg twice daily and Lovenox was discontinued  As per physiatry notes: If patient tolerates amantadine, consider increasing nifedipine  Continue donepezil 5 mg daily  Continue clopidogrel and atorvastatin  Continue scheduled Zofran before meals and as needed dosing which has been effective in controlling nausea  Continue PT/OT/ST  Follow-up with cardiology, neurology, and physiatry

## 2025-05-20 NOTE — ASSESSMENT & PLAN NOTE
Lab Results   Component Value Date    EGFR 54 04/01/2025    EGFR 54 03/31/2025    EGFR 57 03/27/2025    CREATININE 1.29 04/01/2025    CREATININE 1.29 03/31/2025    CREATININE 1.24 03/27/2025   Most recent BMP at baseline  Avoid nephrotoxic medications such as NSAIDs  Avoid hypotension  Ensure adequate hydration

## 2025-05-23 ENCOUNTER — NURSING HOME VISIT (OUTPATIENT)
Dept: GERIATRICS | Facility: OTHER | Age: 75
End: 2025-05-23

## 2025-05-23 DIAGNOSIS — E11.36 TYPE 2 DIABETES MELLITUS WITH DIABETIC CATARACT, WITH LONG-TERM CURRENT USE OF INSULIN (HCC): ICD-10-CM

## 2025-05-23 DIAGNOSIS — I63.9 BRAINSTEM STROKE (HCC): ICD-10-CM

## 2025-05-23 DIAGNOSIS — R11.2 NAUSEA AND VOMITING, UNSPECIFIED VOMITING TYPE: ICD-10-CM

## 2025-05-23 DIAGNOSIS — I10 ESSENTIAL HYPERTENSION: Primary | ICD-10-CM

## 2025-05-23 DIAGNOSIS — G47.33 OSA (OBSTRUCTIVE SLEEP APNEA): ICD-10-CM

## 2025-05-23 DIAGNOSIS — R53.81 DEBILITY: ICD-10-CM

## 2025-05-23 DIAGNOSIS — Z79.4 TYPE 2 DIABETES MELLITUS WITH DIABETIC CATARACT, WITH LONG-TERM CURRENT USE OF INSULIN (HCC): ICD-10-CM

## 2025-05-23 NOTE — ASSESSMENT & PLAN NOTE
With recent history of brainstem stroke with left sided hemiparesis, dysarthria, and vertigo  Patient had follow-up with physiatrist on 5/19/2025 and was started on amantadine.  Lovenox was also discontinued by physiatrist  As per physiatry notes: If patient tolerates amantadine, consider increasing nifedipine  Continue donepezil 5 mg daily  Continue clopidogrel and atorvastatin  Continue scheduled Zofran before meals and as needed dosing which has been effective in controlling nausea  Continue PT/OT/ST  Follow-up with cardiology, neurology, and physiatry

## 2025-05-23 NOTE — ASSESSMENT & PLAN NOTE
With elevated blood pressure this morning 204/100 at time of exam this morning likely multifactorial due to nausea and vomiting and patient was not able to take his morning meds.  Stat hydralazine 25 mg given x 1 dose which was effective in lowering his blood pressure  Continue nifedipine 60 mg daily and valsartan 320 mg daily  Continue to monitor and adjust medications if clinically indicated  As per physiatry notes, if patient tolerates amantadine, consider increasing nifedipine

## 2025-05-23 NOTE — ASSESSMENT & PLAN NOTE
Multifactorial  Continue supportive care at SNF for ADLs  Continue PT/OT  Continue fall precautions  Ensure adequate hydration and nutrition  Social service is assisting patient with discharge planning

## 2025-05-23 NOTE — ASSESSMENT & PLAN NOTE
Chronic since stroke and appears to be exacerbated by position changes.  Symptoms have been controlled this past week until today.  Nausea and vomiting this morning likely due to scopolamine falling off  Patient's nurse applied another scopolamine patch and was given stat dose of Zofran with improvement  Continue scheduled Zofran before meals 3 times a day and every 4 hours as needed  Continue scopolamine patch

## 2025-05-23 NOTE — ASSESSMENT & PLAN NOTE
Lab Results   Component Value Date    HGBA1C 9.0 (H) 03/26/2025   Uses personal CGM which does not require fingersticks  Blood glucose stable  Continue current insulin regimen, glimepiride 2 mg twice daily, metformin ER 1000 mg twice daily, and Jardiance 25 mg daily

## 2025-05-23 NOTE — PROGRESS NOTES
Facility: [facility]   POS: 31  Progress Note    Chief Complaint/Reason for visit: BP elevation, nausea and vomiting  Code status: Full code  History of Present Illness: 75-year-old male seen and examined for STR follow-up of acute and chronic medical conditions.  Patient is currently residing at [facility] rehabilitation.  Received patient resting in bed in supine position and appeared unwell. He reports that he vomited this morning as soon as her raised his head slightly above pillow.  Nurse could not find scopolamine patch behind ears.  He has been stable with current regimen of scopolamine patch and scheduled Zofran doses. BP taken manually at time of exam 204/100 and stat hydralazine 25 mg ordered x 1 dose along with stat Zofran dose.  Discussed plan of care with patient, wife, and patient's nurse.  Revisited patient 2 more times to assess condition and blood pressure.  Hydralazine was effective in lowering his blood pressure with most recent blood pressure 152/82 at 2 PM taken manually in the right arm.  No further complaints of nausea or vomiting.  Past Medical History: unchanged from history and physical  Family History: Unchanged from history and physical  Social History: Unchanged from history and physical  Review of systems: As per review of medical illness, all other systems reviewed and negative.  Medications: All medication and routine orders were reviewed and updated  Allergies: Reviewed and unchanged  Consults reviewed:PT, OT, and Other  Labs/Diagnostics (reviewed by this provider): Copy in Chart  Imaging Reviewed: None today  Physical Exam  Weight: 135 lb Temp:  97.3         BP: See above notes  pulse:86 Resp: 16      O2 Sat: 92 to 94% on room air  Orientation:Person, Place, and Day     Physical Exam  Vitals and nursing note reviewed.   Constitutional:       General: He is not in acute distress.     Appearance: He is ill-appearing. He is not toxic-appearing or diaphoretic.   HENT:       Head: Normocephalic.      Nose: No congestion.      Mouth/Throat:      Mouth: Mucous membranes are moist.      Pharynx: No oropharyngeal exudate.     Eyes:      Conjunctiva/sclera: Conjunctivae normal.      Comments: Wears prescription glasses.     Cardiovascular:      Rate and Rhythm: Normal rate and regular rhythm.   Pulmonary:      Effort: Pulmonary effort is normal. No respiratory distress.   Abdominal:      General: Bowel sounds are normal. There is no distension.      Palpations: Abdomen is soft.      Tenderness: There is no abdominal tenderness. There is no guarding.     Musculoskeletal:      Cervical back: Neck supple. No rigidity.      Right lower leg: No edema.      Left lower leg: No edema.      Comments: Left-sided hemiparesis.     Skin:     General: Skin is warm and dry.      Capillary Refill: Capillary refill takes less than 2 seconds.     Neurological:      Mental Status: He is alert. Mental status is at baseline.     Psychiatric:         Mood and Affect: Mood normal.         Behavior: Behavior normal.         Thought Content: Thought content normal.       Assessment/Plan:  75-year-old female with:    Essential hypertension  With elevated blood pressure this morning 204/100 at time of exam this morning likely multifactorial due to nausea and vomiting and patient was not able to take his morning meds.  Stat hydralazine 25 mg given x 1 dose which was effective in lowering his blood pressure  Continue nifedipine 60 mg daily and valsartan 320 mg daily  Continue to monitor and adjust medications if clinically indicated  As per physiatry notes, if patient tolerates amantadine, consider increasing nifedipine    Nausea and vomiting  Chronic since stroke and appears to be exacerbated by position changes.  Symptoms have been controlled this past week until today.  Nausea and vomiting this morning likely due to scopolamine falling off  Patient's nurse applied another scopolamine patch and was given stat dose  of Zofran with improvement  Continue scheduled Zofran before meals 3 times a day and every 4 hours as needed  Continue scopolamine patch    Brainstem stroke (HCC)  With recent history of brainstem stroke with left sided hemiparesis, dysarthria, and vertigo  Patient had follow-up with physiatrist on 5/19/2025 and was started on amantadine.  Lovenox was also discontinued by physiatrist  As per physiatry notes: If patient tolerates amantadine, consider increasing nifedipine  Continue donepezil 5 mg daily  Continue clopidogrel and atorvastatin  Continue scheduled Zofran before meals and as needed dosing which has been effective in controlling nausea  Continue PT/OT/ST  Follow-up with cardiology, neurology, and physiatry     DEWEY (obstructive sleep apnea)  Continue CPAP every night    Type 2 diabetes mellitus with diabetic cataract, with long-term current use of insulin (HCC)    Lab Results   Component Value Date    HGBA1C 9.0 (H) 03/26/2025   Uses personal CGM which does not require fingersticks  Blood glucose stable  Continue current insulin regimen, glimepiride 2 mg twice daily, metformin ER 1000 mg twice daily, and Jardiance 25 mg daily    I have spent a total time of 45 minutes in caring for this patient on the day of the visit/encounter including Diagnostic results, Risks and benefits of tx options, Patient and family education, Importance of tx compliance, Impressions, Documenting in the medical record, Reviewing/placing orders in the medical record (including tests, medications, and/or procedures), Obtaining or reviewing history  , and Communicating with other healthcare professionals . Change in condition due to elevated BP >200 and N/V requiring emergent assessment, treatment, and re-assessment. Discussion of treatment with patient and wife.     This note was completed in part utilizing [employer] direct voice recognition software.  Grammatical errors, random word insertion, spelling mistakes, and  incomplete sentences may be an occasional consequence of the system secondary to software limitations, ambient noise and hardware issues.  At the time of dictation, efforts were made to edit, clarify and/or correct errors.  Please read the chart carefully and recognize, using context, where substitutions have occurred.  If you have any questions or concerns about the context, text or information contained within the body of this dictation, please contact myself, the provider, for further clarification.    VICENTE Reed  5/23/20252:26 PM

## 2025-05-28 ENCOUNTER — TELEPHONE (OUTPATIENT)
Dept: INTERNAL MEDICINE CLINIC | Facility: CLINIC | Age: 75
End: 2025-05-28

## 2025-05-28 ENCOUNTER — NURSING HOME VISIT (OUTPATIENT)
Dept: GERIATRICS | Facility: OTHER | Age: 75
End: 2025-05-28
Payer: COMMERCIAL

## 2025-05-28 DIAGNOSIS — E11.65 TYPE 2 DIABETES MELLITUS WITH HYPERGLYCEMIA, WITH LONG-TERM CURRENT USE OF INSULIN (HCC): ICD-10-CM

## 2025-05-28 DIAGNOSIS — I63.9 BRAINSTEM STROKE (HCC): ICD-10-CM

## 2025-05-28 DIAGNOSIS — Z79.4 TYPE 2 DIABETES MELLITUS WITH DIABETIC CATARACT, WITH LONG-TERM CURRENT USE OF INSULIN (HCC): Primary | ICD-10-CM

## 2025-05-28 DIAGNOSIS — R63.4 WEIGHT LOSS: ICD-10-CM

## 2025-05-28 DIAGNOSIS — E11.36 TYPE 2 DIABETES MELLITUS WITH DIABETIC CATARACT, WITH LONG-TERM CURRENT USE OF INSULIN (HCC): Primary | ICD-10-CM

## 2025-05-28 DIAGNOSIS — I10 ESSENTIAL HYPERTENSION: ICD-10-CM

## 2025-05-28 DIAGNOSIS — Z79.4 TYPE 2 DIABETES MELLITUS WITH HYPERGLYCEMIA, WITH LONG-TERM CURRENT USE OF INSULIN (HCC): ICD-10-CM

## 2025-05-28 DIAGNOSIS — R11.2 NAUSEA AND VOMITING, UNSPECIFIED VOMITING TYPE: ICD-10-CM

## 2025-05-28 PROCEDURE — 99309 SBSQ NF CARE MODERATE MDM 30: CPT | Performed by: NURSE PRACTITIONER

## 2025-05-28 NOTE — TELEPHONE ENCOUNTER
Patient wife called and stated that patient unable to come in at time for MAWV due to having stroke and being at [facility].  She will call to schedule when patient comes home.

## 2025-05-28 NOTE — PROGRESS NOTES
Facility: [facility]  POS: 31  Progress Note    Chief Complaint/Reason for visit: STR follow-up visit  Code status: Full code  History of Present Illness: 75-year-old male seen and examined for STR follow-up of acute on chronic medical conditions.  Patient is currently residing at [facility] for rehabilitation.  Received patient seated in wheelchair and he appeared in no distress.  Patient's nurse noted that patient's oral intake has decreased the past couple of days and also appears depressed.  Patient's wife was present at bedside.  Reviewed tamiko 3 CGM glucose log over the past week, 14 days, and a 3-month period and found that blood glucose has been fairly stable trending 110s to 170 until recently dropping to 80s and 50s last night.  Patient was started on amantadine by physiatry service last week.  Amantadine is the only new drug that was started and could be a contributing factor to change in patient's appetite and mood; consider discontinuing.  No further complaints of nausea vomiting on current antiemetic medications.  Discussed medication changes with patient and his wife which includes lowering long-acting insulin dose and discontinuing glimepiride due to hypoglycemia.  Patient continues to lose weight; will discuss weight loss with dietitian see A/P for additional information.  Past Medical History: unchanged from history and physical  Family History: Unchanged from history and physical  Social History: Unchanged from history and physical  Review of systems: As per review of medical illness, all other systems reviewed and negative.  Medications: All medication and routine orders were reviewed and updated  Allergies: Reviewed and unchanged  Consults reviewed:PT and OT  Labs/Diagnostics (reviewed by this provider): Copy in Chart  Imaging Reviewed: None today  Physical Exam  Weight: 131.6 pounds temp: 97.9         BP: 154/82  pulse: 78 resp: 14        Orientation:Person, Place, and Day      Physical Exam  Vitals and nursing note reviewed.   Constitutional:       General: He is not in acute distress.     Appearance: He is not toxic-appearing or diaphoretic.   HENT:      Head: Normocephalic.      Nose: No congestion or rhinorrhea.      Mouth/Throat:      Mouth: Mucous membranes are moist.      Pharynx: No oropharyngeal exudate.     Eyes:      General:         Right eye: No discharge.         Left eye: No discharge.      Extraocular Movements: Extraocular movements intact.      Conjunctiva/sclera: Conjunctivae normal.       Cardiovascular:      Rate and Rhythm: Normal rate and regular rhythm.      Pulses: Normal pulses.   Pulmonary:      Effort: Pulmonary effort is normal. No respiratory distress.      Breath sounds: Normal breath sounds. No wheezing, rhonchi or rales.   Abdominal:      General: Bowel sounds are normal. There is no distension.      Palpations: Abdomen is soft.      Tenderness: There is no abdominal tenderness. There is no guarding.     Musculoskeletal:      Cervical back: Neck supple.      Right lower leg: No edema.      Left lower leg: No edema.      Comments: Left-sided hemiparesis.     Skin:     General: Skin is warm and dry.      Capillary Refill: Capillary refill takes less than 2 seconds.     Neurological:      Mental Status: He is alert. Mental status is at baseline.     Psychiatric:         Mood and Affect: Mood normal.         Behavior: Behavior normal.         Thought Content: Thought content normal.       Assessment/Plan:  75-year-old male with:    Type 2 diabetes mellitus with diabetic cataract, with long-term current use of insulin ([facility])    Lab Results   Component Value Date    HGBA1C 9.0 (H) 03/26/2025   With reported poor appetite and oral intake, weight loss, and hypoglycemia.  No symptoms reported  Reviewed CGM tamiko 3 log with patient and his wife.  Blood glucose has been fairly stable trending 110s to 170 until recently.  Low blood glucose readings during the night  due to poor oral intake  Plan:  Decrease Lantus insulin from 10 units every night to 5 units every night with hold parameter and discontinue glimepiride  Continue to monitor blood glucose 4 times daily using personal CGM and sliding scale insulin coverage NovoLog according to blood glucose  Continue metformin 1000 mg twice daily and Jardiance 25 mg daily  Will continue to monitor and adjust medications if clinically indicated    Weight loss  Patient has lost 12.4 pounds since 4/24/2025.  He lost 4 pounds from 5/12/2025 until 5/28/2025  Weight loss likely multifactorial in setting of stroke, medications, mood, and chronic medical conditions affecting his appetite  Amantadine was started by physiatry service which could be a contributing factor to depressed mood, poor appetite, and oral intake; will speak to patient and his wife to consider discontinuing  Patient agreed to taking mirtazapine 7.5 mg every night which was ordered  Continue nutritional supplements and offer foods that patient enjoys eating  Continue to monitor weights  Dietitian following    Brainstem stroke (HCC)  With left-sided hemiparesis, dysarthria, and vertigo  Patient had follow-up with physiatry service on 5/19/2025 and was started on amantadine.  Patient likely not tolerating amantadine as there is a change noted in his affect, general mood, and appetite  Lovenox was discontinued by physiatrist  Continue donepezil 5 mg daily, clopidogrel, and atorvastatin  Continue scopolamine patch every 3 days  Continue scheduled Zofran before meals and as needed dosing which has been effective in controlling nausea and vomiting  Continue PT/OT/ST  Follow-up with cardiology, neurology, and physiatry    Nausea and vomiting  Chronic since stroke  Currently stable  Continue scheduled Zofran 3 times daily and as needed dosing  Continue scopolamine patch every 3 days  Continue to monitor    Essential hypertension  SBPs trending 130s to 150s  Currently on  nifedipine ER 60 mg daily and valsartan 320 mg daily  Continue to monitor and adjust medications if clinically indicated    This note was completed in part utilizing [employer] direct voice recognition software.  Grammatical errors, random word insertion, spelling mistakes, and incomplete sentences may be an occasional consequence of the system secondary to software limitations, ambient noise and hardware issues.  At the time of dictation, efforts were made to edit, clarify and/or correct errors.  Please read the chart carefully and recognize, using context, where substitutions have occurred.  If you have any questions or concerns about the context, text or information contained within the body of this dictation, please contact myself, the provider, for further clarification.    VICENTE Reed  5/29/202510:46 AM

## 2025-05-29 PROBLEM — R63.4 WEIGHT LOSS: Status: ACTIVE | Noted: 2025-05-29

## 2025-05-29 RX ORDER — INSULIN GLARGINE 100 [IU]/ML
5 INJECTION, SOLUTION SUBCUTANEOUS
Status: SHIPPED
Start: 2025-05-29 | End: 2025-06-06

## 2025-05-29 NOTE — ASSESSMENT & PLAN NOTE
Patient has lost 12.4 pounds since 4/24/2025.  He lost 4 pounds from 5/12/2025 until 5/28/2025  Weight loss likely multifactorial in setting of stroke, medications, mood, and chronic medical conditions affecting his appetite  Amantadine was started by physiatry service which could be a contributing factor to depressed mood, poor appetite, and oral intake; consider discontinuing  Patient agreed to taking mirtazapine 7.5 mg every night which was ordered  Continue nutritional supplements and offer foods that patient enjoys eating  Continue to monitor weights  Dietitian following

## 2025-05-29 NOTE — ASSESSMENT & PLAN NOTE
Lab Results   Component Value Date    HGBA1C 9.0 (H) 03/26/2025   With reported poor appetite and oral intake, weight loss, and hypoglycemia.  No symptoms reported  Reviewed CGM tamiko 3 log with patient and his wife.  Blood glucose has been fairly stable trending 110s to 170 until recently.  Low blood glucose readings during the night due to poor oral intake  Plan:  Decrease Lantus insulin from 10 units every night to 5 units every night with hold parameter and discontinue glimepiride  Continue to monitor blood glucose 4 times daily using personal CGM and sliding scale insulin coverage NovoLog according to blood glucose  Continue metformin 1000 mg twice daily and Jardiance 25 mg daily  Will continue to monitor and adjust medications if clinically indicated

## 2025-05-29 NOTE — ASSESSMENT & PLAN NOTE
SBPs trending 130s to 150s  Currently on nifedipine ER 60 mg daily and valsartan 320 mg daily  Continue to monitor and adjust medications if clinically indicated

## 2025-05-29 NOTE — ASSESSMENT & PLAN NOTE
Chronic since stroke  Currently stable  Continue scheduled Zofran 3 times daily and as needed dosing  Continue scopolamine patch every 3 days  Continue to monitor

## 2025-05-29 NOTE — ASSESSMENT & PLAN NOTE
With left-sided hemiparesis, dysarthria, and vertigo  Patient had follow-up with physiatry service on 5/19/2025 and was started on amantadine.  Patient likely not tolerating amantadine as there is a change noted in his affect, general mood, and appetite  Lovenox was discontinued by physiatrist  Continue donepezil 5 mg daily, clopidogrel, and atorvastatin  Continue scopolamine patch every 3 days  Continue scheduled Zofran before meals and as needed dosing which has been effective in controlling nausea and vomiting  Continue PT/OT/ST  Follow-up with cardiology, neurology, and physiatry

## 2025-06-03 ENCOUNTER — NURSING HOME VISIT (OUTPATIENT)
Dept: GERIATRICS | Facility: OTHER | Age: 75
End: 2025-06-03
Payer: COMMERCIAL

## 2025-06-03 DIAGNOSIS — E11.36 TYPE 2 DIABETES MELLITUS WITH DIABETIC CATARACT, WITH LONG-TERM CURRENT USE OF INSULIN (HCC): ICD-10-CM

## 2025-06-03 DIAGNOSIS — R63.4 WEIGHT LOSS: ICD-10-CM

## 2025-06-03 DIAGNOSIS — N18.31 CHRONIC KIDNEY DISEASE, STAGE 3A (HCC): ICD-10-CM

## 2025-06-03 DIAGNOSIS — R11.2 NAUSEA AND VOMITING, UNSPECIFIED VOMITING TYPE: ICD-10-CM

## 2025-06-03 DIAGNOSIS — Z79.4 TYPE 2 DIABETES MELLITUS WITH DIABETIC CATARACT, WITH LONG-TERM CURRENT USE OF INSULIN (HCC): ICD-10-CM

## 2025-06-03 DIAGNOSIS — I63.9 BRAINSTEM STROKE (HCC): Primary | ICD-10-CM

## 2025-06-03 PROCEDURE — 99309 SBSQ NF CARE MODERATE MDM 30: CPT | Performed by: NURSE PRACTITIONER

## 2025-06-03 NOTE — ASSESSMENT & PLAN NOTE
With left-sided hemiparesis, dysarthria, and vertigo  Patient had a setback this morning during therapy session where he was found to be weak and unable to sit up by himself at side of bed. He notes an increase in weakness to left upper arm and unable to raise left leg off bed  Therapist reports that he normally can reposition himself in bed and sit up beside the bed without assistance  Weakness likely multifactorial secondary to recent discontinuation of amantadine and currently on mirtazapine for appetite and mood  Continue donepezil 5 mg daily, clopidogrel, and atorvastatin  Continue scopolamine patch every 3 days  Continue scheduled Zofran before meals and as needed dosing which has been effective in controlling nausea and vomiting  Continue PT/OT/ST  Plan:  Order CBC and CMP in a.m.  Discontinue mirtazapine

## 2025-06-03 NOTE — ASSESSMENT & PLAN NOTE
Lab Results   Component Value Date    EGFR 54 04/01/2025    EGFR 54 03/31/2025    EGFR 57 03/27/2025    CREATININE 1.29 04/01/2025    CREATININE 1.29 03/31/2025    CREATININE 1.24 03/27/2025   Recent BMP stable  Avoid nephrotoxic medications such as NSAIDs  Avoid hypotension  Ensure adequate hydration  Scheduled BMP 6/4/2025

## 2025-06-03 NOTE — ASSESSMENT & PLAN NOTE
Lab Results   Component Value Date    HGBA1C 9.0 (H) 03/26/2025   Glimepiride was recently discontinued due to hypoglycemia in setting of poor oral intake  With improved blood glucose now that patient's appetite is returning and completing 70% to 80% of meals along with nutritional supplements provided by dietitian  Blood glucose more stable  Lantus insulin was increased from 5 units to 10 units every night  Continue metformin 1000 mg twice daily and Jardiance 25 mg daily  We will continue to monitor and adjust medications if clinically indicated

## 2025-06-03 NOTE — PROGRESS NOTES
Facility: [facility]   POS: 31  Progress Note    Chief Complaint/Reason for visit: STR follow up visit  Code status:  Full code  History of Present Illness: 75-year-old male seen and examined for STR follow-up of acute and chronic medical conditions.  Patient is currently residing at [facility] for rehabilitation.  Observed patient with transfer out of bed this morning with PT assistance.  Patient had a difficult time sitting up by himself with increase in weakness to the left side.  He previously was able to raise his left arm and unable to do it today.  No new focal deficits noted.  Patient does not appear toxic.  Amantadine recently discontinued as per patient's wife request due to side effects.  She would also like patient to be weaned off of mirtazapine. Patient wants to do well and feels discouraged.  Vital signs are stable.  Will check routine CBC and CMP in am.    Past Medical History: unchanged from history and physical  Family History: Unchanged from history and physical  Social History: Unchanged from history and physical  Review of systems: As per review of medical illness, all other systems reviewed and negative.  Medications: All medication and routine orders were reviewed and updated  Allergies: Reviewed and unchanged  Consults reviewed:PT, OT, Nutrition, and Other  Labs/Diagnostics (reviewed by this provider): Copy in Chart most recent CBC and BMP stable  Imaging Reviewed: None today  Physical Exam  Weight: 131.6 LB Temp: 97.8          BP: 142/76     Pulse: 76 Resp: 16  Orientation:Person, Place, and Day     Physical Exam  Vitals and nursing note reviewed.   Constitutional:       General: He is not in acute distress.     Appearance: He is not toxic-appearing or diaphoretic.      Comments: Elderly male who appears with chronic illness.     HENT:      Head: Normocephalic.      Nose: No congestion.      Mouth/Throat:      Mouth: Mucous membranes are moist.      Pharynx: No oropharyngeal  exudate.     Eyes:      General: No scleral icterus.     Conjunctiva/sclera: Conjunctivae normal.      Comments: Wears prescription glasses.     Cardiovascular:      Rate and Rhythm: Normal rate and regular rhythm.   Pulmonary:      Effort: Pulmonary effort is normal. No respiratory distress.   Abdominal:      General: Bowel sounds are normal. There is no distension.      Palpations: Abdomen is soft.      Tenderness: There is no abdominal tenderness. There is no guarding.     Musculoskeletal:      Cervical back: Neck supple.      Right lower leg: No edema.      Left lower leg: No edema.      Comments: With left sided hemiparesis.     Skin:     General: Skin is warm and dry.      Capillary Refill: Capillary refill takes less than 2 seconds.     Neurological:      Mental Status: He is alert. Mental status is at baseline.      Motor: Weakness present.     Psychiatric:         Mood and Affect: Mood normal.         Behavior: Behavior normal.         Thought Content: Thought content normal.       Assessment/Plan:  75-year-old male with:    Brainstem stroke (HCC)  With left-sided hemiparesis, dysarthria, and vertigo  Patient had a setback this morning during therapy session where he was found to be weak and unable to sit up by himself at side of bed. He notes an increase in weakness to left upper arm and unable to raise left leg off bed  Therapist reports that he normally can reposition himself in bed and sit up beside the bed without assistance  Weakness likely multifactorial secondary to recent discontinuation of amantadine and currently on mirtazapine for appetite and mood  Continue donepezil 5 mg daily, clopidogrel, and atorvastatin  Continue scopolamine patch every 3 days  Continue scheduled Zofran before meals and as needed dosing which has been effective in controlling nausea and vomiting  Continue PT/OT/ST  Plan:  Order CBC and CMP in a.m.  Discontinue mirtazapine    Type 2 diabetes mellitus with diabetic cataract,  with long-term current use of insulin ([facility])    Lab Results   Component Value Date    HGBA1C 9.0 (H) 03/26/2025   Glimepiride was recently discontinued due to hypoglycemia in setting of poor oral intake  With improved blood glucose now that patient's appetite is returning and completing 70% to 80% of meals along with nutritional supplements provided by dietitian  Blood glucose more stable  Lantus insulin was increased from 5 units to 10 units every night  Continue metformin 1000 mg twice daily and Jardiance 25 mg daily  We will continue to monitor and adjust medications if clinically indicated    Weight loss  Weight loss likely multifactorial in setting of stroke, medications, mood, and chronic medical conditions affecting his appetite  Appetite and oral intake improving  Continue to monitor weights every week  Patient was started on mirtazapine for mood and appetite, however due to increase in weakness noted by patient, patient's wife is requesting that patient be weaned off of mirtazapine  Continue nutritional supplements  Dietitian following    Nausea and vomiting  Chronic since stroke  Controlled on scheduled Zofran 3 times a day and as needed dosing  Continue scopolamine patch every 3 days    Chronic kidney disease, stage 3a ([facility])  Lab Results   Component Value Date    EGFR 54 04/01/2025    EGFR 54 03/31/2025    EGFR 57 03/27/2025    CREATININE 1.29 04/01/2025    CREATININE 1.29 03/31/2025    CREATININE 1.24 03/27/2025   Recent BMP stable  Avoid nephrotoxic medications such as NSAIDs  Avoid hypotension  Ensure adequate hydration  Scheduled BMP 6/4/2025     This note was completed in part utilizing [email or web address] direct voice recognition software.  Grammatical errors, random word insertion, spelling mistakes, and incomplete sentences may be an occasional consequence of the system secondary to software limitations, ambient noise and hardware issues.  At the time of dictation, efforts were made to edit, clarify  and/or correct errors.  Please read the chart carefully and recognize, using context, where substitutions have occurred.  If you have any questions or concerns about the context, text or information contained within the body of this dictation, please contact myself, the provider, for further clarification.    VICENTE Reed  6/3/68626:46 PM

## 2025-06-03 NOTE — ASSESSMENT & PLAN NOTE
Chronic since stroke  Controlled on scheduled Zofran 3 times a day and as needed dosing  Continue scopolamine patch every 3 days

## 2025-06-03 NOTE — ASSESSMENT & PLAN NOTE
Weight loss likely multifactorial in setting of stroke, medications, mood, and chronic medical conditions affecting his appetite  Appetite and oral intake improving  Continue to monitor weights every week  Patient was started on mirtazapine for mood and appetite, however due to increase in weakness noted by patient, patient's wife is requesting that patient be weaned off of mirtazapine  Continue nutritional supplements  Dietitian following

## 2025-06-05 ENCOUNTER — NURSING HOME VISIT (OUTPATIENT)
Dept: GERIATRICS | Facility: OTHER | Age: 75
End: 2025-06-05
Payer: COMMERCIAL

## 2025-06-05 DIAGNOSIS — R11.2 NAUSEA AND VOMITING, UNSPECIFIED VOMITING TYPE: ICD-10-CM

## 2025-06-05 DIAGNOSIS — R53.81 DEBILITY: ICD-10-CM

## 2025-06-05 DIAGNOSIS — E11.36 TYPE 2 DIABETES MELLITUS WITH DIABETIC CATARACT, WITH LONG-TERM CURRENT USE OF INSULIN (HCC): ICD-10-CM

## 2025-06-05 DIAGNOSIS — R63.4 WEIGHT LOSS: ICD-10-CM

## 2025-06-05 DIAGNOSIS — Z79.4 TYPE 2 DIABETES MELLITUS WITH DIABETIC CATARACT, WITH LONG-TERM CURRENT USE OF INSULIN (HCC): ICD-10-CM

## 2025-06-05 DIAGNOSIS — I10 ESSENTIAL HYPERTENSION: ICD-10-CM

## 2025-06-05 DIAGNOSIS — I63.9 BRAINSTEM STROKE (HCC): Primary | ICD-10-CM

## 2025-06-05 DIAGNOSIS — N18.31 CHRONIC KIDNEY DISEASE, STAGE 3A (HCC): ICD-10-CM

## 2025-06-05 PROCEDURE — 99309 SBSQ NF CARE MODERATE MDM 30: CPT | Performed by: NURSE PRACTITIONER

## 2025-06-05 NOTE — ASSESSMENT & PLAN NOTE
With left-sided hemiparesis, dysarthria, and vertigo  Patient had a setback this morning during therapy session on 6/3 where he was found to be weak and unable to sit up by himself at side of bed. He notes an increase in weakness to left upper arm and unable to raise left leg off bed  Therapist reports that he normally can reposition himself in bed and sit up beside the bed without assistance  Weakness likely multifactorial secondary to mirtazapine and recent discontinuation of amantadine due to side effects.  Patient overall condition is back to baseline today  Continue donepezil 5 mg daily, clopidogrel, and atorvastatin  Continue scopolamine patch every 3 days  Continue scheduled Zofran before meals and as needed dosing which has been effective in controlling nausea and vomiting  Continue PT/OT/ST

## 2025-06-05 NOTE — PROGRESS NOTES
"  Facility: [facility]  POS: 31  Progress Note    Chief Complaint/Reason for visit: STR follow-up visit  Code status: Full code  History of Present Illness: 75-year-old male seen and examined for STR follow-up of acute on chronic medical conditions.  Patient is currently residing at [facility] for rehabilitation.  At time of examination, received patient resting in bed, and he appeared in no distress.  Patient's wife was at bedside.  He offers no complaints.  No further episodes of weakness or \"freezing\" of upper extremities.  No new focal deficits noted.  He appears at baseline today.  Recent BMP on 6/4/2025 revealed mild creatinine elevation likely from decreased fluid intake.  Lab results were reviewed with patient and his wife on 6/4/2025.    Past Medical History: unchanged from history and physical  Family History: Unchanged from history and physical  Social History: Unchanged from history and physical  Review of systems: As per review of medical illness, all other systems reviewed and negative.  Medications: All medication and routine orders were reviewed and updated  Allergies: Reviewed and unchanged  Consults reviewed:PT, OT, and Other  Labs/Diagnostics (reviewed by this provider): Copy in Chart  Imaging Reviewed: None today  Physical Exam  Weight: 135.9 pounds temp: 98.2         BP: 130/80  pulse: 90 Resp: 16     Orientation:Person, Place, and Day     Physical Exam  Vitals and nursing note reviewed.   Constitutional:       General: He is not in acute distress.     Appearance: He is not ill-appearing, toxic-appearing or diaphoretic.   HENT:      Head: Normocephalic.      Nose: No congestion.      Mouth/Throat:      Mouth: Mucous membranes are moist.      Pharynx: No oropharyngeal exudate.     Eyes:      Conjunctiva/sclera: Conjunctivae normal.       Cardiovascular:      Rate and Rhythm: Normal rate and regular rhythm.   Pulmonary:      Effort: Pulmonary effort is normal. No respiratory " distress.   Abdominal:      General: Bowel sounds are normal.      Palpations: Abdomen is soft.     Musculoskeletal:      Cervical back: Neck supple.      Right lower leg: No edema.      Left lower leg: No edema.      Comments: With left hemiparesis.     Skin:     General: Skin is warm and dry.      Capillary Refill: Capillary refill takes less than 2 seconds.     Neurological:      Mental Status: He is alert. Mental status is at baseline.      Motor: Weakness present.     Psychiatric:         Mood and Affect: Mood normal.         Behavior: Behavior normal.         Thought Content: Thought content normal.       Assessment/Plan:  75-year-old male with:    Brainstem stroke (HCC)  With left-sided hemiparesis, dysarthria, and vertigo  Patient had a setback this morning during therapy session on 6/3 where he was found to be weak and unable to sit up by himself at side of bed. He notes an increase in weakness to left upper arm and unable to raise left leg off bed  Therapist reports that he normally can reposition himself in bed and sit up beside the bed without assistance  Weakness likely multifactorial secondary to mirtazapine and recent discontinuation of amantadine due to side effects.  Patient overall condition is back to baseline today  Continue donepezil 5 mg daily, clopidogrel, and atorvastatin  Continue scopolamine patch every 3 days  Continue scheduled Zofran before meals and as needed dosing which has been effective in controlling nausea and vomiting  Continue PT/OT/ST    Type 2 diabetes mellitus with diabetic cataract, with long-term current use of insulin ([facility])    Lab Results   Component Value Date    HGBA1C 9.0 (H) 03/26/2025   Glimepiride was discontinued due to hypoglycemia with improved blood glucose readings.  Patient has CGM tamiko which does not require fingersticks  No episodes of hypo/hyperglycemia reported  Continue current regimen of Lantus insulin 10 units every night, metformin 1000 mg twice daily,  and Jardiance 25 mg daily  Continue sliding scale insulin coverage according to blood glucose 4 times daily  Will continue to monitor and adjust medications if clinically indicated    Nausea and vomiting  Chronic since stroke  Controlled on current regimen of Zofran 3 times daily and as needed dosing  Continue scopolamine patch every 3 days    Chronic kidney disease, stage 3a (HCC)  Lab Results   Component Value Date    EGFR 54 04/01/2025    EGFR 54 03/31/2025    EGFR 57 03/27/2025    CREATININE 1.29 04/01/2025    CREATININE 1.29 03/31/2025    CREATININE 1.24 03/27/2025   With slightly elevated creatinine level 1.49 on 6/4/2025 likely due to poor oral intake on 6/3/2025 (poor oral intake likely multifactorial in setting of side effects from amantadine and patient was also placed on mirtazapine due to poor appetite and oral intake).  Amantadine was discontinued and weaning off of mirtazapine  With reported improved appetite and oral intake  Recheck BMP on 6/11/2025  Avoid hypotension  Avoid nephrotoxic medications such as NSAIDs    Essential hypertension  SBPs trending 130s to 140s more recently  Currently on nifedipine ER 60 mg daily and valsartan 320 mg daily  Continue to monitor and adjust medications if clinically indicated    Debility  Multifactorial  Continue supportive care at SNF for ADLs  Continue PT/OT  Continue fall precautions  Ensure adequate hydration and nutrition  Social service is assisting patient with discharge planning    Weight loss  Multifactorial secondary to stroke, nausea, side effects from medication affecting appetite, and chronic medical conditions  Appetite and oral intake improving and patient has gained over 3 pounds in 1 week  Continue to monitor weights  Continue nutritional supplements  Dietitian following    This note was completed in part utilizing [employer] direct voice recognition software.  Grammatical errors, random word insertion, spelling mistakes, and incomplete  sentences may be an occasional consequence of the system secondary to software limitations, ambient noise and hardware issues.  At the time of dictation, efforts were made to edit, clarify and/or correct errors.  Please read the chart carefully and recognize, using context, where substitutions have occurred.  If you have any questions or concerns about the context, text or information contained within the body of this dictation, please contact myself, the provider, for further clarification.    VICENTE Reed  6/5/202512:04 PM

## 2025-06-05 NOTE — ASSESSMENT & PLAN NOTE
Multifactorial secondary to stroke, medications, and chronic medical conditions  Appetite and oral intake improving and patient has gained over 3 pounds in 1 week  Continue to monitor weights  Continue nutritional supplements  Dietitian following

## 2025-06-05 NOTE — ASSESSMENT & PLAN NOTE
Lab Results   Component Value Date    HGBA1C 9.0 (H) 03/26/2025   Glimepiride was discontinued due to hypoglycemia with improved blood glucose readings.  Patient has CGM tamiko which does not require fingersticks  No episodes of hypo/hyperglycemia reported  Continue current regimen of Lantus insulin 10 units every night, metformin 1000 mg twice daily, and Jardiance 25 mg daily  Continue sliding scale insulin coverage according to blood glucose 4 times daily  Will continue to monitor and adjust medications if clinically indicated

## 2025-06-05 NOTE — ASSESSMENT & PLAN NOTE
SBPs trending 130s to 140s more recently  Currently on nifedipine ER 60 mg daily and valsartan 320 mg daily  Continue to monitor and adjust medications if clinically indicated

## 2025-06-05 NOTE — ASSESSMENT & PLAN NOTE
Chronic since stroke  Controlled on current regimen of Zofran 3 times daily and as needed dosing  Continue scopolamine patch every 3 days

## 2025-06-05 NOTE — ASSESSMENT & PLAN NOTE
Lab Results   Component Value Date    EGFR 54 04/01/2025    EGFR 54 03/31/2025    EGFR 57 03/27/2025    CREATININE 1.29 04/01/2025    CREATININE 1.29 03/31/2025    CREATININE 1.24 03/27/2025   With slightly elevated creatinine level 1.49 on 6/4/2025 likely due to poor oral intake on 6/3/2025 (poor oral intake likely multifactorial in setting of side effects from amantadine and patient was also placed on mirtazapine due to poor appetite and oral intake).  Amantadine was discontinued and weaning off of mirtazapine  With reported improved appetite and oral intake  Recheck BMP on 6/11/2025  Avoid hypotension  Avoid nephrotoxic medications such as NSAIDs

## 2025-06-06 ENCOUNTER — NURSING HOME VISIT (OUTPATIENT)
Dept: GERIATRICS | Facility: OTHER | Age: 75
End: 2025-06-06

## 2025-06-06 DIAGNOSIS — Z79.4 TYPE 2 DIABETES MELLITUS WITH HYPERGLYCEMIA, WITH LONG-TERM CURRENT USE OF INSULIN (HCC): ICD-10-CM

## 2025-06-06 DIAGNOSIS — M25.552 ACUTE HIP PAIN, LEFT: Primary | ICD-10-CM

## 2025-06-06 DIAGNOSIS — E11.65 TYPE 2 DIABETES MELLITUS WITH HYPERGLYCEMIA, WITH LONG-TERM CURRENT USE OF INSULIN (HCC): ICD-10-CM

## 2025-06-06 RX ORDER — INSULIN GLARGINE 100 [IU]/ML
10 INJECTION, SOLUTION SUBCUTANEOUS
Status: SHIPPED
Start: 2025-06-06

## 2025-06-06 RX ORDER — PANTOPRAZOLE SODIUM 40 MG/1
40 TABLET, DELAYED RELEASE ORAL DAILY
COMMUNITY

## 2025-06-06 RX ORDER — INSULIN ASPART 100 [IU]/ML
1-8 INJECTION, SOLUTION INTRAVENOUS; SUBCUTANEOUS 4 TIMES DAILY
COMMUNITY

## 2025-06-06 RX ORDER — ACETAMINOPHEN 500 MG
1000 TABLET ORAL 2 TIMES DAILY
COMMUNITY

## 2025-06-06 RX ORDER — ACETAMINOPHEN 325 MG/1
650 TABLET ORAL EVERY 4 HOURS PRN
COMMUNITY

## 2025-06-06 RX ORDER — SCOPOLAMINE 1 MG/3D
1 PATCH, EXTENDED RELEASE TRANSDERMAL
COMMUNITY

## 2025-06-06 NOTE — ASSESSMENT & PLAN NOTE
New onset severe left hip pain.  Patient reports that he was unable to lie on his left side.  No reported injuries.  Stat left hip x-ray was ordered which revealed no acute pathology.  Possibly nerve pain in setting of stroke?  Neurology made aware of pain as he has an upcoming appointment on 6/12/2025  Patient reports left hip pain is significantly impacting his mobility and self-care today  Plan:  Order Tylenol 1000 mg twice daily for 5 days then re-evaluate need to continue and Salonpas topical patch to left hip every morning and remove nightly  Follow-up with neurology as scheduled outpatient

## 2025-06-06 NOTE — PROGRESS NOTES
Facility: [facility]  POS: 31  Acute Med Note  Code status: Full code    Assessment/Plan:  75-year-old male with:    Acute hip pain, left  New onset severe left hip pain.  Patient reports that he was unable to lie on his left side.  No reported injuries.  Stat left hip x-ray was ordered which revealed no acute pathology.  Possibly nerve pain in setting of stroke?  Neurology made aware of pain as he has an upcoming appointment on 6/12/2025  Patient reports left hip pain is significantly impacting his mobility and self-care today  Plan:  Order Tylenol 1000 mg twice daily for 5 days then re-evaluate need to continue and Salonpas topical patch to left hip every morning and remove nightly  Follow-up with neurology as scheduled outpatient    Subjective: Severe pain left hip     Patient ID: Jose Maria Bethea is a 75 y.o. male.    75-year-old male seen and examined for evaluation of severe left hip pain.  Patient is currently residing at [facility] for rehabilitation.  Received patient resting in bed and he appeared in distress.  Patient's wife was present in the room.  Patient reports having severe left hip pain and unable to lie on left side.  He expressed that the pain is preventing him from participating fully in therapy.  Stat left hip x-ray was ordered which revealed no acute pathology.  Reviewed x-ray report with patient and informed him that the results were faxed to neurology.  Informed patient that pain in left hip could possibly be nerve pain due to stroke and discussed pain medication regimen.  Patient is currently being weaned off of mirtazapine with last dose on 6/10/2025 (due to fatigue in the morning and he felt that mirtazapine was a contributing factor).  Patient's wife requests neurology recommendations regarding an antidepressant; neurology office has been contacted.  He has a follow-up appointment with [facility] neurology on 6/12/2025.    The following portions of the patient's history were  reviewed and updated as appropriate:  Allergies, current medications, Past Family history, past medical history, past social history, past surgical history, and problem list.    Review of Systems   Constitutional:  Positive for activity change. Negative for chills, diaphoresis and fever.   Respiratory:  Negative for cough and shortness of breath.    Cardiovascular:  Negative for chest pain.   Gastrointestinal:  Negative for abdominal pain.   Endocrine: Negative.    Musculoskeletal:  Negative for gait problem.   Neurological:  Negative for dizziness, syncope, light-headedness and headaches.   Psychiatric/Behavioral:  Positive for dysphoric mood (Due to pain in left hip affecting ADLs) and sleep disturbance. Negative for agitation, behavioral problems and hallucinations.    All other systems reviewed and are negative.        Objective:    BP: 154/82    temp: 97.8      HR: 83       Resp: 16     Physical Exam  Vitals and nursing note reviewed.   Constitutional:       General: He is not in acute distress.     Appearance: He is not toxic-appearing or diaphoretic.   HENT:      Head: Normocephalic.      Nose: No congestion or rhinorrhea.      Mouth/Throat:      Mouth: Mucous membranes are moist.      Pharynx: No oropharyngeal exudate.     Eyes:      General:         Right eye: No discharge.         Left eye: No discharge.      Extraocular Movements: Extraocular movements intact.      Conjunctiva/sclera: Conjunctivae normal.       Cardiovascular:      Rate and Rhythm: Normal rate and regular rhythm.      Pulses: Normal pulses.   Pulmonary:      Effort: Pulmonary effort is normal. No respiratory distress.      Breath sounds: Normal breath sounds. No wheezing, rhonchi or rales.   Abdominal:      General: Bowel sounds are normal. There is no distension.      Palpations: Abdomen is soft.      Tenderness: There is no abdominal tenderness. There is no guarding.     Musculoskeletal:      Cervical back: Neck supple.      Right lower  leg: No edema.      Left lower leg: No edema.      Comments: Left-sided hemiparesis     Skin:     General: Skin is warm and dry.      Capillary Refill: Capillary refill takes less than 2 seconds.     Neurological:      Mental Status: He is alert. Mental status is at baseline.     Psychiatric:         Mood and Affect: Mood normal.         Behavior: Behavior normal.         Thought Content: Thought content normal.           I have spent a total time of 30 minutes in caring for this patient on the day of the visit/encounter including Diagnostic results, Instructions for management, Impressions, Counseling / Coordination of care, Documenting in the medical record, Reviewing/placing orders in the medical record (including tests, medications, and/or procedures), Obtaining or reviewing history  , and Communicating with other healthcare professionals .     This note was completed in part utilizing with Dragon medical one voice recognition software.  Grammatical errors, random word insertion, spelling mistakes, and incomplete sentences may be an occasional consequence of the system secondary to software limitations, ambient noise and hardware issues.  At the time of dictation, efforts were made to edit, clarify and/or correct errors.  Please read the chart carefully and recognize, using context, where substitutions have occurred.  If you have any questions or concerns about the context, text or information contained within the body of this dictation, please contact myself, the provider, for further clarification.

## 2025-06-09 ENCOUNTER — TELEPHONE (OUTPATIENT)
Dept: NEUROLOGY | Facility: CLINIC | Age: 75
End: 2025-06-09

## 2025-06-09 NOTE — TELEPHONE ENCOUNTER
Pt conf appt on 6/12/25 @2:00pm  at the [facility] with  . Thank you for choosing [facility] for your care

## 2025-06-10 NOTE — TELEPHONE ENCOUNTER
Called to notify of last minute change of schedule notified provider will not be able to come into the office. Pt requested to call wife to schedule sooner appt with  for a HFU

## 2025-06-11 ENCOUNTER — NURSING HOME VISIT (OUTPATIENT)
Dept: GERIATRICS | Facility: OTHER | Age: 75
End: 2025-06-11
Payer: COMMERCIAL

## 2025-06-11 DIAGNOSIS — L24.89 IRRITANT CONTACT DERMATITIS DUE TO OTHER AGENTS: Primary | ICD-10-CM

## 2025-06-11 DIAGNOSIS — Z79.4 TYPE 2 DIABETES MELLITUS WITH DIABETIC CATARACT, WITH LONG-TERM CURRENT USE OF INSULIN (HCC): ICD-10-CM

## 2025-06-11 DIAGNOSIS — N18.31 CHRONIC KIDNEY DISEASE, STAGE 3A (HCC): ICD-10-CM

## 2025-06-11 DIAGNOSIS — R13.12 OROPHARYNGEAL DYSPHAGIA: ICD-10-CM

## 2025-06-11 DIAGNOSIS — I10 ESSENTIAL HYPERTENSION: ICD-10-CM

## 2025-06-11 DIAGNOSIS — G47.33 OSA (OBSTRUCTIVE SLEEP APNEA): ICD-10-CM

## 2025-06-11 DIAGNOSIS — I63.9 BRAINSTEM STROKE (HCC): ICD-10-CM

## 2025-06-11 DIAGNOSIS — E11.36 TYPE 2 DIABETES MELLITUS WITH DIABETIC CATARACT, WITH LONG-TERM CURRENT USE OF INSULIN (HCC): ICD-10-CM

## 2025-06-11 PROCEDURE — 99309 SBSQ NF CARE MODERATE MDM 30: CPT | Performed by: FAMILY MEDICINE

## 2025-06-11 NOTE — PROGRESS NOTES
Facility: [facility]  POS: 31  Progress Note    Chief Complaint/Reason for visit: STR follow-up  Code status: CPR  History of Present Illness: The patient is seen in his room today for STR f/u. Wife at bed side. He reports good sleep, good appetite. C/o a small painful nodule posterior RUE at the site where Eddie sensor was applied.   Past Medical History: unchanged from history and physical  Past Medical History[1]  Family History: Unchanged from history and physical  Social History: Unchanged from history and physical  Review of systems: As per review of medical illness, all other systems reviewed and negative.  Medications: All medication and routine orders were reviewed and updated  Allergies: NKDA  Consults reviewed:PT, OT, Speech, Nutrition, and Other  Labs/Diagnostics (reviewed by this provider): Copy in Chart  Imaging Reviewed: no new imaging  Vitals and nursing note in [facility] EHR reviewed. Vitals stable  General: no acute distress.  HENT:      Head: Normocephalic.      Nose: Nose normal.      Mouth: Mucous membranes are moist.   Eyes:       Right eye: No discharge.         Left eye: No discharge.      Conjunctivae normal.   Cardiovascular:      Normal rate and regular rhythm. No murmur heard.  Pulmonary:      Pulmonary effort is normal. No wheezing, rhonchi or rales.   Abdominal:      Bowel sounds are normal. There is no distension.      Abdomen is soft. There is no abdominal tenderness.   Musculoskeletal: Left hempiparesis     Right lower leg: No edema.      Left lower leg: No edema.   Skin:     General: Skin is warm. Small #0.5 cm raised, tender nodule posterior RUE, no drainage nor fluctuance noted.  Neurological: alert.      Oriented to person, place, and time. Cooperative, follow commands      Behavior: normal.     Assessment/Plan:    Irritant contact dermatitis due to other agents  With small 0.5 cm tender nodule at the site of CGM sensor without sign of infection   Will treat locally with  mupirocin ointment for 7 days  Monitor for size, drainage, or systemic Sx with fever, chills, N/V.    Brainstem stroke (HCC)  With left-sided hemiparesis, dysarthria, and vertigo  Continue donepezil 5 mg daily, clopidogrel, and atorvastatin  Continue scopolamine patch every 3 days  Continue scheduled Zofran before meals and as needed dosing which has been effective in controlling nausea and vomiting  Continue PT/OT/ST      Essential hypertension  BP elevated 140s-150s/60s-80s  Goal BP < 130/80  Consider gradual tapering nifedipine to switch to other antihypertensive med for better control BP  Continue monitoring BP daily.    DEWEY (obstructive sleep apnea)  Continue CPAP every night    Dysphagia  Currently on a regular texture, diabetic diet with thin liquids without any issues reported  Continue aspiration precautions    Type 2 diabetes mellitus with diabetic cataract, with long-term current use of insulin ([facility])  Lab Results   Component Value Date    HGBA1C 9.0 (H) 03/26/2025   Continue Lantus HS, Jardiance 25 mg daily, and Metformin 1000 mg BID  Monitor BG on CGM    Chronic kidney disease, stage 3a (HCC)  Elevated Creatinine 1.49 on 6/4/25, likely preazotemia 2/2 dehydration/hypovolemia  Encouraged hydration  Will repeat BMP. Order placed.     Katiana Diaz MD         [1]   Past Medical History:  Diagnosis Date    COVID-19 07/2022    Diabetes mellitus (HCC)     Type 2    Dyslipidemia     Hypertension     Obstructive sleep apnea on CPAP

## 2025-06-11 NOTE — TELEPHONE ENCOUNTER
Called and spoke to pt's wife who agreed to a VR appt and will be present during VR call as she has some questions and concerns for provider, she requested link be sent to her phone#.

## 2025-06-11 NOTE — TELEPHONE ENCOUNTER
Patient's wife called back stating they called  again regarding whether visit could be virtual.  Please call back with new appointment details as she stated she spoke with someone yesterday and he spoke with someone today and no notations found in chart.  Please gather information and call back.

## 2025-06-12 ENCOUNTER — TELEMEDICINE (OUTPATIENT)
Dept: NEUROLOGY | Facility: CLINIC | Age: 75
End: 2025-06-12
Payer: COMMERCIAL

## 2025-06-12 DIAGNOSIS — I63.9 BRAINSTEM STROKE (HCC): Primary | ICD-10-CM

## 2025-06-12 DIAGNOSIS — E78.2 MIXED HYPERLIPIDEMIA: ICD-10-CM

## 2025-06-12 PROCEDURE — G2211 COMPLEX E/M VISIT ADD ON: HCPCS | Performed by: PSYCHIATRY & NEUROLOGY

## 2025-06-12 PROCEDURE — 99215 OFFICE O/P EST HI 40 MIN: CPT | Performed by: PSYCHIATRY & NEUROLOGY

## 2025-06-12 NOTE — PROGRESS NOTES
"Virtual Regular Visit  Name: Jose Maria Bethea      : 1950      MRN: [id number]  Encounter Provider: Jesica Short MD  Encounter Date: 2025   Encounter department: [facility] [place]  :  Assessment & Plan  Brainstem stroke (HCC)    Secondary Prevention -   -for medications - recommend continuation of combination of plavix and lipitor   -Blood Pressure goal < 130/80, BP is currently at goal   -LDL goal <70, last LDL is   -DEWEY screening - no snoring   - cardiac workup   Continue w/ aricept for speech recovery     Therapy needs  Not in therapy at this time     Counseling/stroke education -   -I advised patient to avoid using NSAIDs for headaches or other pain and to stick to tylenol if needed  -Recommend lifestyle modifications such as mediterranean diet & regular exercise regimen (brisk walking) atleast 4-5 times a week for 20-30 minutes.   -I educated patient/family regarding medication compliance  -encourage smoking cessation, and control of diabetes and hypertension; defer management to primary        Mixed hyperlipidemia  Continue with lipitor        Follow up in 4 weeks.     I would be happy to see the patient sooner if any new questions/concerns arise.  Patient/Guardian was advised to the call the office if they have any questions and concerns in the meantime.     We discussed \"red flag\" headache symptoms including symptoms such as facial droop on one side, weakness/paralysis on either side, speech trouble, numbness on one side, balance issues, any vision changes, extreme dizziness or significant increase in severity of headache or a sudden onset severe headache, patient is to call  immediately or to proceed to the nearest ER.       History of Present Illness     HPI      This is a 76 y/o Male with history of CKD stage 3, DM, HLD, hypertension, and DEWEY on cpap who is here as a hospital follow up.    Patient was in the hospital on 3/25/25 with dizziness and unsteady gait, " and he had workup which consisted of CT head which was negative. CTA head and neck which demonstrated right V4 occlusion which was noted to be chronic. BP was elevated at 181/92. Mri brain which showed R ventral medullary stroke. Patient was started on DAPT for 21 days followed by plavix monotherapy.     He had a similar event few days later, and repeat imaging shows progression of the V4 occlusion.  No other changes were made.     Patient is doing well now. He denies any new TIA/CVA like symptoms.  Patient has been doing ok, but does have some good days and bad days. He still has dizziness and is getting scopalamine and zofran. He does not have any residual motor or sensory deficits. He is complaint w/ his medications and is tolerating them well.     Review of Systems   Constitutional: Negative.    HENT: Negative.     Eyes: Negative.    Respiratory: Negative.     Cardiovascular: Negative.    Gastrointestinal: Negative.    Endocrine: Negative.    Genitourinary: Negative.    Musculoskeletal: Negative.    Skin: Negative.    Allergic/Immunologic: Negative.    Neurological: Negative.    Hematological: Negative.    Psychiatric/Behavioral: Negative.     All other systems reviewed and are negative.      Objective   There were no vitals taken for this visit.    Physical Exam    Administrative Statements   Encounter provider Jesica Short MD    The Patient is located at Home and in the following state in which I hold an active license [place].    The patient was identified by name and date of birth. Jose Maria Bethea was informed that this is a telemedicine visit and that the visit is being conducted through the Epic Embedded platform. He agrees to proceed..  My office door was closed. No one else was in the room.  He acknowledged consent and understanding of privacy and security of the video platform. The patient has agreed to participate and understands they can discontinue the visit at any time.    I have spent a total time of  40 minutes in caring for this patient on the day of the visit/encounter including Risks and benefits of tx options, Instructions for management, Patient and family education, Counseling / Coordination of care, Documenting in the medical record, Reviewing/placing orders in the medical record (including tests, medications, and/or procedures), Obtaining or reviewing history  , and Communicating with other healthcare professionals , not including the time spent for establishing the audio/video connection.

## 2025-06-17 ENCOUNTER — NURSING HOME VISIT (OUTPATIENT)
Dept: GERIATRICS | Facility: OTHER | Age: 75
End: 2025-06-17
Payer: COMMERCIAL

## 2025-06-17 DIAGNOSIS — R11.2 NAUSEA AND VOMITING, UNSPECIFIED VOMITING TYPE: ICD-10-CM

## 2025-06-17 DIAGNOSIS — I10 ESSENTIAL HYPERTENSION: ICD-10-CM

## 2025-06-17 DIAGNOSIS — Z79.4 TYPE 2 DIABETES MELLITUS WITH DIABETIC CATARACT, WITH LONG-TERM CURRENT USE OF INSULIN (HCC): ICD-10-CM

## 2025-06-17 DIAGNOSIS — I63.9 BRAINSTEM STROKE (HCC): Primary | ICD-10-CM

## 2025-06-17 DIAGNOSIS — R53.81 DEBILITY: ICD-10-CM

## 2025-06-17 DIAGNOSIS — N18.31 CHRONIC KIDNEY DISEASE, STAGE 3A (HCC): ICD-10-CM

## 2025-06-17 DIAGNOSIS — E11.36 TYPE 2 DIABETES MELLITUS WITH DIABETIC CATARACT, WITH LONG-TERM CURRENT USE OF INSULIN (HCC): ICD-10-CM

## 2025-06-17 PROBLEM — L24.89 IRRITANT CONTACT DERMATITIS DUE TO OTHER AGENTS: Status: ACTIVE | Noted: 2025-06-17

## 2025-06-17 PROBLEM — M25.552 ACUTE HIP PAIN, LEFT: Status: RESOLVED | Noted: 2025-06-06 | Resolved: 2025-06-17

## 2025-06-17 PROCEDURE — 99309 SBSQ NF CARE MODERATE MDM 30: CPT | Performed by: NURSE PRACTITIONER

## 2025-06-17 NOTE — ASSESSMENT & PLAN NOTE
BP elevated 140s-150s/60s-80s  Goal BP < 130/80  Consider gradual tapering nifedipine to switch to other antihypertensive med for better control BP  Continue monitoring BP daily.

## 2025-06-17 NOTE — PROGRESS NOTES
"Facility: [facility]  POS: 31  Progress Note    Chief Complaint/Reason for visit: STR follow-up visit  Code status: Full code  History of Present Illness: 75-year-old male seen and examined for STR follow-up of acute and chronic medical conditions.  Patient is currently residing at [facility] for rehabilitation.  Patient had telemedicine visit with neurology on 6/12/2025; notes reviewed.  Received patient resting in bed at time of examination and he appeared in no distress.  Patient reported that he became very nauseous this morning and vomited.  He attributes nausea and vomiting due to taking his medications prior to eating.  He then became nauseous again after ambulating 50 feet x 2.  He is currently stable this afternoon.  Physical therapist present in room and will be doing light exercises with him this afternoon.  No further episodes of \"freezing\" of upper extremities.  Maintained on prophylactic scopolamine patch and Zofran to lessen/prevent symptoms.  See A/P for additional information.  Past Medical History: unchanged from history and physical  Past Medical History:  Diagnosis Date    COVID-19 07/2022    Diabetes mellitus (HCC)     Type 2    Dyslipidemia     Hypertension     Obstructive sleep apnea on CPAP      Family History: Unchanged from history and physical  Social History: Unchanged from history and physical  Review of systems: As per review of medical illness, all other systems reviewed and negative.  Medications: All medication and routine orders were reviewed and updated  Allergies: NKDA  Consults reviewed:PT, OT, Speech, Nutrition, and Other  Labs/Diagnostics (reviewed by this provider): Copy in Chart  Imaging Reviewed:  Physical Exam  Weight: 133.9 lb Temp:  97.5         BP: 118/79    Pulse: 67 Resp: 16       Orientation:Person, Place, and Day     Physical Exam  Vitals and nursing note reviewed.   Constitutional:       General: He is not in acute distress.     Appearance: He is not " toxic-appearing or diaphoretic.   HENT:      Head: Normocephalic.      Nose: No congestion or rhinorrhea.      Mouth/Throat:      Mouth: Mucous membranes are moist.      Pharynx: No oropharyngeal exudate.     Eyes:      General:         Right eye: No discharge.         Left eye: No discharge.      Extraocular Movements: Extraocular movements intact.      Conjunctiva/sclera: Conjunctivae normal.      Comments: Wears prescription glasses.     Cardiovascular:      Rate and Rhythm: Normal rate and regular rhythm.      Pulses: Normal pulses.   Pulmonary:      Effort: Pulmonary effort is normal. No respiratory distress.      Breath sounds: Normal breath sounds. No wheezing, rhonchi or rales.   Abdominal:      General: Bowel sounds are normal. There is no distension.      Palpations: Abdomen is soft.      Tenderness: There is no abdominal tenderness. There is no guarding.     Musculoskeletal:      Cervical back: Neck supple.      Right lower leg: No edema.      Left lower leg: No edema.      Comments: With severe weakness left side.     Skin:     General: Skin is warm and dry.      Capillary Refill: Capillary refill takes less than 2 seconds.     Neurological:      Mental Status: He is alert. Mental status is at baseline.      Motor: Weakness present.      Gait: Gait abnormal.     Psychiatric:         Mood and Affect: Mood normal.         Behavior: Behavior normal.         Thought Content: Thought content normal.       Assessment/Plan:  75-year-old male with:    Brainstem stroke  With severe residual left-sided weakness and dysarthria  Currently residing at [facility] rehabilitation  Continue Aricept for speech recovery  LDL goal less than 70 as per neurology  Avoid NSAIDs for headaches  Continue PT/OT/ST  Follow-up with neurology 10/15/2025    Essential hypertension  SBPs trending 140s to 150s.  Will reach out to neurology as BP goal <130/80 in neurology progress Notes  Patient is currently on nifedipine ER 60  mg daily and valsartan 320 mg daily  Continue to monitor and collaborate with neurology    Type 2 diabetes mellitus with diabetic cataract, with long-term current use of insulin ([facility])    Lab Results   Component Value Date    HGBA1C 9.0 (H) 03/26/2025   Patient has personal CGM for blood glucose checks.  He has an order in place for fingerstick blood glucose for comparison if blood glucose readings less than 70 or greater than 450 on his CGM monitor  Goal hemoglobin A1c less than 7.0 in setting of age and comorbidities and blood glucose currently not at goal  Continue diabetic/carb controlled diet  Continue sliding scale insulin coverage with NovoLog 4 times daily according to blood glucose levels  Lantus recently increased to 13 units nightly  Continue Jardiance 25 mg daily and metformin 1000 mg twice daily  Continue to monitor blood glucose and adjust medications if clinically indicated    Nausea and vomiting  Chronic since stroke  Continue prophylactic Zofran 3 times a day and as needed dosing  Continue prophylactic scopolamine patch every 3 days    Debility  Multifactorial  Patient has been participating in therapy and ambulated 50 feet today x 2  Continue PT/OT/ST  Continue fall precautions  Ensure adequate hydration and nutrition    assisting patient with discharge planning    Chronic kidney disease, stage 3a ([facility])  Lab Results   Component Value Date    EGFR 54 04/01/2025    EGFR 54 03/31/2025    EGFR 57 03/27/2025    CREATININE 1.29 04/01/2025    CREATININE 1.29 03/31/2025    CREATININE 1.24 03/27/2025   Most recent creatinine level 1.49  Scheduled BMP 6/18/2025  Avoid nephrotoxic medications and hypotension  Ensure adequate hydration     This note was completed in part utilizing [employer] direct voice recognition software.  Grammatical errors, random word insertion, spelling mistakes, and incomplete sentences may be an occasional consequence of the system secondary to software  limitations, ambient noise and hardware issues.  At the time of dictation, efforts were made to edit, clarify and/or correct errors.  Please read the chart carefully and recognize, using context, where substitutions have occurred.  If you have any questions or concerns about the context, text or information contained within the body of this dictation, please contact myself, the provider, for further clarification.    VICENTE Reed  6/17/20258:07 PM

## 2025-06-17 NOTE — ASSESSMENT & PLAN NOTE
Secondary Prevention -   -for medications - recommend continuation of combination of plavix and lipitor   -Blood Pressure goal < 130/80, BP is currently at goal   -LDL goal <70, last LDL is   -DEWEY screening - no snoring   - cardiac workup   Continue w/ aricept for speech recovery     Therapy needs  Not in therapy at this time     Counseling/stroke education -   -I advised patient to avoid using NSAIDs for headaches or other pain and to stick to tylenol if needed  -Recommend lifestyle modifications such as mediterranean diet & regular exercise regimen (brisk walking) atleast 4-5 times a week for 20-30 minutes.   -I educated patient/family regarding medication compliance  -encourage smoking cessation, and control of diabetes and hypertension; defer management to primary

## 2025-06-17 NOTE — ASSESSMENT & PLAN NOTE
Currently on a regular texture, diabetic diet with thin liquids without any issues reported  Continue aspiration precautions

## 2025-06-17 NOTE — ASSESSMENT & PLAN NOTE
Elevated Creatinine 1.49 on 6/4/25, likely preazotemia 2/2 dehydration/hypovolemia  Encouraged hydration  Will repeat BMP. Order placed.

## 2025-06-17 NOTE — ASSESSMENT & PLAN NOTE
SBPs trending 140s to 150s.  Will reach out to neurology as BP goal <130/80 in neurology progress Notes  Patient is currently on nifedipine ER 60 mg daily and valsartan 320 mg daily  Continue to monitor and collaborate with neurology   never

## 2025-06-17 NOTE — ASSESSMENT & PLAN NOTE
With small 0.5 cm tender nodule at the site of CGM sensor without sign of infection   Will treat locally with mupirocin ointment for 7 days  Monitor for size, drainage, or systemic Sx with fever, chills, N/V.

## 2025-06-17 NOTE — ASSESSMENT & PLAN NOTE
Lab Results   Component Value Date    HGBA1C 9.0 (H) 03/26/2025   Continue Lantus HS, Jardiance 25 mg daily, and Metformin 1000 mg BID  Monitor BG on CGM

## 2025-06-17 NOTE — ASSESSMENT & PLAN NOTE
With left-sided hemiparesis, dysarthria, and vertigo  Continue donepezil 5 mg daily, clopidogrel, and atorvastatin  Continue scopolamine patch every 3 days  Continue scheduled Zofran before meals and as needed dosing which has been effective in controlling nausea and vomiting  Continue PT/OT/ST

## 2025-06-18 NOTE — ASSESSMENT & PLAN NOTE
Lab Results   Component Value Date    EGFR 54 04/01/2025    EGFR 54 03/31/2025    EGFR 57 03/27/2025    CREATININE 1.29 04/01/2025    CREATININE 1.29 03/31/2025    CREATININE 1.24 03/27/2025   Most recent creatinine level 1.49  Scheduled BMP 6/18/2025  Avoid nephrotoxic medications and hypotension  Ensure adequate hydration

## 2025-06-18 NOTE — ASSESSMENT & PLAN NOTE
Lab Results   Component Value Date    HGBA1C 9.0 (H) 03/26/2025   Patient has personal CGM for blood glucose checks.  He has an order in place for fingerstick blood glucose for comparison if blood glucose readings less than 70 or greater than 450 on his CGM monitor  Goal hemoglobin A1c less than 7.0 in setting of age and comorbidities and blood glucose currently not at goal  Continue diabetic/carb controlled diet  Continue sliding scale insulin coverage with NovoLog 4 times daily according to blood glucose levels  Lantus recently increased to 13 units nightly  Continue Jardiance 25 mg daily and metformin 1000 mg twice daily  Continue to monitor blood glucose and adjust medications if clinically indicated

## 2025-06-18 NOTE — ASSESSMENT & PLAN NOTE
Multifactorial  Patient has been participating in therapy and ambulated 50 feet today x 2  Continue PT/OT/ST  Continue fall precautions  Ensure adequate hydration and nutrition    assisting patient with discharge planning

## 2025-06-18 NOTE — ASSESSMENT & PLAN NOTE
Chronic since stroke  Continue prophylactic Zofran 3 times a day and as needed dosing  Continue prophylactic scopolamine patch every 3 days

## 2025-06-25 ENCOUNTER — TELEPHONE (OUTPATIENT)
Age: 75
End: 2025-06-25

## 2025-06-25 ENCOUNTER — NURSING HOME VISIT (OUTPATIENT)
Dept: GERIATRICS | Facility: OTHER | Age: 75
End: 2025-06-25
Payer: COMMERCIAL

## 2025-06-25 DIAGNOSIS — R11.2 NAUSEA AND VOMITING, UNSPECIFIED VOMITING TYPE: ICD-10-CM

## 2025-06-25 DIAGNOSIS — E78.2 MIXED HYPERLIPIDEMIA: ICD-10-CM

## 2025-06-25 DIAGNOSIS — R53.81 DEBILITY: ICD-10-CM

## 2025-06-25 DIAGNOSIS — I10 ESSENTIAL HYPERTENSION: ICD-10-CM

## 2025-06-25 DIAGNOSIS — N18.31 CHRONIC KIDNEY DISEASE, STAGE 3A (HCC): ICD-10-CM

## 2025-06-25 DIAGNOSIS — I63.9 BRAINSTEM STROKE (HCC): Primary | ICD-10-CM

## 2025-06-25 DIAGNOSIS — E11.36 TYPE 2 DIABETES MELLITUS WITH DIABETIC CATARACT, WITH LONG-TERM CURRENT USE OF INSULIN (HCC): ICD-10-CM

## 2025-06-25 DIAGNOSIS — K59.01 SLOW TRANSIT CONSTIPATION: ICD-10-CM

## 2025-06-25 DIAGNOSIS — Z79.4 TYPE 2 DIABETES MELLITUS WITH DIABETIC CATARACT, WITH LONG-TERM CURRENT USE OF INSULIN (HCC): ICD-10-CM

## 2025-06-25 DIAGNOSIS — G47.33 OSA (OBSTRUCTIVE SLEEP APNEA): ICD-10-CM

## 2025-06-25 PROBLEM — R13.10 DYSPHAGIA: Status: RESOLVED | Noted: 2025-04-28 | Resolved: 2025-06-25

## 2025-06-25 PROCEDURE — 99316 NF DSCHRG MGMT 30 MIN+: CPT | Performed by: NURSE PRACTITIONER

## 2025-06-25 NOTE — ASSESSMENT & PLAN NOTE
Lab Results   Component Value Date    EGFR 54 04/01/2025    EGFR 54 03/31/2025    EGFR 57 03/27/2025    CREATININE 1.29 04/01/2025    CREATININE 1.29 03/31/2025    CREATININE 1.24 03/27/2025   Most recent creatinine 1.25 on 6/18/2025  Avoid hypotension  Avoid nephrotoxic medications  Encourage p.o. fluids  Follow-up with PCP for management

## 2025-06-25 NOTE — TELEPHONE ENCOUNTER
Marv Sinha  called to inquire when patients follow up is scheduled for.    Provided appointment details : 10/15/2025 at 3 pm with Dr. Short in [place].

## 2025-06-25 NOTE — ASSESSMENT & PLAN NOTE
Multifactorial  Patient participated in therapy during SNF stay  Continue fall precautions  Social service ordered a wheelchair, hospital bed, 3 in 1 commode  A referral was placed to [facility] by social service to continue PT/OT at home

## 2025-06-25 NOTE — ASSESSMENT & PLAN NOTE
Lab Results   Component Value Date    HGBA1C 9.0 (H) 03/26/2025   Goal hemoglobin A1c <7.0 in setting of age and comorbidities  Patient has personal CGM for blood glucose checks.  Blood glucose readings have been stable  Continue diabetic/carb controlled diet  Continue Lantus insulin 13 units every night  Continue NovoLog sliding scale insulin according to blood glucose levels 4 times a day  Continue Jardiance 25 mg daily and metformin 1000 mg twice daily  Follow-up with endocrinology for management

## 2025-06-25 NOTE — ASSESSMENT & PLAN NOTE
SBPs trending mostly 130s to 150s; 2-week BP log faxed to neurology office for review  Patient is currently on nifedipine ER 60 mg daily and valsartan 320 mg daily  Follow-up with PCP for management

## 2025-06-25 NOTE — PROGRESS NOTES
[facility]  [address] [address]  [phone]  DISCHARGE SUMMARY  POS: 31  Facility:  [facility]     NAME: Jose Maria Bethea  AGE: 75 y.o. SEX: male  DATE OF ADMISSION: 4/24/2025 DATE OF DISCHARGE: 6/26/2025 DISCHARGE DISPOSITION: Home  Code status: Full code  Reason for admission: Patient was admitted from [facility] for continued rehabilitation  Patient was hospitalized at [facility] back on campus from 3/31/2025 to 4/3/2025 for brainstem stroke with left hemiparesis  Admission Diagnoses: Brainstem stroke, essential hypertension, type 2 diabetes mellitus on long-term current use of insulin, chronic kidney disease stage IIIa, mixed hyperlipidemia, obstructive sleep apnea, debility  Additional Problems:   Past Medical History:  Diagnosis Date    COVID-19 07/2022    Diabetes mellitus (HCC)     Type 2    Dyslipidemia     Hypertension     Obstructive sleep apnea on CPAP       Discharge Diagnoses: See problem list follow up recommendations below.    Course of stay: Patient was admitted to [facility] for continued rehabilitation.  During the resident's stay at [facility], he received skilled nursing care, PT, OT, social service support, social service support, and medical management.  He is scheduled to be discharged home on 6/26/2025.  Patient's wife is very supportive.  A referral was placed to [facility] by social service to continue PT/OT at home.  A wheelchair, hospital bed, and 3 in 1 commode was ordered by social service.    Labs and testing performed during stay: CBC, BMP    New Medications: None  Discharge Medications: See discharge medication list which was reviewed in [facility] and compared to facility orders for accuracy.  Status at time of discharge exam: Stable    Today's Visit: 6/25/202512:51 PM    Subjective: No complaints    Review of systems: As per review of medical illness, all other systems reviewed and negative.    Vitals: Weight:  133.9 pounds   BP: 154/78    temp: 97.9    heart rate: 76    Resp: 16    Exam: Physical Exam  Vitals and nursing note reviewed.   Constitutional:       General: He is not in acute distress.     Appearance: He is not toxic-appearing or diaphoretic.      Comments: Elderly male who appears with chronic illness.  In no distress.   HENT:      Head: Normocephalic.      Nose: No congestion.      Mouth/Throat:      Mouth: Mucous membranes are moist.      Pharynx: No oropharyngeal exudate.     Eyes:      Conjunctiva/sclera: Conjunctivae normal.      Comments: Wears prescription glasses.     Cardiovascular:      Rate and Rhythm: Normal rate and regular rhythm.   Pulmonary:      Effort: Pulmonary effort is normal. No respiratory distress.   Abdominal:      General: Bowel sounds are normal. There is no distension.      Palpations: Abdomen is soft.      Tenderness: There is no abdominal tenderness. There is no guarding.     Musculoskeletal:      Cervical back: Neck supple.      Right lower leg: No edema.      Left lower leg: No edema.      Comments: With severe left-sided weakness.     Skin:     General: Skin is warm and dry.      Capillary Refill: Capillary refill takes less than 2 seconds.     Neurological:      Mental Status: He is alert and oriented to person, place, and time.      Motor: Weakness present.     Psychiatric:         Mood and Affect: Mood normal.         Behavior: Behavior normal.         Thought Content: Thought content normal.       Discussion with patient and further instructions:  -Fall precautions  -Medication list was reviewed   -Importance of continued PT/OT    Follow-up Recommendations: Please follow-up with your primary care physician within 7-10 days of discharge to review medication changes and current status.     Problem List Follow-up Recommendations:  75-year-old male with:    Brainstem stroke (HCC)  Patient was hospitalized at [facility] campus from 3/31/2025 to 4/3/2025 for  brainstem stroke with left hemiparesis.  He received rehab at [facility] then transferred to [facility] for continued rehabilitation  He had follow-up with neurology on 6/17/2025  Continue donepezil 5 mg daily and mirtazapine 7.5 mg nightly  Goal BP <130/80; SBPs have been trending mostly 130s to 150s.  A 2-week BP log was faxed to neurology office for review  LDL goal less than 70  He received PT/OT/ST during his SNF stay at [facility]  Follow-up with PCP, physiatry, and neurology    Mixed hyperlipidemia  Continue Lipitor  Follow-up with PCP    Type 2 diabetes mellitus with diabetic cataract, with long-term current use of insulin ([facility])    Lab Results   Component Value Date    HGBA1C 9.0 (H) 03/26/2025   Goal hemoglobin A1c <7.0 in setting of age and comorbidities  Patient has personal CGM for blood glucose checks.  Blood glucose readings have been stable  Continue diabetic/carb controlled diet  Continue Lantus insulin 13 units every night  Continue NovoLog sliding scale insulin according to blood glucose levels 4 times a day  Continue Jardiance 25 mg daily and metformin 1000 mg twice daily  Follow-up with endocrinology for management    Chronic kidney disease, stage 3a ([facility])  Lab Results   Component Value Date    EGFR 54 04/01/2025    EGFR 54 03/31/2025    EGFR 57 03/27/2025    CREATININE 1.29 04/01/2025    CREATININE 1.29 03/31/2025    CREATININE 1.24 03/27/2025   Most recent creatinine 1.25 on 6/18/2025  Avoid hypotension  Avoid nephrotoxic medications  Encourage p.o. fluids  Follow-up with PCP for management    DEWEY (obstructive sleep apnea)  Continue CPAP every night  Routine follow-up with pulmonology outpatient    Nausea and vomiting  Chronic since stroke  Symptoms controlled with current regimen  Continue prophylactic Zofran 3 times a day and as needed dosing  Continue prophylactic scopolamine patch every 3 days    Essential hypertension  SBPs trending mostly 130s to 150s; 2-week BP log faxed  to neurology office for review  Patient is currently on nifedipine ER 60 mg daily and valsartan 320 mg daily  Follow-up with PCP for management    Slow transit constipation  Controlled on MiraLAX daily    Debility  Multifactorial  Patient participated in therapy during SNF stay  Continue fall precautions  Social service ordered a wheelchair, hospital bed, 3 in 1 commode  A referral was placed to [facility] by social service to continue PT/OT at home     I have spent >30 minutes with patient today in which greater than 50% of this time was spent in counseling/coordination of care regarding Diagnostic results, Instructions for management, Importance of tx compliance, Risk factor reductions, Counseling / Coordination of care, Documenting in the medical record, Reviewing/placing orders in the medical record (including tests, medications, and/or procedures), Obtaining or reviewing history  , and Communicating with other healthcare professionals .    PCP made aware of discharge summary via epic communications.    This note was completed in part utilizing Dragon Medical one voice recognition software.  Grammatical errors, random word insertion, spelling mistakes, and incomplete sentences may be an occasional consequence of the system secondary to software limitations, ambient noise and hardware issues.  At the time of dictation, efforts were made to edit, clarify and/or correct errors.  Please read the chart carefully and recognize, using context, where substitutions have occurred.  If you have any questions or concerns about the context, text or information contained within the body of this dictation, please contact myself, the provider, for further clarification.    VICENTE Reed  6/25/202512:51 PM

## 2025-06-25 NOTE — ASSESSMENT & PLAN NOTE
Chronic since stroke  Symptoms controlled with current regimen  Continue prophylactic Zofran 3 times a day and as needed dosing  Continue prophylactic scopolamine patch every 3 days

## 2025-06-25 NOTE — ASSESSMENT & PLAN NOTE
Patient was hospitalized at [facility] from 3/31/2025 to 4/3/2025 for brainstem stroke with left hemiparesis.  He received rehab at [facility] then transferred to [facility] for continued rehabilitation  He had follow-up with neurology on 6/17/2025  Continue donepezil 5 mg daily and mirtazapine 7.5 mg nightly  Goal BP <130/80; SBPs have been trending mostly 130s to 150s.  A 2-week BP log was faxed to neurology office for review  LDL goal less than 70  He received PT/OT/ST during his SNF stay at [facility]  Follow-up with PCP, physiatry, and neurology

## 2025-06-26 ENCOUNTER — TELEPHONE (OUTPATIENT)
Age: 75
End: 2025-06-26

## 2025-06-26 NOTE — TELEPHONE ENCOUNTER
Patient is discharging in couple of hours from [facility]. Please reach out to the patient to schedule TCM visit.  Thank you!!

## 2025-06-30 ENCOUNTER — RA CDI HCC (OUTPATIENT)
Dept: OTHER | Facility: HOSPITAL | Age: 75
End: 2025-06-30

## 2025-07-01 ENCOUNTER — TELEPHONE (OUTPATIENT)
Age: 75
End: 2025-07-01

## 2025-07-07 ENCOUNTER — OFFICE VISIT (OUTPATIENT)
Dept: INTERNAL MEDICINE CLINIC | Facility: CLINIC | Age: 75
End: 2025-07-07
Payer: COMMERCIAL

## 2025-07-07 ENCOUNTER — TELEPHONE (OUTPATIENT)
Dept: INTERNAL MEDICINE CLINIC | Facility: CLINIC | Age: 75
End: 2025-07-07

## 2025-07-07 VITALS
SYSTOLIC BLOOD PRESSURE: 154 MMHG | OXYGEN SATURATION: 96 % | TEMPERATURE: 98.8 F | HEART RATE: 75 BPM | DIASTOLIC BLOOD PRESSURE: 86 MMHG

## 2025-07-07 DIAGNOSIS — R26.89 BALANCE DISORDER: ICD-10-CM

## 2025-07-07 DIAGNOSIS — I10 ESSENTIAL HYPERTENSION: ICD-10-CM

## 2025-07-07 DIAGNOSIS — E11.65 TYPE 2 DIABETES MELLITUS WITH HYPERGLYCEMIA, WITH LONG-TERM CURRENT USE OF INSULIN (HCC): ICD-10-CM

## 2025-07-07 DIAGNOSIS — L98.9 SKIN LESION: Primary | ICD-10-CM

## 2025-07-07 DIAGNOSIS — Z79.4 TYPE 2 DIABETES MELLITUS WITH HYPERGLYCEMIA, WITH LONG-TERM CURRENT USE OF INSULIN (HCC): ICD-10-CM

## 2025-07-07 DIAGNOSIS — E53.8 B12 DEFICIENCY: ICD-10-CM

## 2025-07-07 DIAGNOSIS — K21.9 GASTROESOPHAGEAL REFLUX DISEASE WITHOUT ESOPHAGITIS: ICD-10-CM

## 2025-07-07 DIAGNOSIS — L02.93 CARBUNCLE: ICD-10-CM

## 2025-07-07 DIAGNOSIS — I63.9 BRAINSTEM STROKE (HCC): ICD-10-CM

## 2025-07-07 DIAGNOSIS — R11.0 NAUSEA: ICD-10-CM

## 2025-07-07 DIAGNOSIS — R41.89 COGNITIVE DECLINE: ICD-10-CM

## 2025-07-07 PROCEDURE — 99495 TRANSJ CARE MGMT MOD F2F 14D: CPT | Performed by: INTERNAL MEDICINE

## 2025-07-07 PROCEDURE — 87147 CULTURE TYPE IMMUNOLOGIC: CPT | Performed by: INTERNAL MEDICINE

## 2025-07-07 PROCEDURE — 87186 SC STD MICRODIL/AGAR DIL: CPT | Performed by: INTERNAL MEDICINE

## 2025-07-07 PROCEDURE — 87205 SMEAR GRAM STAIN: CPT | Performed by: INTERNAL MEDICINE

## 2025-07-07 PROCEDURE — 87070 CULTURE OTHR SPECIMN AEROBIC: CPT | Performed by: INTERNAL MEDICINE

## 2025-07-07 RX ORDER — DOXYCYCLINE 100 MG/1
100 CAPSULE ORAL EVERY 12 HOURS SCHEDULED
Qty: 10 CAPSULE | Refills: 0 | Status: SHIPPED | OUTPATIENT
Start: 2025-07-07 | End: 2025-07-12

## 2025-07-07 RX ORDER — SCOPOLAMINE 1 MG/3D
1 PATCH, EXTENDED RELEASE TRANSDERMAL
Qty: 4 PATCH | Refills: 5 | Status: SHIPPED | OUTPATIENT
Start: 2025-07-07 | End: 2025-07-09 | Stop reason: SDUPTHER

## 2025-07-07 RX ORDER — DONEPEZIL HYDROCHLORIDE 5 MG/1
5 TABLET, FILM COATED ORAL
Qty: 90 TABLET | Refills: 1 | Status: SHIPPED | OUTPATIENT
Start: 2025-07-07

## 2025-07-07 RX ORDER — MIRTAZAPINE 7.5 MG/1
7.5 TABLET, FILM COATED ORAL
Qty: 90 TABLET | Refills: 1 | Status: SHIPPED | OUTPATIENT
Start: 2025-07-07

## 2025-07-07 RX ORDER — ONDANSETRON 4 MG/1
4 TABLET, FILM COATED ORAL 3 TIMES DAILY
Qty: 20 TABLET | Refills: 1 | Status: SHIPPED | OUTPATIENT
Start: 2025-07-07

## 2025-07-07 RX ORDER — PANTOPRAZOLE SODIUM 40 MG/1
40 TABLET, DELAYED RELEASE ORAL DAILY
Qty: 90 TABLET | Refills: 1 | Status: SHIPPED | OUTPATIENT
Start: 2025-07-07

## 2025-07-07 RX ORDER — CLOPIDOGREL BISULFATE 75 MG/1
75 TABLET ORAL DAILY
Qty: 90 TABLET | Refills: 1 | Status: SHIPPED | OUTPATIENT
Start: 2025-07-07

## 2025-07-07 RX ORDER — CYANOCOBALAMIN (VITAMIN B-12) 250 MCG
250 TABLET ORAL DAILY
Qty: 100 TABLET | Refills: 5 | Status: SHIPPED | OUTPATIENT
Start: 2025-07-07

## 2025-07-07 RX ORDER — MIRTAZAPINE 7.5 MG/1
7.5 TABLET, FILM COATED ORAL
COMMUNITY
Start: 2025-06-21 | End: 2025-07-07 | Stop reason: SDUPTHER

## 2025-07-07 NOTE — PROGRESS NOTES
Transition of Care Visit:  Name: Jose Maria Bethea      : 1950      MRN: [id number]  Encounter Provider: Puneet Charles MD  Encounter Date: 2025   Encounter department: [facility]    Assessment & Plan  Brainstem stroke (HCC)    Orders:    clopidogrel (PLAVIX) 75 mg tablet; Take 1 tablet (75 mg total) by mouth daily    B12 deficiency    Orders:    vitamin B-12 (CYANOCOBALAMIN) 250 MCG TABS; Take 1 tablet (250 mcg total) by mouth daily    Essential hypertension    Orders:    NIFEdipine ER (ADALAT CC) 60 MG 24 hr tablet; Take 1 tablet (60 mg total) by mouth daily    Type 2 diabetes mellitus with hyperglycemia, with long-term current use of insulin ([facility])    Lab Results   Component Value Date    HGBA1C 9.0 (H) 2025            Carbuncle    Orders:    doxycycline hyclate (VIBRAMYCIN) 100 mg capsule; Take 1 capsule (100 mg total) by mouth every 12 (twelve) hours for 5 days With food    Gastroesophageal reflux disease without esophagitis         Balance disorder    Orders:    pantoprazole (PROTONIX) 40 mg tablet; Take 1 tablet (40 mg total) by mouth daily    scopolamine (TRANSDERM-SCOP) 1 mg/3 days TD 72 hr patch; Place 1 patch on the skin every third day over 72 hours    Cognitive decline    Orders:    mirtazapine (REMERON) 7.5 MG tablet; Take 1 tablet (7.5 mg total) by mouth daily at bedtime    donepezil (ARICEPT) 5 mg tablet; Take 1 tablet (5 mg total) by mouth daily at bedtime Recent stroke.  Ordered at [facility]    Nausea    Orders:    ondansetron (ZOFRAN) 4 mg tablet; Take 1 tablet (4 mg total) by mouth 3 (three) times a day Take 4 mg before meals 3 times a day    Skin lesion    Orders:    doxycycline hyclate (VIBRAMYCIN) 100 mg capsule; Take 1 capsule (100 mg total) by mouth every 12 (twelve) hours for 5 days With food    Wound culture and Gram stain; Future         History of Present Illness     Transitional Care Management Review:   Jose Maria Bethea is a 75 y.o. male  here for TCM follow up.     During the TCM phone call patient stated:  TCM Call (since 6/23/2025)       Date and time call was made  6/27/2025  2:20 PM    Hospital care reviewed  Records reviewed    Patient was hospitialized at  Other (comment)    Comment  Marv Huntley    Date of Admission  04/24/25    Date of discharge  06/26/25    Diagnosis  Brainstem stroke    Disposition  Home    Were the patients medications reviewed and updated  No    Current Symptoms  Fatigue; Arm pain - left side          TCM Call (since 6/23/2025)       Post hospital issues  Reduced activity; Poor ADL (Activities of Daily Living) performance    Scheduled for follow up?  Yes    Did you obtain your prescribed medications  Yes    Do you need help managing your prescriptions or medications  Yes    Why type of assitance do you need  wife    Is transportation to your appointment needed  Yes    I have advised the patient to call PCP with any new or worsening symptoms  Sj Garcia CMA    Living Arrangements  Spouse or Significiant other    Support System  Spouse    Do you have social support  Yes, as much as I need          HPI  Review of Systems   Constitutional:  Negative for appetite change, chills, fatigue and fever.   HENT:  Negative for sore throat and trouble swallowing.    Eyes:  Negative for redness.   Respiratory:  Negative for shortness of breath.    Cardiovascular:  Negative for chest pain and palpitations.   Gastrointestinal:  Negative for abdominal pain, constipation and diarrhea.   Genitourinary:  Negative for dysuria and hematuria.   Musculoskeletal:  Positive for arthralgias and gait problem. Negative for back pain and neck pain.   Skin:  Negative for rash.        Lesion left face   Neurological:  Positive for weakness. Negative for seizures and headaches.   Hematological:  Negative for adenopathy.   Psychiatric/Behavioral:  Negative for confusion. The patient is not nervous/anxious.      Objective   /86 (BP  Location: Right arm, Patient Position: Sitting, Cuff Size: Standard)   Pulse 75   Temp 98.8 °F (37.1 °C) (Temporal)   SpO2 96%     Physical Exam  Vitals and nursing note reviewed.   Constitutional:       General: He is not in acute distress.     Appearance: He is well-developed.   HENT:      Head: Normocephalic and atraumatic.     Eyes:      Conjunctiva/sclera: Conjunctivae normal.       Cardiovascular:      Rate and Rhythm: Normal rate and regular rhythm.      Heart sounds: No murmur heard.  Pulmonary:      Effort: Pulmonary effort is normal. No respiratory distress.      Breath sounds: Normal breath sounds.   Abdominal:      Palpations: Abdomen is soft.      Tenderness: There is no abdominal tenderness.     Musculoskeletal:         General: No swelling.      Cervical back: Neck supple.     Skin:     General: Skin is warm and dry.      Capillary Refill: Capillary refill takes less than 2 seconds.      Findings: Lesion present.     Neurological:      Mental Status: He is alert.      Motor: Weakness present.      Gait: Gait abnormal.     Psychiatric:         Mood and Affect: Mood normal.       Medications have been reviewed by provider in current encounter

## 2025-07-07 NOTE — PROGRESS NOTES
Diabetic Foot Exam    Patient's shoes and socks removed.    Right Foot/Ankle   Right Foot Inspection  Skin Exam: skin normal and skin intact. No dry skin, no warmth, no callus, no erythema, no maceration, no abnormal color, no pre-ulcer, no ulcer and no callus.     Toe Exam: ROM and strength within normal limits.     Sensory   Monofilament testing: diminished    Vascular  The right DP pulse is 1+. The right PT pulse is 1+.     Left Foot/Ankle  Left Foot Inspection  Skin Exam: skin normal and skin intact. No dry skin, no warmth, no erythema, no maceration, normal color, no pre-ulcer, no ulcer and no callus.     Toe Exam: ROM and strength within normal limits.     Sensory   Monofilament testing: diminished    Vascular  The left DP pulse is 1+. The left PT pulse is 1+.     Assign Risk Category  No deformity present  No loss of protective sensation  No weak pulses  Risk: 0

## 2025-07-07 NOTE — TELEPHONE ENCOUNTER
Received fax from pharmacy for prior authorization for Scopolamine 1 mg/3 Days 72 Hr patches  . Sent to prior Auth pod and scanned into

## 2025-07-07 NOTE — ASSESSMENT & PLAN NOTE
Orders:    NIFEdipine ER (ADALAT CC) 60 MG 24 hr tablet; Take 1 tablet (60 mg total) by mouth daily

## 2025-07-08 ENCOUNTER — TELEPHONE (OUTPATIENT)
Dept: INTERNAL MEDICINE CLINIC | Facility: CLINIC | Age: 75
End: 2025-07-08

## 2025-07-08 NOTE — TELEPHONE ENCOUNTER
PA for Scopolamine (TRANSDERM-SCOP) 1 mg/3 days TD 72 hr patch SUBMITTED to [employer]    via    []CMM-KEY:   [x]Surescripts-Case ID #: Not provided  []Availity-Auth ID #   []Faxed to plan   []Other website   []Phone call Case ID #     []PA sent as URGENT    All office notes, labs and other pertaining documents and studies sent. Clinical questions answered. Awaiting determination from insurance company.     Turnaround time for your insurance to make a decision on your Prior Authorization can take 7-21 business days.

## 2025-07-09 ENCOUNTER — RESULTS FOLLOW-UP (OUTPATIENT)
Dept: INTERNAL MEDICINE CLINIC | Facility: CLINIC | Age: 75
End: 2025-07-09

## 2025-07-09 DIAGNOSIS — E11.65 TYPE 2 DIABETES MELLITUS WITH HYPERGLYCEMIA, WITH LONG-TERM CURRENT USE OF INSULIN (HCC): ICD-10-CM

## 2025-07-09 DIAGNOSIS — R26.89 BALANCE DISORDER: ICD-10-CM

## 2025-07-09 DIAGNOSIS — Z79.4 TYPE 2 DIABETES MELLITUS WITH HYPERGLYCEMIA, WITH LONG-TERM CURRENT USE OF INSULIN (HCC): ICD-10-CM

## 2025-07-09 RX ORDER — METFORMIN HYDROCHLORIDE 500 MG/1
1000 TABLET, EXTENDED RELEASE ORAL 2 TIMES DAILY
Qty: 360 TABLET | Refills: 1 | Status: SHIPPED | OUTPATIENT
Start: 2025-07-09

## 2025-07-09 RX ORDER — INSULIN GLARGINE 100 [IU]/ML
INJECTION, SOLUTION SUBCUTANEOUS
Qty: 15 ML | Refills: 1 | Status: SHIPPED | OUTPATIENT
Start: 2025-07-09

## 2025-07-09 NOTE — TELEPHONE ENCOUNTER
PA for Scopolamine (TRANSDERM-SCOP) 1 mg/3 days TD 72 hr patch DENIED    Reason:(Screenshot if applicable)        Message sent to office clinical pool Yes    Denial letter scanned into Media Yes    We can gladly do an appeal but the process can take about 30-60 days to provide determination. Please have the office staff schedule a Peer to Peer at phone [phone] . If an appeal is truly warranted please have Provider send clinical documentation to the PA department to support the appeal.     **Please follow up with your patient regarding denial and next steps**

## 2025-07-11 LAB
BACTERIA WND AEROBE CULT: ABNORMAL
GRAM STN SPEC: ABNORMAL

## 2025-07-11 RX ORDER — SCOPOLAMINE 1 MG/3D
1 PATCH, EXTENDED RELEASE TRANSDERMAL
Qty: 4 PATCH | Refills: 0 | Status: SHIPPED | OUTPATIENT
Start: 2025-07-11 | End: 2025-07-14 | Stop reason: SDUPTHER

## 2025-07-12 ENCOUNTER — TELEPHONE (OUTPATIENT)
Dept: OTHER | Facility: OTHER | Age: 75
End: 2025-07-12

## 2025-07-12 DIAGNOSIS — R26.89 BALANCE DISORDER: ICD-10-CM

## 2025-07-12 DIAGNOSIS — R11.2 INTRACTABLE NAUSEA AND VOMITING: Primary | ICD-10-CM

## 2025-07-12 NOTE — TELEPHONE ENCOUNTER
"Bettina ([employer]) stated, \"Please advise provider that the medication request for scopolamine (TRANSDERM-SCOP) 1 mg/3 days TD 72 hr patch was not granted.\"    Please review. Thank you.  "

## 2025-07-14 RX ORDER — SCOPOLAMINE 1 MG/3D
1 PATCH, EXTENDED RELEASE TRANSDERMAL
Qty: 4 PATCH | Refills: 0 | Status: SHIPPED | OUTPATIENT
Start: 2025-07-14

## 2025-07-14 NOTE — TELEPHONE ENCOUNTER
Pt's wife called to advise that scopolamine patches being denied by insurance.. needed for imbalance issues from stroke, dr rachel baer put him on it ...     contact dr rachel baer for any questions but patient needs this appealed and approved    Also the bed they got from [facility] 3 times already, looking for a better bed that would be covered by insurance. (bariatric bed is what's needed) . Please advise if it can be ordered.

## 2025-07-16 DIAGNOSIS — Z79.4 TYPE 2 DIABETES MELLITUS WITH HYPERGLYCEMIA, WITH LONG-TERM CURRENT USE OF INSULIN (HCC): ICD-10-CM

## 2025-07-16 DIAGNOSIS — E11.65 TYPE 2 DIABETES MELLITUS WITH HYPERGLYCEMIA, WITH LONG-TERM CURRENT USE OF INSULIN (HCC): ICD-10-CM

## 2025-07-18 DIAGNOSIS — I63.9 BRAINSTEM STROKE (HCC): Primary | ICD-10-CM

## 2025-07-18 DIAGNOSIS — R26.89 BALANCE DISORDER: ICD-10-CM

## 2025-07-18 DIAGNOSIS — R11.10 VOMITING IN ADULT: ICD-10-CM

## 2025-07-18 RX ORDER — SYRINGE-NEEDLE,INSULIN,0.5 ML 27GX1/2"
SYRINGE, EMPTY DISPOSABLE MISCELLANEOUS 4 TIMES DAILY
Qty: 400 EACH | Refills: 0 | Status: SHIPPED | OUTPATIENT
Start: 2025-07-18 | End: 2025-10-26

## 2025-07-21 RX ORDER — SCOPOLAMINE 1 MG/3D
1 PATCH, EXTENDED RELEASE TRANSDERMAL
Qty: 4 PATCH | Refills: 0 | Status: SHIPPED | OUTPATIENT
Start: 2025-07-21

## 2025-07-21 NOTE — TELEPHONE ENCOUNTER
Called and left message on machine on Dr. Nolan phone, if biopsy results are in, please fax to [phone].

## 2025-07-21 NOTE — TELEPHONE ENCOUNTER
Patient needs the scopolamine patch due to intractable vomiting and nausea after cerebrovascular accident and hospitalization.  He cannot function and walk without nausea and vomiting if he does not take this patch.

## 2025-07-22 ENCOUNTER — OFFICE VISIT (OUTPATIENT)
Dept: ENDOCRINOLOGY | Facility: CLINIC | Age: 75
End: 2025-07-22
Payer: COMMERCIAL

## 2025-07-22 ENCOUNTER — TELEPHONE (OUTPATIENT)
Age: 75
End: 2025-07-22

## 2025-07-22 VITALS
RESPIRATION RATE: 16 BRPM | OXYGEN SATURATION: 96 % | SYSTOLIC BLOOD PRESSURE: 144 MMHG | BODY MASS INDEX: 21.38 KG/M2 | HEART RATE: 76 BPM | DIASTOLIC BLOOD PRESSURE: 82 MMHG | TEMPERATURE: 97.7 F | HEIGHT: 66 IN | WEIGHT: 133 LBS

## 2025-07-22 DIAGNOSIS — E55.9 VITAMIN D DEFICIENCY: ICD-10-CM

## 2025-07-22 DIAGNOSIS — Z79.4 TYPE 2 DIABETES MELLITUS WITH HYPERGLYCEMIA, WITH LONG-TERM CURRENT USE OF INSULIN (HCC): Primary | ICD-10-CM

## 2025-07-22 DIAGNOSIS — Z79.4 TYPE 2 DIABETES MELLITUS WITH DIABETIC CATARACT, WITH LONG-TERM CURRENT USE OF INSULIN (HCC): ICD-10-CM

## 2025-07-22 DIAGNOSIS — E78.2 MIXED HYPERLIPIDEMIA: ICD-10-CM

## 2025-07-22 DIAGNOSIS — E11.65 TYPE 2 DIABETES MELLITUS WITH HYPERGLYCEMIA, WITH LONG-TERM CURRENT USE OF INSULIN (HCC): Primary | ICD-10-CM

## 2025-07-22 DIAGNOSIS — E11.36 TYPE 2 DIABETES MELLITUS WITH DIABETIC CATARACT, WITH LONG-TERM CURRENT USE OF INSULIN (HCC): ICD-10-CM

## 2025-07-22 PROCEDURE — 95251 CONT GLUC MNTR ANALYSIS I&R: CPT | Performed by: INTERNAL MEDICINE

## 2025-07-22 PROCEDURE — 99215 OFFICE O/P EST HI 40 MIN: CPT | Performed by: INTERNAL MEDICINE

## 2025-07-22 PROCEDURE — G2211 COMPLEX E/M VISIT ADD ON: HCPCS | Performed by: INTERNAL MEDICINE

## 2025-07-22 RX ORDER — HYDROCHLOROTHIAZIDE 12.5 MG/1
CAPSULE ORAL
Qty: 6 EACH | Refills: 1 | Status: SHIPPED | OUTPATIENT
Start: 2025-07-22

## 2025-07-22 RX ORDER — INSULIN GLARGINE 100 [IU]/ML
INJECTION, SOLUTION SUBCUTANEOUS
Qty: 15 ML | Refills: 1 | Status: SHIPPED | OUTPATIENT
Start: 2025-07-22

## 2025-07-22 RX ORDER — FLURBIPROFEN SODIUM 0.3 MG/ML
SOLUTION/ DROPS OPHTHALMIC
Qty: 100 EACH | Refills: 3 | Status: SHIPPED | OUTPATIENT
Start: 2025-07-22

## 2025-07-22 RX ORDER — INSULIN ASPART 100 [IU]/ML
INJECTION, SOLUTION INTRAVENOUS; SUBCUTANEOUS
Qty: 30 ML | Refills: 1 | Status: SHIPPED | OUTPATIENT
Start: 2025-07-22

## 2025-07-22 NOTE — TELEPHONE ENCOUNTER
Patient wife calling asking about the sliding scale. She wanted to know if he always gets the 6 units, or if he falls in a range he will get the 1-5 units? Informed her that he always get the 6 units and if he falls int he range on the sliding scale he will get an additional 1-5 units depending on where he lands on the scale. No further questions at this time.

## 2025-07-22 NOTE — ASSESSMENT & PLAN NOTE
He is uncontrolled with recent CVA. GMI was 8.3%.    Today we reviewed all data including CGM and MRI brain and agreed to use following regimen for next 3 months.    Your Current Insulin  and dose is: Before Breakfast Before Lunch Before Evening Meal Bedtime     Novolog Insulin   6u   6u   6u    Regular, Apidra, Humalog orNovolog Sliding Scale:   <80              151-200 + 1 +1 +1    201-250 +2 +2 +2 +   251-300 +3 +3 +3 +   301-350 +4 +4 +4 +   >350 +5 +5 +5 +     Jardiance 25mg        Lantus Insulin    15u     Switch to tamiko 3 plus.  Follow up in 3 months.    Lab Results   Component Value Date    HGBA1C 9.0 (H) 03/26/2025

## 2025-07-22 NOTE — PATIENT INSTRUCTIONS
INSULIN DOSAGE INSTRUCTIONS    Name: Jose Maria Bethea                        : 1950  MRN #: [id number]    Your Current Insulin  and dose is: Before Breakfast Before Lunch Before Evening Meal Bedtime     Novolog Insulin   6u   6u   6u    Regular, Apidra, Humalog orNovolog Sliding Scale:   <80              151-200 + 1 +1 +1    201-250 +2 +2 +2 +   251-300 +3 +3 +3 +   301-350 +4 +4 +4 +   >350 +5 +5 +5 +     Jardiance 25mg        Lantus Insulin    15u     Additional Instructions:   Please test your blood sugar: 4 times daily- before meals and bedtime OR use a glucose sensor.  Target Blood sugar range _70_to _180__.  Call if your Puneet Charles MD  blood sugar is less than _60_ or greater than _400__.    Today's Date: 2025

## 2025-07-22 NOTE — PROGRESS NOTES
"    Follow-up Patient Progress Note      CC: type 2 diabetes     History of Present Illness:   72 yr male with type 2 diabetes since 2002, HTN, HLD, DEWEY, Hx vertebral artery stenosis, retinopathy and microalbuminuria. Last visit was 1/3/25. He had recent CVA.     Since last visit, he lost 21 lbs. No polyuria, polydipsia or blurred vision.        CGM data review::  Device: tamiko Dates: 7/9/25-7/22/25  Usage: 97 %  Av glu: 207 mg/dL  SD:  mg/dL CV:   % GMI: 8.3 % TIR: 34 % TAR: 45+21 %  TBR: 0 %  Glycemic patters:  mild postprandial hyperglycemia.  Hypoglycemia: No    Diet: regular.  Activity: active physically.     Home blood glucose monitoring: does not do glucose checks. Does not wish to use a CGM.  Hypoglycemia: no symptoms     Current meds:  Lantus 15u daily bedtime.  Jardiance 25mg PO daily  Metformin 1000mg PO BID  Novolog     Opthamology: yes, retinopathy+ve de 11/2023  Podiatry: no  Influenza: no; covid - yes  Dental:  Pancreatitis: No     Ace/ARB: valsartan  Statin: no- lipitor  Thyroid issues: none    Physical Exam:  Body mass index is 21.47 kg/m².  /82 (BP Location: Left arm, Patient Position: Sitting)   Pulse 76   Temp 97.7 °F (36.5 °C) (Temporal)   Resp 16   Ht 5' 6\" (1.676 m)   Wt 60.3 kg (133 lb) Comment: patient reported, patient unable to stand on scale patient in wheelchair  SpO2 96%   BMI 21.47 kg/m²    Vitals:    07/22/25 1048   Weight: 60.3 kg (133 lb)        Physical Exam  Constitutional:       General: He is not in acute distress.     Appearance: He is well-developed.   HENT:      Head: Normocephalic and atraumatic.      Nose: Nose normal.     Eyes:      Conjunctiva/sclera: Conjunctivae normal.     Pulmonary:      Effort: Pulmonary effort is normal.   Abdominal:      General: There is no distension.     Musculoskeletal:      Cervical back: Normal range of motion and neck supple.     Skin:     Findings: No rash.      Comments: No icterus     Neurological:      Mental Status: He is " alert and oriented to person, place, and time.       Labs:   Lab Results   Component Value Date    HGBA1C 9.0 (H) 03/26/2025       Lab Results   Component Value Date    WBK5EZYTHACY 1.156 10/30/2024       Lab Results   Component Value Date    CREATININE 1.29 04/01/2025    CREATININE 1.29 03/31/2025    CREATININE 1.24 03/27/2025    BUN 36 (H) 04/01/2025     04/23/2015    K 3.8 04/01/2025     04/01/2025    CO2 25 04/01/2025     eGFR   Date Value Ref Range Status   04/01/2025 54 ml/min/1.73sq m Final       Lab Results   Component Value Date    ALT 15 04/01/2025    AST 19 04/01/2025    ALKPHOS 71 04/01/2025    BILITOT 0.5 04/23/2015       Lab Results   Component Value Date    CHOLESTEROL 113 03/26/2025    CHOLESTEROL 137 10/30/2024    CHOLESTEROL 142 04/27/2021     Lab Results   Component Value Date    HDL 44 03/26/2025    HDL 41 10/30/2024    HDL 47 04/27/2021     Lab Results   Component Value Date    TRIG 125 03/26/2025    TRIG 156 (H) 10/30/2024    TRIG 99 04/27/2021     Lab Results   Component Value Date    NONHDLC 96 10/30/2024    NONHDLC 90 07/21/2019    NONHDLC 71 12/30/2018         Assessment/Plan:    1. Type 2 diabetes mellitus with hyperglycemia, with long-term current use of insulin (HCC)  Assessment & Plan:  He is uncontrolled with recent CVA. GMI was 8.3%.    Today we reviewed all data including CGM and MRI brain and agreed to use following regimen for next 3 months.    Your Current Insulin  and dose is: Before Breakfast Before Lunch Before Evening Meal Bedtime     Novolog Insulin   6u   6u   6u    Regular, Apidra, Humalog orNovolog Sliding Scale:   <80              151-200 + 1 +1 +1    201-250 +2 +2 +2 +   251-300 +3 +3 +3 +   301-350 +4 +4 +4 +   >350 +5 +5 +5 +     Jardiance 25mg        Lantus Insulin    15u     Switch to tamiko 3 plus.  Follow up in 3 months.    Lab Results   Component Value Date    HGBA1C 9.0 (H) 03/26/2025     Orders:  -     NovoLOG FlexPen 100 units/mL injection pen;  Use 6u TIDAC plus 1u/50 above 150mg/dL; Max dose 30u/day.  -     Empagliflozin (Jardiance) 25 MG TABS; Take 1 tablet (25 mg total) by mouth every morning  -     Insulin Pen Needle (B-D UF III MINI PEN NEEDLES) 31G X 5 MM MISC; Use daily  -     Insulin Glargine Solostar (Lantus SoloStar) 100 UNIT/ML SOPN; Use 15u QHS  -     Continuous Glucose Sensor (FreeStyle Eddie 3 Plus Sensor) MISC; Use 1 each every 15 days  2. Type 2 diabetes mellitus with diabetic cataract, with long-term current use of insulin (HCC)  Assessment & Plan:    Lab Results   Component Value Date    HGBA1C 9.0 (H) 03/26/2025     3. Mixed hyperlipidemia  Assessment & Plan:  Continue statin therapy.  4. Vitamin D deficiency        I have spent a total time of 40 minutes on 07/22/25 in caring for this patient including greater than 50% of this time was spent in counseling/coordination of care as listed above.       Discussed with the patient and all questioned fully answered. He will contact me with concerns.    Umm Saucedo

## 2025-07-23 ENCOUNTER — TELEPHONE (OUTPATIENT)
Age: 75
End: 2025-07-23

## 2025-07-23 NOTE — TELEPHONE ENCOUNTER
PA for B-D UF III MINI PEN NEEDLES 31G X 5 MM SUBMITTED to [facility]    via    [x][employer]-Case ID # [id number]  [x]PA sent as URGENT    All office notes, labs and other pertaining documents and studies sent. Clinical questions answered. Awaiting determination from insurance company.     Turnaround time for your insurance to make a decision on your Prior Authorization can take 7-21 business days.

## 2025-07-24 NOTE — TELEPHONE ENCOUNTER
Phone call from [place] with [facility] PA dept with some clinical questions for pen needle PA. She asked if the patient is using the pen needles to deliver insulin to the body. Confirmed that he is on short and long acting insulin daily. She also asked if the patient has been on insulin for over 80 days. Confirmed that he has. They will send determination to the office.  No further action needed

## 2025-07-25 NOTE — TELEPHONE ENCOUNTER
PA for B-D UF III MINI PEN NEEDLES 31G X 5 MM  APPROVED     Date(s) approved 4/25/25-7/24/26    Case #    Patient advised by          [x][employer] Message  []Phone call   []LMOM  []L/M to call office as no active Communication consent on file  []Unable to leave detailed message as VM not approved on Communication consent       Pharmacy advised by    [x]Fax  []Phone call  []Secure Chat    Specialty Pharmacy    []      Approval letter scanned into Media No

## 2025-08-11 ENCOUNTER — OFFICE VISIT (OUTPATIENT)
Dept: INTERNAL MEDICINE CLINIC | Facility: CLINIC | Age: 75
End: 2025-08-11

## 2025-08-11 VITALS
HEIGHT: 66 IN | TEMPERATURE: 97.9 F | DIASTOLIC BLOOD PRESSURE: 76 MMHG | HEART RATE: 81 BPM | SYSTOLIC BLOOD PRESSURE: 134 MMHG | OXYGEN SATURATION: 96 % | BODY MASS INDEX: 21.47 KG/M2

## 2025-08-11 DIAGNOSIS — Z00.00 MEDICARE ANNUAL WELLNESS VISIT, SUBSEQUENT: Primary | ICD-10-CM

## 2025-08-11 DIAGNOSIS — Z79.4 TYPE 2 DIABETES MELLITUS WITH HYPERGLYCEMIA, WITH LONG-TERM CURRENT USE OF INSULIN (HCC): ICD-10-CM

## 2025-08-11 DIAGNOSIS — I63.9 BRAINSTEM STROKE (HCC): ICD-10-CM

## 2025-08-11 DIAGNOSIS — K59.09 OTHER CONSTIPATION: ICD-10-CM

## 2025-08-11 DIAGNOSIS — E11.65 TYPE 2 DIABETES MELLITUS WITH HYPERGLYCEMIA, WITH LONG-TERM CURRENT USE OF INSULIN (HCC): ICD-10-CM

## 2025-08-11 DIAGNOSIS — C44.329 SQUAMOUS CELL CANCER OF SKIN OF LEFT CHEEK: ICD-10-CM

## 2025-08-11 RX ORDER — DOCUSATE SODIUM 100 MG/1
100 CAPSULE, LIQUID FILLED ORAL 2 TIMES DAILY
Qty: 200 CAPSULE | Refills: 3 | Status: SHIPPED | OUTPATIENT
Start: 2025-08-11

## 2025-08-12 ENCOUNTER — TELEPHONE (OUTPATIENT)
Age: 75
End: 2025-08-12

## 2025-08-17 ENCOUNTER — PATIENT MESSAGE (OUTPATIENT)
Dept: INTERNAL MEDICINE CLINIC | Facility: CLINIC | Age: 75
End: 2025-08-17

## 2025-08-17 DIAGNOSIS — K59.09 OTHER CONSTIPATION: Primary | ICD-10-CM

## 2025-08-18 RX ORDER — BISACODYL 5 MG/1
5 TABLET, DELAYED RELEASE ORAL DAILY PRN
Qty: 30 TABLET | Refills: 0 | Status: SHIPPED | OUTPATIENT
Start: 2025-08-18

## 2025-08-20 ENCOUNTER — TELEPHONE (OUTPATIENT)
Age: 75
End: 2025-08-20

## 2025-08-21 DIAGNOSIS — E11.36 TYPE 2 DIABETES MELLITUS WITH DIABETIC CATARACT, WITH LONG-TERM CURRENT USE OF INSULIN (HCC): Primary | ICD-10-CM

## 2025-08-21 DIAGNOSIS — Z79.4 TYPE 2 DIABETES MELLITUS WITH DIABETIC CATARACT, WITH LONG-TERM CURRENT USE OF INSULIN (HCC): Primary | ICD-10-CM

## 2025-08-21 RX ORDER — LANCETS
EACH MISCELLANEOUS
Qty: 200 EACH | Refills: 3 | Status: SHIPPED | OUTPATIENT
Start: 2025-08-21

## 2025-08-21 RX ORDER — BLOOD SUGAR DIAGNOSTIC
STRIP MISCELLANEOUS
Qty: 200 EACH | Refills: 3 | Status: SHIPPED | OUTPATIENT
Start: 2025-08-21 | End: 2025-08-26

## 2025-08-21 RX ORDER — BLOOD-GLUCOSE METER
EACH MISCELLANEOUS
Qty: 1 KIT | Refills: 1 | Status: SHIPPED | OUTPATIENT
Start: 2025-08-21 | End: 2025-08-26

## 2025-08-22 ENCOUNTER — TREATMENT (OUTPATIENT)
Dept: OTHER | Facility: HOSPITAL | Age: 75
End: 2025-08-22

## 2025-08-22 DIAGNOSIS — Z79.4 TYPE 2 DIABETES MELLITUS WITH HYPERGLYCEMIA, WITH LONG-TERM CURRENT USE OF INSULIN (HCC): Primary | ICD-10-CM

## 2025-08-22 DIAGNOSIS — E11.65 TYPE 2 DIABETES MELLITUS WITH HYPERGLYCEMIA, WITH LONG-TERM CURRENT USE OF INSULIN (HCC): Primary | ICD-10-CM
